# Patient Record
Sex: MALE | Race: WHITE | NOT HISPANIC OR LATINO | Employment: OTHER | URBAN - METROPOLITAN AREA
[De-identification: names, ages, dates, MRNs, and addresses within clinical notes are randomized per-mention and may not be internally consistent; named-entity substitution may affect disease eponyms.]

---

## 2017-02-21 RX ORDER — LOSARTAN POTASSIUM 100 MG/1
100 TABLET ORAL DAILY
Qty: 90 TABLET | Refills: 3 | Status: SHIPPED | OUTPATIENT
Start: 2017-02-21 | End: 2017-06-19 | Stop reason: SDUPTHER

## 2017-03-22 ENCOUNTER — TELEPHONE (OUTPATIENT)
Dept: FAMILY MEDICINE CLINIC | Facility: CLINIC | Age: 72
End: 2017-03-22

## 2017-03-22 DIAGNOSIS — H26.9 CATARACTS, BILATERAL: Primary | ICD-10-CM

## 2017-04-12 RX ORDER — EZETIMIBE 10 MG/1
10 TABLET ORAL DAILY
Qty: 90 TABLET | Refills: 3 | Status: SHIPPED | OUTPATIENT
Start: 2017-04-12 | End: 2018-09-26 | Stop reason: SDUPTHER

## 2017-06-19 ENCOUNTER — HOSPITAL ENCOUNTER (EMERGENCY)
Facility: HOSPITAL | Age: 72
Discharge: HOME | End: 2017-06-19
Attending: EMERGENCY MEDICINE | Admitting: EMERGENCY MEDICINE

## 2017-06-19 ENCOUNTER — APPOINTMENT (OUTPATIENT)
Dept: GENERAL RADIOLOGY | Facility: HOSPITAL | Age: 72
End: 2017-06-19

## 2017-06-19 VITALS
RESPIRATION RATE: 16 BRPM | TEMPERATURE: 96 F | WEIGHT: 176 LBS | BODY MASS INDEX: 28.28 KG/M2 | HEART RATE: 73 BPM | DIASTOLIC BLOOD PRESSURE: 113 MMHG | SYSTOLIC BLOOD PRESSURE: 203 MMHG | OXYGEN SATURATION: 97 % | HEIGHT: 66 IN

## 2017-06-19 DIAGNOSIS — R51.9 FACIAL PAIN: Primary | ICD-10-CM

## 2017-06-19 PROCEDURE — 70220 X-RAY EXAM OF SINUSES: CPT

## 2017-06-19 PROCEDURE — 99283 EMERGENCY DEPT VISIT LOW MDM: CPT

## 2017-06-19 RX ORDER — HYDROCODONE BITARTRATE AND ACETAMINOPHEN 5; 325 MG/1; MG/1
1 TABLET ORAL EVERY 6 HOURS PRN
Qty: 10 TABLET | Refills: 0 | Status: SHIPPED | OUTPATIENT
Start: 2017-06-19 | End: 2018-09-26

## 2017-06-19 RX ORDER — HYDROCODONE BITARTRATE AND ACETAMINOPHEN 7.5; 325 MG/1; MG/1
1 TABLET ORAL ONCE
Status: COMPLETED | OUTPATIENT
Start: 2017-06-19 | End: 2017-06-19

## 2017-06-19 RX ORDER — LOSARTAN POTASSIUM 100 MG/1
100 TABLET ORAL DAILY
Qty: 90 TABLET | Refills: 3 | Status: SHIPPED | OUTPATIENT
Start: 2017-06-19 | End: 2018-09-26 | Stop reason: SDUPTHER

## 2017-06-19 RX ADMIN — HYDROCODONE BITARTRATE AND ACETAMINOPHEN 1 TABLET: 7.5; 325 TABLET ORAL at 01:29

## 2017-06-19 NOTE — ED PROVIDER NOTES
EMERGENCY DEPARTMENT ENCOUNTER    CHIEF COMPLAINT  Chief Complaint: Facial pain  History given by: patient   History limited by: n/a  Room Number: 16/16  PMD: Tyron Hunt MD      HPI:  Pt is a 72 y.o. male who presents complaining of pain across the bridge of his nose that began at 20:30. Pt also complains of neck pain that began at the same time. Pt denies know recent injury, but states that he broke his nasal bone in his teenage years. Pt states that he has had 4-5 similar episodes of pain over the last 10 years. Pt also states that he frequently has sinus problems. Pt also reports drinking a frozen mady ~2 hours prior to the pain beginning.    Duration:  4 hours   Onset: gradual  Timing: constant   Location: bridge of the nose   Radiation: none  Quality: pain  Intensity/Severity: mild   Progression: unchanged   Associated Symptoms: neck pain   Aggravating Factors: none  Alleviating Factors: none  Previous Episodes: hx of nasal fracture, similar episodes of pain (4-5 over the last 10 years)  Treatment before arrival: none    PAST MEDICAL HISTORY  Active Ambulatory Problems     Diagnosis Date Noted   • Hypertension 09/12/2016   • GERD (gastroesophageal reflux disease) 09/12/2016   • Hyperlipidemia 09/12/2016   • Anxiety 09/12/2016     Resolved Ambulatory Problems     Diagnosis Date Noted   • No Resolved Ambulatory Problems     Past Medical History:   Diagnosis Date   • Anxiety    • GERD (gastroesophageal reflux disease)    • Hyperlipidemia    • Hypertension    • Impaired fasting blood sugar    • Kidney stones        PAST SURGICAL HISTORY  Past Surgical History:   Procedure Laterality Date   • CYST REMOVAL     • CYSTOSCOPY W/ LITHOLAPAXY / [facility]         FAMILY HISTORY  History reviewed. No pertinent family history.    SOCIAL HISTORY  Social History     Social History   • Marital status:      Spouse name: N/A   • Number of children: N/A   • Years of education: N/A     Occupational History   • Not on  file.     Social History Main Topics   • Smoking status: Never Smoker   • Smokeless tobacco: Never Used   • Alcohol use Yes   • Drug use: No   • Sexual activity: Yes     Partners: Male     Other Topics Concern   • Not on file     Social History Narrative   • No narrative on file       ALLERGIES  Sulfa antibiotics    REVIEW OF SYSTEMS  Review of Systems   Constitutional: Negative for chills and fever.   HENT: Negative for congestion and sore throat.         Pain to the bridge of the nose     Eyes: Negative.    Respiratory: Negative for cough and shortness of breath.    Cardiovascular: Negative for chest pain and leg swelling.   Gastrointestinal: Negative for abdominal pain, diarrhea and vomiting.   Genitourinary: Negative for difficulty urinating and dysuria.   Musculoskeletal: Positive for neck pain. Negative for back pain.   Skin: Negative for rash and wound.   Allergic/Immunologic: Negative.    Neurological: Negative for dizziness, weakness, numbness and headaches.   Psychiatric/Behavioral: Negative.    All other systems reviewed and are negative.      PHYSICAL EXAM  ED Triage Vitals   Temp Heart Rate Resp BP SpO2   06/19/17 0007 06/19/17 0007 06/19/17 0007 06/19/17 0019 06/19/17 0007   96 °F (35.6 °C) 73 16 203/113 97 %      Temp src Heart Rate Source Patient Position BP Location FiO2 (%)   -- -- 06/19/17 0019 06/19/17 0019 --     Lying Right arm        Physical Exam   Constitutional: He is oriented to person, place, and time and well-developed, well-nourished, and in no distress.   HENT:   Head: Normocephalic and atraumatic.   Nose: Nose normal. Right sinus exhibits no maxillary sinus tenderness and no frontal sinus tenderness. Left sinus exhibits no maxillary sinus tenderness and no frontal sinus tenderness.   Eyes: EOM are normal. Pupils are equal, round, and reactive to light.   Neck: Normal range of motion. Neck supple.   Neck is non tender     Cardiovascular: Normal rate, regular rhythm and normal heart  sounds.    Pulmonary/Chest: Effort normal and breath sounds normal. No respiratory distress.   Abdominal: Soft. There is no tenderness. There is no rebound and no guarding.   Musculoskeletal: Normal range of motion. He exhibits no edema.   Neurological: He is alert and oriented to person, place, and time. He has normal sensation and normal strength.   Skin: Skin is warm and dry.   Psychiatric: Mood and affect normal.   Nursing note and vitals reviewed.    RADIOLOGY  XR Sinuses 3+ View   Preliminary Result   No definite evidence for acute or chronic sinusitis. If there is high   clinical concern, CT scan may be performed which is more sensitive and   specific for sinus pathology.                I ordered the above noted radiological studies. Interpreted by radiologist. Reviewed by me in PACS.       PROCEDURES  Procedures      PROGRESS AND CONSULTS  ED Course     00:28  XR sinuses ordered.     01:17  Norco ordered to treat pain.     01;20  BP- (!) 203/113 HR- 73 Temp- 96 °F (35.6 °C) O2 sat- 97%  Rechecked the patient who is in NAD and is resting comfortably. Advised pt that XR shows NAD. Plan to treat pain. Advised pt to f.u with PMD for blood pressure recheck, and with his ENT for further evaluation. Pt understands and agrees with the plan, all questions answered.      MEDICAL DECISION MAKING  Results were reviewed/discussed with the patient and they were also made aware of online access. Pt also made aware that some labs, such as cultures, will not be resulted during ER visit and follow up with PMD is necessary.     MDM  Number of Diagnoses or Management Options  Facial pain:      Amount and/or Complexity of Data Reviewed  Tests in the radiology section of CPT®: ordered and reviewed (XR sinus shows NAD)    Patient Progress  Patient progress: stable         DIAGNOSIS  Final diagnoses:   Facial pain       DISPOSITION    DISCHARGE    Patient discharged in stable condition.    Reviewed implications of results,  diagnosis, meds, responsibility to follow up, warning signs and symptoms of possible worsening, potential complications and reasons to return to ER.    Patient/Family voiced understanding of above instructions.    Discussed plan for discharge, as there is no emergent indication for admission.  Pt/family is agreeable and understands need for follow up and repeat testing.  Pt is aware that discharge does not mean that nothing is wrong but it indicates no emergency is present that requires admission and they must continue care with follow-up as given below or physician of their choice.     FOLLOW-UP  Tyron Hunt MD  [id number] [id number]  [phone]    Schedule an appointment as soon as possible for a visit           Medication List      Changed          escitalopram 20 MG tablet   Commonly known as:  LEXAPRO   Take 1 tablet by mouth daily.   What changed:  how much to take         Stop          Diclofenac 18 MG capsule   Commonly known as:  ZORVOLEX       ondansetron 4 MG tablet   Commonly known as:  ZOFRAN       tamsulosin 0.4 MG capsule 24 hr capsule   Commonly known as:  FLOMAX           Latest Documented Vital Signs:  As of 1:19 AM  BP- (!) 203/113 HR- 73 Temp- 96 °F (35.6 °C) O2 sat- 97%    --  Documentation assistance provided by regina Kevin for Dr White.  Information recorded by the scribe was done at my direction and has been verified and validated by me.        Mercedes Kevin  06/19/17 0122       Daniel White MD  06/19/17 0665

## 2017-06-19 NOTE — ED NOTES
Pt. Broke nose playing football years ago. Pt. Has had this pain before, but usually a couple of aleve take care of it, but tonight aleve did not work.     Emigdio Spann RN  06/19/17 0025  Pt. Also c/o of neck pain/stiffness, but does not have trouble turning side to side (no increased pain with head movement.     Emigdio Spann RN  06/19/17 0027

## 2017-06-19 NOTE — ED NOTES
Pt. Reports drinking about 1/2 a mady at 1700. Pt. Denies any direct pain from cold mady.      Emigdio Spann RN  06/19/17 0019  Pt. Reporting that his son is a medical Dr. And took him off of BP medicine approx. 3-4 mos. Pt. Routinely works out and lifts weights.     Emigdio Spann RN  06/19/17 0022

## 2017-06-20 ENCOUNTER — TELEPHONE (OUTPATIENT)
Dept: SOCIAL WORK | Facility: HOSPITAL | Age: 72
End: 2017-06-20

## 2017-06-20 NOTE — TELEPHONE ENCOUNTER
ED f/u phone call. States he is doing well and has already followed up w/ PCP. No questions/concerns

## 2017-07-27 ENCOUNTER — TELEPHONE (OUTPATIENT)
Dept: FAMILY MEDICINE CLINIC | Facility: CLINIC | Age: 72
End: 2017-07-27

## 2017-07-27 DIAGNOSIS — R21 RASH AND NONSPECIFIC SKIN ERUPTION: Primary | ICD-10-CM

## 2017-07-31 ENCOUNTER — TELEPHONE (OUTPATIENT)
Dept: FAMILY MEDICINE CLINIC | Facility: CLINIC | Age: 72
End: 2017-07-31

## 2017-07-31 DIAGNOSIS — N40.1 BENIGN PROSTATIC HYPERPLASIA WITH LOWER URINARY TRACT SYMPTOMS, UNSPECIFIED MORPHOLOGY: Primary | ICD-10-CM

## 2017-07-31 NOTE — TELEPHONE ENCOUNTER
Pt needs yearly visit with Urology, this is continued referral, has to be scheduled after 8/22/17 for yearly exam. Pt aware

## 2017-08-14 ENCOUNTER — TELEPHONE (OUTPATIENT)
Dept: FAMILY MEDICINE CLINIC | Facility: CLINIC | Age: 72
End: 2017-08-14

## 2017-08-14 DIAGNOSIS — E78.5 HYPERLIPIDEMIA, UNSPECIFIED HYPERLIPIDEMIA TYPE: ICD-10-CM

## 2017-08-14 RX ORDER — ATORVASTATIN CALCIUM 10 MG/1
10 TABLET, FILM COATED ORAL DAILY
Qty: 30 TABLET | Refills: 11 | Status: SHIPPED | OUTPATIENT
Start: 2017-08-14 | End: 2017-08-14 | Stop reason: SDUPTHER

## 2017-08-14 RX ORDER — ATORVASTATIN CALCIUM 10 MG/1
10 TABLET, FILM COATED ORAL DAILY
Qty: 90 TABLET | Refills: 3 | Status: SHIPPED | OUTPATIENT
Start: 2017-08-14 | End: 2018-09-26 | Stop reason: SDUPTHER

## 2017-09-14 DIAGNOSIS — R73.01 IFG (IMPAIRED FASTING GLUCOSE): ICD-10-CM

## 2017-09-14 DIAGNOSIS — I10 ESSENTIAL HYPERTENSION: Primary | ICD-10-CM

## 2017-09-15 ENCOUNTER — RESULTS ENCOUNTER (OUTPATIENT)
Dept: FAMILY MEDICINE CLINIC | Facility: CLINIC | Age: 72
End: 2017-09-15

## 2017-09-15 DIAGNOSIS — R73.01 IFG (IMPAIRED FASTING GLUCOSE): ICD-10-CM

## 2017-09-15 DIAGNOSIS — I10 ESSENTIAL HYPERTENSION: ICD-10-CM

## 2017-09-19 ENCOUNTER — OFFICE VISIT (OUTPATIENT)
Dept: FAMILY MEDICINE CLINIC | Facility: CLINIC | Age: 72
End: 2017-09-19

## 2017-09-19 VITALS
BODY MASS INDEX: 28.12 KG/M2 | SYSTOLIC BLOOD PRESSURE: 125 MMHG | WEIGHT: 175 LBS | RESPIRATION RATE: 16 BRPM | HEART RATE: 70 BPM | DIASTOLIC BLOOD PRESSURE: 85 MMHG | TEMPERATURE: 98.1 F | HEIGHT: 66 IN

## 2017-09-19 DIAGNOSIS — Z00.00 MEDICARE ANNUAL WELLNESS VISIT, SUBSEQUENT: Primary | ICD-10-CM

## 2017-09-19 DIAGNOSIS — Z23 IMMUNIZATION DUE: ICD-10-CM

## 2017-09-19 DIAGNOSIS — E78.2 MIXED HYPERLIPIDEMIA: Primary | ICD-10-CM

## 2017-09-19 LAB
ALBUMIN SERPL-MCNC: 4.5 G/DL (ref 3.5–5.2)
ALBUMIN/GLOB SERPL: 1.9 G/DL
ALP SERPL-CCNC: 52 U/L (ref 39–117)
ALT SERPL-CCNC: 17 U/L (ref 1–41)
AST SERPL-CCNC: 17 U/L (ref 1–40)
BASOPHILS # BLD AUTO: 0.04 10*3/MM3 (ref 0–0.2)
BASOPHILS NFR BLD AUTO: 0.8 % (ref 0–1.5)
BILIRUB SERPL-MCNC: 1 MG/DL (ref 0.1–1.2)
BUN SERPL-MCNC: 13 MG/DL (ref 8–23)
BUN/CREAT SERPL: 10.2 (ref 7–25)
CALCIUM SERPL-MCNC: 9.9 MG/DL (ref 8.6–10.5)
CHLORIDE SERPL-SCNC: 102 MMOL/L (ref 98–107)
CHOLEST SERPL-MCNC: 127 MG/DL (ref 0–200)
CO2 SERPL-SCNC: 24.5 MMOL/L (ref 22–29)
CREAT SERPL-MCNC: 1.27 MG/DL (ref 0.76–1.27)
EOSINOPHIL # BLD AUTO: 0.15 10*3/MM3 (ref 0–0.7)
EOSINOPHIL NFR BLD AUTO: 3.1 % (ref 0.3–6.2)
ERYTHROCYTE [DISTWIDTH] IN BLOOD BY AUTOMATED COUNT: 13.9 % (ref 11.5–14.5)
GLOBULIN SER CALC-MCNC: 2.4 GM/DL
GLUCOSE SERPL-MCNC: 113 MG/DL (ref 65–99)
HBA1C MFR BLD: 5.9 % (ref 4.8–5.6)
HCT VFR BLD AUTO: 45.8 % (ref 40.4–52.2)
HDLC SERPL-MCNC: 44 MG/DL (ref 40–60)
HGB BLD-MCNC: 16 G/DL (ref 13.7–17.6)
IMM GRANULOCYTES # BLD: 0 10*3/MM3 (ref 0–0.03)
IMM GRANULOCYTES NFR BLD: 0 % (ref 0–0.5)
LDLC SERPL CALC-MCNC: 60 MG/DL (ref 0–100)
LYMPHOCYTES # BLD AUTO: 1.61 10*3/MM3 (ref 0.9–4.8)
LYMPHOCYTES NFR BLD AUTO: 33.5 % (ref 19.6–45.3)
MCH RBC QN AUTO: 32.4 PG (ref 27–32.7)
MCHC RBC AUTO-ENTMCNC: 34.9 G/DL (ref 32.6–36.4)
MCV RBC AUTO: 92.7 FL (ref 79.8–96.2)
MONOCYTES # BLD AUTO: 0.44 10*3/MM3 (ref 0.2–1.2)
MONOCYTES NFR BLD AUTO: 9.2 % (ref 5–12)
NEUTROPHILS # BLD AUTO: 2.56 10*3/MM3 (ref 1.9–8.1)
NEUTROPHILS NFR BLD AUTO: 53.4 % (ref 42.7–76)
PLATELET # BLD AUTO: 151 10*3/MM3 (ref 140–500)
POTASSIUM SERPL-SCNC: 4.1 MMOL/L (ref 3.5–5.2)
PROT SERPL-MCNC: 6.9 G/DL (ref 6–8.5)
RBC # BLD AUTO: 4.94 10*6/MM3 (ref 4.6–6)
SODIUM SERPL-SCNC: 142 MMOL/L (ref 136–145)
TRIGL SERPL-MCNC: 114 MG/DL (ref 0–150)
TSH SERPL DL<=0.005 MIU/L-ACNC: 1.54 MIU/ML (ref 0.27–4.2)
VLDLC SERPL CALC-MCNC: 22.8 MG/DL (ref 5–40)
WBC # BLD AUTO: 4.8 10*3/MM3 (ref 4.5–10.7)

## 2017-09-19 PROCEDURE — G0008 ADMIN INFLUENZA VIRUS VAC: HCPCS | Performed by: FAMILY MEDICINE

## 2017-09-19 PROCEDURE — 96160 PT-FOCUSED HLTH RISK ASSMT: CPT | Performed by: FAMILY MEDICINE

## 2017-09-19 PROCEDURE — G0439 PPPS, SUBSEQ VISIT: HCPCS | Performed by: FAMILY MEDICINE

## 2017-09-19 PROCEDURE — 90686 IIV4 VACC NO PRSV 0.5 ML IM: CPT | Performed by: FAMILY MEDICINE

## 2017-09-19 NOTE — PROGRESS NOTES
QUICK REFERENCE INFORMATION:  The ABCs of the Annual Wellness Visit    Subsequent Medicare Wellness Visit    HEALTH RISK ASSESSMENT    1945    Recent Hospitalizations:  No hospitalization(s) within the last year..        Current Medical Providers:  Patient Care Team:  Tyron Hunt MD as PCP - General  Tyron Hunt MD as PCP - Family Medicine        Smoking Status:  History   Smoking Status   • Never Smoker   Smokeless Tobacco   • Never Used       Alcohol Consumption:  History   Alcohol Use   • Yes       Depression Screen:   PHQ-2/PHQ-9 Depression Screening 9/19/2017   Little interest or pleasure in doing things 0   Feeling down, depressed, or hopeless 0   Trouble falling or staying asleep, or sleeping too much 0   Feeling tired or having little energy 0   Poor appetite or overeating 0   Feeling bad about yourself - or that you are a failure or have let yourself or your family down 0   Trouble concentrating on things, such as reading the newspaper or watching television 0   Moving or speaking so slowly that other people could have noticed. Or the opposite - being so fidgety or restless that you have been moving around a lot more than usual 0   Thoughts that you would be better off dead, or of hurting yourself in some way 0   Total Score 0   If you checked off any problems, how difficult have these problems made it for you to do your work, take care of things at home, or get along with other people? Not difficult at all       Health Habits and Functional and Cognitive Screening:  Functional & Cognitive Status 9/19/2017   Do you have difficulty preparing food and eating? No   Do you have difficulty bathing yourself? No   Do you have difficulty getting dressed? No   Do you have difficulty using the toilet? No   Do you have difficulty moving around from place to place? No   In the past year have you fallen or experienced a near fall? No   Do you need help using the phone?  No   Are you deaf or do you have serious  difficulty hearing?  No   Do you need help with transportation? No   Do you need help shopping? No   Do you need help preparing meals?  No   Do you need help with housework?  No   Do you need help with laundry? No   Do you need help taking your medications? No   Do you need help managing money? No   Do you have difficulty concentrating, remembering or making decisions? No       Health Habits  Current Diet: Frequent Junk Food  Dental Exam: Up to date  Eye Exam: Up to date  Exercise (times per week): 4 times per week  Current Exercise Activities Include: Weightlifting      Does the patient have evidence of cognitive impairment? No    Aspirin use counseling: Taking ASA appropriately as indicated      Recent Lab Results:  CMP:  Lab Results   Component Value Date    GLU 97 09/01/2016    BUN 14 12/11/2016    CREATININE 1.27 12/11/2016    EGFRIFNONA 56 (L) 12/11/2016    EGFRIFAFRI 73 09/01/2016    BCR 11.0 12/11/2016     12/11/2016    K 3.8 12/11/2016    CO2 24.6 12/11/2016    CALCIUM 9.8 12/11/2016    PROTENTOTREF 7.3 09/01/2016    ALBUMIN 4.50 12/11/2016    LABGLOBREF 2.4 09/01/2016    LABIL2 1.6 12/11/2016    BILITOT 0.5 12/11/2016    ALKPHOS 60 12/11/2016    AST 18 12/11/2016    ALT 20 12/11/2016     Lipid Panel:  Lab Results   Component Value Date    TRIG 136 09/01/2016    HDL 42 09/01/2016    VLDL 27.2 09/01/2016    LDLHDL 1.59 09/01/2016     HbA1c:       Visual Acuity:  No exam data present    Age-appropriate Screening Schedule:  Refer to the list below for future screening recommendations based on patient's age, sex and/or medical conditions. Orders for these recommended tests are listed in the plan section. The patient has been provided with a written plan.    Health Maintenance   Topic Date Due   • LIPID PANEL  09/01/2017   • PNEUMOCOCCAL VACCINES (65+ LOW/MEDIUM RISK) (2 of 2 - PPSV23) 09/12/2017   • COLONOSCOPY  04/30/2022   • INFLUENZA VACCINE  Completed   • ZOSTER VACCINE  Completed   • TDAP/TD VACCINES   Excluded        Subjective   History of Present Illness    Tyron Hunt is a 72 y.o. male who presents for an Subsequent Wellness Visit.    The following portions of the patient's history were reviewed and updated as appropriate: allergies, current medications, past family history, past medical history, past social history, past surgical history and problem list.    Outpatient Medications Prior to Visit   Medication Sig Dispense Refill   • atorvastatin (LIPITOR) 10 MG tablet Take 1 tablet by mouth Daily. 90 tablet 3   • escitalopram (LEXAPRO) 20 MG tablet Take 1 tablet by mouth daily. (Patient taking differently: Take 10 mg by mouth Daily.) 90 tablet 3   • ezetimibe (ZETIA) 10 MG tablet Take 1 tablet by mouth Daily. 90 tablet 3   • losartan (COZAAR) 100 MG tablet Take 1 tablet by mouth Daily. 90 tablet 3   • acetaminophen (TYLENOL) 325 MG tablet Take by mouth.     • aspirin 81 MG tablet Take  by mouth Daily. Pt. Says he takes one every other day.     • Diclofenac (ZORVOLEX) 18 MG capsule Take 18 mg by mouth 2 (two) times a day. 10 capsule 0   • esomeprazole (NexIUM) 40 MG capsule Take by mouth.     • HYDROcodone-acetaminophen (NORCO) 5-325 MG per tablet Take 1 tablet by mouth Every 6 (Six) Hours As Needed for Severe Pain (7-10). 10 tablet 0   • Magnesium 100 MG capsule Take by mouth.     • Multiple Vitamin tablet Take by mouth daily.     • ondansetron (ZOFRAN) 4 MG tablet Take 1 tablet by mouth Every 6 (Six) Hours. 20 tablet 0   • tamsulosin (FLOMAX) 0.4 MG capsule 24 hr capsule Take 1 capsule by mouth Daily. 5 capsule 0     No facility-administered medications prior to visit.        Patient Active Problem List   Diagnosis   • Hypertension   • GERD (gastroesophageal reflux disease)   • Hyperlipidemia   • Anxiety   • Benign prostatic hyperplasia with lower urinary tract symptoms       Advance Care Planning:  has an advance directive - a copy HAS NOT been provided    Identification of Risk Factors:  Risk factors  "include: weight  and cardiovascular risk.    Review of Systems    Compared to one year ago, the patient feels his physical health is the same.  Compared to one year ago, the patient feels his mental health is the same.    Objective     Physical Exam    Vitals:    09/19/17 1013   BP: 125/85   Pulse: 70   Resp: 16   Temp: 98.1 °F (36.7 °C)   Weight: 175 lb (79.4 kg)   Height: 66\" (167.6 cm)   PainSc: 0-No pain       Body mass index is 28.25 kg/(m^2).  Discussed the patient's BMI with him. The BMI is in the acceptable range.    Assessment/Plan   Patient Self-Management and Personalized Health Advice  The patient has been provided with information about: diet, exercise and weight management and preventive services including:   · Exercise counseling provided, Fall Risk assessment done, Nutrition counseling provided.    Visit Diagnoses:    ICD-10-CM ICD-9-CM   1. Medicare annual wellness visit, subsequent Z00.00 V70.0   2. Immunization due Z23 V05.9       Orders Placed This Encounter   Procedures   • Flu Vaccine Quad PF 3YR+       Outpatient Encounter Prescriptions as of 9/19/2017   Medication Sig Dispense Refill   • atorvastatin (LIPITOR) 10 MG tablet Take 1 tablet by mouth Daily. 90 tablet 3   • escitalopram (LEXAPRO) 20 MG tablet Take 1 tablet by mouth daily. (Patient taking differently: Take 10 mg by mouth Daily.) 90 tablet 3   • ezetimibe (ZETIA) 10 MG tablet Take 1 tablet by mouth Daily. 90 tablet 3   • losartan (COZAAR) 100 MG tablet Take 1 tablet by mouth Daily. 90 tablet 3   • acetaminophen (TYLENOL) 325 MG tablet Take by mouth.     • aspirin 81 MG tablet Take  by mouth Daily. Pt. Says he takes one every other day.     • Diclofenac (ZORVOLEX) 18 MG capsule Take 18 mg by mouth 2 (two) times a day. 10 capsule 0   • esomeprazole (NexIUM) 40 MG capsule Take by mouth.     • HYDROcodone-acetaminophen (NORCO) 5-325 MG per tablet Take 1 tablet by mouth Every 6 (Six) Hours As Needed for Severe Pain (7-10). 10 tablet 0   • " Magnesium 100 MG capsule Take by mouth.     • Multiple Vitamin tablet Take by mouth daily.     • ondansetron (ZOFRAN) 4 MG tablet Take 1 tablet by mouth Every 6 (Six) Hours. 20 tablet 0   • tamsulosin (FLOMAX) 0.4 MG capsule 24 hr capsule Take 1 capsule by mouth Daily. 5 capsule 0     No facility-administered encounter medications on file as of 9/19/2017.        Reviewed use of high risk medication in the elderly: not applicable  Reviewed for potential of harmful drug interactions in the elderly: not applicable    Follow Up:  Return in about 1 year (around 9/19/2018) for Annual physical.     An After Visit Summary and PPPS with all of these plans were given to the patient.

## 2017-09-19 NOTE — PATIENT INSTRUCTIONS
Medicare Wellness  Personal Prevention Plan of Service     Date of Office Visit:  2017  Encounter Provider:  Tyron Hunt MD  Place of Service:  [facility]  Patient Name: Tyron Hunt  :  1945    As part of the Medicare Wellness portion of your visit today, we are providing you with this personalized preventive plan of services (PPPS). This plan is based upon recommendations of the [place] Preventive Services Task Force (USPSTF) and the Advisory Committee on Immunization Practices (ACIP).    This lists the preventive care services that should be considered, and provides dates of when you are due. Items listed as completed are up-to-date and do not require any further intervention.    Health Maintenance   Topic Date Due   • INFLUENZA VACCINE  2017   • LIPID PANEL  2017   • PNEUMOCOCCAL VACCINES (65+ LOW/MEDIUM RISK) (2 of 2 - PPSV23) 2017   • MEDICARE ANNUAL WELLNESS  2018   • COLONOSCOPY  2022   • ZOSTER VACCINE  Completed   • TDAP/TD VACCINES  Excluded       No orders of the defined types were placed in this encounter.      Return in about 1 year (around 2018) for Annual physical.

## 2017-09-25 RX ORDER — ESCITALOPRAM OXALATE 20 MG/1
TABLET ORAL
Qty: 90 TABLET | Refills: 2 | Status: SHIPPED | OUTPATIENT
Start: 2017-09-25 | End: 2018-09-26 | Stop reason: SDUPTHER

## 2017-09-26 RX ORDER — ESCITALOPRAM OXALATE 20 MG/1
TABLET ORAL
Qty: 90 TABLET | Refills: 0 | Status: SHIPPED | OUTPATIENT
Start: 2017-09-26 | End: 2018-05-14

## 2018-03-14 ENCOUNTER — TRANSCRIBE ORDERS (OUTPATIENT)
Dept: ADMINISTRATIVE | Facility: HOSPITAL | Age: 73
End: 2018-03-14

## 2018-03-14 DIAGNOSIS — Z13.9 VISIT FOR SCREENING: Primary | ICD-10-CM

## 2018-03-22 DIAGNOSIS — I10 BENIGN HYPERTENSION: ICD-10-CM

## 2018-03-23 RX ORDER — AMLODIPINE BESYLATE 10 MG/1
TABLET ORAL
Qty: 90 TABLET | Refills: 0 | OUTPATIENT
Start: 2018-03-23

## 2018-03-26 ENCOUNTER — HOSPITAL ENCOUNTER (OUTPATIENT)
Dept: CARDIOLOGY | Facility: HOSPITAL | Age: 73
Discharge: HOME | End: 2018-03-26
Admitting: FAMILY MEDICINE

## 2018-03-26 VITALS
BODY MASS INDEX: 27.97 KG/M2 | WEIGHT: 174 LBS | HEART RATE: 60 BPM | DIASTOLIC BLOOD PRESSURE: 81 MMHG | SYSTOLIC BLOOD PRESSURE: 135 MMHG | HEIGHT: 66 IN

## 2018-03-26 DIAGNOSIS — Z13.9 VISIT FOR SCREENING: ICD-10-CM

## 2018-03-26 LAB
BH CV ECHO MEAS - DIST AO DIAM: 1.4 CM
BH CV VAS BP LEFT ARM: NORMAL MMHG
BH CV VAS BP RIGHT ARM: NORMAL MMHG
BH CV XLRA MEAS - MID AO DIAM: 1.63 CM
BH CV XLRA MEAS - PAD LEFT ABI DP: 1.2
BH CV XLRA MEAS - PAD LEFT ABI PT: 1.21
BH CV XLRA MEAS - PAD LEFT ARM: 133 MMHG
BH CV XLRA MEAS - PAD LEFT LEG DP: 162 MMHG
BH CV XLRA MEAS - PAD LEFT LEG PT: 164 MMHG
BH CV XLRA MEAS - PAD RIGHT ABI DP: 1.2
BH CV XLRA MEAS - PAD RIGHT ABI PT: 1.18
BH CV XLRA MEAS - PAD RIGHT ARM: 135 MMHG
BH CV XLRA MEAS - PAD RIGHT LEG DP: 162 MMHG
BH CV XLRA MEAS - PAD RIGHT LEG PT: 160 MMHG
BH CV XLRA MEAS - PROX AO DIAM: 1.68 CM
BH CV XLRA MEAS LEFT ICA/CCA RATIO: 1.16
BH CV XLRA MEAS LEFT MID CCA PSV: NORMAL CM/SEC
BH CV XLRA MEAS LEFT MID ICA PSV: NORMAL CM/SEC
BH CV XLRA MEAS LEFT PROX ECA PSV: NORMAL CM/SEC
BH CV XLRA MEAS RIGHT ICA/CCA RATIO: 0.86
BH CV XLRA MEAS RIGHT MID CCA PSV: NORMAL CM/SEC
BH CV XLRA MEAS RIGHT MID ICA PSV: NORMAL CM/SEC
BH CV XLRA MEAS RIGHT PROX ECA PSV: NORMAL CM/SEC

## 2018-03-26 PROCEDURE — 93799 UNLISTED CV SVC/PROCEDURE: CPT

## 2018-08-08 RX ORDER — LOSARTAN POTASSIUM 100 MG/1
TABLET ORAL
Qty: 90 TABLET | Refills: 2 | OUTPATIENT
Start: 2018-08-08

## 2018-09-17 ENCOUNTER — TELEPHONE (OUTPATIENT)
Dept: FAMILY MEDICINE CLINIC | Facility: CLINIC | Age: 73
End: 2018-09-17

## 2018-09-17 DIAGNOSIS — R73.01 IMPAIRED FASTING GLUCOSE: ICD-10-CM

## 2018-09-17 DIAGNOSIS — I10 ESSENTIAL HYPERTENSION: Primary | ICD-10-CM

## 2018-09-17 DIAGNOSIS — E78.2 MIXED HYPERLIPIDEMIA: ICD-10-CM

## 2018-09-22 LAB
ALBUMIN SERPL-MCNC: 4.5 G/DL (ref 3.5–5.2)
ALBUMIN/GLOB SERPL: 2 G/DL
ALP SERPL-CCNC: 56 U/L (ref 39–117)
ALT SERPL-CCNC: 16 U/L (ref 1–41)
AST SERPL-CCNC: 16 U/L (ref 1–40)
BASOPHILS # BLD AUTO: 0.03 10*3/MM3 (ref 0–0.2)
BASOPHILS NFR BLD AUTO: 0.6 % (ref 0–1.5)
BILIRUB SERPL-MCNC: 0.7 MG/DL (ref 0.1–1.2)
BUN SERPL-MCNC: 15 MG/DL (ref 8–23)
BUN/CREAT SERPL: 12.4 (ref 7–25)
CALCIUM SERPL-MCNC: 9.5 MG/DL (ref 8.6–10.5)
CHLORIDE SERPL-SCNC: 104 MMOL/L (ref 98–107)
CHOLEST SERPL-MCNC: 121 MG/DL (ref 100–199)
CO2 SERPL-SCNC: 26.2 MMOL/L (ref 22–29)
CREAT SERPL-MCNC: 1.21 MG/DL (ref 0.76–1.27)
EOSINOPHIL # BLD AUTO: 0.14 10*3/MM3 (ref 0–0.7)
EOSINOPHIL NFR BLD AUTO: 2.8 % (ref 0.3–6.2)
ERYTHROCYTE [DISTWIDTH] IN BLOOD BY AUTOMATED COUNT: 13.1 % (ref 11.5–14.5)
GLOBULIN SER CALC-MCNC: 2.3 GM/DL
GLUCOSE SERPL-MCNC: 100 MG/DL (ref 65–99)
HBA1C MFR BLD: 6 % (ref 4.8–5.6)
HCT VFR BLD AUTO: 44.9 % (ref 40.4–52.2)
HDL SERPL-SCNC: 33.8 UMOL/L
HDLC SERPL-MCNC: 42 MG/DL
HGB BLD-MCNC: 15.3 G/DL (ref 13.7–17.6)
IMM GRANULOCYTES # BLD: 0.01 10*3/MM3 (ref 0–0.03)
IMM GRANULOCYTES NFR BLD: 0.2 % (ref 0–0.5)
LDL SERPL QN: 19.9 NM
LDL SERPL-SCNC: 877 NMOL/L
LDL SMALL SERPL-SCNC: 657 NMOL/L
LDLC SERPL CALC-MCNC: 56 MG/DL (ref 0–99)
LYMPHOCYTES # BLD AUTO: 1.79 10*3/MM3 (ref 0.9–4.8)
LYMPHOCYTES NFR BLD AUTO: 36.3 % (ref 19.6–45.3)
MCH RBC QN AUTO: 31.5 PG (ref 27–32.7)
MCHC RBC AUTO-ENTMCNC: 34.1 G/DL (ref 32.6–36.4)
MCV RBC AUTO: 92.6 FL (ref 79.8–96.2)
MONOCYTES # BLD AUTO: 0.34 10*3/MM3 (ref 0.2–1.2)
MONOCYTES NFR BLD AUTO: 6.9 % (ref 5–12)
NEUTROPHILS # BLD AUTO: 2.63 10*3/MM3 (ref 1.9–8.1)
NEUTROPHILS NFR BLD AUTO: 53.4 % (ref 42.7–76)
PLATELET # BLD AUTO: 151 10*3/MM3 (ref 140–500)
POTASSIUM SERPL-SCNC: 4 MMOL/L (ref 3.5–5.2)
PROT SERPL-MCNC: 6.8 G/DL (ref 6–8.5)
RBC # BLD AUTO: 4.85 10*6/MM3 (ref 4.6–6)
SODIUM SERPL-SCNC: 145 MMOL/L (ref 136–145)
TRIGL SERPL-MCNC: 113 MG/DL (ref 0–149)
TSH SERPL DL<=0.005 MIU/L-ACNC: 2.03 MIU/ML (ref 0.27–4.2)
WBC # BLD AUTO: 4.93 10*3/MM3 (ref 4.5–10.7)

## 2018-09-26 ENCOUNTER — OFFICE VISIT (OUTPATIENT)
Dept: FAMILY MEDICINE CLINIC | Facility: CLINIC | Age: 73
End: 2018-09-26

## 2018-09-26 VITALS
DIASTOLIC BLOOD PRESSURE: 83 MMHG | SYSTOLIC BLOOD PRESSURE: 119 MMHG | RESPIRATION RATE: 16 BRPM | HEART RATE: 66 BPM | TEMPERATURE: 97.4 F | BODY MASS INDEX: 28.12 KG/M2 | HEIGHT: 66 IN | WEIGHT: 175 LBS

## 2018-09-26 DIAGNOSIS — Z23 IMMUNIZATION DUE: ICD-10-CM

## 2018-09-26 DIAGNOSIS — I10 ESSENTIAL HYPERTENSION: ICD-10-CM

## 2018-09-26 DIAGNOSIS — F33.42 RECURRENT MAJOR DEPRESSIVE DISORDER, IN FULL REMISSION (HCC): ICD-10-CM

## 2018-09-26 DIAGNOSIS — E78.5 HYPERLIPIDEMIA, UNSPECIFIED HYPERLIPIDEMIA TYPE: ICD-10-CM

## 2018-09-26 DIAGNOSIS — R73.01 IMPAIRED FASTING GLUCOSE: ICD-10-CM

## 2018-09-26 DIAGNOSIS — Z00.00 MEDICARE ANNUAL WELLNESS VISIT, SUBSEQUENT: Primary | ICD-10-CM

## 2018-09-26 PROCEDURE — 90662 IIV NO PRSV INCREASED AG IM: CPT | Performed by: FAMILY MEDICINE

## 2018-09-26 PROCEDURE — 90732 PPSV23 VACC 2 YRS+ SUBQ/IM: CPT | Performed by: FAMILY MEDICINE

## 2018-09-26 PROCEDURE — 99214 OFFICE O/P EST MOD 30 MIN: CPT | Performed by: FAMILY MEDICINE

## 2018-09-26 PROCEDURE — G0008 ADMIN INFLUENZA VIRUS VAC: HCPCS | Performed by: FAMILY MEDICINE

## 2018-09-26 PROCEDURE — G0009 ADMIN PNEUMOCOCCAL VACCINE: HCPCS | Performed by: FAMILY MEDICINE

## 2018-09-26 PROCEDURE — G0439 PPPS, SUBSEQ VISIT: HCPCS | Performed by: FAMILY MEDICINE

## 2018-09-26 RX ORDER — ATORVASTATIN CALCIUM 10 MG/1
10 TABLET, FILM COATED ORAL DAILY
Qty: 90 TABLET | Refills: 3 | Status: SHIPPED | OUTPATIENT
Start: 2018-09-26 | End: 2020-07-09 | Stop reason: SDUPTHER

## 2018-09-26 RX ORDER — ESCITALOPRAM OXALATE 20 MG/1
20 TABLET ORAL DAILY
Qty: 90 TABLET | Refills: 3 | Status: SHIPPED | OUTPATIENT
Start: 2018-09-26 | End: 2019-10-07 | Stop reason: SDUPTHER

## 2018-09-26 RX ORDER — EZETIMIBE 10 MG/1
10 TABLET ORAL DAILY
Qty: 90 TABLET | Refills: 3 | Status: SHIPPED | OUTPATIENT
Start: 2018-09-26 | End: 2019-02-04 | Stop reason: SDUPTHER

## 2018-09-26 RX ORDER — LOSARTAN POTASSIUM 50 MG/1
50 TABLET ORAL DAILY
Qty: 90 TABLET | Refills: 3 | Status: SHIPPED | OUTPATIENT
Start: 2018-09-26 | End: 2018-10-25 | Stop reason: SDUPTHER

## 2018-09-26 RX ORDER — LOSARTAN POTASSIUM 100 MG/1
100 TABLET ORAL DAILY
Qty: 90 TABLET | Refills: 3 | Status: CANCELLED | OUTPATIENT
Start: 2018-09-26

## 2018-09-26 NOTE — PROGRESS NOTES
"Subjective   Tyron Hunt is a 73 y.o. male.     History of Present Illness     Chief Complaint:   Chief Complaint   Patient presents with   • medicare wellness exam   • Hypertension     med refill - lab results   • Hyperlipidemia   • Anxiety   • Immunizations     high dose flu injection given left deloid  - jms       Tyron Hunt 73 y.o. male who presents today for Medical Management of the below listed issues and medication refills.  he has a problem list of   Patient Active Problem List   Diagnosis   • Hypertension   • GERD (gastroesophageal reflux disease)   • Hyperlipidemia   • Anxiety   • Benign prostatic hyperplasia with lower urinary tract symptoms   • Impaired fasting glucose   • Recurrent major depressive disorder, in full remission (CMS/[facility])   .  Since the last visit, he has overall felt well.  he has been compliant with   Current Outpatient Prescriptions:   •  losartan (COZAAR) 50 MG tablet, Take 1 tablet by mouth Daily., Disp: 90 tablet, Rfl: 3  •  acetaminophen (TYLENOL) 325 MG tablet, Take by mouth., Disp: , Rfl:   •  aspirin 81 MG tablet, Take  by mouth Daily. Pt. Says he takes one every other day., Disp: , Rfl:   •  atorvastatin (LIPITOR) 10 MG tablet, Take 1 tablet by mouth Daily., Disp: 90 tablet, Rfl: 3  •  escitalopram (LEXAPRO) 20 MG tablet, Take 1 tablet by mouth Daily., Disp: 90 tablet, Rfl: 3  •  esomeprazole (NexIUM) 40 MG capsule, Take by mouth., Disp: , Rfl:   •  ezetimibe (ZETIA) 10 MG tablet, Take 1 tablet by mouth Daily., Disp: 90 tablet, Rfl: 3  •  Magnesium 100 MG capsule, Take by mouth., Disp: , Rfl:   •  Multiple Vitamin tablet, Take by mouth daily., Disp: , Rfl: .  he denies medication side effects.    All of the chronic condition(s) listed above are stable w/o issues.    /83   Pulse 66   Temp 97.4 °F (36.3 °C) (Oral)   Resp 16   Ht 167.6 cm (66\")   Wt 79.4 kg (175 lb)   BMI 28.25 kg/m²     Results for orders placed or performed in visit on 09/17/18 "   Comprehensive metabolic panel   Result Value Ref Range    Glucose 100 (H) 65 - 99 mg/dL    BUN 15 8 - 23 mg/dL    Creatinine 1.21 0.76 - 1.27 mg/dL    eGFR Non African Am 59 (L) >60 mL/min/1.73    eGFR African Am 71 >60 mL/min/1.73    BUN/Creatinine Ratio 12.4 7.0 - 25.0    Sodium 145 136 - 145 mmol/L    Potassium 4.0 3.5 - 5.2 mmol/L    Chloride 104 98 - 107 mmol/L    Total CO2 26.2 22.0 - 29.0 mmol/L    Calcium 9.5 8.6 - 10.5 mg/dL    Total Protein 6.8 6.0 - 8.5 g/dL    Albumin 4.50 3.50 - 5.20 g/dL    Globulin 2.3 gm/dL    A/G Ratio 2.0 g/dL    Total Bilirubin 0.7 0.1 - 1.2 mg/dL    Alkaline Phosphatase 56 39 - 117 U/L    AST (SGOT) 16 1 - 40 U/L    ALT (SGPT) 16 1 - 41 U/L   TSH   Result Value Ref Range    TSH 2.030 0.270 - 4.200 mIU/mL   Hemoglobin A1c   Result Value Ref Range    Hemoglobin A1C 6.00 (H) 4.80 - 5.60 %   NMR LipoProfile   Result Value Ref Range    LDL-P 877 <1,000 nmol/L    LDL-C 56 0 - 99 mg/dL    HDL-C 42 >39 mg/dL    Triglycerides 113 0 - 149 mg/dL    Total Cholesterol 121 100 - 199 mg/dL    HDL-P (Total) 33.8 >=30.5 umol/L    Small LDL-P 657 (H) <=527 nmol/L    LDL Size 19.9 >20.5 nm   CBC and Differential   Result Value Ref Range    WBC 4.93 4.50 - 10.70 10*3/mm3    RBC 4.85 4.60 - 6.00 10*6/mm3    Hemoglobin 15.3 13.7 - 17.6 g/dL    Hematocrit 44.9 40.4 - 52.2 %    MCV 92.6 79.8 - 96.2 fL    MCH 31.5 27.0 - 32.7 pg    MCHC 34.1 32.6 - 36.4 g/dL    RDW 13.1 11.5 - 14.5 %    Platelets 151 140 - 500 10*3/mm3    Neutrophil Rel % 53.4 42.7 - 76.0 %    Lymphocyte Rel % 36.3 19.6 - 45.3 %    Monocyte Rel % 6.9 5.0 - 12.0 %    Eosinophil Rel % 2.8 0.3 - 6.2 %    Basophil Rel % 0.6 0.0 - 1.5 %    Neutrophils Absolute 2.63 1.90 - 8.10 10*3/mm3    Lymphocytes Absolute 1.79 0.90 - 4.80 10*3/mm3    Monocytes Absolute 0.34 0.20 - 1.20 10*3/mm3    Eosinophils Absolute 0.14 0.00 - 0.70 10*3/mm3    Basophils Absolute 0.03 0.00 - 0.20 10*3/mm3    Immature Granulocyte Rel % 0.2 0.0 - 0.5 %    Immature Grans  Absolute 0.01 0.00 - 0.03 10*3/mm3           The following portions of the patient's history were reviewed and updated as appropriate: allergies, current medications, past family history, past medical history, past social history, past surgical history and problem list.    Review of Systems   Constitutional: Negative for activity change, chills, fatigue and fever.   Respiratory: Negative for cough and shortness of breath.    Cardiovascular: Negative for chest pain and palpitations.   Gastrointestinal: Negative for abdominal pain.   Endocrine: Negative for cold intolerance.   Psychiatric/Behavioral: Negative for behavioral problems and dysphoric mood. The patient is not nervous/anxious.        Objective   Physical Exam   Constitutional: He appears well-developed and well-nourished.   Neck: Neck supple. No thyromegaly present.   Cardiovascular: Normal rate and regular rhythm.    No murmur heard.  Pulmonary/Chest: Effort normal and breath sounds normal.   Abdominal: Bowel sounds are normal. There is no tenderness.   Psychiatric: He has a normal mood and affect. His behavior is normal.   Nursing note and vitals reviewed.  Labs reviewed with pt today during visit. All questions answered.      Assessment/Plan   Tyron was seen today for medicare wellness exam, hypertension, hyperlipidemia, anxiety and immunizations.    Diagnoses and all orders for this visit:    Medicare annual wellness visit, subsequent    Hyperlipidemia, unspecified hyperlipidemia type  -     ezetimibe (ZETIA) 10 MG tablet; Take 1 tablet by mouth Daily.  -     atorvastatin (LIPITOR) 10 MG tablet; Take 1 tablet by mouth Daily.    Impaired fasting glucose    Recurrent major depressive disorder, in full remission (CMS/[facility])  -     escitalopram (LEXAPRO) 20 MG tablet; Take 1 tablet by mouth Daily.    Essential hypertension  -     losartan (COZAAR) 50 MG tablet; Take 1 tablet by mouth Daily.    Immunization due  -     Fluzone High Dose =>65Years  -      Pneumococcal Polysaccharide Vaccine 23-Valent Greater Than or Equal To 1yo Subcutaneous / IM    Other orders  -     Cancel: losartan (COZAAR) 100 MG tablet; Take 1 tablet by mouth Daily.

## 2018-09-26 NOTE — PROGRESS NOTES
QUICK REFERENCE INFORMATION:  The ABCs of the Annual Wellness Visit    Subsequent Medicare Wellness Visit    HEALTH RISK ASSESSMENT    1945    Recent Hospitalizations:  No hospitalization(s) within the last year..        Current Medical Providers:  Patient Care Team:  Tyron Hunt MD as PCP - General  Tyron Hunt MD as PCP - Family Medicine  Paulino Taylor MD as Consulting Physician (Urology)        Smoking Status:  History   Smoking Status   • Never Smoker   Smokeless Tobacco   • Never Used       Alcohol Consumption:  History   Alcohol Use   • Yes       Depression Screen:   PHQ-2/PHQ-9 Depression Screening 9/26/2018   Little interest or pleasure in doing things 0   Feeling down, depressed, or hopeless 0   Trouble falling or staying asleep, or sleeping too much -   Feeling tired or having little energy -   Poor appetite or overeating -   Feeling bad about yourself - or that you are a failure or have let yourself or your family down -   Trouble concentrating on things, such as reading the newspaper or watching television -   Moving or speaking so slowly that other people could have noticed. Or the opposite - being so fidgety or restless that you have been moving around a lot more than usual -   Thoughts that you would be better off dead, or of hurting yourself in some way -   Total Score 0   If you checked off any problems, how difficult have these problems made it for you to do your work, take care of things at home, or get along with other people? -       Health Habits and Functional and Cognitive Screening:  Functional & Cognitive Status 9/26/2018   Do you have difficulty preparing food and eating? No   Do you have difficulty bathing yourself, getting dressed or grooming yourself? No   Do you have difficulty using the toilet? No   Do you have difficulty moving around from place to place? No   Do you have trouble with steps or getting out of a bed or a chair? No   In the past year have you  fallen or experienced a near fall? Yes   Current Diet Well Balanced Diet   Dental Exam Up to date   Eye Exam Up to date   Exercise (times per week) 4 times per week   Current Exercise Activities Include Weightlifting   Do you need help using the phone?  No   Are you deaf or do you have serious difficulty hearing?  No   Do you need help with transportation? No   Do you need help shopping? No   Do you need help preparing meals?  No   Do you need help with housework?  No   Do you need help with laundry? No   Do you need help taking your medications? No   Do you need help managing money? No   Do you ever drive or ride in a car without wearing a seat belt? No   Have you felt unusual stress, anger or loneliness in the last month? No   Who do you live with? Spouse   If you need help, do you have trouble finding someone available to you? Yes   Have you been bothered in the last four weeks by sexual problems? No   Do you have difficulty concentrating, remembering or making decisions? No           Does the patient have evidence of cognitive impairment? No    Aspirin use counseling: Taking ASA appropriately as indicated      Recent Lab Results:  CMP:  Lab Results   Component Value Date     (H) 09/20/2018    BUN 15 09/20/2018    CREATININE 1.21 09/20/2018    EGFRIFNONA 59 (L) 09/20/2018    EGFRIFAFRI 71 09/20/2018    BCR 12.4 09/20/2018     09/20/2018    K 4.0 09/20/2018    CO2 26.2 09/20/2018    CALCIUM 9.5 09/20/2018    PROTENTOTREF 6.8 09/20/2018    ALBUMIN 4.50 09/20/2018    LABGLOBREF 2.3 09/20/2018    LABIL2 2.0 09/20/2018    BILITOT 0.7 09/20/2018    ALKPHOS 56 09/20/2018    AST 16 09/20/2018    ALT 16 09/20/2018     Lipid Panel:  Lab Results   Component Value Date    TRIG 113 09/20/2018    HDL 44 09/19/2017    VLDL 22.8 09/19/2017    LDLHDL 1.59 09/01/2016     HbA1c:  Lab Results   Component Value Date    HGBA1C 6.00 (H) 09/20/2018       Visual Acuity:  No exam data present    Age-appropriate Screening  Schedule:  Refer to the list below for future screening recommendations based on patient's age, sex and/or medical conditions. Orders for these recommended tests are listed in the plan section. The patient has been provided with a written plan.    Health Maintenance   Topic Date Due   • ZOSTER VACCINE (2 of 2) 02/26/2006   • PNEUMOCOCCAL VACCINES (65+ LOW/MEDIUM RISK) (2 of 2 - PPSV23) 09/12/2017   • LIPID PANEL  09/20/2019   • COLONOSCOPY  04/30/2022   • INFLUENZA VACCINE  Completed   • TDAP/TD VACCINES  Excluded        Subjective   History of Present Illness    Tyron Hunt is a 73 y.o. male who presents for an Subsequent Wellness Visit.    The following portions of the patient's history were reviewed and updated as appropriate: allergies, current medications, past family history, past medical history, past social history, past surgical history and problem list.    Outpatient Medications Prior to Visit   Medication Sig Dispense Refill   • atorvastatin (LIPITOR) 10 MG tablet Take 1 tablet by mouth Daily. 90 tablet 3   • escitalopram (LEXAPRO) 20 MG tablet TAKE ONE TABLET BY MOUTH DAILY 90 tablet 2   • ezetimibe (ZETIA) 10 MG tablet Take 1 tablet by mouth Daily. 90 tablet 3   • losartan (COZAAR) 100 MG tablet Take 1 tablet by mouth Daily. 90 tablet 3   • acetaminophen (TYLENOL) 325 MG tablet Take by mouth.     • aspirin 81 MG tablet Take  by mouth Daily. Pt. Says he takes one every other day.     • esomeprazole (NexIUM) 40 MG capsule Take by mouth.     • Magnesium 100 MG capsule Take by mouth.     • Multiple Vitamin tablet Take by mouth daily.     • HYDROcodone-acetaminophen (NORCO) 5-325 MG per tablet Take 1 tablet by mouth Every 6 (Six) Hours As Needed for Severe Pain (7-10). 10 tablet 0     No facility-administered medications prior to visit.        Patient Active Problem List   Diagnosis   • Hypertension   • GERD (gastroesophageal reflux disease)   • Hyperlipidemia   • Anxiety   • Benign prostatic hyperplasia  "with lower urinary tract symptoms   • Impaired fasting glucose   • Recurrent major depressive disorder, in full remission (CMS/[facility])       Advance Care Planning:  has an advance directive - a copy has been provided and is in file    Identification of Risk Factors:  Risk factors include: cardiovascular risk.    Review of Systems    Compared to one year ago, the patient feels his physical health is the same.  Compared to one year ago, the patient feels his mental health is the same.    Objective     Physical Exam    Vitals:    09/26/18 1546   BP: 119/83   Pulse: 66   Resp: 16   Temp: 97.4 °F (36.3 °C)   TempSrc: Oral   Weight: 79.4 kg (175 lb)   Height: 167.6 cm (66\")   PainSc: 0-No pain       Patient's Body mass index is 28.25 kg/m². BMI is within normal parameters. No follow-up required.      Assessment/Plan   Patient Self-Management and Personalized Health Advice  The patient has been provided with information about: diet, exercise and weight management and preventive services including:   · Exercise counseling provided, Fall Risk assessment done, Nutrition counseling provided.    Visit Diagnoses:    ICD-10-CM ICD-9-CM   1. Medicare annual wellness visit, subsequent Z00.00 V70.0   2. Hyperlipidemia, unspecified hyperlipidemia type E78.5 272.4   3. Impaired fasting glucose R73.01 790.21   4. Recurrent major depressive disorder, in full remission (CMS/[facility]) F33.42 296.36   5. Essential hypertension I10 401.9   6. Immunization due Z23 V05.9       Orders Placed This Encounter   Procedures   • Fluzone High Dose =>65Years   • Pneumococcal Polysaccharide Vaccine 23-Valent Greater Than or Equal To 3yo Subcutaneous / IM       Outpatient Encounter Prescriptions as of 9/26/2018   Medication Sig Dispense Refill   • losartan (COZAAR) 50 MG tablet Take 1 tablet by mouth Daily. 90 tablet 3   • [DISCONTINUED] atorvastatin (LIPITOR) 10 MG tablet Take 1 tablet by mouth Daily. 90 tablet 3   • [DISCONTINUED] escitalopram (LEXAPRO) 20 MG " tablet TAKE ONE TABLET BY MOUTH DAILY 90 tablet 2   • [DISCONTINUED] ezetimibe (ZETIA) 10 MG tablet Take 1 tablet by mouth Daily. 90 tablet 3   • [DISCONTINUED] losartan (COZAAR) 100 MG tablet Take 1 tablet by mouth Daily. 90 tablet 3   • acetaminophen (TYLENOL) 325 MG tablet Take by mouth.     • aspirin 81 MG tablet Take  by mouth Daily. Pt. Says he takes one every other day.     • atorvastatin (LIPITOR) 10 MG tablet Take 1 tablet by mouth Daily. 90 tablet 3   • escitalopram (LEXAPRO) 20 MG tablet Take 1 tablet by mouth Daily. 90 tablet 3   • esomeprazole (NexIUM) 40 MG capsule Take by mouth.     • ezetimibe (ZETIA) 10 MG tablet Take 1 tablet by mouth Daily. 90 tablet 3   • Magnesium 100 MG capsule Take by mouth.     • Multiple Vitamin tablet Take by mouth daily.     • [DISCONTINUED] HYDROcodone-acetaminophen (NORCO) 5-325 MG per tablet Take 1 tablet by mouth Every 6 (Six) Hours As Needed for Severe Pain (7-10). 10 tablet 0     No facility-administered encounter medications on file as of 9/26/2018.        Reviewed use of high risk medication in the elderly: not applicable  Reviewed for potential of harmful drug interactions in the elderly: not applicable    Follow Up:  Return in about 1 year (around 9/26/2019) for Recheck.     An After Visit Summary and PPPS with all of these plans were given to the patient.

## 2018-09-26 NOTE — PATIENT INSTRUCTIONS
Medicare Wellness  Personal Prevention Plan of Service     Date of Office Visit:  2018  Encounter Provider:  Tyron Hunt MD  Place of Service:  [facility]  Patient Name: Tyron Albertoer  :  1945    As part of the Medicare Wellness portion of your visit today, we are providing you with this personalized preventive plan of services (PPPS). This plan is based upon recommendations of the [place] Preventive Services Task Force (USPSTF) and the Advisory Committee on Immunization Practices (ACIP).    This lists the preventive care services that should be considered, and provides dates of when you are due. Items listed as completed are up-to-date and do not require any further intervention.    Health Maintenance   Topic Date Due   • ZOSTER VACCINE (2 of 2) 2006   • PNEUMOCOCCAL VACCINES (65+ LOW/MEDIUM RISK) (2 of 2 - PPSV23) 2017   • LIPID PANEL  2019   • MEDICARE ANNUAL WELLNESS  2019   • COLONOSCOPY  2022   • INFLUENZA VACCINE  Completed   • TDAP/TD VACCINES  Excluded       Orders Placed This Encounter   Procedures   • Fluzone High Dose =>65Years   • Pneumococcal Polysaccharide Vaccine 23-Valent Greater Than or Equal To 1yo Subcutaneous / IM       Return in about 1 year (around 2019) for Recheck.

## 2018-10-25 RX ORDER — LOSARTAN POTASSIUM 100 MG/1
TABLET ORAL
Qty: 90 TABLET | Refills: 2 | Status: SHIPPED | OUTPATIENT
Start: 2018-10-25 | End: 2019-03-21

## 2019-01-24 DIAGNOSIS — R53.82 CHRONIC FATIGUE: ICD-10-CM

## 2019-01-24 DIAGNOSIS — I10 ESSENTIAL HYPERTENSION: ICD-10-CM

## 2019-01-24 DIAGNOSIS — R73.01 IMPAIRED FASTING GLUCOSE: Primary | ICD-10-CM

## 2019-01-25 ENCOUNTER — RESULTS ENCOUNTER (OUTPATIENT)
Dept: FAMILY MEDICINE CLINIC | Facility: CLINIC | Age: 74
End: 2019-01-25

## 2019-01-25 DIAGNOSIS — R53.82 CHRONIC FATIGUE: ICD-10-CM

## 2019-01-25 DIAGNOSIS — I10 ESSENTIAL HYPERTENSION: ICD-10-CM

## 2019-01-25 DIAGNOSIS — R73.01 IMPAIRED FASTING GLUCOSE: ICD-10-CM

## 2019-02-04 ENCOUNTER — TELEPHONE (OUTPATIENT)
Dept: FAMILY MEDICINE CLINIC | Facility: CLINIC | Age: 74
End: 2019-02-04

## 2019-02-04 DIAGNOSIS — E78.5 HYPERLIPIDEMIA, UNSPECIFIED HYPERLIPIDEMIA TYPE: ICD-10-CM

## 2019-02-04 RX ORDER — EZETIMIBE 10 MG/1
10 TABLET ORAL DAILY
Qty: 90 TABLET | Refills: 3 | Status: SHIPPED | OUTPATIENT
Start: 2019-02-04 | End: 2021-06-07 | Stop reason: HOSPADM

## 2019-03-21 RX ORDER — LOSARTAN POTASSIUM 25 MG/1
25 TABLET ORAL DAILY
Qty: 90 TABLET | Refills: 3 | Status: SHIPPED | OUTPATIENT
Start: 2019-03-21 | End: 2019-04-25

## 2019-04-11 DIAGNOSIS — I10 ESSENTIAL HYPERTENSION: Primary | ICD-10-CM

## 2019-04-11 LAB
BASOPHILS # BLD AUTO: 0.04 10*3/MM3 (ref 0–0.2)
BASOPHILS NFR BLD AUTO: 0.9 % (ref 0–1.5)
BUN SERPL-MCNC: 11 MG/DL (ref 8–23)
BUN/CREAT SERPL: 8.2 (ref 7–25)
CALCIUM SERPL-MCNC: 9.6 MG/DL (ref 8.6–10.5)
CHLORIDE SERPL-SCNC: 106 MMOL/L (ref 98–107)
CO2 SERPL-SCNC: 26 MMOL/L (ref 22–29)
CREAT SERPL-MCNC: 1.34 MG/DL (ref 0.76–1.27)
EOSINOPHIL # BLD AUTO: 0.17 10*3/MM3 (ref 0–0.4)
EOSINOPHIL NFR BLD AUTO: 3.7 % (ref 0.3–6.2)
ERYTHROCYTE [DISTWIDTH] IN BLOOD BY AUTOMATED COUNT: 13 % (ref 12.3–15.4)
GLUCOSE SERPL-MCNC: 106 MG/DL (ref 65–99)
HBA1C MFR BLD: 5.92 % (ref 4.8–5.6)
HCT VFR BLD AUTO: 47.5 % (ref 37.5–51)
HGB BLD-MCNC: 16 G/DL (ref 13–17.7)
IMM GRANULOCYTES # BLD AUTO: 0.02 10*3/MM3 (ref 0–0.05)
IMM GRANULOCYTES NFR BLD AUTO: 0.4 % (ref 0–0.5)
LYMPHOCYTES # BLD AUTO: 1.59 10*3/MM3 (ref 0.7–3.1)
LYMPHOCYTES NFR BLD AUTO: 34.9 % (ref 19.6–45.3)
MCH RBC QN AUTO: 31.3 PG (ref 26.6–33)
MCHC RBC AUTO-ENTMCNC: 33.7 G/DL (ref 31.5–35.7)
MCV RBC AUTO: 93 FL (ref 79–97)
MONOCYTES # BLD AUTO: 0.42 10*3/MM3 (ref 0.1–0.9)
MONOCYTES NFR BLD AUTO: 9.2 % (ref 5–12)
NEUTROPHILS # BLD AUTO: 2.32 10*3/MM3 (ref 1.4–7)
NEUTROPHILS NFR BLD AUTO: 50.9 % (ref 42.7–76)
NRBC BLD AUTO-RTO: 0 /100 WBC (ref 0–0)
PLATELET # BLD AUTO: 167 10*3/MM3 (ref 140–450)
POTASSIUM SERPL-SCNC: 4.1 MMOL/L (ref 3.5–5.2)
RBC # BLD AUTO: 5.11 10*6/MM3 (ref 4.14–5.8)
SODIUM SERPL-SCNC: 144 MMOL/L (ref 136–145)
T4 FREE SERPL-MCNC: 1.28 NG/DL (ref 0.93–1.7)
TSH SERPL DL<=0.005 MIU/L-ACNC: 1.48 MIU/ML (ref 0.27–4.2)
WBC # BLD AUTO: 4.56 10*3/MM3 (ref 3.4–10.8)

## 2019-04-18 RX ORDER — AMLODIPINE BESYLATE 2.5 MG/1
2.5 TABLET ORAL DAILY
Qty: 30 TABLET | Refills: 11 | Status: SHIPPED | OUTPATIENT
Start: 2019-04-18 | End: 2019-12-04 | Stop reason: SDUPTHER

## 2019-04-24 DIAGNOSIS — R41.3 MEMORY CHANGES: Primary | ICD-10-CM

## 2019-04-24 DIAGNOSIS — R47.89 WORD FINDING DIFFICULTY: ICD-10-CM

## 2019-04-29 DIAGNOSIS — R47.89 WORD FINDING DIFFICULTY: ICD-10-CM

## 2019-04-29 DIAGNOSIS — R41.3 MEMORY CHANGES: Primary | ICD-10-CM

## 2019-09-19 DIAGNOSIS — H91.93 DECREASED HEARING OF BOTH EARS: Primary | ICD-10-CM

## 2019-09-30 ENCOUNTER — TRANSCRIBE ORDERS (OUTPATIENT)
Dept: ADMINISTRATIVE | Facility: HOSPITAL | Age: 74
End: 2019-09-30

## 2019-09-30 DIAGNOSIS — Z13.6 ENCOUNTER FOR SCREENING FOR VASCULAR DISEASE: Primary | ICD-10-CM

## 2019-10-07 DIAGNOSIS — F33.42 RECURRENT MAJOR DEPRESSIVE DISORDER, IN FULL REMISSION (HCC): ICD-10-CM

## 2019-10-07 RX ORDER — ESCITALOPRAM OXALATE 20 MG/1
TABLET ORAL
Qty: 90 TABLET | Refills: 0 | Status: SHIPPED | OUTPATIENT
Start: 2019-10-07 | End: 2020-02-10

## 2019-10-17 DIAGNOSIS — I10 ESSENTIAL HYPERTENSION: ICD-10-CM

## 2019-10-17 RX ORDER — LOSARTAN POTASSIUM 50 MG/1
TABLET ORAL
Qty: 90 TABLET | Refills: 2 | OUTPATIENT
Start: 2019-10-17

## 2019-10-28 ENCOUNTER — HOSPITAL ENCOUNTER (OUTPATIENT)
Dept: CARDIOLOGY | Facility: HOSPITAL | Age: 74
Discharge: HOME | End: 2019-10-28
Admitting: FAMILY MEDICINE

## 2019-10-28 ENCOUNTER — FLU SHOT (OUTPATIENT)
Dept: FAMILY MEDICINE CLINIC | Facility: CLINIC | Age: 74
End: 2019-10-28

## 2019-10-28 VITALS
HEIGHT: 66 IN | DIASTOLIC BLOOD PRESSURE: 95 MMHG | HEART RATE: 65 BPM | WEIGHT: 174 LBS | SYSTOLIC BLOOD PRESSURE: 165 MMHG | BODY MASS INDEX: 27.97 KG/M2

## 2019-10-28 DIAGNOSIS — Z13.6 ENCOUNTER FOR SCREENING FOR VASCULAR DISEASE: ICD-10-CM

## 2019-10-28 DIAGNOSIS — Z23 IMMUNIZATION DUE: Primary | ICD-10-CM

## 2019-10-28 LAB
BH CV ECHO MEAS - DIST AO DIAM: 1.47 CM
BH CV VAS BP LEFT ARM: NORMAL MMHG
BH CV VAS BP RIGHT ARM: NORMAL MMHG
BH CV XLRA MEAS - MID AO DIAM: 1.71 CM
BH CV XLRA MEAS - PAD LEFT ABI DP: 1.21
BH CV XLRA MEAS - PAD LEFT ABI PT: 1.21
BH CV XLRA MEAS - PAD LEFT ARM: 165 MMHG
BH CV XLRA MEAS - PAD LEFT LEG DP: 200 MMHG
BH CV XLRA MEAS - PAD LEFT LEG PT: 200 MMHG
BH CV XLRA MEAS - PAD RIGHT ABI DP: 1.21
BH CV XLRA MEAS - PAD RIGHT ABI PT: 1.21
BH CV XLRA MEAS - PAD RIGHT ARM: 160 MMHG
BH CV XLRA MEAS - PAD RIGHT LEG DP: 200 MMHG
BH CV XLRA MEAS - PAD RIGHT LEG PT: 200 MMHG
BH CV XLRA MEAS - PROX AO DIAM: 1.92 CM
BH CV XLRA MEAS LEFT ICA/CCA RATIO: 1
BH CV XLRA MEAS LEFT MID CCA PSV: NORMAL CM/SEC
BH CV XLRA MEAS LEFT MID ICA PSV: NORMAL CM/SEC
BH CV XLRA MEAS LEFT PROX ECA PSV: NORMAL CM/SEC
BH CV XLRA MEAS RIGHT ICA/CCA RATIO: 1.25
BH CV XLRA MEAS RIGHT MID CCA PSV: NORMAL CM/SEC
BH CV XLRA MEAS RIGHT MID ICA PSV: NORMAL CM/SEC
BH CV XLRA MEAS RIGHT PROX ECA PSV: NORMAL CM/SEC

## 2019-10-28 PROCEDURE — 90653 IIV ADJUVANT VACCINE IM: CPT | Performed by: FAMILY MEDICINE

## 2019-10-28 PROCEDURE — 93799 UNLISTED CV SVC/PROCEDURE: CPT

## 2019-10-28 PROCEDURE — G0008 ADMIN INFLUENZA VIRUS VAC: HCPCS | Performed by: FAMILY MEDICINE

## 2019-10-28 NOTE — PROGRESS NOTES
Patient was seen today for a vascular screening. The  BP was elevated 160/92 right arm and 165/95 left arm. He will follow-up with his doctor today.

## 2019-12-04 RX ORDER — AMLODIPINE BESYLATE 2.5 MG/1
1.25 TABLET ORAL DAILY
Qty: 15 TABLET | Refills: 11 | Status: SHIPPED | OUTPATIENT
Start: 2019-12-04 | End: 2019-12-19 | Stop reason: SDUPTHER

## 2019-12-19 DIAGNOSIS — R73.01 IMPAIRED FASTING GLUCOSE: ICD-10-CM

## 2019-12-19 DIAGNOSIS — E78.5 HYPERLIPIDEMIA, UNSPECIFIED HYPERLIPIDEMIA TYPE: ICD-10-CM

## 2019-12-19 DIAGNOSIS — I10 ESSENTIAL HYPERTENSION: Primary | ICD-10-CM

## 2019-12-19 RX ORDER — AMLODIPINE BESYLATE 5 MG/1
5 TABLET ORAL DAILY
Qty: 90 TABLET | Refills: 3 | Status: SHIPPED | OUTPATIENT
Start: 2019-12-19 | End: 2020-03-05

## 2019-12-20 ENCOUNTER — RESULTS ENCOUNTER (OUTPATIENT)
Dept: FAMILY MEDICINE CLINIC | Facility: CLINIC | Age: 74
End: 2019-12-20

## 2019-12-20 DIAGNOSIS — I10 ESSENTIAL HYPERTENSION: ICD-10-CM

## 2019-12-20 DIAGNOSIS — R73.01 IMPAIRED FASTING GLUCOSE: ICD-10-CM

## 2019-12-20 DIAGNOSIS — E78.5 HYPERLIPIDEMIA, UNSPECIFIED HYPERLIPIDEMIA TYPE: ICD-10-CM

## 2019-12-21 LAB
ALBUMIN SERPL-MCNC: 4.5 G/DL (ref 3.5–5.2)
ALBUMIN/GLOB SERPL: 1.7 G/DL
ALP SERPL-CCNC: 63 U/L (ref 39–117)
ALT SERPL-CCNC: 23 U/L (ref 1–41)
AST SERPL-CCNC: 17 U/L (ref 1–40)
BASOPHILS # BLD AUTO: 0.08 10*3/MM3 (ref 0–0.2)
BASOPHILS NFR BLD AUTO: 1.5 % (ref 0–1.5)
BILIRUB SERPL-MCNC: 0.8 MG/DL (ref 0.2–1.2)
BUN SERPL-MCNC: 16 MG/DL (ref 8–23)
BUN/CREAT SERPL: 12.7 (ref 7–25)
CALCIUM SERPL-MCNC: 9.3 MG/DL (ref 8.6–10.5)
CHLORIDE SERPL-SCNC: 102 MMOL/L (ref 98–107)
CHOLEST SERPL-MCNC: 204 MG/DL (ref 100–199)
CO2 SERPL-SCNC: 27.3 MMOL/L (ref 22–29)
CREAT SERPL-MCNC: 1.26 MG/DL (ref 0.76–1.27)
EOSINOPHIL # BLD AUTO: 0.28 10*3/MM3 (ref 0–0.4)
EOSINOPHIL NFR BLD AUTO: 5.2 % (ref 0.3–6.2)
ERYTHROCYTE [DISTWIDTH] IN BLOOD BY AUTOMATED COUNT: 13.1 % (ref 12.3–15.4)
GLOBULIN SER CALC-MCNC: 2.6 GM/DL
GLUCOSE SERPL-MCNC: 120 MG/DL (ref 65–99)
HBA1C MFR BLD: 6.3 % (ref 4.8–5.6)
HCT VFR BLD AUTO: 48.4 % (ref 37.5–51)
HDL SERPL-SCNC: 33.6 UMOL/L
HDLC SERPL-MCNC: 38 MG/DL
HGB BLD-MCNC: 16.4 G/DL (ref 13–17.7)
IMM GRANULOCYTES # BLD AUTO: 0.03 10*3/MM3 (ref 0–0.05)
IMM GRANULOCYTES NFR BLD AUTO: 0.6 % (ref 0–0.5)
LDL SERPL QN: 19.9 NM
LDL SERPL-SCNC: 1851 NMOL/L
LDL SMALL SERPL-SCNC: 1297 NMOL/L
LDLC SERPL CALC-MCNC: 128 MG/DL (ref 0–99)
LP-IR SCORE SERPL: 77
LYMPHOCYTES # BLD AUTO: 1.96 10*3/MM3 (ref 0.7–3.1)
LYMPHOCYTES NFR BLD AUTO: 36.6 % (ref 19.6–45.3)
MCH RBC QN AUTO: 30.2 PG (ref 26.6–33)
MCHC RBC AUTO-ENTMCNC: 33.9 G/DL (ref 31.5–35.7)
MCV RBC AUTO: 89.1 FL (ref 79–97)
MONOCYTES # BLD AUTO: 0.49 10*3/MM3 (ref 0.1–0.9)
MONOCYTES NFR BLD AUTO: 9.1 % (ref 5–12)
NEUTROPHILS # BLD AUTO: 2.52 10*3/MM3 (ref 1.7–7)
NEUTROPHILS NFR BLD AUTO: 47 % (ref 42.7–76)
NRBC BLD AUTO-RTO: 0 /100 WBC (ref 0–0.2)
PLATELET # BLD AUTO: 179 10*3/MM3 (ref 140–450)
POTASSIUM SERPL-SCNC: 4 MMOL/L (ref 3.5–5.2)
PROT SERPL-MCNC: 7.1 G/DL (ref 6–8.5)
RBC # BLD AUTO: 5.43 10*6/MM3 (ref 4.14–5.8)
SODIUM SERPL-SCNC: 142 MMOL/L (ref 136–145)
TRIGL SERPL-MCNC: 189 MG/DL (ref 0–149)
WBC # BLD AUTO: 5.36 10*3/MM3 (ref 3.4–10.8)

## 2020-02-08 DIAGNOSIS — F33.42 RECURRENT MAJOR DEPRESSIVE DISORDER, IN FULL REMISSION (HCC): ICD-10-CM

## 2020-02-10 DIAGNOSIS — F33.42 RECURRENT MAJOR DEPRESSIVE DISORDER, IN FULL REMISSION (HCC): ICD-10-CM

## 2020-02-10 RX ORDER — ESCITALOPRAM OXALATE 20 MG/1
20 TABLET ORAL DAILY
Qty: 90 TABLET | Refills: 3 | Status: SHIPPED | OUTPATIENT
Start: 2020-02-10 | End: 2020-11-21

## 2020-02-10 RX ORDER — ESCITALOPRAM OXALATE 20 MG/1
TABLET ORAL
Qty: 90 TABLET | Refills: 0 | Status: SHIPPED | OUTPATIENT
Start: 2020-02-10 | End: 2020-02-10 | Stop reason: SDUPTHER

## 2020-02-20 DIAGNOSIS — E78.5 HYPERLIPIDEMIA, UNSPECIFIED HYPERLIPIDEMIA TYPE: ICD-10-CM

## 2020-02-20 DIAGNOSIS — R73.01 IMPAIRED FASTING GLUCOSE: Primary | ICD-10-CM

## 2020-02-20 DIAGNOSIS — I10 ESSENTIAL HYPERTENSION: ICD-10-CM

## 2020-03-05 ENCOUNTER — RESULTS ENCOUNTER (OUTPATIENT)
Dept: FAMILY MEDICINE CLINIC | Facility: CLINIC | Age: 75
End: 2020-03-05

## 2020-03-05 DIAGNOSIS — R73.01 IMPAIRED FASTING GLUCOSE: ICD-10-CM

## 2020-03-05 DIAGNOSIS — E78.5 HYPERLIPIDEMIA, UNSPECIFIED HYPERLIPIDEMIA TYPE: ICD-10-CM

## 2020-03-05 DIAGNOSIS — I10 ESSENTIAL HYPERTENSION: ICD-10-CM

## 2020-03-05 RX ORDER — AMLODIPINE BESYLATE 2.5 MG/1
1.25 TABLET ORAL DAILY
Qty: 45 TABLET | Refills: 3 | Status: SHIPPED | OUTPATIENT
Start: 2020-03-05 | End: 2020-12-16 | Stop reason: SDUPTHER

## 2020-06-11 LAB
BUN SERPL-MCNC: 17 MG/DL (ref 8–23)
BUN/CREAT SERPL: 12.1 (ref 7–25)
CALCIUM SERPL-MCNC: 9.7 MG/DL (ref 8.6–10.5)
CHLORIDE SERPL-SCNC: 104 MMOL/L (ref 98–107)
CHOLEST SERPL-MCNC: 141 MG/DL (ref 100–199)
CO2 SERPL-SCNC: 27.6 MMOL/L (ref 22–29)
CREAT SERPL-MCNC: 1.4 MG/DL (ref 0.76–1.27)
GLUCOSE SERPL-MCNC: 117 MG/DL (ref 65–99)
HBA1C MFR BLD: 6 % (ref 4.8–5.6)
HDL SERPL-SCNC: 37.2 UMOL/L
HDLC SERPL-MCNC: 47 MG/DL
LDL SERPL QN: 20 NM
LDL SERPL-SCNC: 1179 NMOL/L
LDL SMALL SERPL-SCNC: 829 NMOL/L
LDLC SERPL CALC-MCNC: 76 MG/DL (ref 0–99)
LP-IR SCORE SERPL: 62
POTASSIUM SERPL-SCNC: 4.2 MMOL/L (ref 3.5–5.2)
SODIUM SERPL-SCNC: 143 MMOL/L (ref 136–145)
TRIGL SERPL-MCNC: 91 MG/DL (ref 0–149)

## 2020-07-09 DIAGNOSIS — E78.5 HYPERLIPIDEMIA, UNSPECIFIED HYPERLIPIDEMIA TYPE: ICD-10-CM

## 2020-07-09 RX ORDER — ATORVASTATIN CALCIUM 10 MG/1
TABLET, FILM COATED ORAL
Qty: 90 TABLET | Refills: 2 | OUTPATIENT
Start: 2020-07-09

## 2020-07-09 RX ORDER — ATORVASTATIN CALCIUM 10 MG/1
10 TABLET, FILM COATED ORAL DAILY
Qty: 90 TABLET | Refills: 3 | Status: SHIPPED | OUTPATIENT
Start: 2020-07-09 | End: 2021-08-18 | Stop reason: SDUPTHER

## 2020-10-13 ENCOUNTER — FLU SHOT (OUTPATIENT)
Dept: FAMILY MEDICINE CLINIC | Facility: CLINIC | Age: 75
End: 2020-10-13

## 2020-10-13 DIAGNOSIS — Z23 NEED FOR INFLUENZA VACCINATION: ICD-10-CM

## 2020-10-13 PROCEDURE — G0008 ADMIN INFLUENZA VIRUS VAC: HCPCS | Performed by: FAMILY MEDICINE

## 2020-10-13 PROCEDURE — 90694 VACC AIIV4 NO PRSRV 0.5ML IM: CPT | Performed by: FAMILY MEDICINE

## 2020-10-18 DIAGNOSIS — R47.89 WORD FINDING DIFFICULTY: Primary | ICD-10-CM

## 2020-10-19 ENCOUNTER — DOCUMENTATION (OUTPATIENT)
Dept: FAMILY MEDICINE CLINIC | Facility: CLINIC | Age: 75
End: 2020-10-19

## 2020-10-19 RX ORDER — MEMANTINE HYDROCHLORIDE 5 MG/1
5 TABLET ORAL 2 TIMES DAILY
Qty: 60 TABLET | Refills: 11 | Status: SHIPPED | OUTPATIENT
Start: 2020-10-19 | End: 2021-06-07 | Stop reason: HOSPADM

## 2020-10-28 ENCOUNTER — TRANSCRIBE ORDERS (OUTPATIENT)
Dept: ADMINISTRATIVE | Facility: HOSPITAL | Age: 75
End: 2020-10-28

## 2020-10-28 DIAGNOSIS — Z13.6 ENCOUNTER FOR SCREENING FOR VASCULAR DISEASE: Primary | ICD-10-CM

## 2020-11-04 ENCOUNTER — HOSPITAL ENCOUNTER (OUTPATIENT)
Dept: CARDIOLOGY | Facility: HOSPITAL | Age: 75
Discharge: HOME | End: 2020-11-04
Admitting: SURGERY

## 2020-11-04 VITALS
DIASTOLIC BLOOD PRESSURE: 80 MMHG | HEART RATE: 62 BPM | BODY MASS INDEX: 28.49 KG/M2 | WEIGHT: 171 LBS | SYSTOLIC BLOOD PRESSURE: 150 MMHG | HEIGHT: 65 IN

## 2020-11-04 DIAGNOSIS — Z13.6 ENCOUNTER FOR SCREENING FOR VASCULAR DISEASE: ICD-10-CM

## 2020-11-04 LAB
BH CV ECHO MEAS - DIST AO DIAM: 1.43 CM
BH CV VAS BP LEFT ARM: NORMAL MMHG
BH CV VAS BP RIGHT ARM: NORMAL MMHG
BH CV XLRA MEAS - MID AO DIAM: 1.7 CM
BH CV XLRA MEAS - PAD LEFT ABI DP: 1.13
BH CV XLRA MEAS - PAD LEFT ABI PT: 1.26
BH CV XLRA MEAS - PAD LEFT ARM: 150 MMHG
BH CV XLRA MEAS - PAD LEFT LEG DP: 170 MMHG
BH CV XLRA MEAS - PAD LEFT LEG PT: 190 MMHG
BH CV XLRA MEAS - PAD RIGHT ABI DP: 1.2
BH CV XLRA MEAS - PAD RIGHT ABI PT: 1.26
BH CV XLRA MEAS - PAD RIGHT ARM: 146 MMHG
BH CV XLRA MEAS - PAD RIGHT LEG DP: 180 MMHG
BH CV XLRA MEAS - PAD RIGHT LEG PT: 190 MMHG
BH CV XLRA MEAS - PROX AO DIAM: 1.79 CM
BH CV XLRA MEAS LEFT ICA/CCA RATIO: 1.24
BH CV XLRA MEAS LEFT MID CCA PSV: NORMAL CM/SEC
BH CV XLRA MEAS LEFT MID ICA PSV: NORMAL CM/SEC
BH CV XLRA MEAS LEFT PROX ECA PSV: NORMAL CM/SEC
BH CV XLRA MEAS RIGHT ICA/CCA RATIO: 0.97
BH CV XLRA MEAS RIGHT MID CCA PSV: NORMAL CM/SEC
BH CV XLRA MEAS RIGHT MID ICA PSV: NORMAL CM/SEC
BH CV XLRA MEAS RIGHT PROX ECA PSV: NORMAL CM/SEC

## 2020-11-04 PROCEDURE — 93799 UNLISTED CV SVC/PROCEDURE: CPT

## 2020-11-21 RX ORDER — DESVENLAFAXINE SUCCINATE 50 MG/1
50 TABLET, EXTENDED RELEASE ORAL DAILY
Qty: 30 TABLET | Refills: 5 | Status: SHIPPED | OUTPATIENT
Start: 2020-11-21 | End: 2021-01-07

## 2020-12-16 ENCOUNTER — OFFICE VISIT (OUTPATIENT)
Dept: NEUROLOGY | Facility: CLINIC | Age: 75
End: 2020-12-16

## 2020-12-16 VITALS
BODY MASS INDEX: 27.99 KG/M2 | OXYGEN SATURATION: 98 % | HEIGHT: 65 IN | SYSTOLIC BLOOD PRESSURE: 138 MMHG | DIASTOLIC BLOOD PRESSURE: 82 MMHG | HEART RATE: 75 BPM | WEIGHT: 168 LBS

## 2020-12-16 DIAGNOSIS — R41.89 COGNITIVE IMPAIRMENT: Primary | ICD-10-CM

## 2020-12-16 DIAGNOSIS — G47.33 OBSTRUCTIVE SLEEP APNEA: ICD-10-CM

## 2020-12-16 PROCEDURE — 99205 OFFICE O/P NEW HI 60 MIN: CPT | Performed by: PSYCHIATRY & NEUROLOGY

## 2020-12-16 RX ORDER — LANOLIN ALCOHOL/MO/W.PET/CERES
1000 CREAM (GRAM) TOPICAL DAILY
COMMUNITY
End: 2023-01-01

## 2020-12-16 RX ORDER — AMLODIPINE BESYLATE 5 MG/1
TABLET ORAL
COMMUNITY
Start: 2020-10-19 | End: 2021-02-11 | Stop reason: SDUPTHER

## 2020-12-16 NOTE — PATIENT INSTRUCTIONS
**Eat a high fiber diet    **Exercise regularly (physically and mentally)    **Floss daily    **Consider eating yogurt regularly    **Consider drinking green tea    **Limit soda and alcohol    **Ensure safety in the home    **Check out th Alzheimer's Association (www.alz.org).    **If you are interested in participating in a clinical trial, check out the following centers:   [facility]:  [email or web address]      [facility]:  [email or web address]     [facility]:  [email or web address]    **If you live in [facility], consider [facility]. It is a free agency that helps find placement for seniors. They do an assessment and find out the need and know which facilities have openings and would be the best fit. They help figure out the financial aspects as well.  Ashely Cloud is the nurse navigator and her number is [phone].

## 2020-12-16 NOTE — PROGRESS NOTES
Subjective:     Patient ID: Tyron Hunt is a 75 y.o. male.    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  I reviewed the patient's records.  I reviewed Dr. Boateng neurology note from May 2019.  Reports that the patient has been having memory lapses or losses.  He was losing things sometimes.  He may lose his train of thought.  It reports that he did navigate and plan a trip to [place] the year prior.  EEG and B12 level was ordered.  It was suspected that the patient's issues related to small vessel disease.  The patient had a brain MRI done in 2019 that showed prominent generalized atrophy and white matter microvascular disease.  The patient reports that he does have a deviated septum because he broke his nose playing football.  Last year his TSH was checked and was normal.    Forgetting minor things.  Symptoms started a year ago.  Not affecting driving.  Has not gotten lost.  No accidents, tickets, near misses.  Son has voiced a little concern about driving.  Can't tell that symptoms have worsened over time.  Wife has to remind him of things.  Doesn't forget to take medications.  No problems with ADLs.  No changes in personality.  Doesn't do everything that wife wants him to do.  No impulsivity issues.  Builds model airplanes.  Reports minimal tremor when doing this.  Describes mood as joyful and happy for the most part.  Gets ticked off with politics.  Sleeps well.  Wife says that he snores.  Likes to nap in the afternoon for an hour.  Has never had a sleep study.  No falls.  No accidents due to memory problems.  Wife takes care of the bills.  She has always.  Lives at home with wife.  No hallucinations.  Was on namenda.  He didn't like it.  Didn't think that it was helpful, so stopped it.  Has not  been on aricept.      Occupation: retired dentist  Hobbies: Likes to read a lot, WWII (father was killed on [place] Day)  Alcohol? Rare mady when he is eating out  Smoking? no  Loss of consciousness/head trauma? no  Seizures/strokes/CNS infections? no  Appetite/Weight change? A year ago, purposefully lost 10 lbs  Dry eyes/mouth? no  Swallowing difficulties? no  Family history of neurological disease? GF and GM has memory problems, not sure of age of onset  POA: wife  Living will: yes      The following portions of the patient's history were reviewed and updated as appropriate: allergies, current medications, past family history, past medical history, past social history, past surgical history and problem list.    Review of Systems   Constitutional: Negative for activity change, appetite change and fatigue.   HENT: Negative for ear pain, tinnitus and trouble swallowing.    Eyes: Negative for photophobia, pain and visual disturbance.   Respiratory: Negative for cough, chest tightness and shortness of breath.    Cardiovascular: Negative for chest pain, palpitations and leg swelling.   Gastrointestinal: Negative for abdominal pain, nausea and vomiting.   Endocrine: Negative for cold intolerance, heat intolerance and polydipsia.   Musculoskeletal: Negative for back pain, gait problem and neck pain.   Skin: Negative for color change, pallor and rash.   Allergic/Immunologic: Negative for environmental allergies, food allergies and immunocompromised state.   Neurological: Negative for dizziness, tremors, seizures, syncope, facial asymmetry, speech difficulty, weakness, light-headedness, numbness and headaches.   Hematological: Negative for adenopathy. Does not bruise/bleed easily.   Psychiatric/Behavioral: Positive for decreased concentration. Negative for agitation, behavioral problems, confusion, dysphoric mood, hallucinations, self-injury, sleep disturbance and suicidal ideas. The patient is not nervous/anxious and is not  hyperactive.     I reviewed the ROS documented by the MA.  All other systems negative.      Objective:    Neurologic Exam    Physical Exam     **The patient is wearing a mask**  Constitutional:  Vital signs reviewed.  No apparent distress.  Well groomed.  Eyes:  No injection, no icterus.    Respiratory:  Normal effort.  Clear to auscultation bilaterally.  Cardiovascular:  Regular rate and rhythm.  No murmurs.  No carotid bruits. Symmetric radial pulses.  Musculoskeletal: Normal station.  Gait steady.  Normal arm swing.  Patient able to walk on heels and toes.  Tandem gait intact.  Romberg negative.  Muscle tone and bulk normal in the bilateral upper and lower extremities.  Strength is 5/5 in the bilateral upper and lower extremities proximally and distally unless otherwise specified in the neurological exam.  Skin:  No rashes.  Warm, dry, and intact.  Psychiatric:  Good mood.  Normal affect.    Neurologic:  Mental status-    The patient is alert and oriented to person, place and time.  Attention/concentration is within normal limits.  Speech is fluent without dysarthria.  The patient is able to name, repeat and follow complex commands without difficulty.     MoCA score is:  19/30 (scanned into chart)  F-words in a minute: 9  Animals in a minute: 11    Reading: Intact    Left parietal function:   Writing- Intact (sentence scanned in)   Calculations- Intact to how many nickels are in $1; 5-2; 2x4; 78/6=12 (not intact)   Recognizes own fingers- yes   Can distinguish between right/left- yes    Right parietal function (neglect?):   Clock- not intact   Dividing lines- not intact (scanned into chart)   Copy drawing- not intact    Frontal lobe:   Silhouette pattern of alternating angles and squares- intact   Luria manual sequencing task- not intact      Apraxia:  Able to demonstrate how to brush teeth with right hand and comb hair with left hand.  Can also demonstrate how to strike and match and blow it out and open a can  and take a sip.    Logic & abstraction: Knows how an apple and banana are alike.  Knows how a car and a boat are alike.    Insight: Appropriate response if they smelled smoke in a crowded theater    Cranial nerves- Pupils equally round and reactive to light with intact accomodation.  Visual fields intact.  Extraocular movements intact.  Facial sensation intact.   Hearing intact to finger-rub bilaterally.  SCM and trapezius are 5/5 bilaterally.    Motor-  See musculoskeletal above.  No tremor.  Reflexes- 2+ in the bilateral biceps, brachioradialis, patellar and achilles.  Toes down-going bilaterally.  Sensation- Intact to pinprick and vibration in bilateral upper and lower extremities symmetrically.  Coordination- Intact to finger tapping and heel knee shin bilaterally.   Gait- See musculoskeletal exam above.       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    1.  Moderate cognitive impairment-The patient's memory problems do not seem to be affecting his day-to-day and therefore I have a low clinical suspicion for dementia at this time.  He does have significant cognitive issues with a MoCA score of a 19 out of 30.  He scored poorly on visual spatial skills.  We discussed my concerns about driving.  The patient was agreeable to going to [employer] and having a driving assessment.  I also asked them to get his brain MRI on a disc so that I can personally review the images. Based on the report, he likely does have vascular cognitive impairment.  He is at risk for sleep apnea we discussed how untreated sleep apnea could cause memory issues.  The patient would like to think about a home sleep study.  We also discussed the role for neuropsychological testing.  If he was interested, it would be reasonable but possibly may not ultimately affect his  plan of care.  The patient did not seem interested in that today.  I had an extensive conversation regarding his test results and plan of care with the patient and his wife.  We will see the patient back in 3 months time or sooner if needed.    A total of 60 minutes of face-to-face time was spent with the patient and greater than 50% time was spent on counseling regarding his symptoms and plan of care.       Problems Addressed this Visit     None      Visit Diagnoses     Cognitive impairment    -  Primary    Relevant Orders    Ambulatory Referral to Occupational Therapy (Completed)      Diagnoses       Codes Comments    Cognitive impairment    -  Primary ICD-10-CM: R41.89  ICD-9-CM: 294.9

## 2021-01-07 ENCOUNTER — DOCUMENTATION (OUTPATIENT)
Dept: FAMILY MEDICINE CLINIC | Facility: CLINIC | Age: 76
End: 2021-01-07

## 2021-01-07 RX ORDER — FLUOXETINE HYDROCHLORIDE 20 MG/1
20 CAPSULE ORAL DAILY
Qty: 7 CAPSULE | Refills: 0 | Status: SHIPPED | OUTPATIENT
Start: 2021-01-07 | End: 2021-01-12

## 2021-01-12 ENCOUNTER — DOCUMENTATION (OUTPATIENT)
Dept: NEUROLOGY | Facility: CLINIC | Age: 76
End: 2021-01-12

## 2021-01-12 RX ORDER — FLUOXETINE HYDROCHLORIDE 20 MG/1
CAPSULE ORAL
Qty: 7 CAPSULE | Refills: 0 | Status: SHIPPED | OUTPATIENT
Start: 2021-01-12 | End: 2021-05-16

## 2021-01-22 ENCOUNTER — APPOINTMENT (OUTPATIENT)
Dept: SLEEP MEDICINE | Facility: HOSPITAL | Age: 76
End: 2021-01-22

## 2021-01-25 DIAGNOSIS — E78.5 HYPERLIPIDEMIA, UNSPECIFIED HYPERLIPIDEMIA TYPE: Primary | ICD-10-CM

## 2021-01-25 DIAGNOSIS — I10 ESSENTIAL HYPERTENSION: ICD-10-CM

## 2021-01-25 DIAGNOSIS — R73.01 IMPAIRED FASTING GLUCOSE: ICD-10-CM

## 2021-01-25 DIAGNOSIS — R53.82 CHRONIC FATIGUE: Primary | ICD-10-CM

## 2021-01-30 LAB
ALBUMIN SERPL-MCNC: 4.4 G/DL (ref 3.5–5.2)
ALBUMIN/GLOB SERPL: 1.9 G/DL
ALP SERPL-CCNC: 61 U/L (ref 39–117)
ALT SERPL-CCNC: 19 U/L (ref 1–41)
AST SERPL-CCNC: 22 U/L (ref 1–40)
BASOPHILS # BLD AUTO: NORMAL 10*3/UL
BILIRUB SERPL-MCNC: 0.7 MG/DL (ref 0–1.2)
BUN SERPL-MCNC: 14 MG/DL (ref 8–23)
BUN/CREAT SERPL: 11.8 (ref 7–25)
CALCIUM SERPL-MCNC: 9.2 MG/DL (ref 8.6–10.5)
CHLORIDE SERPL-SCNC: 107 MMOL/L (ref 98–107)
CHOLEST SERPL-MCNC: 161 MG/DL (ref 100–199)
CO2 SERPL-SCNC: 25.3 MMOL/L (ref 22–29)
CREAT SERPL-MCNC: 1.19 MG/DL (ref 0.76–1.27)
EOSINOPHIL # BLD AUTO: NORMAL 10*3/UL
EOSINOPHIL NFR BLD AUTO: NORMAL %
GLOBULIN SER CALC-MCNC: 2.3 GM/DL
GLUCOSE SERPL-MCNC: 111 MG/DL (ref 65–99)
HBA1C MFR BLD: NORMAL %
HCT VFR BLD AUTO: NORMAL %
HDL SERPL-SCNC: 31.6 UMOL/L
HDLC SERPL-MCNC: 44 MG/DL
HGB BLD-MCNC: NORMAL G/DL
LDL SERPL QN: 20.3 NM
LDL SERPL-SCNC: 1284 NMOL/L
LDL SMALL SERPL-SCNC: 699 NMOL/L
LDLC SERPL CALC-MCNC: 95 MG/DL (ref 0–99)
LP-IR SCORE SERPL: 59
LYMPHOCYTES # BLD AUTO: NORMAL 10*3/UL
LYMPHOCYTES NFR BLD AUTO: NORMAL %
MONOCYTES NFR BLD AUTO: NORMAL %
NEUTROPHILS NFR BLD AUTO: NORMAL %
PLATELET # BLD AUTO: NORMAL 10*3/UL
POTASSIUM SERPL-SCNC: 4 MMOL/L (ref 3.5–5.2)
PROT SERPL-MCNC: 6.7 G/DL (ref 6–8.5)
RBC # BLD AUTO: NORMAL 10*6/UL
REQUEST PROBLEM: NORMAL
SODIUM SERPL-SCNC: 144 MMOL/L (ref 136–145)
TESTOST FREE SERPL-MCNC: 4 PG/ML (ref 6.6–18.1)
TESTOST SERPL-MCNC: 525 NG/DL (ref 264–916)
TRIGL SERPL-MCNC: 124 MG/DL (ref 0–149)
TSH SERPL DL<=0.005 MIU/L-ACNC: 1.97 UIU/ML (ref 0.27–4.2)
WBC # BLD AUTO: NORMAL 10*3/MM3

## 2021-01-31 DIAGNOSIS — I10 ESSENTIAL HYPERTENSION: ICD-10-CM

## 2021-01-31 DIAGNOSIS — R73.01 IMPAIRED FASTING GLUCOSE: Primary | ICD-10-CM

## 2021-02-01 ENCOUNTER — TELEPHONE (OUTPATIENT)
Dept: NEUROLOGY | Facility: CLINIC | Age: 76
End: 2021-02-01

## 2021-02-01 NOTE — TELEPHONE ENCOUNTER
We received a fax from [facility] stating pt refused  evaluation stating he doesn't feel that he needs it.

## 2021-02-05 LAB
BASOPHILS # BLD AUTO: 0.05 10*3/MM3 (ref 0–0.2)
BASOPHILS NFR BLD AUTO: 0.9 % (ref 0–1.5)
EOSINOPHIL # BLD AUTO: 0.2 10*3/MM3 (ref 0–0.4)
EOSINOPHIL NFR BLD AUTO: 3.4 % (ref 0.3–6.2)
ERYTHROCYTE [DISTWIDTH] IN BLOOD BY AUTOMATED COUNT: 13 % (ref 12.3–15.4)
HBA1C MFR BLD: 6 % (ref 4.8–5.6)
HCT VFR BLD AUTO: 46.1 % (ref 37.5–51)
HGB BLD-MCNC: 15.7 G/DL (ref 13–17.7)
IMM GRANULOCYTES # BLD AUTO: 0.02 10*3/MM3 (ref 0–0.05)
IMM GRANULOCYTES NFR BLD AUTO: 0.3 % (ref 0–0.5)
LYMPHOCYTES # BLD AUTO: 2.06 10*3/MM3 (ref 0.7–3.1)
LYMPHOCYTES NFR BLD AUTO: 35 % (ref 19.6–45.3)
MCH RBC QN AUTO: 31.5 PG (ref 26.6–33)
MCHC RBC AUTO-ENTMCNC: 34.1 G/DL (ref 31.5–35.7)
MCV RBC AUTO: 92.4 FL (ref 79–97)
MONOCYTES # BLD AUTO: 0.58 10*3/MM3 (ref 0.1–0.9)
MONOCYTES NFR BLD AUTO: 9.9 % (ref 5–12)
NEUTROPHILS # BLD AUTO: 2.97 10*3/MM3 (ref 1.7–7)
NEUTROPHILS NFR BLD AUTO: 50.5 % (ref 42.7–76)
NRBC BLD AUTO-RTO: 0 /100 WBC (ref 0–0.2)
PLATELET # BLD AUTO: 147 10*3/MM3 (ref 140–450)
RBC # BLD AUTO: 4.99 10*6/MM3 (ref 4.14–5.8)
WBC # BLD AUTO: 5.88 10*3/MM3 (ref 3.4–10.8)

## 2021-02-09 RX ORDER — AMLODIPINE BESYLATE 5 MG/1
TABLET ORAL
Qty: 90 TABLET | Refills: 2 | OUTPATIENT
Start: 2021-02-09

## 2021-02-11 ENCOUNTER — DOCUMENTATION (OUTPATIENT)
Dept: FAMILY MEDICINE CLINIC | Facility: CLINIC | Age: 76
End: 2021-02-11

## 2021-02-11 RX ORDER — AMLODIPINE BESYLATE 5 MG/1
5 TABLET ORAL DAILY
Qty: 90 TABLET | Refills: 3 | Status: SHIPPED | OUTPATIENT
Start: 2021-02-11 | End: 2021-09-21 | Stop reason: SDUPTHER

## 2021-02-12 ENCOUNTER — TELEPHONE (OUTPATIENT)
Dept: NEUROLOGY | Facility: CLINIC | Age: 76
End: 2021-02-12

## 2021-02-12 NOTE — TELEPHONE ENCOUNTER
Haydee from [facility] authorization dept. [phone] needs to speak with you about pt's insurance says it's out of network.    ThanksKandy

## 2021-02-16 ENCOUNTER — APPOINTMENT (OUTPATIENT)
Dept: SLEEP MEDICINE | Facility: HOSPITAL | Age: 76
End: 2021-02-16

## 2021-03-09 DIAGNOSIS — Z23 IMMUNIZATION DUE: ICD-10-CM

## 2021-03-15 ENCOUNTER — HOSPITAL ENCOUNTER (OUTPATIENT)
Dept: SLEEP MEDICINE | Facility: HOSPITAL | Age: 76
Discharge: HOME | End: 2021-03-15

## 2021-03-15 DIAGNOSIS — G47.33 OBSTRUCTIVE SLEEP APNEA: ICD-10-CM

## 2021-04-26 ENCOUNTER — TELEPHONE (OUTPATIENT)
Dept: NEUROLOGY | Facility: CLINIC | Age: 76
End: 2021-04-26

## 2021-04-26 NOTE — TELEPHONE ENCOUNTER
----- Message from Miranda Lai MD sent at 4/26/2021  9:32 AM EDT -----  Can we asked the sleep lab to contact this patient again to get set up with a home sleep study?  He is apparently agreeable to try.  Thanks!

## 2021-04-26 NOTE — TELEPHONE ENCOUNTER
Spoke to patient. Informed him that he would just need to call the sleep lab to reschedule the appointment. Sleep lab phone number given. He stated understanding and thanks.

## 2021-04-30 ENCOUNTER — HOSPITAL ENCOUNTER (OUTPATIENT)
Dept: SLEEP MEDICINE | Facility: HOSPITAL | Age: 76
Discharge: HOME | End: 2021-04-30
Admitting: PSYCHIATRY & NEUROLOGY

## 2021-04-30 PROCEDURE — 95806 SLEEP STUDY UNATT&RESP EFFT: CPT | Performed by: PSYCHIATRY & NEUROLOGY

## 2021-04-30 PROCEDURE — 95806 SLEEP STUDY UNATT&RESP EFFT: CPT

## 2021-05-03 ENCOUNTER — OFFICE VISIT (OUTPATIENT)
Dept: NEUROLOGY | Facility: CLINIC | Age: 76
End: 2021-05-03

## 2021-05-03 VITALS
DIASTOLIC BLOOD PRESSURE: 78 MMHG | BODY MASS INDEX: 27.82 KG/M2 | HEIGHT: 65 IN | SYSTOLIC BLOOD PRESSURE: 138 MMHG | RESPIRATION RATE: 18 BRPM | WEIGHT: 167 LBS

## 2021-05-03 DIAGNOSIS — R41.89 COGNITIVE IMPAIRMENT: Primary | ICD-10-CM

## 2021-05-03 PROCEDURE — 99215 OFFICE O/P EST HI 40 MIN: CPT | Performed by: PSYCHIATRY & NEUROLOGY

## 2021-05-03 RX ORDER — DONEPEZIL HYDROCHLORIDE 5 MG/1
5 TABLET, FILM COATED ORAL NIGHTLY
Qty: 30 TABLET | Refills: 0 | Status: SHIPPED | OUTPATIENT
Start: 2021-05-03 | End: 2021-06-01 | Stop reason: SDUPTHER

## 2021-05-03 NOTE — PROGRESS NOTES
Subjective:     Patient ID: Tyron Hunt is a 76 y.o. male.    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  The patient was last seen as a new patient on December 16, 2020.  For details regarding his history, please refer to that note.  Since I last saw him we received records from his previous neurologist.  He had an EEG in 2019 that was read as normal.  Overall the patient and his wife both think that his memory is better.  He has been taking Privigen.  Since his last visit he continues to drive.  He refused a driving assessment because he did not feel like he needed it.  His wife is not concerned about his driving but they did voice that his son was a little concerned.  He has not gotten lost, there has been no accidents, tickets or near misses.  He denies any development of tremor or hallucinations.  He had one fall in the dark when he tripped over a coffee table in a hotel.  Otherwise he denies any balance issues.  He does have some urinary incontinence once or twice a day.  He saw urologist and reportedly had normal aging.  They brought in his CD of his brain MRI from 2019.  I personally reviewed the images.  His Brizuela ratio is 0.317.  At his previous visit he scored a 19 out of 30 on his Derwent.  He just dropped off his sleep study today.    The following portions of the patient's history were reviewed and updated as appropriate: allergies, current medications, past family history, past medical history, past social history, past surgical history and problem list.    Review of Systems     Objective:    Neurologic Exam    Physical Exam     The patient scored a 16 out of 30 on his Derwent today.    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    1.  Memory loss-the patient's MoCA score declined today to a 16 out of 30.  We had extensive conversation regarding his symptoms.  Due to the decline, I would recommend starting Aricept 5 mg daily.  After a month we can increase to 10 mg.  We discussed side effects including upset stomach.  We had an extensive conversation regarding his driving today.  I am concerned about his insight.  He has poor visual-spatial function and a low MoCA score.  I feel very strongly about him doing the driving assessment at [facility].  The patient finally was agreeable but did not seem convinced.  We will see him back in 4 to 6 weeks to go over his sleep study results and hopefully he has had his driving test by then.  Although the patient does have some urinary incontinence he does not have ataxia and his ventricles are not too large.  I have a low clinical sufficient for NPH at this time.  In the future, we may want to consider further evaluation over at [place]'s multidisciplinary clinic.    Total of 40 minutes of time was spent on this encounter today.  This included reviewing the patient's records, face-to-face time, and documentation.       Problems Addressed this Visit     None      Visit Diagnoses     Cognitive impairment    -  Primary    Relevant Orders    Ambulatory Referral to Occupational Therapy (Completed)      Diagnoses       Codes Comments    Cognitive impairment    -  Primary ICD-10-CM: R41.89  ICD-9-CM: 294.9

## 2021-05-03 NOTE — PATIENT INSTRUCTIONS
**Eat a high fiber diet    **Exercise regularly (physically and mentally)    **Floss daily    **Consider eating yogurt regularly    **Consider drinking green tea    **Limit soda and alcohol    **Ensure safety in the home    **Check out th Alzheimer's Association (www.alz.org).    **If you are interested in participating in a clinical trial, check out the following centers:   [place] [facility]:  [email or web address]      [facility]'s Disease Center:  [email or web address]/coa/[email or web address]     [facility] Alzheimer's Disease Research Center:  [email or web address]/[email or web address]    **If you live in [facility], consider [facility]. It is a free agency that helps find placement for seniors. They do an assessment and find out the need and know which facilities have openings and would be the best fit. They help figure out the financial aspects as well.  Asehly Cloud is the nurse navigator and her number is [phone].

## 2021-05-06 DIAGNOSIS — R52 PAIN: Primary | ICD-10-CM

## 2021-05-06 PROCEDURE — 73140 X-RAY EXAM OF FINGER(S): CPT | Performed by: FAMILY MEDICINE

## 2021-05-12 ENCOUNTER — TELEPHONE (OUTPATIENT)
Dept: NEUROLOGY | Facility: CLINIC | Age: 76
End: 2021-05-12

## 2021-05-12 NOTE — TELEPHONE ENCOUNTER
Returned call to patient. Spoke to his wife. Explained that the sleep studies can take up to 4 to 6 weeks for the provider to read, that is why an appointment was made to go over the results on 6/7/21. I also reassured her once Dr. Lai does read the study she will also have me call or send a message through patient's my chart account. Patient's wife stated understanding and thanks.

## 2021-05-12 NOTE — TELEPHONE ENCOUNTER
Provider:     Caller: PT    Relationship to Patient: SELF    Pharmacy: NA    Phone Number: [phone]    Reason for Call: PATIENT IS WANTING TO SEE IF SOMEONE CAN CALL HIM TO GO OVER SLEEP STUDY RESULTS. PLEASE ADVISE.     When was the patient last seen: 5-3-21

## 2021-05-16 RX ORDER — ESCITALOPRAM OXALATE 10 MG/1
10 TABLET ORAL DAILY
Qty: 90 TABLET | Refills: 3 | Status: SHIPPED | OUTPATIENT
Start: 2021-05-16 | End: 2021-07-09 | Stop reason: SDUPTHER

## 2021-06-01 RX ORDER — DONEPEZIL HYDROCHLORIDE 5 MG/1
5 TABLET, FILM COATED ORAL NIGHTLY
Qty: 30 TABLET | Refills: 11 | Status: SHIPPED | OUTPATIENT
Start: 2021-06-01 | End: 2021-06-07

## 2021-06-01 RX ORDER — DONEPEZIL HYDROCHLORIDE 5 MG/1
TABLET, FILM COATED ORAL
Qty: 30 TABLET | Refills: 0 | OUTPATIENT
Start: 2021-06-01

## 2021-06-01 NOTE — TELEPHONE ENCOUNTER
BART IS CALLING IN TO SAY THAT HE DOES NOW WANT THE MEDICATION AND HE WOULD LIKE SOMEONE TO CALL HIM TO EXPLAIN EXACTLY WHAT THE MEDICATION IS FOR AND WHAT IS DOES.   PLEASE CALL PATIENT TO ADVISE.

## 2021-06-01 NOTE — TELEPHONE ENCOUNTER
I returned the call to the patient. He has thought more about it and would like to continue the 5 mg tablets for now.     Please review.   Thank you

## 2021-06-01 NOTE — TELEPHONE ENCOUNTER
AUSTYN. Patient does not want to increase medication, donepezil, to the recommended 10 mg nightly. He actually does not want to continue it at this time and would like to further discuss it with you at his appt next week.    Please review.  Thank you

## 2021-06-07 ENCOUNTER — OFFICE VISIT (OUTPATIENT)
Dept: NEUROLOGY | Facility: CLINIC | Age: 76
End: 2021-06-07

## 2021-06-07 VITALS
BODY MASS INDEX: 27.66 KG/M2 | OXYGEN SATURATION: 99 % | DIASTOLIC BLOOD PRESSURE: 98 MMHG | WEIGHT: 166 LBS | SYSTOLIC BLOOD PRESSURE: 160 MMHG | HEART RATE: 92 BPM | HEIGHT: 65 IN

## 2021-06-07 DIAGNOSIS — R41.89 COGNITIVE IMPAIRMENT: ICD-10-CM

## 2021-06-07 DIAGNOSIS — G47.33 OBSTRUCTIVE SLEEP APNEA: Primary | ICD-10-CM

## 2021-06-07 PROCEDURE — 99214 OFFICE O/P EST MOD 30 MIN: CPT | Performed by: PSYCHIATRY & NEUROLOGY

## 2021-06-07 RX ORDER — DONEPEZIL HYDROCHLORIDE 10 MG/1
10 TABLET, FILM COATED ORAL NIGHTLY
Qty: 30 TABLET | Refills: 11 | Status: SHIPPED | OUTPATIENT
Start: 2021-06-07 | End: 2021-07-27

## 2021-06-07 RX ORDER — UBIDECARENONE 100 MG
200 CAPSULE ORAL DAILY
COMMUNITY
Start: 2022-02-22

## 2021-06-07 RX ORDER — NAPROXEN SODIUM 220 MG
220 TABLET ORAL AS NEEDED
COMMUNITY
End: 2021-08-17

## 2021-06-07 RX ORDER — DESVENLAFAXINE SUCCINATE 50 MG/1
50 TABLET, EXTENDED RELEASE ORAL DAILY
COMMUNITY
End: 2021-07-09

## 2021-06-07 NOTE — PROGRESS NOTES
Subjective:     Patient ID: Tyron Hunt is a 76 y.o. male.    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  The patient was last seen on May 3, 2021.  He was a new patient back in December.  For details regarding his history, please refer to that note.  Today he is following up his sleep study.  He had overall mild apnea with an AHI of 9.1.  Is 9.7 the supine position 5.2 in the lateral position.  He also started Aricept 5 mg daily.  He denies any side effects of the medication.  They report that they have not been contacted by [facility] for the driving assessment just yet.  He is seeing an ophthalmologist currently to make sure that his vision is optimized for the assessment.    The following portions of the patient's history were reviewed and updated as appropriate: allergies, current medications, past family history, past medical history, past social history, past surgical history and problem list.    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    1.  Memory loss-the patient is tolerating 5 mg of Aricept.  I recommend increasing to 10 mg.    2.  Mild obstructive sleep apnea-we discussed the diagnosis of sleep apnea as well as the consequences of untreated sleep apnea.  We discussed treatment options with CPAP and strict lateral positional therapy.  The patient would like to purchase the Rematee side sleeper device.    We will see him back in 6 months time or sooner if needed.    A total of 30 minutes of time was spent on this encounter today.  This  included reviewing patient's records, face-to-face time, and documentation.       Problems Addressed this Visit     None      Visit Diagnoses     Obstructive sleep apnea    -  Primary    Cognitive impairment          Diagnoses       Codes Comments    Obstructive sleep apnea    -  Primary ICD-10-CM: G47.33  ICD-9-CM: 327.23     Cognitive impairment     ICD-10-CM: R41.89  ICD-9-CM: 294.9

## 2021-06-07 NOTE — PATIENT INSTRUCTIONS
**Eat a high fiber diet    **Exercise regularly (physically and mentally)    **Floss daily    **Consider eating yogurt regularly    **Consider drinking green tea    **Limit soda and alcohol    **Ensure safety in the home    **Check out th Alzheimer's Association (www.alz.org).    **If you are interested in participating in a clinical trial, check out the following centers:   [place] [facility]:  http://[email or web address].medicine.[facility]/      [facility] Disease [facility]:  [email or web address]://[email or web address]/coa/adc     [facility] Alzheimer's Disease Research [facility]:  [email or web address]/[email or web address]    **If you live in [facility], consider [facility]. It is a free agency that helps find placement for seniors. They do an assessment and find out the need and know which facilities have openings and would be the best fit. They help figure out the financial aspects as well.  Ashely Cloud is the nurse navigator and her number is [phone].

## 2021-06-09 ENCOUNTER — TELEPHONE (OUTPATIENT)
Dept: NEUROLOGY | Facility: CLINIC | Age: 76
End: 2021-06-09

## 2021-06-09 NOTE — TELEPHONE ENCOUNTER
Caller: Tyron Hunt    Relationship: Self    Best call back number: [phone]    What was the call regarding: PT CALLED REQUESTING TO KNOW THE POTENTIAL SIDE EFFECTS OF THE 10MG DONEPEZIL (ARICEPT) MEDICATION. INFORMED PT THAT I AM NOT CLINICALLY TRAINED TO PROVIDE THE REQUESTED INFORMATION BUT I WOULD SEND A MESSAGE TO DR. LIU REQUESTING ADDITIONAL INFORMATION, INFORMED PT THAT WE WOULD CALL BACK AS SOON AS WE HAD SOME ANSWERS FOR HIM. PT VERBALIZED UNDERSTANDING AND THANKED THE OFFICE FOR OUR OUTSTANDING SERVICE PROVIDED AT HIS VISIT ON 6/7/21.    Do you require a callback: YES, PLEASE.      PLEASE REVIEW AND ADVISE.

## 2021-06-17 DIAGNOSIS — R73.01 IMPAIRED FASTING GLUCOSE: Primary | ICD-10-CM

## 2021-06-17 DIAGNOSIS — I10 ESSENTIAL HYPERTENSION: ICD-10-CM

## 2021-06-19 LAB
BUN SERPL-MCNC: 16 MG/DL (ref 8–23)
BUN/CREAT SERPL: 13.9 (ref 7–25)
CALCIUM SERPL-MCNC: 10 MG/DL (ref 8.6–10.5)
CHLORIDE SERPL-SCNC: 105 MMOL/L (ref 98–107)
CHOLEST SERPL-MCNC: 158 MG/DL (ref 100–199)
CO2 SERPL-SCNC: 31.5 MMOL/L (ref 22–29)
CREAT SERPL-MCNC: 1.15 MG/DL (ref 0.76–1.27)
GLUCOSE SERPL-MCNC: 108 MG/DL (ref 65–99)
HBA1C MFR BLD: 5.9 % (ref 4.8–5.6)
HDL SERPL-SCNC: 36 UMOL/L
HDLC SERPL-MCNC: 54 MG/DL
LDL SERPL QN: 20.9 NM
LDL SERPL-SCNC: 1086 NMOL/L
LDL SMALL SERPL-SCNC: 311 NMOL/L
LDLC SERPL CALC-MCNC: 90 MG/DL (ref 0–99)
LP-IR SCORE SERPL: 41
POTASSIUM SERPL-SCNC: 4.1 MMOL/L (ref 3.5–5.2)
SODIUM SERPL-SCNC: 144 MMOL/L (ref 136–145)
TRIGL SERPL-MCNC: 73 MG/DL (ref 0–149)

## 2021-06-24 ENCOUNTER — TELEPHONE (OUTPATIENT)
Dept: NEUROLOGY | Facility: CLINIC | Age: 76
End: 2021-06-24

## 2021-06-24 NOTE — TELEPHONE ENCOUNTER
Please review. Patient called in stating that he is starting to have side effects he believes to the donepezil since increasing to the 10 mg. He complains of waking up around 3 or 4 am every night trembling and freezing to death with his arms feeling like they are on fire. He states that he feels like he is shaking to pieces. He would like to know your recommendations.    Thank you

## 2021-06-24 NOTE — TELEPHONE ENCOUNTER
Caller: BART LUIS    Relationship:   PT  Best call back number: [phone]    What medications are you currently taking:   Current Outpatient Medications on File Prior to Visit   Medication Sig Dispense Refill   • amLODIPine (NORVASC) 5 MG tablet Take 1 tablet by mouth Daily. (Patient taking differently: Take 2.5 mg by mouth Daily.) 90 tablet 3   • atorvastatin (LIPITOR) 10 MG tablet Take 1 tablet by mouth Daily. 90 tablet 3   • Cholecalciferol (VITAMIN D3 PO) Take  by mouth.     • coenzyme Q10 100 MG capsule Take 100 mg by mouth Daily.     • desvenlafaxine (PRISTIQ) 50 MG 24 hr tablet Take 50 mg by mouth Daily.     • donepezil (Aricept) 10 MG tablet Take 1 tablet by mouth Every Night. 30 tablet 11   • escitalopram (Lexapro) 10 MG tablet Take 1 tablet by mouth Daily. 90 tablet 3   • Magnesium 100 MG capsule Take 200 mg by mouth.     • Multiple Vitamins-Minerals (COMPLETE MULTIVITAMIN/MINERAL PO) Take  by mouth.     • Multiple Vitamins-Minerals (ZINC PO) Take  by mouth.     • naproxen sodium (ALEVE) 220 MG tablet Take 220 mg by mouth As Needed.     • vitamin B-12 (CYANOCOBALAMIN) 1000 MCG tablet Take 1,000 mcg by mouth Daily.       No current facility-administered medications on file prior to visit.        When did you start taking these medications: ABOUT 2 WEEKS SINCE DOSAGE INCREASE     Which medication are you concerned about: donepezil (Aricept) 10 MG tablet    Who prescribed you this medication:  DR LIU     What are your concerns:  PT STATES SINCE  TAKING INCREASED MG OF 10 PT STATES HE IS WAKING UP  AROUND 3 OR 4 IN THE MORNING  STATES HE FEELS LIKE HE IS FREEZING TO DEATH  ARMS FEEL LIKE THEY ARE ON FIRE  HAS ONLY EXPERIENCED SINCE THE DOSAGE INCREASE AND IS CONCERNED PLS ADVISE  STATES HE FEELS LIKE HE IS SHAKING TO PIECES         How long have you had these concerns:  SINCE INCREASE OF MED

## 2021-06-24 NOTE — TELEPHONE ENCOUNTER
I would first try taking the medication in the morning.  If he continues to have issues then I would lower the dose back down to 5 mg.

## 2021-07-05 DIAGNOSIS — M54.2 CERVICALGIA: Primary | ICD-10-CM

## 2021-07-06 ENCOUNTER — DOCUMENTATION (OUTPATIENT)
Dept: NEUROLOGY | Facility: CLINIC | Age: 76
End: 2021-07-06

## 2021-07-06 ENCOUNTER — HOSPITAL ENCOUNTER (OUTPATIENT)
Dept: GENERAL RADIOLOGY | Facility: HOSPITAL | Age: 76
Discharge: HOME | End: 2021-07-06
Admitting: FAMILY MEDICINE

## 2021-07-06 PROCEDURE — 72050 X-RAY EXAM NECK SPINE 4/5VWS: CPT

## 2021-07-06 NOTE — PROGRESS NOTES
The patient was assessed by the driving program on June 21, 2021.  The patient was cleared to drive without restrictions.

## 2021-07-09 RX ORDER — ESCITALOPRAM OXALATE 20 MG/1
20 TABLET ORAL DAILY
Qty: 90 TABLET | Refills: 3 | Status: SHIPPED | OUTPATIENT
Start: 2021-07-09 | End: 2021-09-21

## 2021-07-11 RX ORDER — PANTOPRAZOLE SODIUM 40 MG/1
40 TABLET, DELAYED RELEASE ORAL DAILY
Qty: 90 TABLET | Refills: 3 | Status: SHIPPED | OUTPATIENT
Start: 2021-07-11 | End: 2022-02-17 | Stop reason: SDUPTHER

## 2021-07-12 RX ORDER — MEMANTINE HYDROCHLORIDE 5 MG/1
TABLET ORAL
Qty: 120 TABLET | Refills: 5 | Status: SHIPPED | OUTPATIENT
Start: 2021-07-12 | End: 2021-07-27

## 2021-07-12 RX ORDER — SUCRALFATE 1 G/1
1 TABLET ORAL NIGHTLY
Qty: 30 TABLET | Refills: 1 | Status: SHIPPED | OUTPATIENT
Start: 2021-07-12 | End: 2021-09-13

## 2021-07-19 DIAGNOSIS — M50.30 DDD (DEGENERATIVE DISC DISEASE), CERVICAL: Primary | ICD-10-CM

## 2021-07-22 DIAGNOSIS — R39.198 DECREASED URINE STREAM: Primary | ICD-10-CM

## 2021-07-22 DIAGNOSIS — R10.13 DYSPEPSIA: ICD-10-CM

## 2021-07-27 RX ORDER — TAMSULOSIN HYDROCHLORIDE 0.4 MG/1
1 CAPSULE ORAL NIGHTLY
Qty: 30 CAPSULE | Refills: 1 | Status: SHIPPED | OUTPATIENT
Start: 2021-07-27 | End: 2022-02-17 | Stop reason: SDUPTHER

## 2021-08-04 DIAGNOSIS — R10.13 EPIGASTRIC PAIN: Primary | ICD-10-CM

## 2021-08-04 NOTE — TELEPHONE ENCOUNTER
Pt calls and reports continued epigastric pressure and discomfort, even with the PPI and Carafate. Pt has appt end of October with GI. Will get CT Abd to look into further.

## 2021-08-06 DIAGNOSIS — M50.30 DDD (DEGENERATIVE DISC DISEASE), CERVICAL: Primary | ICD-10-CM

## 2021-08-13 NOTE — PROGRESS NOTES
"The patient has a pain history of the following:  Cervical disc disease     Previous interventions that the patient has received include:   Cervical Epidural steroid injections in the past - helped     Pain medications include:  Tylenol     Previously: Naproxen     Other conservative modalities which the patient reports using include:  Physical Therapy: no  Chiropractor: no  Massage Therapy: no  TENS: no  Neck or back surgery: no  Past pain management: no    Past Significant Surgical History:  None (posterior cervical cyst removal)    HPI:       CHIEF COMPLAINT: Neck Pain    Tyron Hunt is a 76 y.o. male referred here by Tyron Hunt MD. Tyron Hunt presents to the office for evaluation and treatment of Neck Pain      Onset:  1997  Inciting Event:  Multiple falls years ago   Location:  Neck   Pain: Pain described as cold and burning. Located in the neck and does radiate into the left upper extremity.  Severity:  Pain rated as a 0 /10.  Apportions pain as 0%  neck pain and 100% extremity pain.  Symptoms have been episodic - only around 5am.  Exacerbation:  Sleeping, certain neck positions at night seem to irritate it.    Alleviation:  Getting out of bed.  Associated Symptoms:  Denies weakness in the left upper extremity - still able to bench press 100lbs.   Ambulates: without assistive device.     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  Lately it's been bothering him early in the morning hours.  He's tried multiple different pillows without much change.  He states he has left upper extremity \"cold, fire\" around 5:00 in the morning.  He feels some sensation in his left chest as well, but denies pressure or shortness of air associated with it.  His pain resolves when he gets up and has his coffee in the morning.         PEG Assessment   What number best describes your pain on average in the past week?4  What number best describes how, " during the past week, pain has interfered with your enjoyment of life?0  What number best describes how, during the past week, pain has interfered with your general activity?  0        Current Outpatient Medications:   •  amLODIPine (NORVASC) 5 MG tablet, Take 1 tablet by mouth Daily. (Patient taking differently: Take 2.5 mg by mouth Daily.), Disp: 90 tablet, Rfl: 3  •  Apoaequorin (PREVAGEN PO), Take 1 tablet by mouth Daily., Disp: , Rfl:   •  atorvastatin (LIPITOR) 10 MG tablet, Take 1 tablet by mouth Daily., Disp: 90 tablet, Rfl: 3  •  Cholecalciferol (VITAMIN D3 PO), Take  by mouth., Disp: , Rfl:   •  coenzyme Q10 100 MG capsule, Take 100 mg by mouth Daily., Disp: , Rfl:   •  desvenlafaxine (PRISTIQ) 50 MG 24 hr tablet, Take 50 mg by mouth Daily., Disp: , Rfl:   •  escitalopram (Lexapro) 20 MG tablet, Take 1 tablet by mouth Daily., Disp: 90 tablet, Rfl: 3  •  Magnesium 200 MG tablet, Take 200 mg by mouth., Disp: , Rfl:   •  Multiple Vitamins-Minerals (COMPLETE MULTIVITAMIN/MINERAL PO), Take  by mouth., Disp: , Rfl:   •  Multiple Vitamins-Minerals (ZINC PO), Take  by mouth., Disp: , Rfl:   •  pantoprazole (PROTONIX) 40 MG EC tablet, Take 1 tablet by mouth Daily., Disp: 90 tablet, Rfl: 3  •  rivastigmine (EXELON) 1.5 MG capsule, Take 1 capsule by mouth 2 (Two) Times a Day., Disp: 60 capsule, Rfl: 2  •  sucralfate (Carafate) 1 g tablet, Take 1 tablet by mouth Every Night., Disp: 30 tablet, Rfl: 1  •  tamsulosin (FLOMAX) 0.4 MG capsule 24 hr capsule, Take 1 capsule by mouth Every Night., Disp: 30 capsule, Rfl: 1  •  vitamin B-12 (CYANOCOBALAMIN) 1000 MCG tablet, Take 1,000 mcg by mouth Daily., Disp: , Rfl:     The following portions of the patient's history were reviewed and updated as appropriate: allergies, current medications, past family history, past medical history, past social history, past surgical history and problem list.      REVIEW OF PERTINENT MEDICAL DATA            6/18/21 Creatinine 1.15    2/4/21  "Platelets 147 (10*3)    Review of Systems   Constitutional: Negative for fatigue.   HENT: Positive for congestion.    Eyes: Negative for visual disturbance.   Respiratory: Negative for shortness of breath.    Cardiovascular: Negative for chest pain.   Gastrointestinal: Negative for constipation.   Genitourinary: Negative for difficulty urinating.   Musculoskeletal: Negative for neck pain.   Neurological: Negative for numbness.   Psychiatric/Behavioral: Negative for sleep disturbance and suicidal ideas. The patient is not nervous/anxious.      I have reviewed and confirmed the accuracy of the ROS as documented by the MA/LPN/RN Nataliia Avilez MD      Vitals:    08/17/21 0855   BP: 130/94   Pulse: 81   Resp: 18   Temp: 96.9 °F (36.1 °C)   SpO2: 97%   Weight: 74.1 kg (163 lb 6.4 oz)   Height: 165.1 cm (65\")   PainSc: 0-No pain         Objective   Physical Exam  Vitals reviewed.   Constitutional:       General: He is not in acute distress.  Pulmonary:      Effort: Pulmonary effort is normal. No respiratory distress.   Musculoskeletal:      Comments: Cervical Examination:  Appearance: Posterior scarring present (cyst removal).  Range of Motion: full range of motion  Cervical paravertebral regions and transverse processes are not tender.  The Cervical Paraspinous, Trapezius, Levator and Rhomboid muscles are not tender to palpation, bilaterally.   Skin:     General: Skin is warm and dry.   Neurological:      General: No focal deficit present.      Mental Status: He is alert.      Sensory: No sensory deficit.      Deep Tendon Reflexes:      Reflex Scores:       Bicep reflexes are 2+ on the right side and 2+ on the left side.       Brachioradialis reflexes are 2+ on the right side and 2+ on the left side.     Comments: Negative Webber's sign bilaterally   Psychiatric:         Mood and Affect: Mood normal.         Thought Content: Thought content normal.         Assessment/Plan   Diagnoses and all orders for this " visit:    1. Cervical radiculitis (Primary)  -     Case Request    2. Cervical disc disease  -     Case Request    3. Cervical spondylosis without myelopathy    4. Cervical spinal stenosis  -     Case Request    5. Foraminal stenosis of cervical region  -     Case Request    6. Arthropathy of cervical facet joint        - Pertinent labs reviewed.   - Pertinent imaging reviewed.   - Discussed physical therapy, however he would like to hold at this time.   - Will schedule for cervical Epidural steroid injection.  Risks discussed including but not limited to bleeding, bruising, infection, damage to surrounding structures, headache, and rare things such as being paralyzed, seizure, stroke, heart attack and death.  The risk of steroid medications include but are not limited to immunosuppression, which can increase the risk of surya an infectious disease as well as decrease the immune response to a vaccine.    - Will hold on medications at this time - he does not like any medications that cause drowsiness.     --- Follow-up for C7-T1 Epidural steroid injection and office visit 4 weeks following procedure.          While examining this patient, I wore protective equipment including a mask, eye shield and gloves.  I washed my hands before and after this patient encounter.  The patient wore a mask throughout the visit as well.     Nataliia Avilez MD  Pain Management

## 2021-08-13 NOTE — H&P (VIEW-ONLY)
"The patient has a pain history of the following:  Cervical disc disease     Previous interventions that the patient has received include:   Cervical Epidural steroid injections in the past - helped     Pain medications include:  Tylenol     Previously: Naproxen     Other conservative modalities which the patient reports using include:  Physical Therapy: no  Chiropractor: no  Massage Therapy: no  TENS: no  Neck or back surgery: no  Past pain management: no    Past Significant Surgical History:  None (posterior cervical cyst removal)    HPI:       CHIEF COMPLAINT: Neck Pain    Tyron Hunt is a 76 y.o. male referred here by Tryon Hunt MD. Tyron Hunt presents to the office for evaluation and treatment of Neck Pain      Onset:  1997  Inciting Event:  Multiple falls years ago   Location:  Neck   Pain: Pain described as cold and burning. Located in the neck and does radiate into the left upper extremity.  Severity:  Pain rated as a 0 /10.  Apportions pain as 0%  neck pain and 100% extremity pain.  Symptoms have been episodic - only around 5am.  Exacerbation:  Sleeping, certain neck positions at night seem to irritate it.    Alleviation:  Getting out of bed.  Associated Symptoms:  Denies weakness in the left upper extremity - still able to bench press 100lbs.   Ambulates: without assistive device.     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  Lately it's been bothering him early in the morning hours.  He's tried multiple different pillows without much change.  He states he has left upper extremity \"cold, fire\" around 5:00 in the morning.  He feels some sensation in his left chest as well, but denies pressure or shortness of air associated with it.  His pain resolves when he gets up and has his coffee in the morning.         PEG Assessment   What number best describes your pain on average in the past week?4  What number best describes how, " during the past week, pain has interfered with your enjoyment of life?0  What number best describes how, during the past week, pain has interfered with your general activity?  0        Current Outpatient Medications:   •  amLODIPine (NORVASC) 5 MG tablet, Take 1 tablet by mouth Daily. (Patient taking differently: Take 2.5 mg by mouth Daily.), Disp: 90 tablet, Rfl: 3  •  Apoaequorin (PREVAGEN PO), Take 1 tablet by mouth Daily., Disp: , Rfl:   •  atorvastatin (LIPITOR) 10 MG tablet, Take 1 tablet by mouth Daily., Disp: 90 tablet, Rfl: 3  •  Cholecalciferol (VITAMIN D3 PO), Take  by mouth., Disp: , Rfl:   •  coenzyme Q10 100 MG capsule, Take 100 mg by mouth Daily., Disp: , Rfl:   •  desvenlafaxine (PRISTIQ) 50 MG 24 hr tablet, Take 50 mg by mouth Daily., Disp: , Rfl:   •  escitalopram (Lexapro) 20 MG tablet, Take 1 tablet by mouth Daily., Disp: 90 tablet, Rfl: 3  •  Magnesium 200 MG tablet, Take 200 mg by mouth., Disp: , Rfl:   •  Multiple Vitamins-Minerals (COMPLETE MULTIVITAMIN/MINERAL PO), Take  by mouth., Disp: , Rfl:   •  Multiple Vitamins-Minerals (ZINC PO), Take  by mouth., Disp: , Rfl:   •  pantoprazole (PROTONIX) 40 MG EC tablet, Take 1 tablet by mouth Daily., Disp: 90 tablet, Rfl: 3  •  rivastigmine (EXELON) 1.5 MG capsule, Take 1 capsule by mouth 2 (Two) Times a Day., Disp: 60 capsule, Rfl: 2  •  sucralfate (Carafate) 1 g tablet, Take 1 tablet by mouth Every Night., Disp: 30 tablet, Rfl: 1  •  tamsulosin (FLOMAX) 0.4 MG capsule 24 hr capsule, Take 1 capsule by mouth Every Night., Disp: 30 capsule, Rfl: 1  •  vitamin B-12 (CYANOCOBALAMIN) 1000 MCG tablet, Take 1,000 mcg by mouth Daily., Disp: , Rfl:     The following portions of the patient's history were reviewed and updated as appropriate: allergies, current medications, past family history, past medical history, past social history, past surgical history and problem list.      REVIEW OF PERTINENT MEDICAL DATA            6/18/21 Creatinine 1.15    2/4/21  "Platelets 147 (10*3)    Review of Systems   Constitutional: Negative for fatigue.   HENT: Positive for congestion.    Eyes: Negative for visual disturbance.   Respiratory: Negative for shortness of breath.    Cardiovascular: Negative for chest pain.   Gastrointestinal: Negative for constipation.   Genitourinary: Negative for difficulty urinating.   Musculoskeletal: Negative for neck pain.   Neurological: Negative for numbness.   Psychiatric/Behavioral: Negative for sleep disturbance and suicidal ideas. The patient is not nervous/anxious.      I have reviewed and confirmed the accuracy of the ROS as documented by the MA/LPN/RN Nataliia Avilez MD      Vitals:    08/17/21 0855   BP: 130/94   Pulse: 81   Resp: 18   Temp: 96.9 °F (36.1 °C)   SpO2: 97%   Weight: 74.1 kg (163 lb 6.4 oz)   Height: 165.1 cm (65\")   PainSc: 0-No pain         Objective   Physical Exam  Vitals reviewed.   Constitutional:       General: He is not in acute distress.  Pulmonary:      Effort: Pulmonary effort is normal. No respiratory distress.   Musculoskeletal:      Comments: Cervical Examination:  Appearance: Posterior scarring present (cyst removal).  Range of Motion: full range of motion  Cervical paravertebral regions and transverse processes are not tender.  The Cervical Paraspinous, Trapezius, Levator and Rhomboid muscles are not tender to palpation, bilaterally.   Skin:     General: Skin is warm and dry.   Neurological:      General: No focal deficit present.      Mental Status: He is alert.      Sensory: No sensory deficit.      Deep Tendon Reflexes:      Reflex Scores:       Bicep reflexes are 2+ on the right side and 2+ on the left side.       Brachioradialis reflexes are 2+ on the right side and 2+ on the left side.     Comments: Negative Webber's sign bilaterally   Psychiatric:         Mood and Affect: Mood normal.         Thought Content: Thought content normal.         Assessment/Plan   Diagnoses and all orders for this " visit:    1. Cervical radiculitis (Primary)  -     Case Request    2. Cervical disc disease  -     Case Request    3. Cervical spondylosis without myelopathy    4. Cervical spinal stenosis  -     Case Request    5. Foraminal stenosis of cervical region  -     Case Request    6. Arthropathy of cervical facet joint        - Pertinent labs reviewed.   - Pertinent imaging reviewed.   - Discussed physical therapy, however he would like to hold at this time.   - Will schedule for cervical Epidural steroid injection.  Risks discussed including but not limited to bleeding, bruising, infection, damage to surrounding structures, headache, and rare things such as being paralyzed, seizure, stroke, heart attack and death.  The risk of steroid medications include but are not limited to immunosuppression, which can increase the risk of surya an infectious disease as well as decrease the immune response to a vaccine.    - Will hold on medications at this time - he does not like any medications that cause drowsiness.     --- Follow-up for C7-T1 Epidural steroid injection and office visit 4 weeks following procedure.          While examining this patient, I wore protective equipment including a mask, eye shield and gloves.  I washed my hands before and after this patient encounter.  The patient wore a mask throughout the visit as well.     Nataliia Avilez MD  Pain Management

## 2021-08-14 RX ORDER — RIVASTIGMINE TARTRATE 1.5 MG/1
1.5 CAPSULE ORAL 2 TIMES DAILY
Qty: 60 CAPSULE | Refills: 2 | Status: SHIPPED | OUTPATIENT
Start: 2021-08-14 | End: 2021-09-21

## 2021-08-17 ENCOUNTER — PREP FOR SURGERY (OUTPATIENT)
Dept: SURGERY | Facility: SURGERY CENTER | Age: 76
End: 2021-08-17

## 2021-08-17 ENCOUNTER — OFFICE VISIT (OUTPATIENT)
Dept: PAIN MEDICINE | Facility: CLINIC | Age: 76
End: 2021-08-17

## 2021-08-17 VITALS
TEMPERATURE: 96.9 F | RESPIRATION RATE: 18 BRPM | DIASTOLIC BLOOD PRESSURE: 94 MMHG | HEART RATE: 81 BPM | SYSTOLIC BLOOD PRESSURE: 130 MMHG | BODY MASS INDEX: 27.22 KG/M2 | OXYGEN SATURATION: 97 % | WEIGHT: 163.4 LBS | HEIGHT: 65 IN

## 2021-08-17 DIAGNOSIS — M48.02 CERVICAL SPINAL STENOSIS: ICD-10-CM

## 2021-08-17 DIAGNOSIS — M50.90 CERVICAL DISC DISEASE: ICD-10-CM

## 2021-08-17 DIAGNOSIS — M48.02 FORAMINAL STENOSIS OF CERVICAL REGION: ICD-10-CM

## 2021-08-17 DIAGNOSIS — M54.12 CERVICAL RADICULITIS: Primary | ICD-10-CM

## 2021-08-17 DIAGNOSIS — M47.812 ARTHROPATHY OF CERVICAL FACET JOINT: ICD-10-CM

## 2021-08-17 DIAGNOSIS — M47.812 CERVICAL SPONDYLOSIS WITHOUT MYELOPATHY: ICD-10-CM

## 2021-08-17 PROCEDURE — 99204 OFFICE O/P NEW MOD 45 MIN: CPT | Performed by: ANESTHESIOLOGY

## 2021-08-17 RX ORDER — SODIUM CHLORIDE 0.9 % (FLUSH) 0.9 %
10 SYRINGE (ML) INJECTION EVERY 12 HOURS SCHEDULED
Status: CANCELLED | OUTPATIENT
Start: 2021-08-17

## 2021-08-17 RX ORDER — SODIUM CHLORIDE 0.9 % (FLUSH) 0.9 %
10 SYRINGE (ML) INJECTION AS NEEDED
Status: CANCELLED | OUTPATIENT
Start: 2021-08-17

## 2021-08-17 RX ORDER — DESVENLAFAXINE SUCCINATE 50 MG/1
50 TABLET, EXTENDED RELEASE ORAL DAILY
Status: ON HOLD | COMMUNITY
End: 2021-08-27

## 2021-08-17 NOTE — PATIENT INSTRUCTIONS
What to expect if we're setting up an injection/procedure    - I have placed the order today, we'll start speaking to your insurance for authorization (this can sometimes take a few weeks).   - You should be scheduled for your procedure before you leave the office.  If you were not, please call our office to schedule.   - A COVID test is no longer required for procedures (except spinal cord stimulator procedures).   - LIGHT Intravenous (IV) sedation is offered for some procedures: you will NOT be put to sleep.  If you plan to have sedation, do not eat or drink anything on the day of your injection.   - If we're using steroid in your injection, it should be scheduled at least 2 weeks before or after your COVID vaccine(s).  - Most procedures require having someone drive you.  Please make sure you arrange a  unless told otherwise.           Epidural Steroid Injection    An epidural steroid injection is a shot of steroid medicine and numbing medicine that is given into the space between the spinal cord and the bones of the back (epidural space). The shot helps relieve pain caused by an irritated or swollen nerve root.  The amount of pain relief you get from the injection depends on what is causing the nerve to be swollen and irritated, and how long your pain lasts. You are more likely to benefit from this injection if your pain is strong and comes on suddenly rather than if you have had long-term (chronic) pain.  Tell a health care provider about:  · Any allergies you have.  · All medicines you are taking, including vitamins, herbs, eye drops, creams, and over-the-counter medicines.  · Any problems you or family members have had with anesthetic medicines.  · Any blood disorders you have.  · Any surgeries you have had.  · Any medical conditions you have.  · Whether you are pregnant or may be pregnant.  What are the risks?  Generally, this is a safe procedure. However, problems may occur,  including:  · Headache.  · Bleeding.  · Infection.  · Allergic reaction to medicines.  · Nerve damage.  What happens before the procedure?  Staying hydrated  Follow instructions from your health care provider about hydration, which may include:  · Up to 2 hours before the procedure - you may continue to drink clear liquids, such as water, clear fruit juice, black coffee, and plain tea.  Eating and drinking restrictions  Follow instructions from your health care provider about eating and drinking, which may include:  · 8 hours before the procedure - stop eating heavy meals or foods, such as meat, fried foods, or fatty foods.  · 6 hours before the procedure - stop eating light meals or foods, such as toast or cereal.  · 6 hours before the procedure - stop drinking milk or drinks that contain milk.  · 2 hours before the procedure - stop drinking clear liquids.  Medicines  · You may be given medicines to lower anxiety.  · Ask your health care provider about:  ? Changing or stopping your regular medicines. This is especially important if you are taking diabetes medicines or blood thinners.  ? Taking medicines such as aspirin and ibuprofen. These medicines can thin your blood. Do not take these medicines unless your health care provider tells you to take them.  ? Taking over-the-counter medicines, vitamins, herbs, and supplements.  · Ask your health care provider what steps will be taken to prevent infection.  General instructions  · Plan to have someone take you home from the hospital or clinic.  · If you will be going home right after the procedure, plan to have someone with you for 24 hours.  What happens during the procedure?  · An IV will be inserted into one of your veins.  · You will be given one or more of the following:  ? A medicine to help you relax (sedative).  ? A medicine to numb the area (local anesthetic).  · You will be asked to lie on your abdomen or sit.  · The injection site will be cleaned.  · A  needle will be inserted through your skin into the epidural space. This may cause you some discomfort. An X-ray machine will be used to guide the needle as close as possible to the affected nerve.  · A steroid medicine and a local anesthetic will be injected into the epidural space.  · The needle and IV will be removed.  · A bandage (dressing) will be put over the injection site.  The procedure may vary among health care providers and hospitals.  What can I expect after the procedure?  Follow these instructions at home:  Injection site care  · You may remove the bandage (dressing) after 24 hours.  · Check your injection site every day for signs of infection. Check for:  ? Redness, swelling, or pain.  ? Fluid or blood.  ? Warmth.  ? Pus or a bad smell.  Managing pain, stiffness, and swelling  · For 24 hours after the procedure:  ? Avoid using heat on the injection site.  ? Do not take baths, swim, or use a hot tub until your health care provider approves. Ask your health care provider if you may take a shower. You may only be allowed to take sponge baths.  · If directed, put ice on the injection site. To do this:  ? Put ice in a plastic bag.  ? Place a towel between your skin and the bag.  ? Leave the ice on for 20 minutes, 2-3 times a day.    Activity  · Do not drive for 24 hours if you were given a sedative during your procedure.  · Return to your normal activities as told by your health care provider. Ask your health care provider what activities are safe for you.  General instructions  · Your blood pressure, heart rate, breathing rate, and blood oxygen level will be monitored until you leave the hospital or clinic.  · Your arm or leg may feel weak or numb for a few hours.  · The injection site may feel sore.  · Take over-the-counter and prescription medicines only as told by your health care provider.  · Drink enough fluid to keep your urine pale yellow.  · Keep all follow-up visits as told by your health care  provider. This is important.  Contact a health care provider if:  · You have any of these signs of infection:  ? Redness, swelling, or pain around your injection site.  ? Fluid or blood coming from your injection site.  ? Warmth coming from your injection site.  ? Pus or a bad smell coming from your injection site.  ? A fever.  · You continue to have pain and soreness around the injection site, even after taking over-the-counter pain medicine.  · You have severe, sudden, or lasting nausea or vomiting.  Get help right away if:  · You have severe pain at the injection site that is not relieved by medicines.  · You develop a severe headache or a stiff neck.  · You become sensitive to light.  · You have any new numbness or weakness in your legs or arms.  · You lose control of your bladder or bowel movements.  · You have trouble breathing.  Summary  · An epidural steroid injection is a shot of steroid medicine and numbing medicine that is given into the epidural space.  · The shot helps relieve pain caused by an irritated or swollen nerve root.  · You are more likely to benefit from this injection if your pain is strong and comes on suddenly rather than if you have had chronic pain.  This information is not intended to replace advice given to you by your health care provider. Make sure you discuss any questions you have with your health care provider.  Document Revised: 06/29/2020 Document Reviewed: 06/29/2020  [employer]vier Patient Education © 2021 [employer]

## 2021-08-18 DIAGNOSIS — E78.5 HYPERLIPIDEMIA, UNSPECIFIED HYPERLIPIDEMIA TYPE: ICD-10-CM

## 2021-08-18 RX ORDER — ATORVASTATIN CALCIUM 10 MG/1
10 TABLET, FILM COATED ORAL DAILY
Qty: 90 TABLET | Refills: 3 | Status: SHIPPED | OUTPATIENT
Start: 2021-08-18 | End: 2022-02-17 | Stop reason: SDUPTHER

## 2021-08-19 ENCOUNTER — OFFICE (AMBULATORY)
Dept: URBAN - METROPOLITAN AREA CLINIC 75 | Facility: CLINIC | Age: 76
End: 2021-08-19

## 2021-08-19 VITALS
OXYGEN SATURATION: 98 % | WEIGHT: 163 LBS | SYSTOLIC BLOOD PRESSURE: 150 MMHG | DIASTOLIC BLOOD PRESSURE: 88 MMHG | HEIGHT: 66 IN | HEART RATE: 78 BPM

## 2021-08-19 DIAGNOSIS — R14.2 ERUCTATION: ICD-10-CM

## 2021-08-19 DIAGNOSIS — R10.13 EPIGASTRIC PAIN: ICD-10-CM

## 2021-08-19 DIAGNOSIS — R11.0 NAUSEA: ICD-10-CM

## 2021-08-19 PROCEDURE — 99204 OFFICE O/P NEW MOD 45 MIN: CPT | Performed by: INTERNAL MEDICINE

## 2021-08-19 NOTE — SERVICEHPINOTES
thank you very much for referring your dad for evaluation.  I have met him before, my aunt and uncle are his neighbors.  He is here today with your mom.  They tell me that he's been having intermittent episodes of nausea.  He can often be nauseated in the morning even before he eats but he can sometimes get nauseous after eating and sometimes he does not want to eat because of the nausea.  He also has indigestion at times and increased belching.  Symptoms seem to be somewhat intermittent but again the nausea is worse in the morning.  He's had symptoms now for about 2 months.  There is no melena, there is no hematemesis.  He denies reflux.  There is no dysphagia or odynophagia.  He does not smoke or drink.  He's had no abdominal surgeries.  He does tell me that he was told that he is "borderline diabetic".  As a result he says he did change his diet and his weight has dropped from 180-160 in the past year and again he says that has been intentional.  He's also had lab work done, I don't have a copy but he says everything was "fine".He has occasional constipation but overall does well and uses MiraLAX when necessary.  Is no blood in the stool, there is no lower abdominal pain.  He denies family history of polyps, colitis or colon cancer.  He is up-to-date in terms of colonoscopy.  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  He is in no distress however.  He does not look acutely ill.  He says he is active and exercises without difficulty.

## 2021-08-19 NOTE — SERVICENOTES
per patient is lab work is unremarkable.  He also had an MRI which apparently showed degenerative disc disease in the neck.

## 2021-08-26 NOTE — DISCHARGE INSTRUCTIONS
[facility] - Post-procedure Instructions          --  While there are no absolute restrictions, it is recommended that you do not perform strenuous activity today. In the morning, you may resume your level of activity as before your block.    --  If you have a band-aid at your injection site, please remove it later today. Observe the area for any redness, swelling, pus-like drainage, or a temperature over 101°. If any of these symptoms occur, please call your doctor at [phone]. If after office hours, leave a message and the on-call provider will return your call.    --  Ice may be applied to your injection site. It is recommended you avoid direct heat (heating pad; hot tub) for 1-2 days.    --  Call [facility] at [phone] if you experience persistent headache, persistent bleeding from the injection site, or severe pain not relieved by heat or oral medication.    --  Do not make important decisions today.    --  Due to the effects of the block and/or the I.V. Sedation, DO NOT drive or operate hazardous machinery for 12 hours.  Local anesthetics may cause numbness after procedure and precautions must be taken with regards to operating equipment as well as with walking, even if ambulating with assistance of another person or with an assistive device.    --  Do not drink alcohol for 12 hours.    -- You may return to work tomorrow, or as directed by your referring doctor.    --  Occasionally you may notice a slight increase in your pain after the procedure. This should start to improve within the next 24-48 hours. Radiofrequency ablation procedure pain may last 3-4 weeks.    --  It may take as long as 3-4 days before you notice a gradual improvement in your pain and/or other symptoms.    -- You may continue to take your prescribed pain medication as needed.    --  Some normal possible side effects of steroid use could include fluid retention, increased blood sugar, dull headache,  increased sweating, increased appetite, mood swings and flushing.    --  Diabetics are recommended to watch their blood glucose level closely for 24-48 hours after the injection.    --  Must stay in PACU for 20 min upon arrival and prove no leg weakness before being discharged.    --  IN THE EVENT OF A LIFE THREATENING EMERGENCY, (CHEST PAIN, BREATHING DIFFICULTIES, PARALYSIS…) YOU SHOULD GO TO YOUR NEAREST EMERGENCY ROOM.    --  You should be contacted by our office within 2-3 days to schedule follow up or next appointment date.  If not contacted within 7 days, please call the office at [phone]

## 2021-08-27 ENCOUNTER — TRANSCRIBE ORDERS (OUTPATIENT)
Dept: SURGERY | Facility: SURGERY CENTER | Age: 76
End: 2021-08-27

## 2021-08-27 ENCOUNTER — HOSPITAL ENCOUNTER (OUTPATIENT)
Facility: SURGERY CENTER | Age: 76
Setting detail: HOSPITAL OUTPATIENT SURGERY
Discharge: HOME | End: 2021-08-27
Attending: ANESTHESIOLOGY | Admitting: ANESTHESIOLOGY

## 2021-08-27 ENCOUNTER — HOSPITAL ENCOUNTER (OUTPATIENT)
Dept: GENERAL RADIOLOGY | Facility: SURGERY CENTER | Age: 76
Setting detail: HOSPITAL OUTPATIENT SURGERY
End: 2021-08-27

## 2021-08-27 VITALS
WEIGHT: 160 LBS | BODY MASS INDEX: 25.71 KG/M2 | HEIGHT: 66 IN | TEMPERATURE: 98.4 F | DIASTOLIC BLOOD PRESSURE: 98 MMHG | HEART RATE: 66 BPM | OXYGEN SATURATION: 96 % | RESPIRATION RATE: 16 BRPM | SYSTOLIC BLOOD PRESSURE: 173 MMHG

## 2021-08-27 DIAGNOSIS — M48.02 CERVICAL SPINAL STENOSIS: ICD-10-CM

## 2021-08-27 DIAGNOSIS — Z41.9 SURGERY, ELECTIVE: Primary | ICD-10-CM

## 2021-08-27 DIAGNOSIS — M54.12 CERVICAL RADICULITIS: ICD-10-CM

## 2021-08-27 DIAGNOSIS — Z41.9 SURGERY, ELECTIVE: ICD-10-CM

## 2021-08-27 DIAGNOSIS — M48.02 FORAMINAL STENOSIS OF CERVICAL REGION: ICD-10-CM

## 2021-08-27 PROCEDURE — 25010000002 DEXAMETHASONE SODIUM PHOSPHATE 100 MG/10ML SOLUTION 10 ML VIAL: Performed by: ANESTHESIOLOGY

## 2021-08-27 PROCEDURE — 62321 NJX INTERLAMINAR CRV/THRC: CPT | Performed by: ANESTHESIOLOGY

## 2021-08-27 PROCEDURE — 3E0S33Z INTRODUCTION OF ANTI-INFLAMMATORY INTO EPIDURAL SPACE, PERCUTANEOUS APPROACH: ICD-10-PCS | Performed by: ANESTHESIOLOGY

## 2021-08-27 PROCEDURE — 0 IOHEXOL 300 MG/ML SOLUTION 10 ML VIAL: Performed by: ANESTHESIOLOGY

## 2021-08-27 PROCEDURE — 76000 FLUOROSCOPY <1 HR PHYS/QHP: CPT

## 2021-08-27 RX ORDER — SODIUM CHLORIDE 0.9 % (FLUSH) 0.9 %
10 SYRINGE (ML) INJECTION AS NEEDED
Status: DISCONTINUED | OUTPATIENT
Start: 2021-08-27 | End: 2021-08-27 | Stop reason: HOSPADM

## 2021-08-27 RX ORDER — SODIUM CHLORIDE 0.9 % (FLUSH) 0.9 %
10 SYRINGE (ML) INJECTION EVERY 12 HOURS SCHEDULED
Status: DISCONTINUED | OUTPATIENT
Start: 2021-08-27 | End: 2021-08-27 | Stop reason: HOSPADM

## 2021-08-27 RX ORDER — SODIUM CHLORIDE, SODIUM LACTATE, POTASSIUM CHLORIDE, CALCIUM CHLORIDE 600; 310; 30; 20 MG/100ML; MG/100ML; MG/100ML; MG/100ML
20 INJECTION, SOLUTION INTRAVENOUS CONTINUOUS
Status: DISCONTINUED | OUTPATIENT
Start: 2021-08-27 | End: 2021-08-27 | Stop reason: HOSPADM

## 2021-08-27 RX ADMIN — SODIUM CHLORIDE, POTASSIUM CHLORIDE, SODIUM LACTATE AND CALCIUM CHLORIDE 20 ML/HR: 600; 310; 30; 20 INJECTION, SOLUTION INTRAVENOUS at 11:06

## 2021-08-27 NOTE — OP NOTE
Cervical Epidural Steroid Injection   [facility]    PREOPERATIVE DIAGNOSIS:  Cervical Radiculopathy, Cervical Degenerative Disc Disease and Cervical Spinal Stenosis  POSTOPERATIVE DIAGNOSIS:  Same as preop diagnosis    PROCEDURE:   Cervical Epidural Steroid Injection, Therapeutic Translaminar Injection, with epidurogram, at  C7-T1    PRE-PROCEDURE DISCUSSION WITH PATIENT:    Risks and complications were discussed with the patient prior to starting the procedure and informed consent was obtained.  We discussed various topics including but not limited to bleeding, infection, injury, paralysis, nerve injury, dural puncture, coma, death, worsening of clinical picture, lack of pain relief, and postprocedural soreness.    SURGEON:  Nataliia Avilez MD    REASON FOR PROCEDURE:   Diagnostic injection at this level is needed and Degenerative changes are noted in the area.    SEDATION:  Patient declined administration of moderate sedation    ANESTHETIC:  None  STEROID:   15mg Dexamethasone    DESCRIPTON OF PROCEDURE:  After obtaining informed consent, I.V. was started in the preop area.   The patient was taken to the operating room and placed in the prone position.  EKG, blood pressure, and pulse oximeter were monitored throughout, and sedation was provided as needed by the RN under my guidance. All pressure points were well padded.  The cervicothoracic spine area was prepped with Chloraprep and draped in a sterile fashion.      AP fluoroscopic image was used to visualize the C7-T1 interspace.  The skin and subcutaneous tissue over the area was anesthetized with 1% Lidocaine.  An 18-Gauge Tuohy needle was then advanced through the anesthetized skin tract under fluoroscopic guidance in a coaxial view using a loss of resistance technique.  Lateral fluoroscopy was used to verify appropriate needle depth.  Once the needle tip was felt to be in the posterior epidural space, aspiration was noted to be negative  for blood or CSF.  A volume of 1mL of Omnipaque 180 was then injected under live fluoroscopy in an AP view which produced good epidural spread with no evidence of loculation, vascular run-off or intrathecal spread.  Subsequently, a total volume of 3mL consisting of 15mg of dexamethasone mixed with normal saline was injected without resistance.      ESTIMATED BLOOD LOSS:  <5 mL  SPECIMENS:  None    COMPLICATIONS:     No complications were noted., There was no indication of vascular uptake on live injection of contrast dye. and There was no indication of intrathecal uptake on live injection of contrast dye.    TOLERANCE & DISCHARGE CONDITION:    The patient tolerated the procedure well.  The patient was transported to the recovery area without difficulties.  The patient was discharged to home under the care of family in stable and satisfactory condition.    PLAN OF CARE:  1. The patient was given our standard instruction sheet.        2.   The patient will Return to clinic 2-3 wks.  3.    The patient will resume all medications as per the medication reconciliation sheet.

## 2021-08-29 RX ORDER — AMITRIPTYLINE HYDROCHLORIDE 10 MG/1
10 TABLET, FILM COATED ORAL NIGHTLY
Qty: 90 TABLET | Refills: 1 | Status: SHIPPED | OUTPATIENT
Start: 2021-08-29 | End: 2021-09-21

## 2021-09-08 NOTE — PROGRESS NOTES
The patient has a pain history of the following:  Cervical disc disease   Cervical radiculitis  Cervical spinal stenosis  Cervical spondylosis   Cervical foraminal stenosis      Previous interventions that the patient has received include:   Cervical Epidural steroid injection 8/27/21  Cervical Epidural steroid injections in the past - helped      Pain medications include:  Tylenol      Previously: Naproxen      Other conservative modalities which the patient reports using include:  Physical Therapy: no  Chiropractor: no  Massage Therapy: no  TENS: no  Neck or back surgery: no  Past pain management: no     Past Significant Surgical History:  None (posterior cervical cyst removal)    HPI:     CHIEF COMPLAINT Neck Pain  F/u neck pain. Pt had CERVICAL EPIDURAL and sts receiving 90% relief x a week and a half and is still getting relief today.     Subjective   Tyron Hunt is a 76 y.o. male  who presents to the office for follow-up of procedure.  He completed a Cervical Epidural steroid injection 8/27/21 for management of cervical radiculitis. Patient reports 90% relief from the procedure for 1.5 weeks and ongoing.     He presents today stating that his symptoms have significantly reduced.  He feels that some of his symptoms may actually be GI related, so he's going to see a specialist to further assess abdominal complaints.  Since his injection the pain is much less, but he still feels a sensation of coldness in his arms bilaterally.  He says this doesn't bother him much.        PEG Assessment   What number best describes your pain on average in the past week?2  What number best describes how, during the past week, pain has interfered with your enjoyment of life?2  What number best describes how, during the past week, pain has interfered with your general activity?  2    REVIEW OF PERTINENT MEDICAL DATA          The following portions of the patient's history were reviewed and updated as appropriate: allergies,  "current medications, past family history, past medical history, past social history, past surgical history and problem list.    Review of Systems   Constitutional: Negative for activity change, fatigue and fever.   HENT: Negative for congestion.    Eyes: Negative for visual disturbance.   Respiratory: Negative for cough and shortness of breath.    Cardiovascular: Negative for chest pain.   Gastrointestinal: Negative for constipation and diarrhea.   Endocrine: Negative for polyuria.   Genitourinary: Negative for difficulty urinating.   Musculoskeletal: Positive for neck pain.   Allergic/Immunologic: Negative for immunocompromised state.   Neurological: Negative for dizziness, weakness, light-headedness, numbness and headaches.   Psychiatric/Behavioral: Negative for agitation, sleep disturbance and suicidal ideas. The patient is not nervous/anxious.        Vitals:    09/09/21 1028   BP: 166/91  Comment: pt sts had bp med today   Pulse: 70   Resp: 20   Temp: 97.1 °F (36.2 °C)   SpO2: 96%   Weight: 73.9 kg (163 lb)   Height: 167.6 cm (66\")   PainSc:   2   PainLoc: Neck         Objective   Physical Exam  Vitals reviewed.   Constitutional:       General: He is not in acute distress.  Pulmonary:      Effort: Pulmonary effort is normal. No respiratory distress.   Neurological:      General: No focal deficit present.      Mental Status: He is alert.      Deep Tendon Reflexes:      Reflex Scores:       Bicep reflexes are 2+ on the right side.       Brachioradialis reflexes are 2+ on the right side and 2+ on the left side.     Comments: Negative Webber's sign bilaterally.   Psychiatric:         Mood and Affect: Mood normal.         Thought Content: Thought content normal.             Assessment/Plan   Diagnoses and all orders for this visit:    1. Cervical disc disease (Primary)    2. Cervical radiculitis    3. Foraminal stenosis of cervical region    4. Cervical spinal stenosis    5. Cervical spondylosis without " myelopathy    6. Arthropathy of cervical facet joint        - May repeat cervical Epidural steroid injection as needed up to 3 times in a 6 month period/4 times in a 1 year period.    - Will hold at this time due to significantly reduced symptoms.     --- Follow-up prn            While examining this patient, I wore protective equipment including a mask, eye sheild and gloves.  I washed my hands before and after this patient encounter.  The patient wore a mask throughout the visit as well.     Nataliia Avilez MD  Pain Management

## 2021-09-09 ENCOUNTER — OFFICE VISIT (OUTPATIENT)
Dept: PAIN MEDICINE | Facility: CLINIC | Age: 76
End: 2021-09-09

## 2021-09-09 VITALS
SYSTOLIC BLOOD PRESSURE: 166 MMHG | TEMPERATURE: 97.1 F | DIASTOLIC BLOOD PRESSURE: 91 MMHG | OXYGEN SATURATION: 96 % | HEIGHT: 66 IN | BODY MASS INDEX: 26.2 KG/M2 | RESPIRATION RATE: 20 BRPM | HEART RATE: 70 BPM | WEIGHT: 163 LBS

## 2021-09-09 DIAGNOSIS — M47.812 CERVICAL SPONDYLOSIS WITHOUT MYELOPATHY: ICD-10-CM

## 2021-09-09 DIAGNOSIS — M54.12 CERVICAL RADICULITIS: ICD-10-CM

## 2021-09-09 DIAGNOSIS — M48.02 CERVICAL SPINAL STENOSIS: ICD-10-CM

## 2021-09-09 DIAGNOSIS — M48.02 FORAMINAL STENOSIS OF CERVICAL REGION: ICD-10-CM

## 2021-09-09 DIAGNOSIS — M50.90 CERVICAL DISC DISEASE: Primary | ICD-10-CM

## 2021-09-09 DIAGNOSIS — M47.812 ARTHROPATHY OF CERVICAL FACET JOINT: ICD-10-CM

## 2021-09-09 PROCEDURE — 99212 OFFICE O/P EST SF 10 MIN: CPT | Performed by: ANESTHESIOLOGY

## 2021-09-13 RX ORDER — SUCRALFATE 1 G/1
TABLET ORAL
Qty: 30 TABLET | Refills: 5 | Status: SHIPPED | OUTPATIENT
Start: 2021-09-13 | End: 2022-02-17

## 2021-09-21 DIAGNOSIS — F41.9 ANXIETY: ICD-10-CM

## 2021-09-21 DIAGNOSIS — R73.01 IMPAIRED FASTING GLUCOSE: Primary | ICD-10-CM

## 2021-09-21 DIAGNOSIS — I10 ESSENTIAL HYPERTENSION: ICD-10-CM

## 2021-09-21 RX ORDER — LOSARTAN POTASSIUM 25 MG/1
25 TABLET ORAL DAILY
Qty: 90 TABLET | Refills: 1 | Status: SHIPPED | OUTPATIENT
Start: 2021-09-21 | End: 2022-02-17 | Stop reason: SDUPTHER

## 2021-09-21 RX ORDER — FLUVOXAMINE MALEATE 50 MG/1
50 TABLET, COATED ORAL NIGHTLY
Qty: 30 TABLET | Refills: 5 | Status: SHIPPED | OUTPATIENT
Start: 2021-09-21 | End: 2021-12-23 | Stop reason: SDUPTHER

## 2021-09-21 RX ORDER — AMLODIPINE BESYLATE 5 MG/1
5 TABLET ORAL DAILY
Qty: 90 TABLET | Refills: 3 | Status: SHIPPED | OUTPATIENT
Start: 2021-09-21 | End: 2022-02-17

## 2021-09-22 LAB
ALBUMIN SERPL-MCNC: 4.8 G/DL (ref 3.5–5.2)
ALBUMIN/GLOB SERPL: 2.7 G/DL
ALP SERPL-CCNC: 77 U/L (ref 39–117)
ALT SERPL-CCNC: 12 U/L (ref 1–41)
AST SERPL-CCNC: 14 U/L (ref 1–40)
BASOPHILS # BLD AUTO: 0.05 10*3/MM3 (ref 0–0.2)
BASOPHILS NFR BLD AUTO: 0.9 % (ref 0–1.5)
BILIRUB SERPL-MCNC: 0.5 MG/DL (ref 0–1.2)
BUN SERPL-MCNC: 11 MG/DL (ref 8–23)
BUN/CREAT SERPL: 9.6 (ref 7–25)
CALCIUM SERPL-MCNC: 9.5 MG/DL (ref 8.6–10.5)
CHLORIDE SERPL-SCNC: 101 MMOL/L (ref 98–107)
CO2 SERPL-SCNC: 28.2 MMOL/L (ref 22–29)
CREAT SERPL-MCNC: 1.15 MG/DL (ref 0.76–1.27)
EOSINOPHIL # BLD AUTO: 0.12 10*3/MM3 (ref 0–0.4)
EOSINOPHIL NFR BLD AUTO: 2.1 % (ref 0.3–6.2)
ERYTHROCYTE [DISTWIDTH] IN BLOOD BY AUTOMATED COUNT: 12.1 % (ref 12.3–15.4)
GLOBULIN SER CALC-MCNC: 1.8 GM/DL
GLUCOSE SERPL-MCNC: 104 MG/DL (ref 65–99)
HBA1C MFR BLD: 5.8 % (ref 4.8–5.6)
HCT VFR BLD AUTO: 45.5 % (ref 37.5–51)
HGB BLD-MCNC: 15.3 G/DL (ref 13–17.7)
IMM GRANULOCYTES # BLD AUTO: 0.02 10*3/MM3 (ref 0–0.05)
IMM GRANULOCYTES NFR BLD AUTO: 0.4 % (ref 0–0.5)
LYMPHOCYTES # BLD AUTO: 1.77 10*3/MM3 (ref 0.7–3.1)
LYMPHOCYTES NFR BLD AUTO: 31.6 % (ref 19.6–45.3)
MCH RBC QN AUTO: 30.9 PG (ref 26.6–33)
MCHC RBC AUTO-ENTMCNC: 33.6 G/DL (ref 31.5–35.7)
MCV RBC AUTO: 91.9 FL (ref 79–97)
MONOCYTES # BLD AUTO: 0.6 10*3/MM3 (ref 0.1–0.9)
MONOCYTES NFR BLD AUTO: 10.7 % (ref 5–12)
NEUTROPHILS # BLD AUTO: 3.04 10*3/MM3 (ref 1.7–7)
NEUTROPHILS NFR BLD AUTO: 54.3 % (ref 42.7–76)
NRBC BLD AUTO-RTO: 0 /100 WBC (ref 0–0.2)
PLATELET # BLD AUTO: 159 10*3/MM3 (ref 140–450)
POTASSIUM SERPL-SCNC: 3.7 MMOL/L (ref 3.5–5.2)
PROT SERPL-MCNC: 6.6 G/DL (ref 6–8.5)
RBC # BLD AUTO: 4.95 10*6/MM3 (ref 4.14–5.8)
SODIUM SERPL-SCNC: 140 MMOL/L (ref 136–145)
TSH SERPL DL<=0.005 MIU/L-ACNC: 1.07 UIU/ML (ref 0.27–4.2)
WBC # BLD AUTO: 5.6 10*3/MM3 (ref 3.4–10.8)

## 2021-09-30 VITALS
RESPIRATION RATE: 11 BRPM | OXYGEN SATURATION: 93 % | DIASTOLIC BLOOD PRESSURE: 70 MMHG | OXYGEN SATURATION: 92 % | RESPIRATION RATE: 18 BRPM | DIASTOLIC BLOOD PRESSURE: 62 MMHG | WEIGHT: 160 LBS | HEART RATE: 61 BPM | SYSTOLIC BLOOD PRESSURE: 127 MMHG | OXYGEN SATURATION: 95 % | HEART RATE: 64 BPM | OXYGEN SATURATION: 94 % | DIASTOLIC BLOOD PRESSURE: 65 MMHG | RESPIRATION RATE: 10 BRPM | DIASTOLIC BLOOD PRESSURE: 64 MMHG | SYSTOLIC BLOOD PRESSURE: 91 MMHG | DIASTOLIC BLOOD PRESSURE: 87 MMHG | SYSTOLIC BLOOD PRESSURE: 108 MMHG | SYSTOLIC BLOOD PRESSURE: 116 MMHG | HEART RATE: 58 BPM | OXYGEN SATURATION: 98 % | TEMPERATURE: 96.8 F | TEMPERATURE: 97.2 F | HEART RATE: 63 BPM | RESPIRATION RATE: 16 BRPM | HEIGHT: 66 IN | SYSTOLIC BLOOD PRESSURE: 92 MMHG | DIASTOLIC BLOOD PRESSURE: 59 MMHG | HEART RATE: 67 BPM | SYSTOLIC BLOOD PRESSURE: 138 MMHG | HEART RATE: 59 BPM | RESPIRATION RATE: 15 BRPM | OXYGEN SATURATION: 100 %

## 2021-10-05 ENCOUNTER — OFFICE (AMBULATORY)
Dept: URBAN - METROPOLITAN AREA PATHOLOGY 4 | Facility: PATHOLOGY | Age: 76
End: 2021-10-05

## 2021-10-05 ENCOUNTER — AMBULATORY SURGICAL CENTER (AMBULATORY)
Dept: URBAN - METROPOLITAN AREA SURGERY 17 | Facility: SURGERY | Age: 76
End: 2021-10-05

## 2021-10-05 DIAGNOSIS — K22.89 OTHER SPECIFIED DISEASE OF ESOPHAGUS: ICD-10-CM

## 2021-10-05 DIAGNOSIS — R11.0 NAUSEA: ICD-10-CM

## 2021-10-05 DIAGNOSIS — K29.50 UNSPECIFIED CHRONIC GASTRITIS WITHOUT BLEEDING: ICD-10-CM

## 2021-10-05 DIAGNOSIS — R14.2 ERUCTATION: ICD-10-CM

## 2021-10-05 DIAGNOSIS — K31.89 OTHER DISEASES OF STOMACH AND DUODENUM: ICD-10-CM

## 2021-10-05 DIAGNOSIS — R10.13 EPIGASTRIC PAIN: ICD-10-CM

## 2021-10-05 LAB
GI HISTOLOGY: A. SELECT: (no result)
GI HISTOLOGY: PDF REPORT: (no result)

## 2021-10-05 PROCEDURE — 88342 IMHCHEM/IMCYTCHM 1ST ANTB: CPT | Mod: Q6 | Performed by: INTERNAL MEDICINE

## 2021-10-05 PROCEDURE — 43239 EGD BIOPSY SINGLE/MULTIPLE: CPT | Performed by: INTERNAL MEDICINE

## 2021-10-05 PROCEDURE — 88305 TISSUE EXAM BY PATHOLOGIST: CPT | Mod: Q6 | Performed by: INTERNAL MEDICINE

## 2021-10-13 ENCOUNTER — TRANSCRIBE ORDERS (OUTPATIENT)
Dept: SURGERY | Facility: SURGERY CENTER | Age: 76
End: 2021-10-13

## 2021-10-13 ENCOUNTER — PREP FOR SURGERY (OUTPATIENT)
Dept: SURGERY | Facility: SURGERY CENTER | Age: 76
End: 2021-10-13

## 2021-10-13 DIAGNOSIS — Z41.9 SURGERY, ELECTIVE: Primary | ICD-10-CM

## 2021-10-13 DIAGNOSIS — M54.12 CERVICAL RADICULITIS: Primary | ICD-10-CM

## 2021-10-13 DIAGNOSIS — M50.90 CERVICAL DISC DISEASE: Primary | ICD-10-CM

## 2021-10-13 DIAGNOSIS — M54.12 CERVICAL RADICULITIS: ICD-10-CM

## 2021-10-13 DIAGNOSIS — M48.02 FORAMINAL STENOSIS OF CERVICAL REGION: ICD-10-CM

## 2021-10-13 DIAGNOSIS — M48.02 CERVICAL SPINAL STENOSIS: ICD-10-CM

## 2021-10-13 DIAGNOSIS — M50.90 CERVICAL DISC DISEASE: ICD-10-CM

## 2021-10-13 RX ORDER — SODIUM CHLORIDE 0.9 % (FLUSH) 0.9 %
10 SYRINGE (ML) INJECTION AS NEEDED
Status: CANCELLED | OUTPATIENT
Start: 2021-10-13

## 2021-10-13 RX ORDER — SODIUM CHLORIDE 0.9 % (FLUSH) 0.9 %
10 SYRINGE (ML) INJECTION EVERY 12 HOURS SCHEDULED
Status: CANCELLED | OUTPATIENT
Start: 2021-10-13

## 2021-10-15 NOTE — DISCHARGE INSTRUCTIONS
[facility] - Post-procedure Instructions          --  While there are no absolute restrictions, it is recommended that you do not perform strenuous activity today. In the morning, you may resume your level of activity as before your block.    --  If you have a band-aid at your injection site, please remove it later today. Observe the area for any redness, swelling, pus-like drainage, or a temperature over 101°. If any of these symptoms occur, please call your doctor at [phone]. If after office hours, leave a message and the on-call provider will return your call.    --  Ice may be applied to your injection site. It is recommended you avoid direct heat (heating pad; hot tub) for 1-2 days.    --  Call [facility] at [phone] if you experience persistent headache, persistent bleeding from the injection site, or severe pain not relieved by heat or oral medication.    --  Do not make important decisions today.    --  Due to the effects of the block and/or the I.V. Sedation, DO NOT drive or operate hazardous machinery for 12 hours.  Local anesthetics may cause numbness after procedure and precautions must be taken with regards to operating equipment as well as with walking, even if ambulating with assistance of another person or with an assistive device.    --  Do not drink alcohol for 12 hours.    -- You may return to work tomorrow, or as directed by your referring doctor.    --  Occasionally you may notice a slight increase in your pain after the procedure. This should start to improve within the next 24-48 hours. Radiofrequency ablation procedure pain may last 3-4 weeks.    --  It may take as long as 3-4 days before you notice a gradual improvement in your pain and/or other symptoms.    -- You may continue to take your prescribed pain medication as needed.    --  Some normal possible side effects of steroid use could include fluid retention, increased blood sugar, dull headache,  increased sweating, increased appetite, mood swings and flushing.    --  Diabetics are recommended to watch their blood glucose level closely for 24-48 hours after the injection.    --  Must stay in PACU for 20 min upon arrival and prove no leg weakness before being discharged.    --  IN THE EVENT OF A LIFE THREATENING EMERGENCY, (CHEST PAIN, BREATHING DIFFICULTIES, PARALYSIS…) YOU SHOULD GO TO YOUR NEAREST EMERGENCY ROOM.    --  You should be contacted by our office within 2-3 days to schedule follow up or next appointment date.  If not contacted within 7 days, please call the office at [phone]

## 2021-10-18 ENCOUNTER — HOSPITAL ENCOUNTER (OUTPATIENT)
Dept: GENERAL RADIOLOGY | Facility: SURGERY CENTER | Age: 76
Setting detail: HOSPITAL OUTPATIENT SURGERY
End: 2021-10-18

## 2021-10-18 ENCOUNTER — HOSPITAL ENCOUNTER (OUTPATIENT)
Facility: SURGERY CENTER | Age: 76
Setting detail: HOSPITAL OUTPATIENT SURGERY
Discharge: HOME | End: 2021-10-18
Attending: ANESTHESIOLOGY | Admitting: ANESTHESIOLOGY

## 2021-10-18 VITALS
RESPIRATION RATE: 16 BRPM | TEMPERATURE: 98 F | DIASTOLIC BLOOD PRESSURE: 77 MMHG | WEIGHT: 160 LBS | HEIGHT: 65 IN | BODY MASS INDEX: 26.66 KG/M2 | SYSTOLIC BLOOD PRESSURE: 137 MMHG | OXYGEN SATURATION: 97 % | HEART RATE: 68 BPM

## 2021-10-18 DIAGNOSIS — M48.02 CERVICAL SPINAL STENOSIS: ICD-10-CM

## 2021-10-18 DIAGNOSIS — M50.90 CERVICAL DISC DISEASE: ICD-10-CM

## 2021-10-18 DIAGNOSIS — M54.12 CERVICAL RADICULITIS: ICD-10-CM

## 2021-10-18 DIAGNOSIS — Z41.9 SURGERY, ELECTIVE: ICD-10-CM

## 2021-10-18 DIAGNOSIS — M48.02 FORAMINAL STENOSIS OF CERVICAL REGION: ICD-10-CM

## 2021-10-18 PROCEDURE — 3E0S3BZ INTRODUCTION OF ANESTHETIC AGENT INTO EPIDURAL SPACE, PERCUTANEOUS APPROACH: ICD-10-PCS | Performed by: ANESTHESIOLOGY

## 2021-10-18 PROCEDURE — 25010000002 DEXAMETHASONE SODIUM PHOSPHATE 100 MG/10ML SOLUTION 10 ML VIAL: Performed by: ANESTHESIOLOGY

## 2021-10-18 PROCEDURE — 62321 NJX INTERLAMINAR CRV/THRC: CPT | Performed by: ANESTHESIOLOGY

## 2021-10-18 PROCEDURE — 76000 FLUOROSCOPY <1 HR PHYS/QHP: CPT

## 2021-10-18 PROCEDURE — 77002 NEEDLE LOCALIZATION BY XRAY: CPT

## 2021-10-18 PROCEDURE — 0 IOHEXOL 300 MG/ML SOLUTION 10 ML VIAL: Performed by: ANESTHESIOLOGY

## 2021-10-18 RX ORDER — SODIUM CHLORIDE 0.9 % (FLUSH) 0.9 %
10 SYRINGE (ML) INJECTION AS NEEDED
Status: DISCONTINUED | OUTPATIENT
Start: 2021-10-18 | End: 2021-10-18 | Stop reason: HOSPADM

## 2021-10-18 RX ORDER — SODIUM CHLORIDE 0.9 % (FLUSH) 0.9 %
10 SYRINGE (ML) INJECTION EVERY 12 HOURS SCHEDULED
Status: DISCONTINUED | OUTPATIENT
Start: 2021-10-18 | End: 2021-10-18 | Stop reason: HOSPADM

## 2021-10-18 RX ORDER — SODIUM CHLORIDE, SODIUM LACTATE, POTASSIUM CHLORIDE, CALCIUM CHLORIDE 600; 310; 30; 20 MG/100ML; MG/100ML; MG/100ML; MG/100ML
20 INJECTION, SOLUTION INTRAVENOUS CONTINUOUS
Status: DISCONTINUED | OUTPATIENT
Start: 2021-10-18 | End: 2021-10-18 | Stop reason: HOSPADM

## 2021-10-18 RX ADMIN — SODIUM CHLORIDE, POTASSIUM CHLORIDE, SODIUM LACTATE AND CALCIUM CHLORIDE 20 ML/HR: 600; 310; 30; 20 INJECTION, SOLUTION INTRAVENOUS at 11:34

## 2021-10-18 NOTE — H&P
[facility]   HISTORY AND PHYSICAL    Patient Name: Tyron Hunt  : 1945  MRN: [id number]  Primary Care Physician:  Tyron Hunt MD  Date of admission: 10/18/2021    Subjective   Subjective     Chief Complaint: Chronic neck pain    Returning neck pain with radiation into the left upper extremity      Review of Systems   Constitutional: Negative for chills and fever.   Respiratory: Negative for cough and shortness of breath.    Musculoskeletal: Positive for neck pain.        Personal History     Past Medical History:   Diagnosis Date   • Anxiety    • Diverticulitis    • GERD (gastroesophageal reflux disease)    • Gout    • H/O complete eye exam 2017   • Hyperlipidemia    • Hypertension    • Impaired fasting blood sugar    • Kidney stones    • Memory loss    • Sleep apnea        Past Surgical History:   Procedure Laterality Date   • CERVICAL EPIDURAL N/A 2021    Procedure: CERVICAL EPIDURAL;  Surgeon: Nataliia Avilez MD;  Location: [facility] OR;  Service: Pain Management;  Laterality: N/A;   • CYST REMOVAL     • CYSTOSCOPY W/ LITHOLAPAXY / EHL     • TONSILLECTOMY         Family History: family history includes Arthritis in his mother; Heart disease in his mother; Hypertension in his brother; Obesity in his brother; Thyroid disease in his mother. Otherwise pertinent FHx was reviewed and not pertinent to current issue.    Social History:  reports that he has never smoked. He has never used smokeless tobacco. He reports that he does not drink alcohol and does not use drugs.    Home Medications:  Apoaequorin, Cholecalciferol, Magnesium, Multiple Vitamins-Minerals, amLODIPine, atorvastatin, coenzyme Q10, fluvoxaMINE, losartan, pantoprazole, sucralfate, tamsulosin, and vitamin B-12    Allergies:  Allergies   Allergen Reactions   • Sulfa Antibiotics        Objective    Objective     Vitals:   Temp:  [97.1 °F (36.2 °C)] 97.1 °F (36.2 °C)  Heart Rate:  [73] 73  Resp:  [16] 16  BP: (143)/(103)  143/103    Physical Exam  Constitutional:       General: He is not in acute distress.  Pulmonary:      Effort: Pulmonary effort is normal. No respiratory distress.   Neurological:      Mental Status: He is alert.   Psychiatric:         Mood and Affect: Mood normal.         Thought Content: Thought content normal.         Result Review    Result Review:  I have personally reviewed the results from the time of this admission to 10/18/2021 11:25 EDT and agree with these findings:  []  Laboratory  []  Microbiology  []  Radiology  []  EKG/Telemetry   []  Cardiology/Vascular   []  Pathology  []  Old records  []  Other:  Most notable findings include: No new     Assessment/Plan   Assessment / Plan     Brief Patient Summary:  Tyron Hunt is a 76 y.o. male who continues to have neck pain with radiation into the left upper extremity    Active Hospital Problems:  Active Hospital Problems    Diagnosis    • Cervical disc disease      Added automatically from request for surgery [id number]     • Cervical radiculitis      Added automatically from request for surgery [id number]     • Foraminal stenosis of cervical region      Added automatically from request for surgery [id number]     • Cervical spinal stenosis      Added automatically from request for surgery [id number]       Plan: C7-T1 Epidural steroid injection       DVT prophylaxis:  No DVT prophylaxis order currently exists.    CODE STATUS:       Electronically signed by Nataliia Avilez MD, 10/18/21, 11:25 AM EDT.

## 2021-10-18 NOTE — OP NOTE
Cervical Epidural Steroid Injection   [facility]    PREOPERATIVE DIAGNOSIS:  Cervical Spinal Stenosis, Cervical Disc Displacement and Cervical radiculitis  POSTOPERATIVE DIAGNOSIS:  Same as preop diagnosis    PROCEDURE:   Cervical Epidural Steroid Injection, Therapeutic Translaminar Injection, with epidurogram, at  C7-T1    PRE-PROCEDURE DISCUSSION WITH PATIENT:    Risks and complications were discussed with the patient prior to starting the procedure and informed consent was obtained.  We discussed various topics including but not limited to bleeding, infection, injury, paralysis, nerve injury, dural puncture, coma, death, worsening of clinical picture, lack of pain relief, and postprocedural soreness.    SURGEON:  Nataliia Avilez MD    REASON FOR PROCEDURE:   Previous clinically significant therapeutic effect is noted. and Degenerative changes are noted in the area.    SEDATION:  Patient declined administration of moderate sedation    ANESTHETIC:  None  STEROID:   15mg Dexamethasone    DESCRIPTON OF PROCEDURE:  After obtaining informed consent, I.V. was started in the preop area.   The patient was taken to the operating room and placed in the prone position.  EKG, blood pressure, and pulse oximeter were monitored throughout, and sedation was provided as needed by the RN under my guidance. All pressure points were well padded.  The cervicothoracic spine area was prepped with Chloraprep and draped in a sterile fashion.      AP fluoroscopic image was used to visualize the C7-T1 interspace.  The skin and subcutaneous tissue over the area was anesthetized with 1% Lidocaine.  An 18-Gauge Tuohy needle was then advanced through the anesthetized skin tract under fluoroscopic guidance in a coaxial view using a loss of resistance technique.  Lateral fluoroscopy was used to verify appropriate needle depth.  Once the needle tip was felt to be in the posterior epidural space, aspiration was noted to be  negative for blood or CSF.  A volume of 1mL of Omnipaque 180 was then injected under live fluoroscopy in an AP view which produced good epidural spread with no evidence of loculation, vascular run-off or intrathecal spread.  Subsequently, a total volume of 3mL consisting of 15mg of dexamethasone mixed with normal saline was injected without resistance.      ESTIMATED BLOOD LOSS:  <5 mL  SPECIMENS:  None    COMPLICATIONS:     No complications were noted., There was no indication of vascular uptake on live injection of contrast dye. and There was no indication of intrathecal uptake on live injection of contrast dye.    TOLERANCE & DISCHARGE CONDITION:    The patient tolerated the procedure well.  The patient was transported to the recovery area without difficulties.  The patient was discharged to home under the care of family in stable and satisfactory condition.    PLAN OF CARE:  1. The patient was given our standard instruction sheet.        2.   The patient will Return to clinic 4-6 wks.  3.    The patient will resume all medications as per the medication reconciliation sheet.

## 2021-10-21 RX ORDER — QUETIAPINE FUMARATE 25 MG/1
25 TABLET, FILM COATED ORAL NIGHTLY
Qty: 90 TABLET | Refills: 1 | Status: SHIPPED | OUTPATIENT
Start: 2021-10-21 | End: 2022-02-17

## 2021-11-23 NOTE — PROGRESS NOTES
The patient has a pain history of the following:  Cervical disc disease   Cervical radiculitis  Cervical spinal stenosis  Cervical spondylosis   Cervical foraminal stenosis      Previous interventions that the patient has received include:   Cervical Epidural steroid injection 10/18/21, 8/27/21  Cervical Epidural steroid injections in the past - helped      Pain medications include:  Tylenol      Previously: Naproxen      Other conservative modalities which the patient reports using include:  Physical Therapy: no  Chiropractor: no  Massage Therapy: no  TENS: no  Neck or back surgery: no  Past pain management: no     Past Significant Surgical History:  None (posterior cervical cyst removal)    HPI:     CHIEF COMPLAINT Neck Pain      Subjective   Tyron Hunt is a 76 y.o. male  who presents to the office for follow-up of procedure.  He completed a Cervical Epidural steroid injection 10/18/21 for management of chronic neck and left upper extremity symptoms. Patient reports 90-95% relief from the procedure for 6 weeks and ongoing.     He states that last week he had the left-sided chest pain, but he hasn't been having the pain in his left upper extremity at all since his injection.  He denies any side effects or issues after his injection and is happy with his improvement at this time.        PEG Assessment   What number best describes your pain on average in the past week?0  What number best describes how, during the past week, pain has interfered with your enjoyment of life?0  What number best describes how, during the past week, pain has interfered with your general activity?  0    REVIEW OF PERTINENT MEDICAL DATA  No new    The following portions of the patient's history were reviewed and updated as appropriate: allergies, current medications, past family history, past medical history, past social history, past surgical history and problem list.    Review of Systems   Constitutional: Negative for fatigue.   HENT:  "Negative for congestion.    Eyes: Negative for visual disturbance.   Respiratory: Negative for shortness of breath.    Cardiovascular: Negative for chest pain.   Gastrointestinal: Negative for constipation and diarrhea.   Genitourinary: Negative for difficulty urinating.   Musculoskeletal: Negative for neck pain.   Neurological: Negative for weakness and numbness.   Psychiatric/Behavioral: Negative for sleep disturbance and suicidal ideas. The patient is not nervous/anxious.        Vitals:    11/30/21 0910   BP: 142/77   Pulse: 78   Resp: 18   Temp: 97.7 °F (36.5 °C)   SpO2: 97%   Weight: 74 kg (163 lb 3.2 oz)   Height: 165.1 cm (65\")   PainSc: 0-No pain         Objective   Physical Exam  Vitals reviewed.   Constitutional:       General: He is not in acute distress.  Pulmonary:      Effort: Pulmonary effort is normal. No respiratory distress.   Skin:     General: Skin is warm and dry.      Findings: No bruising, erythema or lesion.   Neurological:      Mental Status: He is alert.   Psychiatric:         Mood and Affect: Mood normal.         Thought Content: Thought content normal.             Assessment/Plan   Diagnoses and all orders for this visit:    1. Cervical disc disease (Primary)    2. Cervical radiculitis    3. Foraminal stenosis of cervical region    4. Cervical spinal stenosis        - Explained may have up to 4 cervical Epidural steroid injections in a 12 month time period.     --- Follow-up 3 months          While examining this patient, I wore protective equipment including a mask, eye sheild and gloves.  I washed my hands before and after this patient encounter.  The patient wore a mask throughout the visit as well.     Nataliia Avilez MD  Pain Management  "

## 2021-11-30 ENCOUNTER — OFFICE VISIT (OUTPATIENT)
Dept: PAIN MEDICINE | Facility: CLINIC | Age: 76
End: 2021-11-30

## 2021-11-30 VITALS
OXYGEN SATURATION: 97 % | DIASTOLIC BLOOD PRESSURE: 77 MMHG | BODY MASS INDEX: 27.19 KG/M2 | TEMPERATURE: 97.7 F | HEIGHT: 65 IN | RESPIRATION RATE: 18 BRPM | SYSTOLIC BLOOD PRESSURE: 142 MMHG | HEART RATE: 78 BPM | WEIGHT: 163.2 LBS

## 2021-11-30 DIAGNOSIS — M54.12 CERVICAL RADICULITIS: ICD-10-CM

## 2021-11-30 DIAGNOSIS — M50.90 CERVICAL DISC DISEASE: Primary | ICD-10-CM

## 2021-11-30 DIAGNOSIS — M48.02 FORAMINAL STENOSIS OF CERVICAL REGION: ICD-10-CM

## 2021-11-30 DIAGNOSIS — M48.02 CERVICAL SPINAL STENOSIS: ICD-10-CM

## 2021-11-30 PROCEDURE — 99212 OFFICE O/P EST SF 10 MIN: CPT | Performed by: ANESTHESIOLOGY

## 2021-12-23 DIAGNOSIS — F41.9 ANXIETY: ICD-10-CM

## 2021-12-23 RX ORDER — FLUVOXAMINE MALEATE 100 MG
100 TABLET ORAL NIGHTLY
Qty: 90 TABLET | Refills: 1 | Status: SHIPPED | OUTPATIENT
Start: 2021-12-23 | End: 2022-02-17

## 2022-01-07 ENCOUNTER — OFFICE (AMBULATORY)
Dept: URBAN - METROPOLITAN AREA CLINIC 75 | Facility: CLINIC | Age: 77
End: 2022-01-07

## 2022-01-07 VITALS
SYSTOLIC BLOOD PRESSURE: 124 MMHG | HEART RATE: 69 BPM | HEIGHT: 66 IN | WEIGHT: 166 LBS | OXYGEN SATURATION: 95 % | DIASTOLIC BLOOD PRESSURE: 88 MMHG

## 2022-01-07 DIAGNOSIS — R11.0 NAUSEA: ICD-10-CM

## 2022-01-07 PROBLEM — K31.89 OTHER DISEASES OF STOMACH AND DUODENUM: Status: ACTIVE | Noted: 2021-10-05

## 2022-01-07 PROBLEM — K22.89 OTHER SPECIFIED DISEASE OF ESOPHAGUS: Status: ACTIVE | Noted: 2021-10-05

## 2022-01-07 PROCEDURE — 99213 OFFICE O/P EST LOW 20 MIN: CPT | Performed by: INTERNAL MEDICINE

## 2022-01-25 ENCOUNTER — TELEPHONE (OUTPATIENT)
Dept: FAMILY MEDICINE CLINIC | Facility: CLINIC | Age: 77
End: 2022-01-25

## 2022-01-25 DIAGNOSIS — Z20.9 EXPOSURE TO COMMUNICABLE DISEASE: Primary | ICD-10-CM

## 2022-01-25 DIAGNOSIS — J06.9 ACUTE URI: ICD-10-CM

## 2022-01-26 LAB
SARS-COV-2 RNA RESP QL NAA+PROBE: NORMAL
SPECIMEN STATUS: NORMAL

## 2022-01-27 RX ORDER — FAMOTIDINE 20 MG/1
20 TABLET, FILM COATED ORAL 2 TIMES DAILY
Qty: 30 TABLET | Refills: 0 | Status: SHIPPED | OUTPATIENT
Start: 2022-01-27 | End: 2022-02-11 | Stop reason: HOSPADM

## 2022-01-29 ENCOUNTER — APPOINTMENT (OUTPATIENT)
Dept: CT IMAGING | Facility: HOSPITAL | Age: 77
End: 2022-01-29

## 2022-01-29 ENCOUNTER — HOSPITAL ENCOUNTER (INPATIENT)
Facility: HOSPITAL | Age: 77
LOS: 12 days | Discharge: HOME HEALTH | End: 2022-02-11
Attending: EMERGENCY MEDICINE | Admitting: INTERNAL MEDICINE

## 2022-01-29 ENCOUNTER — APPOINTMENT (OUTPATIENT)
Dept: GENERAL RADIOLOGY | Facility: HOSPITAL | Age: 77
End: 2022-01-29

## 2022-01-29 DIAGNOSIS — U07.1 ENCEPHALOPATHY DUE TO COVID-19 VIRUS: ICD-10-CM

## 2022-01-29 DIAGNOSIS — R41.82 ALTERED MENTAL STATUS, UNSPECIFIED ALTERED MENTAL STATUS TYPE: ICD-10-CM

## 2022-01-29 DIAGNOSIS — M48.02 CERVICAL SPINAL STENOSIS: ICD-10-CM

## 2022-01-29 DIAGNOSIS — R54 AGE-RELATED PHYSICAL DEBILITY: Primary | ICD-10-CM

## 2022-01-29 DIAGNOSIS — M54.12 CERVICAL RADICULITIS: ICD-10-CM

## 2022-01-29 DIAGNOSIS — G93.49 ENCEPHALOPATHY DUE TO COVID-19 VIRUS: ICD-10-CM

## 2022-01-29 DIAGNOSIS — U07.1 COVID-19 VIRUS INFECTION: ICD-10-CM

## 2022-01-29 LAB
ALBUMIN SERPL-MCNC: 4.4 G/DL (ref 3.5–5.2)
ALBUMIN/GLOB SERPL: 1.8 G/DL
ALP SERPL-CCNC: 96 U/L (ref 39–117)
ALT SERPL W P-5'-P-CCNC: 19 U/L (ref 1–41)
ANION GAP SERPL CALCULATED.3IONS-SCNC: 13 MMOL/L (ref 5–15)
AST SERPL-CCNC: 25 U/L (ref 1–40)
BILIRUB SERPL-MCNC: 0.8 MG/DL (ref 0–1.2)
BUN SERPL-MCNC: 17 MG/DL (ref 8–23)
BUN/CREAT SERPL: 11.5 (ref 7–25)
CALCIUM SPEC-SCNC: 8.8 MG/DL (ref 8.6–10.5)
CHLORIDE SERPL-SCNC: 101 MMOL/L (ref 98–107)
CO2 SERPL-SCNC: 24 MMOL/L (ref 22–29)
CREAT SERPL-MCNC: 1.48 MG/DL (ref 0.76–1.27)
D-LACTATE SERPL-SCNC: 1.8 MMOL/L (ref 0.5–2)
GFR SERPL CREATININE-BSD FRML MDRD: 46 ML/MIN/1.73
GLOBULIN UR ELPH-MCNC: 2.5 GM/DL
GLUCOSE SERPL-MCNC: 113 MG/DL (ref 65–99)
POTASSIUM SERPL-SCNC: 3.8 MMOL/L (ref 3.5–5.2)
PROT SERPL-MCNC: 6.9 G/DL (ref 6–8.5)
SODIUM SERPL-SCNC: 138 MMOL/L (ref 136–145)

## 2022-01-29 PROCEDURE — 80053 COMPREHEN METABOLIC PANEL: CPT | Performed by: EMERGENCY MEDICINE

## 2022-01-29 PROCEDURE — 83605 ASSAY OF LACTIC ACID: CPT | Performed by: EMERGENCY MEDICINE

## 2022-01-29 PROCEDURE — 99285 EMERGENCY DEPT VISIT HI MDM: CPT

## 2022-01-29 PROCEDURE — 93010 ELECTROCARDIOGRAM REPORT: CPT | Performed by: INTERNAL MEDICINE

## 2022-01-29 PROCEDURE — P9612 CATHETERIZE FOR URINE SPEC: HCPCS

## 2022-01-29 PROCEDURE — 85730 THROMBOPLASTIN TIME PARTIAL: CPT | Performed by: EMERGENCY MEDICINE

## 2022-01-29 PROCEDURE — 85610 PROTHROMBIN TIME: CPT | Performed by: EMERGENCY MEDICINE

## 2022-01-29 PROCEDURE — 0202U NFCT DS 22 TRGT SARS-COV-2: CPT | Performed by: EMERGENCY MEDICINE

## 2022-01-29 PROCEDURE — 84484 ASSAY OF TROPONIN QUANT: CPT | Performed by: EMERGENCY MEDICINE

## 2022-01-29 PROCEDURE — 71045 X-RAY EXAM CHEST 1 VIEW: CPT

## 2022-01-29 PROCEDURE — 81001 URINALYSIS AUTO W/SCOPE: CPT | Performed by: EMERGENCY MEDICINE

## 2022-01-29 PROCEDURE — 70450 CT HEAD/BRAIN W/O DYE: CPT

## 2022-01-29 PROCEDURE — 93005 ELECTROCARDIOGRAM TRACING: CPT | Performed by: EMERGENCY MEDICINE

## 2022-01-29 RX ORDER — SODIUM CHLORIDE 0.9 % (FLUSH) 0.9 %
10 SYRINGE (ML) INJECTION AS NEEDED
Status: DISCONTINUED | OUTPATIENT
Start: 2022-01-29 | End: 2022-02-11 | Stop reason: HOSPADM

## 2022-01-30 PROBLEM — R41.82 ALTERED MENTAL STATUS: Status: ACTIVE | Noted: 2022-01-30

## 2022-01-30 PROBLEM — G93.49 ENCEPHALOPATHY DUE TO COVID-19 VIRUS: Status: ACTIVE | Noted: 2022-01-30

## 2022-01-30 PROBLEM — N18.31 STAGE 3A CHRONIC KIDNEY DISEASE: Status: ACTIVE | Noted: 2022-01-30

## 2022-01-30 PROBLEM — U07.1 ENCEPHALOPATHY DUE TO COVID-19 VIRUS: Status: ACTIVE | Noted: 2022-01-30

## 2022-01-30 LAB
APTT PPP: 20 SECONDS (ref 22.7–35.4)
B PARAPERT DNA SPEC QL NAA+PROBE: NOT DETECTED
B PERT DNA SPEC QL NAA+PROBE: NOT DETECTED
BACTERIA UR QL AUTO: NORMAL /HPF
BASOPHILS # BLD AUTO: 0.02 10*3/MM3 (ref 0–0.2)
BASOPHILS NFR BLD AUTO: 0.5 % (ref 0–1.5)
BILIRUB UR QL STRIP: NEGATIVE
C PNEUM DNA NPH QL NAA+NON-PROBE: NOT DETECTED
CLARITY UR: CLEAR
COLOR UR: ABNORMAL
CRP SERPL-MCNC: 1.58 MG/DL (ref 0–0.5)
D-LACTATE SERPL-SCNC: 1 MMOL/L (ref 0.5–2)
DEPRECATED RDW RBC AUTO: 44.9 FL (ref 37–54)
EOSINOPHIL # BLD AUTO: 0 10*3/MM3 (ref 0–0.4)
EOSINOPHIL NFR BLD AUTO: 0 % (ref 0.3–6.2)
ERYTHROCYTE [DISTWIDTH] IN BLOOD BY AUTOMATED COUNT: 12.9 % (ref 12.3–15.4)
FLUAV SUBTYP SPEC NAA+PROBE: NOT DETECTED
FLUBV RNA ISLT QL NAA+PROBE: NOT DETECTED
GLUCOSE UR STRIP-MCNC: NEGATIVE MG/DL
GRAN CASTS URNS QL MICRO: NORMAL /LPF
HADV DNA SPEC NAA+PROBE: NOT DETECTED
HCOV 229E RNA SPEC QL NAA+PROBE: NOT DETECTED
HCOV HKU1 RNA SPEC QL NAA+PROBE: NOT DETECTED
HCOV NL63 RNA SPEC QL NAA+PROBE: NOT DETECTED
HCOV OC43 RNA SPEC QL NAA+PROBE: NOT DETECTED
HCT VFR BLD AUTO: 46.2 % (ref 37.5–51)
HGB BLD-MCNC: 15.5 G/DL (ref 13–17.7)
HGB UR QL STRIP.AUTO: NEGATIVE
HMPV RNA NPH QL NAA+NON-PROBE: NOT DETECTED
HOLD SPECIMEN: NORMAL
HOLD SPECIMEN: NORMAL
HPIV1 RNA ISLT QL NAA+PROBE: NOT DETECTED
HPIV2 RNA SPEC QL NAA+PROBE: NOT DETECTED
HPIV3 RNA NPH QL NAA+PROBE: NOT DETECTED
HPIV4 P GENE NPH QL NAA+PROBE: NOT DETECTED
HYALINE CASTS UR QL AUTO: NORMAL /LPF
INR PPP: 0.99 (ref 0.9–1.1)
KETONES UR QL STRIP: ABNORMAL
LEUKOCYTE ESTERASE UR QL STRIP.AUTO: NEGATIVE
LYMPHOCYTES # BLD AUTO: 0.92 10*3/MM3 (ref 0.7–3.1)
LYMPHOCYTES NFR BLD AUTO: 21.9 % (ref 19.6–45.3)
M PNEUMO IGG SER IA-ACNC: NOT DETECTED
MCH RBC QN AUTO: 31.4 PG (ref 26.6–33)
MCHC RBC AUTO-ENTMCNC: 33.5 G/DL (ref 31.5–35.7)
MCV RBC AUTO: 93.5 FL (ref 79–97)
MONOCYTES # BLD AUTO: 0.48 10*3/MM3 (ref 0.1–0.9)
MONOCYTES NFR BLD AUTO: 11.4 % (ref 5–12)
NEUTROPHILS NFR BLD AUTO: 2.77 10*3/MM3 (ref 1.7–7)
NEUTROPHILS NFR BLD AUTO: 66 % (ref 42.7–76)
NITRITE UR QL STRIP: NEGATIVE
PH UR STRIP.AUTO: <=5 [PH] (ref 5–8)
PLATELET # BLD AUTO: 98 10*3/MM3 (ref 140–450)
PMV BLD AUTO: 10.6 FL (ref 6–12)
PROT UR QL STRIP: ABNORMAL
PROTHROMBIN TIME: 12.9 SECONDS (ref 11.7–14.2)
QT INTERVAL: 404 MS
RBC # BLD AUTO: 4.94 10*6/MM3 (ref 4.14–5.8)
RBC # UR STRIP: NORMAL /HPF
REF LAB TEST METHOD: NORMAL
RHINOVIRUS RNA SPEC NAA+PROBE: NOT DETECTED
RSV RNA NPH QL NAA+NON-PROBE: NOT DETECTED
SARS-COV-2 RNA NPH QL NAA+NON-PROBE: DETECTED
SP GR UR STRIP: 1.02 (ref 1–1.03)
SQUAMOUS #/AREA URNS HPF: NORMAL /HPF
TROPONIN T SERPL-MCNC: <0.01 NG/ML (ref 0–0.03)
TROPONIN T SERPL-MCNC: <0.01 NG/ML (ref 0–0.03)
UROBILINOGEN UR QL STRIP: ABNORMAL
WBC # UR STRIP: NORMAL /HPF
WBC NRBC COR # BLD: 4.2 10*3/MM3 (ref 3.4–10.8)
WHOLE BLOOD HOLD SPECIMEN: NORMAL
WHOLE BLOOD HOLD SPECIMEN: NORMAL

## 2022-01-30 PROCEDURE — 83605 ASSAY OF LACTIC ACID: CPT

## 2022-01-30 PROCEDURE — 85025 COMPLETE CBC W/AUTO DIFF WBC: CPT | Performed by: EMERGENCY MEDICINE

## 2022-01-30 PROCEDURE — 84484 ASSAY OF TROPONIN QUANT: CPT

## 2022-01-30 PROCEDURE — 36415 COLL VENOUS BLD VENIPUNCTURE: CPT

## 2022-01-30 PROCEDURE — 25010000002 ENOXAPARIN PER 10 MG

## 2022-01-30 PROCEDURE — 86140 C-REACTIVE PROTEIN: CPT | Performed by: INTERNAL MEDICINE

## 2022-01-30 RX ORDER — MULTIPLE VITAMINS W/ MINERALS TAB 9MG-400MCG
1 TAB ORAL DAILY
Status: DISCONTINUED | OUTPATIENT
Start: 2022-01-30 | End: 2022-02-11 | Stop reason: HOSPADM

## 2022-01-30 RX ORDER — AMLODIPINE BESYLATE 5 MG/1
5 TABLET ORAL DAILY
Status: DISCONTINUED | OUTPATIENT
Start: 2022-01-30 | End: 2022-02-11 | Stop reason: HOSPADM

## 2022-01-30 RX ORDER — SUCRALFATE 1 G/1
1 TABLET ORAL NIGHTLY
Status: DISCONTINUED | OUTPATIENT
Start: 2022-01-30 | End: 2022-02-11 | Stop reason: HOSPADM

## 2022-01-30 RX ORDER — UREA 10 %
3 LOTION (ML) TOPICAL NIGHTLY PRN
Status: DISCONTINUED | OUTPATIENT
Start: 2022-01-30 | End: 2022-01-30

## 2022-01-30 RX ORDER — CHOLECALCIFEROL (VITAMIN D3) 125 MCG
1000 CAPSULE ORAL DAILY
Status: DISCONTINUED | OUTPATIENT
Start: 2022-01-30 | End: 2022-02-11 | Stop reason: HOSPADM

## 2022-01-30 RX ORDER — NITROGLYCERIN 0.4 MG/1
0.4 TABLET SUBLINGUAL
Status: DISCONTINUED | OUTPATIENT
Start: 2022-01-30 | End: 2022-01-30

## 2022-01-30 RX ORDER — PANTOPRAZOLE SODIUM 40 MG/1
40 TABLET, DELAYED RELEASE ORAL DAILY
Status: DISCONTINUED | OUTPATIENT
Start: 2022-01-30 | End: 2022-02-11 | Stop reason: HOSPADM

## 2022-01-30 RX ORDER — ONDANSETRON 2 MG/ML
4 INJECTION INTRAMUSCULAR; INTRAVENOUS EVERY 4 HOURS PRN
Status: DISCONTINUED | OUTPATIENT
Start: 2022-01-30 | End: 2022-02-11 | Stop reason: HOSPADM

## 2022-01-30 RX ORDER — FAMOTIDINE 20 MG/1
20 TABLET, FILM COATED ORAL DAILY
Status: DISCONTINUED | OUTPATIENT
Start: 2022-01-30 | End: 2022-01-30

## 2022-01-30 RX ORDER — ATORVASTATIN CALCIUM 20 MG/1
10 TABLET, FILM COATED ORAL NIGHTLY
Status: DISCONTINUED | OUTPATIENT
Start: 2022-01-30 | End: 2022-02-11 | Stop reason: HOSPADM

## 2022-01-30 RX ORDER — QUETIAPINE FUMARATE 25 MG/1
25 TABLET, FILM COATED ORAL NIGHTLY
Status: DISCONTINUED | OUTPATIENT
Start: 2022-01-30 | End: 2022-01-30

## 2022-01-30 RX ORDER — FAMOTIDINE 20 MG/1
20 TABLET, FILM COATED ORAL 2 TIMES DAILY
Status: DISCONTINUED | OUTPATIENT
Start: 2022-01-30 | End: 2022-01-30

## 2022-01-30 RX ORDER — TAMSULOSIN HYDROCHLORIDE 0.4 MG/1
0.4 CAPSULE ORAL NIGHTLY
Status: DISCONTINUED | OUTPATIENT
Start: 2022-01-30 | End: 2022-02-11 | Stop reason: HOSPADM

## 2022-01-30 RX ORDER — SODIUM CHLORIDE 9 MG/ML
125 INJECTION, SOLUTION INTRAVENOUS CONTINUOUS
Status: DISCONTINUED | OUTPATIENT
Start: 2022-01-30 | End: 2022-01-31

## 2022-01-30 RX ORDER — ACETAMINOPHEN 325 MG/1
650 TABLET ORAL EVERY 4 HOURS PRN
Status: DISCONTINUED | OUTPATIENT
Start: 2022-01-30 | End: 2022-02-11 | Stop reason: HOSPADM

## 2022-01-30 RX ORDER — QUETIAPINE FUMARATE 50 MG/1
50 TABLET, FILM COATED ORAL NIGHTLY
Status: DISCONTINUED | OUTPATIENT
Start: 2022-01-30 | End: 2022-02-01

## 2022-01-30 RX ORDER — MELATONIN
1000 DAILY
Status: DISCONTINUED | OUTPATIENT
Start: 2022-01-30 | End: 2022-02-11 | Stop reason: HOSPADM

## 2022-01-30 RX ORDER — UREA 10 %
3 LOTION (ML) TOPICAL NIGHTLY
Status: DISCONTINUED | OUTPATIENT
Start: 2022-01-30 | End: 2022-02-11 | Stop reason: HOSPADM

## 2022-01-30 RX ORDER — FLUVOXAMINE MALEATE 50 MG/1
100 TABLET, COATED ORAL NIGHTLY
Status: DISCONTINUED | OUTPATIENT
Start: 2022-01-30 | End: 2022-02-11 | Stop reason: HOSPADM

## 2022-01-30 RX ORDER — ONDANSETRON 4 MG/1
4 TABLET, FILM COATED ORAL EVERY 4 HOURS PRN
Status: DISCONTINUED | OUTPATIENT
Start: 2022-01-30 | End: 2022-02-11 | Stop reason: HOSPADM

## 2022-01-30 RX ADMIN — ATORVASTATIN CALCIUM 10 MG: 20 TABLET, FILM COATED ORAL at 20:39

## 2022-01-30 RX ADMIN — SODIUM CHLORIDE 125 ML/HR: 9 INJECTION, SOLUTION INTRAVENOUS at 21:08

## 2022-01-30 RX ADMIN — MULTIPLE VITAMINS W/ MINERALS TAB 1 TABLET: TAB at 10:38

## 2022-01-30 RX ADMIN — Medication 3 MG: at 20:38

## 2022-01-30 RX ADMIN — TAMSULOSIN HYDROCHLORIDE 0.4 MG: 0.4 CAPSULE ORAL at 20:39

## 2022-01-30 RX ADMIN — SUCRALFATE 1 G: 1 TABLET ORAL at 20:38

## 2022-01-30 RX ADMIN — FLUVOXAMINE MALEATE 100 MG: 50 TABLET, FILM COATED ORAL at 20:39

## 2022-01-30 RX ADMIN — PANTOPRAZOLE SODIUM 40 MG: 40 TABLET, DELAYED RELEASE ORAL at 10:38

## 2022-01-30 RX ADMIN — MAGNESIUM OXIDE 400 MG (241.3 MG MAGNESIUM) TABLET 200 MG: TABLET at 10:38

## 2022-01-30 RX ADMIN — Medication 3 MG: at 04:04

## 2022-01-30 RX ADMIN — SODIUM CHLORIDE 500 ML: 9 INJECTION, SOLUTION INTRAVENOUS at 00:10

## 2022-01-30 RX ADMIN — Medication 1000 UNITS: at 10:37

## 2022-01-30 RX ADMIN — QUETIAPINE FUMARATE 50 MG: 50 TABLET, FILM COATED ORAL at 21:47

## 2022-01-30 RX ADMIN — Medication 1000 MCG: at 10:38

## 2022-01-30 RX ADMIN — SODIUM CHLORIDE 125 ML/HR: 9 INJECTION, SOLUTION INTRAVENOUS at 12:38

## 2022-01-30 RX ADMIN — ENOXAPARIN SODIUM 40 MG: 100 INJECTION SUBCUTANEOUS at 08:31

## 2022-01-30 RX ADMIN — AMLODIPINE BESYLATE 5 MG: 5 TABLET ORAL at 10:38

## 2022-01-30 RX ADMIN — FAMOTIDINE 20 MG: 20 TABLET, FILM COATED ORAL at 08:31

## 2022-01-31 LAB
ALBUMIN SERPL-MCNC: 3.2 G/DL (ref 3.5–5.2)
ALBUMIN/GLOB SERPL: 1.3 G/DL
ALP SERPL-CCNC: 75 U/L (ref 39–117)
ALT SERPL W P-5'-P-CCNC: 20 U/L (ref 1–41)
ANION GAP SERPL CALCULATED.3IONS-SCNC: 13.6 MMOL/L (ref 5–15)
AST SERPL-CCNC: 45 U/L (ref 1–40)
BASOPHILS # BLD AUTO: 0.02 10*3/MM3 (ref 0–0.2)
BASOPHILS NFR BLD AUTO: 0.5 % (ref 0–1.5)
BILIRUB SERPL-MCNC: 0.7 MG/DL (ref 0–1.2)
BUN SERPL-MCNC: 15 MG/DL (ref 8–23)
BUN/CREAT SERPL: 15.8 (ref 7–25)
CALCIUM SPEC-SCNC: 7.7 MG/DL (ref 8.6–10.5)
CHLORIDE SERPL-SCNC: 109 MMOL/L (ref 98–107)
CK SERPL-CCNC: 864 U/L (ref 20–200)
CO2 SERPL-SCNC: 19.4 MMOL/L (ref 22–29)
CREAT SERPL-MCNC: 0.95 MG/DL (ref 0.76–1.27)
CRP SERPL-MCNC: 3.04 MG/DL (ref 0–0.5)
D DIMER PPP FEU-MCNC: 0.63 MCGFEU/ML (ref 0–0.49)
DEPRECATED RDW RBC AUTO: 42.1 FL (ref 37–54)
EOSINOPHIL # BLD AUTO: 0.01 10*3/MM3 (ref 0–0.4)
EOSINOPHIL NFR BLD AUTO: 0.3 % (ref 0.3–6.2)
ERYTHROCYTE [DISTWIDTH] IN BLOOD BY AUTOMATED COUNT: 13 % (ref 12.3–15.4)
FERRITIN SERPL-MCNC: 544 NG/ML (ref 30–400)
FIBRINOGEN PPP-MCNC: 440 MG/DL (ref 219–464)
GFR SERPL CREATININE-BSD FRML MDRD: 77 ML/MIN/1.73
GLOBULIN UR ELPH-MCNC: 2.4 GM/DL
GLUCOSE SERPL-MCNC: 85 MG/DL (ref 65–99)
HCT VFR BLD AUTO: 41.5 % (ref 37.5–51)
HGB BLD-MCNC: 14.1 G/DL (ref 13–17.7)
IMM GRANULOCYTES # BLD AUTO: 0.03 10*3/MM3 (ref 0–0.05)
IMM GRANULOCYTES NFR BLD AUTO: 0.8 % (ref 0–0.5)
LDH SERPL-CCNC: 244 U/L (ref 135–225)
LYMPHOCYTES # BLD AUTO: 0.91 10*3/MM3 (ref 0.7–3.1)
LYMPHOCYTES NFR BLD AUTO: 24.1 % (ref 19.6–45.3)
MCH RBC QN AUTO: 30.6 PG (ref 26.6–33)
MCHC RBC AUTO-ENTMCNC: 34 G/DL (ref 31.5–35.7)
MCV RBC AUTO: 90 FL (ref 79–97)
MONOCYTES # BLD AUTO: 0.43 10*3/MM3 (ref 0.1–0.9)
MONOCYTES NFR BLD AUTO: 11.4 % (ref 5–12)
NEUTROPHILS NFR BLD AUTO: 2.37 10*3/MM3 (ref 1.7–7)
NEUTROPHILS NFR BLD AUTO: 62.9 % (ref 42.7–76)
NRBC BLD AUTO-RTO: 0.8 /100 WBC (ref 0–0.2)
PLATELET # BLD AUTO: 79 10*3/MM3 (ref 140–450)
PMV BLD AUTO: 11.1 FL (ref 6–12)
POTASSIUM SERPL-SCNC: 3.3 MMOL/L (ref 3.5–5.2)
PROT SERPL-MCNC: 5.6 G/DL (ref 6–8.5)
RBC # BLD AUTO: 4.61 10*6/MM3 (ref 4.14–5.8)
SODIUM SERPL-SCNC: 142 MMOL/L (ref 136–145)
WBC NRBC COR # BLD: 3.77 10*3/MM3 (ref 3.4–10.8)

## 2022-01-31 PROCEDURE — 80053 COMPREHEN METABOLIC PANEL: CPT

## 2022-01-31 PROCEDURE — 25010000002 ENOXAPARIN PER 10 MG

## 2022-01-31 PROCEDURE — 82728 ASSAY OF FERRITIN: CPT

## 2022-01-31 PROCEDURE — 83615 LACTATE (LD) (LDH) ENZYME: CPT

## 2022-01-31 PROCEDURE — 86140 C-REACTIVE PROTEIN: CPT

## 2022-01-31 PROCEDURE — 85025 COMPLETE CBC W/AUTO DIFF WBC: CPT

## 2022-01-31 PROCEDURE — 82550 ASSAY OF CK (CPK): CPT

## 2022-01-31 PROCEDURE — 85384 FIBRINOGEN ACTIVITY: CPT

## 2022-01-31 PROCEDURE — 85379 FIBRIN DEGRADATION QUANT: CPT

## 2022-01-31 RX ORDER — MAGNESIUM SULFATE HEPTAHYDRATE 40 MG/ML
2 INJECTION, SOLUTION INTRAVENOUS AS NEEDED
Status: DISCONTINUED | OUTPATIENT
Start: 2022-01-31 | End: 2022-02-11 | Stop reason: HOSPADM

## 2022-01-31 RX ORDER — MAGNESIUM SULFATE HEPTAHYDRATE 40 MG/ML
4 INJECTION, SOLUTION INTRAVENOUS AS NEEDED
Status: DISCONTINUED | OUTPATIENT
Start: 2022-01-31 | End: 2022-02-11 | Stop reason: HOSPADM

## 2022-01-31 RX ORDER — POTASSIUM CHLORIDE 750 MG/1
40 TABLET, FILM COATED, EXTENDED RELEASE ORAL AS NEEDED
Status: DISCONTINUED | OUTPATIENT
Start: 2022-01-31 | End: 2022-02-11 | Stop reason: HOSPADM

## 2022-01-31 RX ORDER — POTASSIUM CHLORIDE 1.5 G/1.77G
40 POWDER, FOR SOLUTION ORAL AS NEEDED
Status: DISCONTINUED | OUTPATIENT
Start: 2022-01-31 | End: 2022-02-11 | Stop reason: HOSPADM

## 2022-01-31 RX ADMIN — SODIUM CHLORIDE 125 ML/HR: 9 INJECTION, SOLUTION INTRAVENOUS at 12:30

## 2022-01-31 RX ADMIN — SODIUM CHLORIDE 125 ML/HR: 9 INJECTION, SOLUTION INTRAVENOUS at 05:17

## 2022-01-31 RX ADMIN — AMLODIPINE BESYLATE 5 MG: 5 TABLET ORAL at 09:01

## 2022-01-31 RX ADMIN — MAGNESIUM OXIDE 400 MG (241.3 MG MAGNESIUM) TABLET 200 MG: TABLET at 09:01

## 2022-01-31 RX ADMIN — Medication 3 MG: at 20:06

## 2022-01-31 RX ADMIN — POTASSIUM CHLORIDE 40 MEQ: 750 TABLET, EXTENDED RELEASE ORAL at 20:07

## 2022-01-31 RX ADMIN — PANTOPRAZOLE SODIUM 40 MG: 40 TABLET, DELAYED RELEASE ORAL at 09:01

## 2022-01-31 RX ADMIN — FLUVOXAMINE MALEATE 100 MG: 50 TABLET, FILM COATED ORAL at 20:07

## 2022-01-31 RX ADMIN — TAMSULOSIN HYDROCHLORIDE 0.4 MG: 0.4 CAPSULE ORAL at 20:06

## 2022-01-31 RX ADMIN — SUCRALFATE 1 G: 1 TABLET ORAL at 20:07

## 2022-01-31 RX ADMIN — QUETIAPINE FUMARATE 50 MG: 50 TABLET, FILM COATED ORAL at 18:19

## 2022-01-31 RX ADMIN — Medication 1000 MCG: at 09:01

## 2022-01-31 RX ADMIN — Medication 1000 UNITS: at 17:11

## 2022-01-31 RX ADMIN — MULTIPLE VITAMINS W/ MINERALS TAB 1 TABLET: TAB at 09:01

## 2022-01-31 RX ADMIN — ENOXAPARIN SODIUM 40 MG: 100 INJECTION SUBCUTANEOUS at 09:00

## 2022-01-31 RX ADMIN — POTASSIUM CHLORIDE 40 MEQ: 750 TABLET, EXTENDED RELEASE ORAL at 17:04

## 2022-01-31 RX ADMIN — ATORVASTATIN CALCIUM 10 MG: 20 TABLET, FILM COATED ORAL at 20:06

## 2022-02-01 PROBLEM — R54 AGE-RELATED PHYSICAL DEBILITY: Status: ACTIVE | Noted: 2022-02-01

## 2022-02-01 LAB
ALBUMIN SERPL-MCNC: 3.7 G/DL (ref 3.5–5.2)
ALBUMIN/GLOB SERPL: 1.5 G/DL
ALP SERPL-CCNC: 81 U/L (ref 39–117)
ALT SERPL W P-5'-P-CCNC: 25 U/L (ref 1–41)
ANION GAP SERPL CALCULATED.3IONS-SCNC: 10 MMOL/L (ref 5–15)
AST SERPL-CCNC: 55 U/L (ref 1–40)
BASOPHILS # BLD AUTO: 0.01 10*3/MM3 (ref 0–0.2)
BASOPHILS NFR BLD AUTO: 0.3 % (ref 0–1.5)
BILIRUB SERPL-MCNC: 0.8 MG/DL (ref 0–1.2)
BUN SERPL-MCNC: 12 MG/DL (ref 8–23)
BUN/CREAT SERPL: 14.6 (ref 7–25)
CALCIUM SPEC-SCNC: 8.5 MG/DL (ref 8.6–10.5)
CHLORIDE SERPL-SCNC: 110 MMOL/L (ref 98–107)
CK SERPL-CCNC: 661 U/L (ref 20–200)
CO2 SERPL-SCNC: 23 MMOL/L (ref 22–29)
CREAT SERPL-MCNC: 0.82 MG/DL (ref 0.76–1.27)
CRP SERPL-MCNC: 3.15 MG/DL (ref 0–0.5)
DEPRECATED RDW RBC AUTO: 45.6 FL (ref 37–54)
EOSINOPHIL # BLD AUTO: 0.05 10*3/MM3 (ref 0–0.4)
EOSINOPHIL NFR BLD AUTO: 1.4 % (ref 0.3–6.2)
ERYTHROCYTE [DISTWIDTH] IN BLOOD BY AUTOMATED COUNT: 12.9 % (ref 12.3–15.4)
FERRITIN SERPL-MCNC: 586 NG/ML (ref 30–400)
GFR SERPL CREATININE-BSD FRML MDRD: 91 ML/MIN/1.73
GLOBULIN UR ELPH-MCNC: 2.5 GM/DL
GLUCOSE SERPL-MCNC: 93 MG/DL (ref 65–99)
HCT VFR BLD AUTO: 45.5 % (ref 37.5–51)
HGB BLD-MCNC: 14.8 G/DL (ref 13–17.7)
IMM GRANULOCYTES # BLD AUTO: 0.01 10*3/MM3 (ref 0–0.05)
IMM GRANULOCYTES NFR BLD AUTO: 0.3 % (ref 0–0.5)
LYMPHOCYTES # BLD AUTO: 0.95 10*3/MM3 (ref 0.7–3.1)
LYMPHOCYTES NFR BLD AUTO: 25.7 % (ref 19.6–45.3)
MAGNESIUM SERPL-MCNC: 2 MG/DL (ref 1.6–2.4)
MCH RBC QN AUTO: 30.8 PG (ref 26.6–33)
MCHC RBC AUTO-ENTMCNC: 32.5 G/DL (ref 31.5–35.7)
MCV RBC AUTO: 94.6 FL (ref 79–97)
MONOCYTES # BLD AUTO: 0.41 10*3/MM3 (ref 0.1–0.9)
MONOCYTES NFR BLD AUTO: 11.1 % (ref 5–12)
NEUTROPHILS NFR BLD AUTO: 2.27 10*3/MM3 (ref 1.7–7)
NEUTROPHILS NFR BLD AUTO: 61.2 % (ref 42.7–76)
NRBC BLD AUTO-RTO: 0 /100 WBC (ref 0–0.2)
PLATELET # BLD AUTO: 93 10*3/MM3 (ref 140–450)
PMV BLD AUTO: 10.8 FL (ref 6–12)
POTASSIUM SERPL-SCNC: 3.8 MMOL/L (ref 3.5–5.2)
PROT SERPL-MCNC: 6.2 G/DL (ref 6–8.5)
RBC # BLD AUTO: 4.81 10*6/MM3 (ref 4.14–5.8)
SODIUM SERPL-SCNC: 143 MMOL/L (ref 136–145)
WBC NRBC COR # BLD: 3.7 10*3/MM3 (ref 3.4–10.8)

## 2022-02-01 PROCEDURE — 25010000002 ENOXAPARIN PER 10 MG

## 2022-02-01 PROCEDURE — 80053 COMPREHEN METABOLIC PANEL: CPT

## 2022-02-01 PROCEDURE — 36415 COLL VENOUS BLD VENIPUNCTURE: CPT

## 2022-02-01 PROCEDURE — 97166 OT EVAL MOD COMPLEX 45 MIN: CPT

## 2022-02-01 PROCEDURE — 85025 COMPLETE CBC W/AUTO DIFF WBC: CPT

## 2022-02-01 PROCEDURE — 97162 PT EVAL MOD COMPLEX 30 MIN: CPT

## 2022-02-01 PROCEDURE — 83735 ASSAY OF MAGNESIUM: CPT | Performed by: INTERNAL MEDICINE

## 2022-02-01 PROCEDURE — 86140 C-REACTIVE PROTEIN: CPT

## 2022-02-01 PROCEDURE — 97530 THERAPEUTIC ACTIVITIES: CPT

## 2022-02-01 PROCEDURE — 82728 ASSAY OF FERRITIN: CPT

## 2022-02-01 PROCEDURE — 82550 ASSAY OF CK (CPK): CPT

## 2022-02-01 RX ORDER — QUETIAPINE FUMARATE 25 MG/1
25 TABLET, FILM COATED ORAL NIGHTLY
Status: DISCONTINUED | OUTPATIENT
Start: 2022-02-01 | End: 2022-02-01

## 2022-02-01 RX ORDER — QUETIAPINE FUMARATE 25 MG/1
25 TABLET, FILM COATED ORAL
Status: DISCONTINUED | OUTPATIENT
Start: 2022-02-01 | End: 2022-02-02

## 2022-02-01 RX ADMIN — Medication 3 MG: at 20:01

## 2022-02-01 RX ADMIN — Medication 1000 MCG: at 09:07

## 2022-02-01 RX ADMIN — QUETIAPINE FUMARATE 25 MG: 25 TABLET ORAL at 20:01

## 2022-02-01 RX ADMIN — ATORVASTATIN CALCIUM 10 MG: 20 TABLET, FILM COATED ORAL at 20:01

## 2022-02-01 RX ADMIN — TAMSULOSIN HYDROCHLORIDE 0.4 MG: 0.4 CAPSULE ORAL at 20:01

## 2022-02-01 RX ADMIN — AMLODIPINE BESYLATE 5 MG: 5 TABLET ORAL at 09:08

## 2022-02-01 RX ADMIN — SODIUM CHLORIDE 500 ML: 9 INJECTION, SOLUTION INTRAVENOUS at 16:07

## 2022-02-01 RX ADMIN — MAGNESIUM OXIDE 400 MG (241.3 MG MAGNESIUM) TABLET 200 MG: TABLET at 09:07

## 2022-02-01 RX ADMIN — ENOXAPARIN SODIUM 40 MG: 100 INJECTION SUBCUTANEOUS at 09:08

## 2022-02-01 RX ADMIN — MULTIPLE VITAMINS W/ MINERALS TAB 1 TABLET: TAB at 09:07

## 2022-02-01 RX ADMIN — FLUVOXAMINE MALEATE 100 MG: 50 TABLET, FILM COATED ORAL at 20:01

## 2022-02-01 RX ADMIN — PANTOPRAZOLE SODIUM 40 MG: 40 TABLET, DELAYED RELEASE ORAL at 09:08

## 2022-02-01 RX ADMIN — Medication 1000 UNITS: at 09:08

## 2022-02-01 RX ADMIN — SUCRALFATE 1 G: 1 TABLET ORAL at 20:01

## 2022-02-01 NOTE — PROGRESS NOTES
Discharge Planning Assessment  [facility]     Patient Name: Tyron Hunt  MRN: [id number]  Today's Date: 2/1/2022    Admit Date: 1/29/2022     Discharge Needs Assessment     Row Name 02/01/22 1153       Living Environment    Lives With spouse    Name(s) of Who Lives With Patient Julia Hunt    Current Living Arrangements home/apartment/condo    Primary Care Provided by spouse/significant other    Provides Primary Care For no one    Family Caregiver if Needed child(salvador), adult; spouse    Family Caregiver Names Spouse, Julia    Quality of Family Relationships helpful; involved; supportive    Able to Return to Prior Arrangements yes       Resource/Environmental Concerns    Resource/Environmental Concerns none       Transition Planning    Patient/Family Anticipates Transition to home with family; home with help/services    Patient/Family Anticipated Services at Transition home health care    Transportation Anticipated family or friend will provide       Discharge Needs Assessment    Readmission Within the Last 30 Days no previous admission in last 30 days    Equipment Currently Used at Home walker, rolling    Concerns to be Addressed adjustment to diagnosis/illness    Equipment Needed After Discharge none    Outpatient/Agency/Support Group Needs homecare agency    Discharge Facility/Level of Care Needs home with home health    Current Discharge Risk dependent with mobility/activities of daily living               Discharge Plan     Row Name 02/01/22 1152       Plan    Plan Home w/ spouse, [facility] and in-home caregivers if needed    Patient/Family in Agreement with Plan yes    Plan Comments Spoke with pt's spouse, Julia [phone], face sheet verified. Patient lives w/ spouse, he is normally IADL inside the home. He occasionally uses a walker, all ADL are on main level. Spouse would like for pt to return home, she is agreeable to use [facility] for HH if needed. We discussed possible need for in home caregivers to assist  spouse, caregiver agency info given to spouse. Spouse will discuss dc plan w/ pt's son, Dr. Hunt. Spouse to follow up w/ [facility] to confirm dc plan. Epic referral sent to [facility]. Spoke w/ House Rhonda Munguia to see if pt is allowed private sitter at bedside, no sitters are allowed on the COVID unit at this time.              Continued Care and Services - Admitted Since 1/29/2022     Home Medical Care     Service Provider Request Status Selected Services Address Phone Fax Patient Preferred     [facility]  Pending - Request Sent N/A [address] [address] [phone] --              Expected Discharge Date and Time     Expected Discharge Date Expected Discharge Time    Feb 2, 2022          Demographic Summary    No documentation.                Functional Status     Row Name 02/01/22 1155       Functional Status    Usual Activity Tolerance good       Functional Status, IADL    Medications assistive person    Meal Preparation assistive person    Housekeeping assistive person    Laundry assistive person    Shopping assistive equipment and person       Mental Status Summary    Recent Changes in Mental Status/Cognitive Functioning unable to assess               Psychosocial    No documentation.                Abuse/Neglect    No documentation.                Legal    No documentation.                Substance Abuse    No documentation.                Patient Forms    No documentation.                   Mariann Patterson RN

## 2022-02-01 NOTE — CONSULTS
"Adult Nutrition  Assessment/PES    Patient Name:  Tyron Hunt  YOB: 1945  MRN: [id number]  Admit Date:  1/29/2022    Assessment Date:  2/1/2022    Comments:  Nutrition screen completed due to pt with Chronic poor po intake x 1 month. Pt with COVID 19 and AMS at this time. BMI 25. DIet Regular with Boost plus TID. Will continue to monitor po intake of meals and supplements, honor foods pt prefers.      Reason for Assessment     Row Name 02/01/22 0746          Reason for Assessment    Reason For Assessment identified at risk by screening criteria     Diagnosis --  COVID 19 infection, AMS, hx of dementia, HTN, Reflux, CKD     Identified At Risk by Screening Criteria reduced oral intake over the last month                Nutrition/Diet History     Row Name 02/01/22 0755          Nutrition/Diet History    Typical Food/Fluid Intake chronic poor po intake x 1 mo                Anthropometrics     Row Name 02/01/22 0755          Anthropometrics    Height 162.6 cm (64.02\")     Weight 67.6 kg (149 lb 1.6 oz)  not weighed by RD            Ideal Body Weight (IBW)    Ideal Body Weight (IBW) (kg) 59.76     % Ideal Body Weight 113.17            Body Mass Index (BMI)    BMI (kg/m2) 25.63     BMI Assessment BMI 25-29.9: overweight                Labs/Tests/Procedures/Meds     Row Name 02/01/22 0757          Labs/Procedures/Meds    Lab Results Reviewed reviewed     Lab Results Comments k, alb, crp            Diagnostic Tests/Procedures    Diagnostic Test/Procedure Reviewed reviewed            Medications    Pertinent Medications Reviewed reviewed     Pertinent Medications Comments lipitor, Vit D, lovenox, melatonin, mvi, protonix, B12                Physical Findings     Row Name 02/01/22 0758          Physical Findings    Oral/Mouth Cavity poor dentition     Skin poor skin integrity/turgor  bruised, ecchymotic                Estimated/Assessed Needs     Row Name 02/01/22 0755          Calculation Measurements    " "Height 162.6 cm (64.02\")                Nutrition Prescription Ordered     Row Name 02/01/22 0759          Nutrition Prescription PO    Current PO Diet Regular     Supplement Boost Plus (Ensure Enlive, Ensure Plus)     Supplement Frequency 3 times a day                       Problem/Interventions:   Problem 1     Row Name 02/01/22 0800          Nutrition Diagnoses Problem 1    Problem 1 Predicted Suboptimal Intake     Etiology (related to) Medical Diagnosis     Infectious Disease Other (comment)  COVID 19 infection                      Intervention Goal     Row Name 02/01/22 0800          Intervention Goal    General Maintain nutrition; Disease management/therapy; Reduce/improve symptoms     PO Increase intake; Tolerate PO; PO intake (%)     PO Intake % 75 %     Weight Maintain weight                Nutrition Intervention     Row Name 02/01/22 0801          Nutrition Intervention    RD/Tech Action Follow Tx progress; Care plan reviewd; Encourage intake; Supplement provided                  Education/Evaluation     Row Name 02/01/22 0801          Education    Education Education not appropriate at this time     Please explain Patient confusion            Monitor/Evaluation    Monitor Per protocol                 Electronically signed by:  Marina Bowden RD  02/01/22 08:01 EST  "

## 2022-02-01 NOTE — THERAPY EVALUATION
Patient Name: Tyron Hunt  : 1945    MRN: [id number]                              Today's Date: 2022       Admit Date: 2022    Visit Dx:     ICD-10-CM ICD-9-CM   1. Altered mental status, unspecified altered mental status type  R41.82 780.97   2. COVID-19 virus infection  U07.1 079.89     Patient Active Problem List   Diagnosis   • Hypertension   • GERD (gastroesophageal reflux disease)   • Hyperlipidemia   • Anxiety   • Benign prostatic hyperplasia with lower urinary tract symptoms   • Impaired fasting glucose   • Recurrent major depressive disorder, in full remission (HCC)   • Cervical disc disease   • Cervical spinal stenosis   • Foraminal stenosis of cervical region   • Cervical radiculitis   • Altered mental status   • Stage 3a chronic kidney disease (HCC)   • Encephalopathy due to COVID-19 virus   • Age-related physical debility     Past Medical History:   Diagnosis Date   • Anxiety    • Diverticulitis    • GERD (gastroesophageal reflux disease)    • Gout    • H/O complete eye exam 2017   • Hyperlipidemia    • Hypertension    • Impaired fasting blood sugar    • Kidney stones    • Memory loss    • Sleep apnea      Past Surgical History:   Procedure Laterality Date   • CERVICAL EPIDURAL N/A 2021    Procedure: CERVICAL EPIDURAL;  Surgeon: Nataliia Avilez MD;  Location: [facility];  Service: Pain Management;  Laterality: N/A;   • CERVICAL EPIDURAL N/A 10/18/2021    Procedure: CERVICAL EPIDURAL 7-1;  Surgeon: Nataliia Avilez MD;  Location: [facility];  Service: Pain Management;  Laterality: N/A;   • CYST REMOVAL     • CYSTOSCOPY W/ LITHOLAPAXY / EHL     • TONSILLECTOMY        General Information     Row Name 22 1416          OT Time and Intention    Document Type evaluation  -SM     Mode of Treatment occupational therapy; physical therapy; co-treatment  -SM     Row Name 22 1416          General Information    Patient Profile Reviewed yes  -SM     Prior Level of  Function independent:; ADL's; all household mobility  per [facility] notes pt is independent, pt may not be an accurate historian this encounter, he reports he uses a w/c but [facility] notes spouse does not report pt owns a w/c but does use a rwx.  -     Existing Precautions/Restrictions fall  COVID 19+  -     Barriers to Rehab cognitive status  -     Row Name 02/01/22 1416          Living Environment    Lives With spouse  -     Row Name 02/01/22 1416          Stairs Within Home, Primary    Stairs, Within Home, Primary per [facility] notes, all ADLs on 1 level.  -     Row Name 02/01/22 1416          Cognition    Orientation Status (Cognition) oriented to; person  -     Row Name 02/01/22 1416          Safety Issues, Functional Mobility    Safety Issues Affecting Function (Mobility) ability to follow commands; insight into deficits/self-awareness; awareness of need for assistance; safety precaution awareness; judgment; problem-solving; sequencing abilities; impulsivity  in bilateral wrist restriants d/t pt attempting to get OOB with nsg. Was in posey vest but that was discharged.  -     Impairments Affecting Function (Mobility) balance; cognition; endurance/activity tolerance; strength; coordination  -           User Key  (r) = Recorded By, (t) = Taken By, (c) = Cosigned By    Initials Name Provider Type     Brianna Armstrong OT Occupational Therapist                 Mobility/ADL's     Row Name 02/01/22 1419          Bed Mobility    Supine-Sit Tama (Bed Mobility) verbal cues; nonverbal cues (demo/gesture); maximum assist (25% patient effort); 2 person assist  -     Sit-Supine [place]ama (Bed Mobility) verbal cues; nonverbal cues (demo/gesture); standby assist  -     Comment (Bed Mobility) initally very lathargic, does not assist much with sitting EOB, more alert once EOB sitting.  -     Row Name 02/01/22 1419          Transfers    Sit-Stand Tama (Transfers) verbal cues; nonverbal cues  (demo/gesture); minimum assist (75% patient effort); 2 person assist  -[facility] Name 02/01/22 1419          Sit-Stand Transfer    Assistive Device (Sit-Stand Transfers) --  HHAX2.  -[facility] Name 02/01/22 1419          Functional Mobility    Functional Mobility- Ind. Level minimum assist (75% patient effort); 2 person assist required  -     Functional Mobility- Comment HHAX2, pt able to take a few side steps up EOB, very unsteady and posterior leaning. Pt declines further mobility today as he reports he is fatiqued and requests to return to bed.  -[facility] Name 02/01/22 1419          Activities of Daily Living    BADL Assessment/Intervention lower body dressing; grooming  -[facility] Name 02/01/22 1419          Lower Body Dressing Assessment/Training    [place] Level (Lower Body Dressing) lower body dressing skills; don; socks; dependent (less than 25% patient effort)  -     Position (Lower Body Dressing) edge of bed sitting  -[facility] Name 02/01/22 1419          Grooming Assessment/Training    [place] Level (Grooming) grooming skills; hair care, combing/brushing; moderate assist (50% patient effort); verbal cues; nonverbal cues (demo/gesture)  -     Position (Grooming) edge of bed sitting  -     Comment (Grooming) pt inappropriately uses comb, combing forehead/eye brows. Attempting to use for hair with several cues from therapists but BUE coordination is impaired.  -           User Key  (r) = Recorded By, (t) = Taken By, (c) = Cosigned By    Initials Name Provider Type     Brianna Armstrong OT Occupational Therapist               Obj/Interventions     [facility] Name 02/01/22 1423          Range of Motion Comprehensive    Comment, General Range of Motion BUE AROM WFL tested EOB.  -[facility] Name 02/01/22 1423          Strength Comprehensive (MMT)    Comment, General Manual Muscle Testing (MMT) Assessment BUE MMT grossly 3/5.  -[facility] Name 02/01/22 1423          Balance    Balance  Assessment sitting static balance  -SM     Static Sitting Balance WFL; sitting, edge of bed  SBA/SV  -SM     Static Standing Balance moderate impairment  -SM     Dynamic Standing Balance severe impairment  -SM     Comment, Balance unsteady and difffculty with weight shifting or picking up feet when standing to advance further functional mobility for ADL.  -SM           User Key  (r) = Recorded By, (t) = Taken By, (c) = Cosigned By    Initials Name Provider Type    SM Brianna Armstrong OT Occupational Therapist               Goals/Plan     Row Name 02/01/22 1429          Transfer Goal 1 (OT)    Activity/Assistive Device (Transfer Goal 1, OT) transfers, all; toilet; sit-to-stand/stand-to-sit  -SM     [place] Level/Cues Needed (Transfer Goal 1, OT) contact guard assist  -SM     Time Frame (Transfer Goal 1, OT) short term goal (STG); 2 weeks  -SM     Progress/Outcome (Transfer Goal 1, OT) goal ongoing  -     Row Name 02/01/22 1429          Dressing Goal 1 (OT)    Activity/Device (Dressing Goal 1, OT) dressing skills, all  -SM     [place]/Cues Needed (Dressing Goal 1, OT) contact guard assist  -SM     Time Frame (Dressing Goal 1, OT) short term goal (STG); 2 weeks  -SM     Progress/Outcome (Dressing Goal 1, OT) goal ongoing  -     Row Name 02/01/22 1429          Toileting Goal 1 (OT)    Activity/Device (Toileting Goal 1, OT) toileting skills, all  -SM     [place] Level/Cues Needed (Toileting Goal 1, OT) contact guard assist  -SM     Time Frame (Toileting Goal 1, OT) short term goal (STG); 2 weeks  -SM     Progress/Outcome (Toileting Goal 1, OT) goal ongoing  -     Row Name 02/01/22 1429          Grooming Goal 1 (OT)    Activity/Device (Grooming Goal 1, OT) grooming skills, all  -SM     [place] (Grooming Goal 1, OT) set-up required  -SM     Time Frame (Grooming Goal 1, OT) short term goal (STG); 2 weeks  seated  -SM     Progress/Outcome (Grooming Goal 1, OT) goal ongoing  [facility]     Row Name  02/01/22 1429          Therapy Assessment/Plan (OT)    Planned Therapy Interventions (OT) activity tolerance training; adaptive equipment training; BADL retraining; cognitive/visual perception retraining; functional balance retraining; IADL retraining; occupation/activity based interventions; patient/caregiver education/training; ROM/therapeutic exercise; strengthening exercise; transfer/mobility retraining  -           User Key  (r) = Recorded By, (t) = Taken By, (c) = Cosigned By    Initials Name Provider Type    Brianna Kincaid, RK Occupational Therapist               Clinical Impression     Row Name 02/01/22 1425          Pain Scale: FACES Pre/Post-Treatment    Pain: FACES Scale, Pretreatment 0-->no hurt  -SM     Posttreatment Pain Rating 0-->no hurt  -SM     Row Name 02/01/22 1425          Plan of Care Review    Plan of Care Reviewed With patient  -     Outcome Summary Pt is a 77 y.o male admitted to [facility] with weakness for 2 days and AMS, treated for encephalopathy 2/2 COVID 19. He has hx of moderate dementia but per ED notes, more more confused than his baseline. Today oriented to self only, able to sit EOB today initally with max AX2 and lathargic, increased aletness while sitting EOB. Pt completed EOB LBD with total A for socks and mod A to comb hair with cogntive cues throuhgout. He requires min AX2 to stand and take a few side steps but unsteady and fatiqued quickly with mobility requesting to return self to bed at end of encounter. Family hopeful for dc home per chart review, will recommend continued OT to increase independence with ADLs and transfers. Currently high falls risk and decreased strength, cogntion, activity tolerance, mobility, self care independence. OT recommending SNF vs. home with HH and 24/7 assist pending his progress while admitted. If dc home would recommend shower chair and BSC if pt does not already have.  -     Row Name 02/01/22 1425          Therapy Assessment/Plan (OT)     Rehab Potential (OT) good, to achieve stated therapy goals  -     Criteria for Skilled Therapeutic Interventions Met (OT) yes; skilled treatment is necessary  -     Therapy Frequency (OT) 5 times/wk  -     Row Name 02/01/22 1425          Therapy Plan Review/Discharge Plan (OT)    Anticipated Discharge Disposition (OT) skilled nursing facility  -     Row Name 02/01/22 1425          Vital Signs    Pre SpO2 (%) 96  -SM     O2 Delivery Pre Treatment room air  -SM     Post SpO2 (%) 96  -SM     O2 Delivery Post Treatment room air  -     Row Name 02/01/22 1425          Positioning and Restraints    Pre-Treatment Position in bed  -SM     Post Treatment Position bed  -SM     In Bed fowlers; call light within reach; encouraged to call for assist; exit alarm on; notified nsg; with family/caregiver; side rails up x3  -SM     Restraints reapplied:; soft limb  -SM           User Key  (r) = Recorded By, (t) = Taken By, (c) = Cosigned By    Initials Name Provider Type     Brianna Armstrong, OT Occupational Therapist               Outcome Measures     Row Name 02/01/22 1431          How much help from another is currently needed...    Putting on and taking off regular lower body clothing? 1  -SM     Bathing (including washing, rinsing, and drying) 2  -SM     Toileting (which includes using toilet bed pan or urinal) 1  -SM     Putting on and taking off regular upper body clothing 2  -SM     Taking care of personal grooming (such as brushing teeth) 2  -SM     Eating meals 2  -SM     AM-PAC 6 Clicks Score (OT) 10  -SM     Row Name 02/01/22 1354          How much help from another person do you currently need...    Turning from your back to your side while in flat bed without using bedrails? 2  -CH     Moving from lying on back to sitting on the side of a flat bed without bedrails? 2  -CH     Moving to and from a bed to a chair (including a wheelchair)? 1  -CH     Standing up from a chair using your arms (e.g.,  wheelchair, bedside chair)? 2  -CH     Climbing 3-5 steps with a railing? 1  -CH     To walk in hospital room? 1  -CH     AM-PAC 6 Clicks Score (PT) 9  -CH     Row Name 02/01/22 1431 02/01/22 1354       Functional Assessment    Outcome Measure Options AM-PAC 6 Clicks Daily Activity (OT)  - AM-PAC 6 Clicks Basic Mobility (PT)  -          User Key  (r) = Recorded By, (t) = Taken By, (c) = Cosigned By    Initials Name Provider Type     Awa Vernon, PT Physical Therapist     Brianna Armstrong, OT Occupational Therapist                Occupational Therapy Education                 Title: PT OT SLP Therapies (In Progress)     Topic: Occupational Therapy (In Progress)     Point: ADL training (Not Started)     Description:   Instruct learner(s) on proper safety adaptation and remediation techniques during self care or transfers.   Instruct in proper use of assistive devices.              Learner Progress:  Not documented in this visit.          Point: Home exercise program (Not Started)     Description:   Instruct learner(s) on appropriate technique for monitoring, assisting and/or progressing therapeutic exercises/activities.              Learner Progress:  Not documented in this visit.          Point: Precautions (Done)     Description:   Instruct learner(s) on prescribed precautions during self-care and functional transfers.              Learning Progress Summary           Patient Acceptance, E, VU,NR by  at 2/1/2022 1431    Comment: educated pt on falls precautions, use of call light for nsg assist, safety with not getting OOB by himself due to high risk of falls.                   Point: Body mechanics (Not Started)     Description:   Instruct learner(s) on proper positioning and spine alignment during self-care, functional mobility activities and/or exercises.              Learner Progress:  Not documented in this visit.                      User Key     Initials Effective Dates Name Provider Type  Discipline     04/02/20 -  Brianna Armstrong OT Occupational Therapist OT              OT Recommendation and Plan  Planned Therapy Interventions (OT): activity tolerance training, adaptive equipment training, BADL retraining, cognitive/visual perception retraining, functional balance retraining, IADL retraining, occupation/activity based interventions, patient/caregiver education/training, ROM/therapeutic exercise, strengthening exercise, transfer/mobility retraining  Therapy Frequency (OT): 5 times/wk  Plan of Care Review  Plan of Care Reviewed With: patient  Outcome Summary: Pt is a 77 y.o male admitted to [facility] with weakness for 2 days and AMS, treated for encephalopathy 2/2 COVID 19. He has hx of moderate dementia but per ED notes, more more confused than his baseline. Today oriented to self only, able to sit EOB today initally with max AX2 and lathargic, increased aletness while sitting EOB. Pt completed EOB LBD with total A for socks and mod A to comb hair with cogntive cues throuhgout. He requires min AX2 to stand and take a few side steps but unsteady and fatiqued quickly with mobility requesting to return self to bed at end of encounter. Family hopeful for dc home per chart review, will recommend continued OT to increase independence with ADLs and transfers. Currently high falls risk and decreased strength, cogntion, activity tolerance, mobility, self care independence. OT recommending SNF vs. home with HH and 24/7 assist pending his progress while admitted. If dc home would recommend shower chair and BSC if pt does not already have.     Time Calculation:    Time Calculation- OT     Row Name 02/01/22 1433             Time Calculation- OT    OT Start Time 1055  -      OT Stop Time 1118  -      OT Time Calculation (min) 23 min  -      Total Timed Code Minutes- OT 15 minute(s)  -      OT Received On 02/01/22  -      OT - Next Appointment 02/02/22  -      OT Goal Re-Cert Due Date 02/15/22  -               Timed Charges    04572 - OT Therapeutic Activity Minutes 15  -SM              Untimed Charges    OT Eval/Re-eval Minutes 8  -SM              Total Minutes    Timed Charges Total Minutes 15  -SM      Untimed Charges Total Minutes 8  -SM       Total Minutes 23  -SM            User Key  (r) = Recorded By, (t) = Taken By, (c) = Cosigned By    Initials Name Provider Type     Brianna Armstrong, OT Occupational Therapist              Therapy Charges for Today     Code Description Service Date Service Provider Modifiers Qty    [id number]  OT EVAL MOD COMPLEXITY 2 2/1/2022 Brianna Armstrong OT GO 1    [id number]  OT THERAPEUTIC ACT EA 15 MIN 2/1/2022 Brianna Armstrong OT GO 1               Brianna Armstrong OT  2/1/2022

## 2022-02-01 NOTE — PLAN OF CARE
Problem: Adult Inpatient Plan of Care  Goal: Optimal Comfort and Wellbeing  Outcome: Ongoing, Not Progressing  Intervention: Provide Person-Centered Care  Recent Flowsheet Documentation  Taken 2/1/2022 1400 by Jocy Dillard RN  Trust Relationship/Rapport:   care explained   emotional support provided   questions encouraged   reassurance provided  Taken 2/1/2022 0913 by Jocy Dillard RN  Trust Relationship/Rapport:   care explained   choices provided   empathic listening provided   questions answered   questions encouraged   reassurance provided   thoughts/feelings acknowledged  Goal: Readiness for Transition of Care  Outcome: Ongoing, Not Progressing     Problem: Confusion Acute  Goal: Optimal Cognitive Function  Outcome: Ongoing, Not Progressing  Intervention: Minimize Factors Contributing to Confusion  Recent Flowsheet Documentation  Taken 2/1/2022 1400 by Jocy Dillard RN  Sensory Stimulation Regulation:   care clustered   quiet environment promoted   television on  Reorientation Measures:   reorientation provided   clock in view  Environment Familiarity/Consistency: daily routine followed  Taken 2/1/2022 0913 by Jocy Dillard RN  Sensory Stimulation Regulation:   care clustered   lighting decreased   quiet environment promoted   television on  Reorientation Measures: reorientation provided     Problem: Infection  Goal: Infection Symptom Resolution  Outcome: Ongoing, Not Progressing  Intervention: Prevent or Manage Infection  Recent Flowsheet Documentation  Taken 2/1/2022 1716 by Jocy Dillard RN  Infection Management: aseptic technique maintained  Taken 2/1/2022 1627 by Jocy Dillard RN  Isolation Precautions:   contact precautions maintained   droplet precautions maintained  Taken 2/1/2022 1600 by Jocy Dillard RN  Infection Management: aseptic technique maintained  Isolation Precautions:   contact precautions maintained   droplet precautions maintained  Taken 2/1/2022 1449 by Jocy Dillard RN  Infection Management:  aseptic technique maintained  Isolation Precautions:   contact precautions maintained   droplet precautions maintained  Taken 2/1/2022 1400 by Jocy Dillard RN  Infection Management: aseptic technique maintained  Isolation Precautions:   contact precautions maintained   droplet precautions maintained  Taken 2/1/2022 1210 by Jocy Dillard RN  Infection Management: aseptic technique maintained  Isolation Precautions:   contact precautions maintained   droplet precautions maintained  Taken 2/1/2022 1015 by Jocy Dillard RN  Infection Management: aseptic technique maintained  Isolation Precautions:   contact precautions maintained   droplet precautions maintained  Taken 2/1/2022 0913 by Jocy Dillard RN  Infection Management: aseptic technique maintained  Taken 2/1/2022 0815 by Jocy Dillard RN  Infection Management: aseptic technique maintained  Isolation Precautions:   contact precautions maintained   droplet precautions maintained     Problem: Restraint, Nonbehavioral (Nonviolent)  Goal: Discontinuation Criteria Achieved  Outcome: Ongoing, Not Progressing  Intervention: Implement Least-restrictive Safety Strategies  Recent Flowsheet Documentation  Taken 2/1/2022 1716 by Jocy Dillard RN  Medical Device Protection: IV pole/bag removed from visual field  Taken 2/1/2022 1627 by Jocy Dillard RN  Medical Device Protection: IV pole/bag removed from visual field  Taken 2/1/2022 1600 by Jocy Dillard RN  Medical Device Protection: IV pole/bag removed from visual field  Taken 2/1/2022 1449 by Jocy Dillard RN  Medical Device Protection: IV pole/bag removed from visual field  Taken 2/1/2022 1400 by Jocy Dillard RN  Medical Device Protection: IV pole/bag removed from visual field  Taken 2/1/2022 1210 by Jocy Dillard RN  Medical Device Protection: IV pole/bag removed from visual field  Taken 2/1/2022 1015 by Jocy Dillard RN  Medical Device Protection: IV pole/bag removed from visual field  Taken 2/1/2022 0913 by Jocy Dillard RN  Medical  Device Protection: IV pole/bag removed from visual field  Taken 2/1/2022 0815 by Jocy Dillard RN  Medical Device Protection: IV pole/bag removed from visual field   Goal Outcome Evaluation:              Outcome Summary: Patient's remains confused which interferes with his safety as he is not able to remember discussion or demonstrations for care. He becomes restless as he wants to go home and wants to get oob to leave resulting in pulling off equipment and monitoring as well as pillows that are place to change position and protect the skin. He responds to one to one direct care with a high degree of cooperation and can engage in discussions about topics and his life. However, he was offering his ice cream to people not present in the room but this ceased when he focused on a topic such as football and him playing football. Saftey is a frank issue as he highly confused.

## 2022-02-01 NOTE — PLAN OF CARE
Goal Outcome Evaluation:  Plan of Care Reviewed With: patient           Outcome Summary: Pt is a 77 y.o male admitted to [facility] with weakness for 2 days and AMS, treated for encephalopathy 2/2 COVID 19. He has hx of moderate dementia but per ED notes, more more confused than his baseline. Today oriented to self only, able to sit EOB today initally with max AX2 and lathargic, increased aletness while sitting EOB. Pt completed EOB LBD with total A for socks and mod A to comb hair with cogntive cues throuhgout. He requires min AX2 to stand and take a few side steps but unsteady and fatiqued quickly with mobility requesting to return self to bed at end of encounter. Family hopeful for dc home per chart review, will recommend continued OT to increase independence with ADLs and transfers. Currently high falls risk and decreased strength, cogntion, activity tolerance, mobility, self care independence. OT recommending SNF vs. home with HH and 24/7 assist pending his progress while admitted. If dc home would recommend shower chair and BSC if pt does not already have.    OT wore a mask, eye protection, gloves, and completed hand hygiene. Pt wore a mask.

## 2022-02-01 NOTE — PLAN OF CARE
Goal Outcome Evaluation:  Plan of Care Reviewed With: patient           Outcome Summary: Pt is a 76 yo M who was admitted with encephalopathy secondary to COVID-19. Pt presents to PT with impaired functional mobility and gait secondary to generalized weakness, impaired balance, and decreased activity tolerance. Pt was very sleepy and difficult to arrouse initially but had improved alertness once sitting EOB. Pt may benefit from skilled PT to address strength, mobility, and gait/transfers.

## 2022-02-01 NOTE — DISCHARGE PLACEMENT REQUEST
"Bart Hunt (77 y.o. Male)             Date of Birth Social Security Number Address Home Phone MRN    1945  [id number]  [place] [address] [phone] [id number]    Denominational Marital Status             [facility]        Admission Date Admission Type Admitting Provider Attending Provider Department, Room/Bed    1/29/22 Emergency Ramila Gutierrez MD Jackson, Alan David, MD [address], [address]    Discharge Date Discharge Disposition Discharge Destination                         Attending Provider: Reuben Barnes MD    Allergies: Sulfa Antibiotics    Isolation: Enh Drop/Con   Infection: COVID (confirmed) (01/30/22)   Code Status: CPR   Advance Care Planning Activity    Ht: 162.6 cm (64.02\")   Wt: 67.6 kg (149 lb 1.6 oz)    Admission Cmt: None   Principal Problem: Encephalopathy due to COVID-19 virus [U07.1,G93.49]                 Active Insurance as of 1/29/2022     Primary Coverage     Payor Plan Insurance Group Employer/Plan Group    ANTHEM MEDICARE REPLACEMENT ANTHEM MEDICARE ADVANTAGE [id number]     Payor Plan Address Payor Plan Phone Number Payor Plan Fax Number Effective Dates    [address] [phone]  1/1/2018 - None Entered    [facility] [id number]       Subscriber Name Subscriber Birth Date Member ID       BART HUNT 1945 [id number]                 Emergency Contacts      (Rel.) Home Phone Work Phone Mobile Phone    ZANDER HUNT (Spouse) [phone] -- --    Dr Bart Hunt (Son) -- [phone]              "

## 2022-02-01 NOTE — THERAPY EVALUATION
Patient Name: Tyron Hunt  : 1945    MRN: [id number]                              Today's Date: 2022       Admit Date: 2022    Visit Dx:     ICD-10-CM ICD-9-CM   1. Altered mental status, unspecified altered mental status type  R41.82 780.97   2. COVID-19 virus infection  U07.1 079.89     Patient Active Problem List   Diagnosis   • Hypertension   • GERD (gastroesophageal reflux disease)   • Hyperlipidemia   • Anxiety   • Benign prostatic hyperplasia with lower urinary tract symptoms   • Impaired fasting glucose   • Recurrent major depressive disorder, in full remission (HCC)   • Cervical disc disease   • Cervical spinal stenosis   • Foraminal stenosis of cervical region   • Cervical radiculitis   • Altered mental status   • Stage 3a chronic kidney disease (HCC)   • Encephalopathy due to COVID-19 virus     Past Medical History:   Diagnosis Date   • Anxiety    • Diverticulitis    • GERD (gastroesophageal reflux disease)    • Gout    • H/O complete eye exam 2017   • Hyperlipidemia    • Hypertension    • Impaired fasting blood sugar    • Kidney stones    • Memory loss    • Sleep apnea      Past Surgical History:   Procedure Laterality Date   • CERVICAL EPIDURAL N/A 2021    Procedure: CERVICAL EPIDURAL;  Surgeon: Nataliia Avilez MD;  [place] [facility] [place];  Service: Pain Management;  Laterality: N/A;   • CERVICAL EPIDURAL N/A 10/18/2021    Procedure: CERVICAL EPIDURAL 7-1;  Surgeon: Nataliia Avilez MD;  [place] [facility] [place];  Service: Pain Management;  Laterality: N/A;   • CYST REMOVAL     • CYSTOSCOPY W/ LITHOLAPAXY / EHL     • TONSILLECTOMY        General Information     Row Name 22 1346          Physical Therapy Time and Intention    Document Type evaluation  -CH     Mode of Treatment individual therapy; physical therapy  -CH     Row Name 22 1346          General Information    Prior Level of Function independent:; transfer; bed mobility; w/c or scooter  pt is not a  very reliable historian, pt reports he gets to a wheelchair at home  -     Existing Precautions/Restrictions fall  -     Barriers to Rehab medically complex; cognitive status  -     Row Name 02/01/22 1346          Living Environment    Lives With spouse  -     Row Name 02/01/22 1346          Cognition    Orientation Status (Cognition) oriented to; person  -     Row Name 02/01/22 1346          Safety Issues, Functional Mobility    Safety Issues Affecting Function (Mobility) ability to follow commands; impulsivity; insight into deficits/self-awareness; judgment; safety precaution awareness  -     Impairments Affecting Function (Mobility) balance; cognition; endurance/activity tolerance; strength  -           User Key  (r) = Recorded By, (t) = Taken By, (c) = Cosigned By    Initials Name Provider Type     Awa Vernon, PT Physical Therapist               Mobility     Row Name 02/01/22 1348          Bed Mobility    Bed Mobility supine-sit; sit-supine  -     Supine-Sit [place] (Bed Mobility) verbal cues; nonverbal cues (demo/gesture); maximum assist (25% patient effort); 2 person assist  -     Sit-Supine [place] (Bed Mobility) verbal cues; nonverbal cues (demo/gesture); standby assist  -     Assistive Device (Bed Mobility) bed rails; draw sheet; head of bed elevated  -     Comment (Bed Mobility) pt very sleepy when PT/OT were helping him to side of bed, but became more alert once sitting upright  -     Row Name 02/01/22 1348          Sit-Stand Transfer    Sit-Stand [place] (Transfers) verbal cues; nonverbal cues (demo/gesture); minimum assist (75% patient effort); 2 person assist  -     Assistive Device (Sit-Stand Transfers) --  [facility]     Row Name 02/01/22 1348          Gait/Stairs (Locomotion)    [place] Level (Gait) verbal cues; nonverbal cues (demo/gesture); moderate assist (50% patient effort); 2 person assist  [facility]     Assistive Device (Gait) --  [facility]      Distance in Feet (Gait) 3 shuffling steps to HOB, and very small (shuffling) standing marches at bedside for 10 seconds  -     Deviations/Abnormal Patterns (Gait) sheila decreased; festinating/shuffling; gait speed decreased; stride length decreased  -     Bilateral Gait Deviations forward flexed posture  -           User Key  (r) = Recorded By, (t) = Taken By, (c) = Cosigned By    Initials Name Provider Type     Awa Vernon PT Physical Therapist               Obj/Interventions     Row Name 02/01/22 1350          Range of Motion Comprehensive    General Range of Motion bilateral lower extremity ROM WFL  -     Row Name 02/01/22 1350          Strength Comprehensive (MMT)    Comment, General Manual Muscle Testing (MMT) Assessment generalized weakness noted with functional mobility, B LE grossly >/=3/5  -     Row Name 02/01/22 1350          Balance    Balance Assessment standing static balance; standing dynamic balance  -     Static Standing Balance moderate impairment  -     Dynamic Standing Balance severe impairment  -           User Key  (r) = Recorded By, (t) = Taken By, (c) = Cosigned By    Initials Name Provider Type     Awa Vernon, PT Physical Therapist               Goals/Plan     Row Name 02/01/22 4604          Bed Mobility Goal 1 (PT)    Activity/Assistive Device (Bed Mobility Goal 1, PT) bed mobility activities, all  -     [place] Level/Cues Needed (Bed Mobility Goal 1, PT) contact guard assist  -     Time Frame (Bed Mobility Goal 1, PT) 1 week  -     Row Name 02/01/22 5503          Transfer Goal 1 (PT)    Activity/Assistive Device (Transfer Goal 1, PT) transfers, all; walker, rolling  -     [place] Level/Cues Needed (Transfer Goal 1, PT) contact guard assist  -     Time Frame (Transfer Goal 1, PT) 1 week  -     Row Name 02/01/22 135          Gait Training Goal 1 (PT)    Activity/Assistive Device (Gait Training Goal 1, PT) gait (walking locomotion);  walker, rolling  -CH     [place] Level (Gait Training Goal 1, PT) contact guard assist  -CH     Distance (Gait Training Goal 1, PT) 50  -CH     Time Frame (Gait Training Goal 1, PT) 1 week  -CH           User Key  (r) = Recorded By, (t) = Taken By, (c) = Cosigned By    Initials Name Provider Type    Awa Caruso, PT Physical Therapist               Clinical Impression     Row Name 02/01/22 1351          Pain    Additional Documentation Pain Scale: FACES Pre/Post-Treatment (Group)  -     Row Name 02/01/22 1351          Pain Scale: FACES Pre/Post-Treatment    Pain: FACES Scale, Pretreatment 0-->no hurt  -CH     Posttreatment Pain Rating 0-->no hurt  -CH     Row Name 02/01/22 1351          Plan of Care Review    Plan of Care Reviewed With patient  -     Outcome Summary Pt is a 78 yo M who was admitted with encephalopathy secondary to COVID-19. Pt presents to PT with impaired functional mobility and gait secondary to generalized weakness, impaired balance, and decreased activity tolerance. Pt was very sleepy and difficult to arrouse initially but had improved alertness once sitting EOB. Pt may benefit from skilled PT to address strength, mobility, and gait/transfers.  -     Row Name 02/01/22 1351          Therapy Assessment/Plan (PT)    Patient/Family Therapy Goals Statement (PT) to go home  -     Rehab Potential (PT) good, to achieve stated therapy goals  -     Criteria for Skilled Interventions Met (PT) skilled treatment is necessary  -     Row Name 02/01/22 1351          Positioning and Restraints    Pre-Treatment Position in bed  -     Post Treatment Position bed  -     In Bed supine; call light within reach; encouraged to call for assist; exit alarm on  -     Restraints reapplied:; soft limb  -           User Key  (r) = Recorded By, (t) = Taken By, (c) = Cosigned By    Initials Name Provider Type    Awa Caruso, PT Physical Therapist               Outcome Measures     Row  Name 02/01/22 1354          How much help from another person do you currently need...    Turning from your back to your side while in flat bed without using bedrails? 2  -CH     Moving from lying on back to sitting on the side of a flat bed without bedrails? 2  -CH     Moving to and from a bed to a chair (including a wheelchair)? 1  -CH     Standing up from a chair using your arms (e.g., wheelchair, bedside chair)? 2  -CH     Climbing 3-5 steps with a railing? 1  -CH     To walk in hospital room? 1  -CH     AM-PAC 6 Clicks Score (PT) 9  -CH     Row Name 02/01/22 1354          Functional Assessment    Outcome Measure Options AM-PAC 6 Clicks Basic Mobility (PT)  -           User Key  (r) = Recorded By, (t) = Taken By, (c) = Cosigned By    Initials Name Provider Type     Awa Vernon PT Physical Therapist                             Physical Therapy Education                 Title: PT OT SLP Therapies (In Progress)     Topic: Physical Therapy (In Progress)     Point: Mobility training (Done)     Learning Progress Summary           Patient Acceptance, E,TB,D, VU,NR by  at 2/1/2022 1355                   Point: Home exercise program (Not Started)     Learner Progress:  Not documented in this visit.          Point: Body mechanics (Done)     Learning Progress Summary           Patient Acceptance, E,TB,D, VU,NR by  at 2/1/2022 1355                   Point: Precautions (Done)     Learning Progress Summary           Patient Acceptance, E,TB,D, VU,NR by  at 2/1/2022 1355                               User Key     Initials Effective Dates Name Provider Type [facility] 06/16/21 -  Awa Vernon PT Physical Therapist PT              PT Recommendation and Plan  Planned Therapy Interventions (PT): balance training, bed mobility training, gait training, home exercise program, patient/family education, strengthening, transfer training  Plan of Care Reviewed With: patient  Outcome Summary: Pt is a 76 yo M  who was admitted with encephalopathy secondary to COVID-19. Pt presents to PT with impaired functional mobility and gait secondary to generalized weakness, impaired balance, and decreased activity tolerance. Pt was very sleepy and difficult to arrouse initially but had improved alertness once sitting EOB. Pt may benefit from skilled PT to address strength, mobility, and gait/transfers.     Time Calculation:    PT Charges     Row Name 02/01/22 1355             Time Calculation    Start Time 1056  -CH      Stop Time 1119  -CH      Time Calculation (min) 23 min  -CH      PT Received On 02/01/22  -      PT - Next Appointment 02/03/22  -      PT Goal Re-Cert Due Date 02/08/22  -              Time Calculation- PT    Total Timed Code Minutes- PT 15 minute(s)  -CH              Timed Charges    54303 - PT Therapeutic Activity Minutes 15  -CH              Untimed Charges    PT Eval/Re-eval Minutes 10  -CH              Total Minutes    Timed Charges Total Minutes 15  -CH      Untimed Charges Total Minutes 10  -CH       Total Minutes 25  -CH            User Key  (r) = Recorded By, (t) = Taken By, (c) = Cosigned By    Initials Name Provider Type     Awa Vernon, PT Physical Therapist              Therapy Charges for Today     Code Description Service Date Service Provider Modifiers Qty    98030799807 HC PT THERAPEUTIC ACT EA 15 MIN 2/1/2022 Awa Vernon, PT GP 1    83633464335 HC PT EVAL MOD COMPLEXITY 3 2/1/2022 Awa Vernon, PT GP 1          PT G-Codes  Outcome Measure Options: AM-PAC 6 Clicks Basic Mobility (PT)  AM-PAC 6 Clicks Score (PT): 9    Awa Vernon PT  2/1/2022

## 2022-02-01 NOTE — PROGRESS NOTES
Name: Tyron Hunt ADMIT: 2022   : 1945  PCP: Tyron Hunt MD    MRN: [id number] LOS: 2 days   AGE/SEX: 77 y.o. male  ROOM: [facility]   Subjective   Chief Complaint   Patient presents with   • Weakness - Generalized   • Altered Mental Status         He has had some issues with likely dementia and memory issues over the past year.  The patient is well as his wife have likely been sick with coronavirus for less than a week.  The patient has not had much of any pulmonary symptoms, only some mild congestion.  Unfortunately he has had increased confusion over the past 2 to 3 days.  Also decreased p.o. intake.    Slept better the past 2 nights  Still then has been wanting to sleep into the morning    Did work with PT after I had seen him  Has still needed restraints at times due to trying to get out of bed by himself (in isolation and a falls risk)    ROS  Unreliable due to mental status changes    Objective   Vital Signs  Temp:  [95.5 °F (35.3 °C)-97.5 °F (36.4 °C)] 95.5 °F (35.3 °C)  Heart Rate:  [56-76] 73  Resp:  [16-20] 16  BP: (132-165)/(78-90) 132/78  SpO2:  [91 %-98 %] 97 %  on   ;   Device (Oxygen Therapy): room air  Body mass index is 25.58 kg/m².    Physical Exam  HENT:      Head: Normocephalic and atraumatic.   Eyes:      General: No scleral icterus.  Cardiovascular:      Rate and Rhythm: Normal rate and regular rhythm.   Pulmonary:      Effort: Pulmonary effort is normal. No respiratory distress.      Breath sounds: Normal breath sounds.   Abdominal:      General: There is no distension.      Palpations: Abdomen is soft.      Tenderness: There is no abdominal tenderness.   Musculoskeletal:      Cervical back: Neck supple.     Elderly  Confused, oriented to self only  Resting this morning and asked if he can continue sleeping (he had said the same thing yesterday)    Results Review:       I reviewed the patient's new clinical results.  Results from last 7 days   Lab Units 22  5140  01/31/22  0559 01/30/22  0010   WBC 10*3/mm3 3.70 3.77 4.20   HEMOGLOBIN g/dL 14.8 14.1 15.5   PLATELETS 10*3/mm3 93* 79* 98*     Results from last 7 days   Lab Units 02/01/22  0623 01/31/22  0559 01/29/22  2320   SODIUM mmol/L 143 142 138   POTASSIUM mmol/L 3.8 3.3* 3.8   CHLORIDE mmol/L 110* 109* 101   CO2 mmol/L 23.0 19.4* 24.0   BUN mg/dL 12 15 17   CREATININE mg/dL 0.82 0.95 1.48*   GLUCOSE mg/dL 93 85 113*   Estimated Creatinine Clearance: 71 mL/min (by C-G formula based on SCr of 0.82 mg/dL).  Results from last 7 days   Lab Units 02/01/22  0623 01/31/22  0559 01/29/22  2320   ALBUMIN g/dL 3.70 3.20* 4.40   BILIRUBIN mg/dL 0.8 0.7 0.8   ALK PHOS U/L 81 75 96   AST (SGOT) U/L 55* 45* 25   ALT (SGPT) U/L 25 20 19     Results from last 7 days   Lab Units 02/01/22  0623 01/31/22  0559 01/29/22  2320   CALCIUM mg/dL 8.5* 7.7* 8.8   ALBUMIN g/dL 3.70 3.20* 4.40   MAGNESIUM mg/dL 2.0  --   --      Results from last 7 days   Lab Units 01/30/22  0606 01/29/22  2320   LACTATE mmol/L 1.0 1.8       Coag   Results from last 7 days   Lab Units 01/29/22  2320   INR  0.99   APTT seconds 20.0*     HbA1C   Lab Results   Component Value Date    HGBA1C 5.80 (H) 09/21/2021    HGBA1C 5.90 (H) 06/18/2021    HGBA1C 6.00 (H) 02/04/2021     Infection     Radiology(recent) No radiology results for the last day  Troponin T   Date Value Ref Range Status   01/30/2022 <0.010 0.000 - 0.030 ng/mL Final     No components found for: TSH;2    amLODIPine, 5 mg, Oral, Daily  atorvastatin, 10 mg, Oral, Nightly  cholecalciferol, 1,000 Units, Oral, Daily  enoxaparin, 40 mg, Subcutaneous, Q24H  fluvoxaMINE, 100 mg, Oral, Nightly  magnesium oxide, 200 mg, Oral, Daily  melatonin, 3 mg, Oral, Nightly  multivitamin with minerals, 1 tablet, Oral, Daily  pantoprazole, 40 mg, Oral, Daily  QUEtiapine, 25 mg, Oral, Nightly  sodium chloride, 500 mL, Intravenous, Once  sucralfate, 1 g, Oral, Nightly  tamsulosin, 0.4 mg, Oral, Nightly  vitamin B-12, 1,000 mcg,  Oral, Daily       Diet Regular      Assessment/Plan      Active Hospital Problems    Diagnosis  POA   • **Encephalopathy due to COVID-19 virus [U07.1, G93.49]  Yes   • Age-related physical debility [R54]  Unknown   • Altered mental status [R41.82]  Yes   • Stage 3a chronic kidney disease (HCC) [N18.31]  Yes   • Hypertension [I10]  Yes   • GERD (gastroesophageal reflux disease) [K21.9]  Yes   • Anxiety [F41.9]  Yes      Resolved Hospital Problems   No resolved problems to display.       · Continue supportive care, monitoring oxygen  · Delirium, trying to have good sleep overnight and is on Seroquel at home and slightly higher dose here in the hospital (decrease dose back to 25mg). want lights on and increase activity during the day.  This is obviously more difficult with his isolation and as discussed in the previous notes trying to get him moved to [place] so there can be a window to monitor him so hopefully we can get him out of the restraints.  · Lovenox for DVT prophylaxis  · Currently no indication for inpatient treatment for covid including steroids (which may give more side effects than benefit)  · Continue majority of home medications  · Monitor labs closely, he did have some thrombocytopenia on admission which has been ok  · Replace electrolytes as needed  · Known abnormality of CXR in the past  · TSH normal 4 months ago  · Will give a short run of fluids again today- but then stop so it is not running continuously     Has still needed restraints at times due to trying to get out of bed by himself (in isolation and a falls risk)    SOLOMON RUBIO  DW Son    Reuben Barnes MD  [facility]  02/01/22  15:26 EST

## 2022-02-01 NOTE — PLAN OF CARE
Problem: Adult Inpatient Plan of Care  Goal: Readiness for Transition of Care  Outcome: Ongoing, Not Progressing     Problem: Confusion Acute  Goal: Optimal Cognitive Function  Outcome: Ongoing, Not Progressing  Intervention: Minimize Factors Contributing to Confusion  Recent Flowsheet Documentation  Taken 1/31/2022 1413 by Jocy Dillard RN  Reorientation Measures:   clock in view   reorientation provided  Taken 1/31/2022 0846 by Jocy Dillard RN  Sensory Stimulation Regulation:   lighting decreased   care clustered   quiet environment promoted  Reorientation Measures:   reorientation provided   calendar in view  Environment Familiarity/Consistency: daily routine followed     Problem: Restraint, Nonbehavioral (Nonviolent)  Goal: Discontinuation Criteria Achieved  Outcome: Ongoing, Not Progressing  Intervention: Implement Least-restrictive Safety Strategies  Recent Flowsheet Documentation  Taken 1/31/2022 1700 by Jocy Dillard RN  Medical Device Protection: IV pole/bag removed from visual field  Taken 1/31/2022 1600 by Jocy Dillard RN  Medical Device Protection: IV pole/bag removed from visual field  Taken 1/31/2022 1413 by Jocy Dillard RN  Medical Device Protection: IV pole/bag removed from visual field  Taken 1/31/2022 1220 by Jocy Dillard RN  Medical Device Protection: IV pole/bag removed from visual field  Taken 1/31/2022 1100 by Jocy Dillard RN  Medical Device Protection: IV pole/bag removed from visual field  Taken 1/31/2022 1000 by Jocy Dillard RN  Medical Device Protection: IV pole/bag removed from visual field  Taken 1/31/2022 0846 by Jocy Dillard RN  Medical Device Protection: IV pole/bag removed from visual field   Goal Outcome Evaluation:              Outcome Summary: Patient is highly confused resulting in pulling off equipment and lines. Additionally, he if focused on returning home and is preoccupied with caring for his family which creates distress in the fact that he is not able to be discharged and  caused him to pull off equipment in the hopes of leaving. He can be redirected but is forgetful and returns to pulling off equipment. He was having frequent urine output and nurse attempted several methods to keep him dry but he forgot the reasons for the tubes and pulled them off resulting in frustration for him. Skin care was provided and to allay anxiety nurse applied a diaper which was changed frequently.

## 2022-02-01 NOTE — PLAN OF CARE
Goal Outcome Evaluation:  Plan of Care Reviewed With: patient        Progress: no change  Outcome Summary: VSS on RA. Only oriented to self. Remains in bilateral wrist restraints d/t attempting to get OOB and pulling at lines. Able to take pills whole with water. Very large amount of urine output, soaking brief and incontinence pads Q2H. Awaiting transfer to [facility] when bed available. Continuous pulse ox monitoring remains in place. No new complaints at this time. WCTM.

## 2022-02-02 ENCOUNTER — TELEPHONE (OUTPATIENT)
Dept: FAMILY MEDICINE CLINIC | Facility: CLINIC | Age: 77
End: 2022-02-02

## 2022-02-02 LAB
ALBUMIN SERPL-MCNC: 3.4 G/DL (ref 3.5–5.2)
ALBUMIN/GLOB SERPL: 1.3 G/DL
ALP SERPL-CCNC: 87 U/L (ref 39–117)
ALT SERPL W P-5'-P-CCNC: 28 U/L (ref 1–41)
ANION GAP SERPL CALCULATED.3IONS-SCNC: 13.6 MMOL/L (ref 5–15)
AST SERPL-CCNC: 47 U/L (ref 1–40)
BASOPHILS # BLD AUTO: 0.02 10*3/MM3 (ref 0–0.2)
BASOPHILS NFR BLD AUTO: 0.4 % (ref 0–1.5)
BILIRUB SERPL-MCNC: 0.8 MG/DL (ref 0–1.2)
BUN SERPL-MCNC: 14 MG/DL (ref 8–23)
BUN/CREAT SERPL: 20.3 (ref 7–25)
CALCIUM SPEC-SCNC: 8.5 MG/DL (ref 8.6–10.5)
CHLORIDE SERPL-SCNC: 109 MMOL/L (ref 98–107)
CK SERPL-CCNC: 397 U/L (ref 20–200)
CO2 SERPL-SCNC: 22.4 MMOL/L (ref 22–29)
CREAT SERPL-MCNC: 0.69 MG/DL (ref 0.76–1.27)
CRP SERPL-MCNC: 3.77 MG/DL (ref 0–0.5)
DEPRECATED RDW RBC AUTO: 42.4 FL (ref 37–54)
EOSINOPHIL # BLD AUTO: 0.09 10*3/MM3 (ref 0–0.4)
EOSINOPHIL NFR BLD AUTO: 1.9 % (ref 0.3–6.2)
ERYTHROCYTE [DISTWIDTH] IN BLOOD BY AUTOMATED COUNT: 12.8 % (ref 12.3–15.4)
FERRITIN SERPL-MCNC: 545 NG/ML (ref 30–400)
GFR SERPL CREATININE-BSD FRML MDRD: 111 ML/MIN/1.73
GLOBULIN UR ELPH-MCNC: 2.6 GM/DL
GLUCOSE SERPL-MCNC: 106 MG/DL (ref 65–99)
HCT VFR BLD AUTO: 42.7 % (ref 37.5–51)
HGB BLD-MCNC: 14.5 G/DL (ref 13–17.7)
LYMPHOCYTES # BLD AUTO: 1.31 10*3/MM3 (ref 0.7–3.1)
LYMPHOCYTES NFR BLD AUTO: 27.6 % (ref 19.6–45.3)
MAGNESIUM SERPL-MCNC: 2.3 MG/DL (ref 1.6–2.4)
MCH RBC QN AUTO: 30.9 PG (ref 26.6–33)
MCHC RBC AUTO-ENTMCNC: 34 G/DL (ref 31.5–35.7)
MCV RBC AUTO: 91 FL (ref 79–97)
MONOCYTES # BLD AUTO: 0.57 10*3/MM3 (ref 0.1–0.9)
MONOCYTES NFR BLD AUTO: 12 % (ref 5–12)
NEUTROPHILS NFR BLD AUTO: 2.73 10*3/MM3 (ref 1.7–7)
NEUTROPHILS NFR BLD AUTO: 57.7 % (ref 42.7–76)
PHOSPHATE SERPL-MCNC: 3.4 MG/DL (ref 2.5–4.5)
PLATELET # BLD AUTO: 90 10*3/MM3 (ref 140–450)
PMV BLD AUTO: 10.9 FL (ref 6–12)
POTASSIUM SERPL-SCNC: 3.5 MMOL/L (ref 3.5–5.2)
PROT SERPL-MCNC: 6 G/DL (ref 6–8.5)
RBC # BLD AUTO: 4.69 10*6/MM3 (ref 4.14–5.8)
SODIUM SERPL-SCNC: 145 MMOL/L (ref 136–145)
WBC NRBC COR # BLD: 4.74 10*3/MM3 (ref 3.4–10.8)

## 2022-02-02 PROCEDURE — 86140 C-REACTIVE PROTEIN: CPT

## 2022-02-02 PROCEDURE — 83735 ASSAY OF MAGNESIUM: CPT | Performed by: INTERNAL MEDICINE

## 2022-02-02 PROCEDURE — 82728 ASSAY OF FERRITIN: CPT

## 2022-02-02 PROCEDURE — 85025 COMPLETE CBC W/AUTO DIFF WBC: CPT

## 2022-02-02 PROCEDURE — 84100 ASSAY OF PHOSPHORUS: CPT | Performed by: INTERNAL MEDICINE

## 2022-02-02 PROCEDURE — 25010000002 ENOXAPARIN PER 10 MG

## 2022-02-02 PROCEDURE — 82550 ASSAY OF CK (CPK): CPT

## 2022-02-02 PROCEDURE — 36415 COLL VENOUS BLD VENIPUNCTURE: CPT

## 2022-02-02 PROCEDURE — 80053 COMPREHEN METABOLIC PANEL: CPT

## 2022-02-02 RX ORDER — DEXTROSE AND SODIUM CHLORIDE 5; .9 G/100ML; G/100ML
100 INJECTION, SOLUTION INTRAVENOUS CONTINUOUS
Status: ACTIVE | OUTPATIENT
Start: 2022-02-02 | End: 2022-02-02

## 2022-02-02 RX ORDER — OLANZAPINE 2.5 MG/1
2.5 TABLET ORAL ONCE
Status: DISCONTINUED | OUTPATIENT
Start: 2022-02-02 | End: 2022-02-03

## 2022-02-02 RX ORDER — OLANZAPINE 2.5 MG/1
2.5 TABLET ORAL NIGHTLY
Status: DISCONTINUED | OUTPATIENT
Start: 2022-02-02 | End: 2022-02-04

## 2022-02-02 RX ADMIN — MULTIPLE VITAMINS W/ MINERALS TAB 1 TABLET: TAB at 10:33

## 2022-02-02 RX ADMIN — Medication 1000 UNITS: at 10:33

## 2022-02-02 RX ADMIN — PANTOPRAZOLE SODIUM 40 MG: 40 TABLET, DELAYED RELEASE ORAL at 10:49

## 2022-02-02 RX ADMIN — MAGNESIUM OXIDE 400 MG (241.3 MG MAGNESIUM) TABLET 200 MG: TABLET at 10:32

## 2022-02-02 RX ADMIN — Medication 3 MG: at 20:04

## 2022-02-02 RX ADMIN — SUCRALFATE 1 G: 1 TABLET ORAL at 20:04

## 2022-02-02 RX ADMIN — Medication 1000 MCG: at 10:33

## 2022-02-02 RX ADMIN — TAMSULOSIN HYDROCHLORIDE 0.4 MG: 0.4 CAPSULE ORAL at 20:04

## 2022-02-02 RX ADMIN — ATORVASTATIN CALCIUM 10 MG: 20 TABLET, FILM COATED ORAL at 20:04

## 2022-02-02 RX ADMIN — AMLODIPINE BESYLATE 5 MG: 5 TABLET ORAL at 10:33

## 2022-02-02 RX ADMIN — ENOXAPARIN SODIUM 40 MG: 100 INJECTION SUBCUTANEOUS at 10:32

## 2022-02-02 RX ADMIN — DEXTROSE AND SODIUM CHLORIDE 100 ML/HR: 5; 900 INJECTION, SOLUTION INTRAVENOUS at 15:18

## 2022-02-02 RX ADMIN — OLANZAPINE 2.5 MG: 2.5 TABLET ORAL at 20:04

## 2022-02-02 RX ADMIN — FLUVOXAMINE MALEATE 100 MG: 50 TABLET, FILM COATED ORAL at 20:04

## 2022-02-02 NOTE — PLAN OF CARE
Goal Outcome Evaluation:  Plan of Care Reviewed With: patient        Progress: declining  Outcome Summary: VSS on RA. Pt only oriented to self. Has began having auditory hallucinations and is frequently talking to people that aren't in the room. Remains very restless and wanting to get OOB. Has had good intake and urine output overnight. WCTM.

## 2022-02-02 NOTE — PROGRESS NOTES
Name: Tyron Hunt ADMIT: 2022   : 1945  PCP: Tyron Hunt MD    MRN: [id number] LOS: 3 days   AGE/SEX: 77 y.o. male  ROOM: [facility]   Subjective   Chief Complaint   Patient presents with   • Weakness - Generalized   • Altered Mental Status         He has had some issues with likely dementia and memory issues over the past year.  The patient is well as his wife have likely been sick with coronavirus for less than a week.  The patient has not had much of any pulmonary symptoms, only some mild congestion.  Unfortunately he has had increased confusion over the past 2 to 3 days.  Also decreased p.o. intake.    Slept better the past 2 nights  Still then has been wanting to sleep into the morning (past few days    Has still needed restraints at times due to trying to get out of bed by himself (in isolation and a falls risk)    2/2 having more hallucinations this afternoon per the nurses    ROS  Unreliable due to mental status changes    Objective   Vital Signs  Temp:  [95.4 °F (35.2 °C)-97.6 °F (36.4 °C)] 97.6 °F (36.4 °C)  Heart Rate:  [66-84] 66  Resp:  [16-20] 18  BP: (136-158)/(80-87) 152/85  SpO2:  [82 %-99 %] 96 %  on   ;   Device (Oxygen Therapy): room air  Body mass index is 25.58 kg/m².    Physical Exam  HENT:      Head: Normocephalic and atraumatic.   Eyes:      General: No scleral icterus.  Cardiovascular:      Rate and Rhythm: Normal rate and regular rhythm.   Pulmonary:      Effort: Pulmonary effort is normal. No respiratory distress.      Breath sounds: Normal breath sounds.   Abdominal:      General: There is no distension.      Palpations: Abdomen is soft.      Tenderness: There is no abdominal tenderness.   Musculoskeletal:      Cervical back: Neck supple.     Elderly  Confused, oriented to self only  Resting this morning and asked if he can continue sleeping (he had said the same thing the past 2 days)  In restraints    Results Review:       I reviewed the patient's new clinical  results.  Results from last 7 days   Lab Units 02/02/22  0634 02/01/22  0623 01/31/22  0559 01/30/22  0010   WBC 10*3/mm3 4.74 3.70 3.77 4.20   HEMOGLOBIN g/dL 14.5 14.8 14.1 15.5   PLATELETS 10*3/mm3 90* 93* 79* 98*     Results from last 7 days   Lab Units 02/02/22  0634 02/01/22  0623 01/31/22  0559 01/29/22  2320   SODIUM mmol/L 145 143 142 138   POTASSIUM mmol/L 3.5 3.8 3.3* 3.8   CHLORIDE mmol/L 109* 110* 109* 101   CO2 mmol/L 22.4 23.0 19.4* 24.0   BUN mg/dL 14 12 15 17   CREATININE mg/dL 0.69* 0.82 0.95 1.48*   GLUCOSE mg/dL 106* 93 85 113*   Estimated Creatinine Clearance: 72.8 mL/min (A) (by C-G formula based on SCr of 0.69 mg/dL (L)).  Results from last 7 days   Lab Units 02/02/22  0634 02/01/22  0623 01/31/22  0559 01/29/22  2320   ALBUMIN g/dL 3.40* 3.70 3.20* 4.40   BILIRUBIN mg/dL 0.8 0.8 0.7 0.8   ALK PHOS U/L 87 81 75 96   AST (SGOT) U/L 47* 55* 45* 25   ALT (SGPT) U/L 28 25 20 19     Results from last 7 days   Lab Units 02/02/22  0634 02/01/22  0623 01/31/22  0559 01/29/22  2320   CALCIUM mg/dL 8.5* 8.5* 7.7* 8.8   ALBUMIN g/dL 3.40* 3.70 3.20* 4.40   MAGNESIUM mg/dL 2.3 2.0  --   --    PHOSPHORUS mg/dL 3.4  --   --   --      Results from last 7 days   Lab Units 01/30/22  0606 01/29/22  2320   LACTATE mmol/L 1.0 1.8       Coag   Results from last 7 days   Lab Units 01/29/22  2320   INR  0.99   APTT seconds 20.0*     HbA1C   Lab Results   Component Value Date    HGBA1C 5.80 (H) 09/21/2021    HGBA1C 5.90 (H) 06/18/2021    HGBA1C 6.00 (H) 02/04/2021     Infection     Radiology(recent) No radiology results for the last day  No results found for: TROPONINT, TROPONINI, BNP  No components found for: TSH;2    amLODIPine, 5 mg, Oral, Daily  atorvastatin, 10 mg, Oral, Nightly  cholecalciferol, 1,000 Units, Oral, Daily  enoxaparin, 40 mg, Subcutaneous, Q24H  fluvoxaMINE, 100 mg, Oral, Nightly  magnesium oxide, 200 mg, Oral, Daily  melatonin, 3 mg, Oral, Nightly  multivitamin with minerals, 1 tablet, Oral,  Daily  OLANZapine, 2.5 mg, Oral, Once  OLANZapine, 2.5 mg, Oral, Nightly  pantoprazole, 40 mg, Oral, Daily  sucralfate, 1 g, Oral, Nightly  tamsulosin, 0.4 mg, Oral, Nightly  vitamin B-12, 1,000 mcg, Oral, Daily      dextrose 5 % and sodium chloride 0.9 %, 100 mL/hr    Diet Regular      Assessment/Plan      Active Hospital Problems    Diagnosis  POA   • **Encephalopathy due to COVID-19 virus [U07.1, G93.49]  Yes   • Age-related physical debility [R54]  Unknown   • Altered mental status [R41.82]  Yes   • Stage 3a chronic kidney disease (HCC) [N18.31]  Yes   • Hypertension [I10]  Yes   • GERD (gastroesophageal reflux disease) [K21.9]  Yes   • Anxiety [F41.9]  Yes      Resolved Hospital Problems   No resolved problems to display.       · Continue supportive care, monitoring oxygen  · Delirium, trying to have good sleep overnight and on Seroquel at home. More hallucinations on 2/2 will change this to low dose zyprexa and have consulted Psychiatry for assistance.       · want lights on and increase activity during the day.  This is obviously more difficult with his isolation and as discussed in the previous notes trying to get him moved to [place] so there can be a window to monitor him so hopefully we can get him out of the restraints.  · Lovenox for DVT prophylaxis  · Currently no indication for inpatient treatment for covid including steroids (which may give more side effects than benefit)  · Continue majority of home medications  · Monitor labs closely, he did have some thrombocytopenia on admission which has been ok  · Replace electrolytes as needed  · Known abnormality of CXR in the past  · TSH normal 4 months ago  · Will give a short run of fluids again today 2/2- but then stop so it is not running continuously     Has still needed restraints at times due to trying to get out of bed by himself (in isolation and a falls risk)    SOLOOMN RN  Sent message to son  Greater than 36 minutes spent with greater than 50%  counseling and coordinating care      Reuben Barnes MD  [facility]  02/02/22  15:26 EST

## 2022-02-02 NOTE — NURSING NOTE
"RN reached out to [facility] about pt bed need. RN was told that \"There is nothing that can be done right now and that  was aware and will continue to evaluate the situation.\"   "

## 2022-02-03 LAB
ALBUMIN SERPL-MCNC: 3.8 G/DL (ref 3.5–5.2)
ALBUMIN/GLOB SERPL: 1.4 G/DL
ALP SERPL-CCNC: 93 U/L (ref 39–117)
ALT SERPL W P-5'-P-CCNC: 32 U/L (ref 1–41)
ANION GAP SERPL CALCULATED.3IONS-SCNC: 13 MMOL/L (ref 5–15)
AST SERPL-CCNC: 50 U/L (ref 1–40)
BASOPHILS # BLD AUTO: 0.01 10*3/MM3 (ref 0–0.2)
BASOPHILS NFR BLD AUTO: 0.2 % (ref 0–1.5)
BILIRUB SERPL-MCNC: 1.2 MG/DL (ref 0–1.2)
BUN SERPL-MCNC: 15 MG/DL (ref 8–23)
BUN/CREAT SERPL: 20.3 (ref 7–25)
CALCIUM SPEC-SCNC: 8.7 MG/DL (ref 8.6–10.5)
CHLORIDE SERPL-SCNC: 107 MMOL/L (ref 98–107)
CK SERPL-CCNC: 372 U/L (ref 20–200)
CO2 SERPL-SCNC: 22 MMOL/L (ref 22–29)
CREAT SERPL-MCNC: 0.74 MG/DL (ref 0.76–1.27)
CRP SERPL-MCNC: 5.63 MG/DL (ref 0–0.5)
DEPRECATED RDW RBC AUTO: 41.8 FL (ref 37–54)
EOSINOPHIL # BLD AUTO: 0.09 10*3/MM3 (ref 0–0.4)
EOSINOPHIL NFR BLD AUTO: 1.7 % (ref 0.3–6.2)
ERYTHROCYTE [DISTWIDTH] IN BLOOD BY AUTOMATED COUNT: 12.5 % (ref 12.3–15.4)
FERRITIN SERPL-MCNC: 648 NG/ML (ref 30–400)
GFR SERPL CREATININE-BSD FRML MDRD: 102 ML/MIN/1.73
GLOBULIN UR ELPH-MCNC: 2.8 GM/DL
GLUCOSE SERPL-MCNC: 100 MG/DL (ref 65–99)
HCT VFR BLD AUTO: 45 % (ref 37.5–51)
HGB BLD-MCNC: 15.1 G/DL (ref 13–17.7)
LYMPHOCYTES # BLD AUTO: 1.04 10*3/MM3 (ref 0.7–3.1)
LYMPHOCYTES NFR BLD AUTO: 19.6 % (ref 19.6–45.3)
MCH RBC QN AUTO: 30.8 PG (ref 26.6–33)
MCHC RBC AUTO-ENTMCNC: 33.6 G/DL (ref 31.5–35.7)
MCV RBC AUTO: 91.6 FL (ref 79–97)
MONOCYTES # BLD AUTO: 0.59 10*3/MM3 (ref 0.1–0.9)
MONOCYTES NFR BLD AUTO: 11.1 % (ref 5–12)
NEUTROPHILS NFR BLD AUTO: 3.56 10*3/MM3 (ref 1.7–7)
NEUTROPHILS NFR BLD AUTO: 67.2 % (ref 42.7–76)
PLATELET # BLD AUTO: 100 10*3/MM3 (ref 140–450)
PMV BLD AUTO: 10.8 FL (ref 6–12)
POTASSIUM SERPL-SCNC: 3.4 MMOL/L (ref 3.5–5.2)
PROT SERPL-MCNC: 6.6 G/DL (ref 6–8.5)
RBC # BLD AUTO: 4.91 10*6/MM3 (ref 4.14–5.8)
SODIUM SERPL-SCNC: 142 MMOL/L (ref 136–145)
WBC NRBC COR # BLD: 5.3 10*3/MM3 (ref 3.4–10.8)

## 2022-02-03 PROCEDURE — 97530 THERAPEUTIC ACTIVITIES: CPT

## 2022-02-03 PROCEDURE — 80053 COMPREHEN METABOLIC PANEL: CPT

## 2022-02-03 PROCEDURE — 25010000002 ENOXAPARIN PER 10 MG

## 2022-02-03 PROCEDURE — 82728 ASSAY OF FERRITIN: CPT

## 2022-02-03 PROCEDURE — 85025 COMPLETE CBC W/AUTO DIFF WBC: CPT

## 2022-02-03 PROCEDURE — 86140 C-REACTIVE PROTEIN: CPT

## 2022-02-03 PROCEDURE — 82550 ASSAY OF CK (CPK): CPT

## 2022-02-03 RX ORDER — DEXTROSE AND SODIUM CHLORIDE 5; .9 G/100ML; G/100ML
100 INJECTION, SOLUTION INTRAVENOUS CONTINUOUS
Status: ACTIVE | OUTPATIENT
Start: 2022-02-03 | End: 2022-02-03

## 2022-02-03 RX ORDER — OLANZAPINE 10 MG/1
5 INJECTION, POWDER, LYOPHILIZED, FOR SOLUTION INTRAMUSCULAR EVERY 8 HOURS PRN
Status: DISCONTINUED | OUTPATIENT
Start: 2022-02-03 | End: 2022-02-04

## 2022-02-03 RX ADMIN — SODIUM CHLORIDE, PRESERVATIVE FREE 10 ML: 5 INJECTION INTRAVENOUS at 20:56

## 2022-02-03 RX ADMIN — FLUVOXAMINE MALEATE 100 MG: 50 TABLET, FILM COATED ORAL at 20:55

## 2022-02-03 RX ADMIN — MULTIPLE VITAMINS W/ MINERALS TAB 1 TABLET: TAB at 09:45

## 2022-02-03 RX ADMIN — ENOXAPARIN SODIUM 40 MG: 100 INJECTION SUBCUTANEOUS at 09:45

## 2022-02-03 RX ADMIN — POTASSIUM CHLORIDE 40 MEQ: 750 TABLET, EXTENDED RELEASE ORAL at 09:45

## 2022-02-03 RX ADMIN — MAGNESIUM OXIDE 400 MG (241.3 MG MAGNESIUM) TABLET 200 MG: TABLET at 09:46

## 2022-02-03 RX ADMIN — SUCRALFATE 1 G: 1 TABLET ORAL at 20:55

## 2022-02-03 RX ADMIN — PANTOPRAZOLE SODIUM 40 MG: 40 TABLET, DELAYED RELEASE ORAL at 09:46

## 2022-02-03 RX ADMIN — Medication 1000 MCG: at 09:46

## 2022-02-03 RX ADMIN — TAMSULOSIN HYDROCHLORIDE 0.4 MG: 0.4 CAPSULE ORAL at 20:55

## 2022-02-03 RX ADMIN — OLANZAPINE 5 MG: 10 INJECTION, POWDER, FOR SOLUTION INTRAMUSCULAR at 18:35

## 2022-02-03 RX ADMIN — DEXTROSE AND SODIUM CHLORIDE 100 ML/HR: 5; 900 INJECTION, SOLUTION INTRAVENOUS at 14:09

## 2022-02-03 RX ADMIN — Medication 1000 UNITS: at 09:46

## 2022-02-03 RX ADMIN — POTASSIUM CHLORIDE 40 MEQ: 750 TABLET, EXTENDED RELEASE ORAL at 13:53

## 2022-02-03 RX ADMIN — AMLODIPINE BESYLATE 5 MG: 5 TABLET ORAL at 09:45

## 2022-02-03 RX ADMIN — Medication 3 MG: at 20:55

## 2022-02-03 RX ADMIN — ATORVASTATIN CALCIUM 10 MG: 20 TABLET, FILM COATED ORAL at 20:55

## 2022-02-03 NOTE — PLAN OF CARE
Problem: Restraint, Nonbehavioral (Nonviolent)  Goal: Discontinuation Criteria Achieved  2/3/2022 0558 by Corina Rendon RN  Outcome: Ongoing, Progressing  2/3/2022 0556 by Corina Rendon RN  Outcome: Ongoing, Progressing  Intervention: Implement Least-restrictive Safety Strategies  Recent Flowsheet Documentation  Taken 2/3/2022 0003 by Corina Rendon RN  Medical Device Protection: tubing secured  Diversional Activities: television  Taken 2/2/2022 1959 by Corina Rendon RN  Medical Device Protection: tubing secured  Diversional Activities: television  Goal: Personal Dignity and Safety Maintained  Intervention: Protect Dignity, Rights, and Personal Wellbeing  Recent Flowsheet Documentation  Taken 2/3/2022 0003 by Corina Rendon RN  Trust Relationship/Rapport:   care explained   questions answered   questions encouraged  Taken 2/2/2022 1959 by Corina Rendon RN  Trust Relationship/Rapport:   care explained   choices provided   questions answered   questions encouraged  Intervention: Protect Skin and Joint Integrity  Recent Flowsheet Documentation  Taken 2/3/2022 0400 by Corina Rendon RN  Body Position: tilted, right  Taken 2/3/2022 0259 by Corina Rendon RN  Body Position: supine  Taken 2/3/2022 0003 by Corina Rendon RN  Body Position: supine, legs elevated  Taken 2/2/2022 2205 by Corina Rendon RN  Body Position: supine, legs elevated  Taken 2/2/2022 1959 by Corina Rendon RN  Body Position: supine, legs elevated   Goal Outcome Evaluation:  Plan of Care Reviewed With: patient        Progress: improving  Outcome Summary: Patient on Room air throughout the shift and oxygen saturation WNL.  Appeared to sleep most of the night.  VSS.  WCTM

## 2022-02-03 NOTE — THERAPY TREATMENT NOTE
Patient Name: Tyron Hunt  : 1945    MRN: [id number]                              Today's Date: 2/3/2022       Admit Date: 2022    Visit Dx:     ICD-10-CM ICD-9-CM   1. Altered mental status, unspecified altered mental status type  R41.82 780.97   2. COVID-19 virus infection  U07.1 079.89     Patient Active Problem List   Diagnosis   • Hypertension   • GERD (gastroesophageal reflux disease)   • Hyperlipidemia   • Anxiety   • Benign prostatic hyperplasia with lower urinary tract symptoms   • Impaired fasting glucose   • Recurrent major depressive disorder, in full remission (HCC)   • Cervical disc disease   • Cervical spinal stenosis   • Foraminal stenosis of cervical region   • Cervical radiculitis   • Altered mental status   • Stage 3a chronic kidney disease (HCC)   • Encephalopathy due to COVID-19 virus   • Age-related physical debility     Past Medical History:   Diagnosis Date   • Anxiety    • Diverticulitis    • GERD (gastroesophageal reflux disease)    • Gout    • H/O complete eye exam 2017   • Hyperlipidemia    • Hypertension    • Impaired fasting blood sugar    • Kidney stones    • Memory loss    • Sleep apnea      Past Surgical History:   Procedure Laterality Date   • CERVICAL EPIDURAL N/A 2021    Procedure: CERVICAL EPIDURAL;  Surgeon: Nataliia Avilez MD;  Location: [facility];  Service: Pain Management;  Laterality: N/A;   • CERVICAL EPIDURAL N/A 10/18/2021    Procedure: CERVICAL EPIDURAL 7-1;  Surgeon: Nataliia Avilez MD;  Location: [facility];  Service: Pain Management;  Laterality: N/A;   • CYST REMOVAL     • CYSTOSCOPY W/ LITHOLAPAXY / EHL     • TONSILLECTOMY        General Information     Row Name 22 1141          Physical Therapy Time and Intention    Document Type therapy note (daily note)  -CH     Mode of Treatment individual therapy; physical therapy  -CH     Row Name 22 1141          General Information    Existing Precautions/Restrictions fall   -     Row Name 02/03/22 1141          Cognition    Orientation Status (Cognition) oriented to; person  -     Row Name 02/03/22 1141          Safety Issues, Functional Mobility    Impairments Affecting Function (Mobility) balance; cognition; endurance/activity tolerance; strength; coordination  -           User Key  (r) = Recorded By, (t) = Taken By, (c) = Cosigned By    Initials Name Provider Type     Awa Vrenon PT Physical Therapist               Mobility     Row Name 02/03/22 1142          Bed Mobility    Bed Mobility supine-sit; sit-supine  -     Supine-Sit [place] (Bed Mobility) verbal cues; nonverbal cues (demo/gesture); minimum assist (75% patient effort)  -     Sit-Supine [place] (Bed Mobility) verbal cues; nonverbal cues (demo/gesture); minimum assist (75% patient effort)  -     Assistive Device (Bed Mobility) bed rails; head of bed elevated  -     Row Name 02/03/22 1142          Sit-Stand Transfer    Sit-Stand [place] (Transfers) verbal cues; nonverbal cues (demo/gesture); minimum assist (75% patient effort)  -     Assistive Device (Sit-Stand Transfers) --  HHA  -     Row Name 02/03/22 1142          Gait/Stairs (Locomotion)    [place] Level (Gait) verbal cues; nonverbal cues (demo/gesture); minimum assist (75% patient effort)  -     Assistive Device (Gait) --  HHA  -     Distance in Feet (Gait) 8 ft  -     Deviations/Abnormal Patterns (Gait) sheila decreased; festinating/shuffling; gait speed decreased; stride length decreased  -     Bilateral Gait Deviations forward flexed posture  -     Comment (Gait/Stairs) gait distance limited secondary to complaints of dizziness, pt with very shuffling and festinating gait  -           User Key  (r) = Recorded By, (t) = Taken By, (c) = Cosigned By    Initials Name Provider Type    Awa Caruso PT Physical Therapist               Obj/Interventions    No documentation.                Goals/Plan     No documentation.                Clinical Impression     Row Name 02/03/22 1146          Pain    Additional Documentation Pain Scale: FACES Pre/Post-Treatment (Group)  -     Row Name 02/03/22 1146          Pain Scale: FACES Pre/Post-Treatment    Pain: FACES Scale, Pretreatment 0-->no hurt  -     Posttreatment Pain Rating 0-->no hurt  -     Row Name 02/03/22 1146          Plan of Care Review    Plan of Care Reviewed With patient  -     Outcome Summary Pt demonstrates some increased activity tolerance and functional strength as he was able to increase his gait distance and required less assistance. Pt continues to have impaired balance and shuffling gait. PT will continue to follow to address strength, mobility, and gait.  -     Row Name 02/03/22 1146          Positioning and Restraints    Pre-Treatment Position in bed  -     Post Treatment Position bed  -CH     In Bed supine; call light within reach; encouraged to call for assist; exit alarm on  -     Restraints --  RN trialing to discharge restraints  -           User Key  (r) = Recorded By, (t) = Taken By, (c) = Cosigned By    Initials Name Provider Type     Awa Vernon, PT Physical Therapist               Outcome Measures     Row Name 02/03/22 1150          How much help from another person do you currently need...    Turning from your back to your side while in flat bed without using bedrails? 3  -CH     Moving from lying on back to sitting on the side of a flat bed without bedrails? 3  -CH     Moving to and from a bed to a chair (including a wheelchair)? 3  -CH     Standing up from a chair using your arms (e.g., wheelchair, bedside chair)? 3  -CH     Climbing 3-5 steps with a railing? 1  -CH     To walk in hospital room? 2  -CH     AM-PAC 6 Clicks Score (PT) 15  -     Row Name 02/03/22 1150          Functional Assessment    Outcome Measure Options AM-PAC 6 Clicks Basic Mobility (PT)  -           User Key  (r) = Recorded By, (t) =  Taken By, (c) = Cosigned By    Initials Name Provider Type     Awa Vernon PT Physical Therapist                             Physical Therapy Education                 Title: PT OT SLP Therapies (In Progress)     Topic: Physical Therapy (In Progress)     Point: Mobility training (Done)     Learning Progress Summary           Patient Acceptance, E,TB,D, VU,NR by  at 2/3/2022 1151    Acceptance, E,TB,D, VU,NR by  at 2/1/2022 1355                   Point: Home exercise program (Not Started)     Learner Progress:  Not documented in this visit.          Point: Body mechanics (Done)     Learning Progress Summary           Patient Acceptance, E,TB,D, VU,NR by  at 2/3/2022 1151    Acceptance, E,TB,D, VU,NR by  at 2/1/2022 1355                   Point: Precautions (Done)     Learning Progress Summary           Patient Acceptance, E,TB,D, VU,NR by  at 2/3/2022 1151    Acceptance, E,TB,D, VU,NR by  at 2/1/2022 1355                               User Key     Initials Effective Dates Name Provider Type [facility] 06/16/21 -  Awa Vernon PT Physical Therapist PT              PT Recommendation and Plan  Planned Therapy Interventions (PT): balance training, bed mobility training, gait training, home exercise program, patient/family education, strengthening, transfer training  Plan of Care Reviewed With: patient  Outcome Summary: Pt demonstrates some increased activity tolerance and functional strength as he was able to increase his gait distance and required less assistance. Pt continues to have impaired balance and shuffling gait. PT will continue to follow to address strength, mobility, and gait.     Time Calculation:    PT Charges     Row Name 02/03/22 1151             Time Calculation    Start Time 1118  -      Stop Time 1130  -      Time Calculation (min) 12 min  -      PT Received On 02/03/22  -      PT - Next Appointment 02/04/22  -              Time Calculation- PT    Total  Timed Code Minutes- PT 12 minute(s)  -CH              Timed Charges    28378 - PT Therapeutic Activity Minutes 12  -CH              Total Minutes    Timed Charges Total Minutes 12  -CH       Total Minutes 12  -CH            User Key  (r) = Recorded By, (t) = Taken By, (c) = Cosigned By    Initials Name Provider Type    CH Awa Vernon PT Physical Therapist              Therapy Charges for Today     Code Description Service Date Service Provider Modifiers Qty    [id number] HC PT THERAPEUTIC ACT EA 15 MIN 2/3/2022 Awa Vernon PT GP 1          PT G-Codes  Outcome Measure Options: AM-PAC 6 Clicks Basic Mobility (PT)  AM-PAC 6 Clicks Score (PT): 15  AM-PAC 6 Clicks Score (OT): 10    Awa Vernon PT  2/3/2022

## 2022-02-03 NOTE — PLAN OF CARE
Goal Outcome Evaluation:           Progress: improving   VSS on room air.  Pt taken out of restraints in the afternoon.  Has tolerated mostly well. Tried to get out of bed 2 times but was able to be directed back into to bed.  He is now being monitored via baby monitor to ensure safety.  Will CTM.

## 2022-02-03 NOTE — CONSULTS
IDENTIFYING INFORMATION: The patient is a 77-year-old white male admitted with increasing confusion related to COVID-19 encephalopathy    CHIEF COMPLAINT: None given    INFORMANT: Chart and Dr. Barnes    RELIABILITY: Good    HISTORY OF PRESENT ILLNESS: The patient is a 77-year-old white male with no reported history of a dementia diagnosis.  He was noted by his son to have increasing confusion and word finding difficulties over the 24 hours prior to admission.  He had become increasingly weak and was found on admission to the hospital to be COVID positive.  His oxygen sats were around 88 during initial evaluation.  When seen today the patient is sleeping soundly and does not awaken for interview.  Charting indicates that his respiratory status is improving as he was able to sleep through the night on room air.  On admission, the patient was on fluvoxamine 100 mg daily.  This medication is usually associated with treatment of obsessive-compulsive disorder.  Additionally he had been on a very low-dose of Seroquel most likely to assist with sleep.  Dr. Barnes indicates that because the patient was reporting some visual hallucinations, Seroquel had been discontinued and the patient instead begun on low-dose Zyprexa.    PAST PSYCHIATRIC HISTORY: As above    PAST MEDICAL HISTORY: Significant for diverticulitis, GERD, gout, hyperlipidemia, hypertension, history of renal stones, sleep apnea    MEDICATIONS:   Current Facility-Administered Medications   Medication Dose Route Frequency Provider Last Rate Last Admin   • acetaminophen (TYLENOL) tablet 650 mg  650 mg Oral Q4H PRN Kaden Rosario APRN       • amLODIPine (NORVASC) tablet 5 mg  5 mg Oral Daily Reuben Barnes MD   5 mg at 02/02/22 1033   • atorvastatin (LIPITOR) tablet 10 mg  10 mg Oral Nightly Reuben Barnes MD   10 mg at 02/02/22 2004   • cholecalciferol (VITAMIN D3) tablet 1,000 Units  1,000 Units Oral Daily Reuben Barnes MD   1,000 Units  at 02/02/22 1033   • enoxaparin (LOVENOX) syringe 40 mg  40 mg Subcutaneous Q24H Kaden Rosario APRN   40 mg at 02/02/22 1032   • fluvoxaMINE (LUVOX) tablet 100 mg  100 mg Oral Nightly Reuben Barnes MD   100 mg at 02/02/22 2004   • magnesium oxide (MAG-OX) tablet 200 mg  200 mg Oral Daily Reuben Barnes MD   200 mg at 02/02/22 1032   • Magnesium Sulfate 2 gram Bolus, followed by 8 gram infusion (total Mg dose 10 grams)- Mg less than or equal to 1mg/dL  2 g Intravenous PRN Reuben Barnes MD        Or   • Magnesium Sulfate 2 gram / 50mL Infusion (GIVE X 3 BAGS TO EQUAL 6GM TOTAL DOSE) - Mg 1.1 - 1.5 mg/dl  2 g Intravenous PRN Reuben Barnes MD        Or   • Magnesium Sulfate 4 gram infusion- Mg 1.6-1.9 mg/dL  4 g Intravenous PRN Reuben Barnes MD       • melatonin tablet 3 mg  3 mg Oral Nightly Reuben Barnes MD   3 mg at 02/02/22 2004   • multivitamin with minerals 1 tablet  1 tablet Oral Daily Reuben Barnes MD   1 tablet at 02/02/22 1033   • OLANZapine (zyPREXA) injection 5 mg  5 mg Intramuscular Q8H PRN Terry Hernandez III, MD       • OLANZapine (zyPREXA) tablet 2.5 mg  2.5 mg Oral Once Reuben Barnes MD       • OLANZapine (zyPREXA) tablet 2.5 mg  2.5 mg Oral Nightly Reuben Barnes MD   2.5 mg at 02/02/22 2004   • ondansetron (ZOFRAN) tablet 4 mg  4 mg Oral Q4H PRN Kaden Rosario APRN        Or   • ondansetron (ZOFRAN) injection 4 mg  4 mg Intravenous Q4H PRN Kaden Rosario APRN       • pantoprazole (PROTONIX) EC tablet 40 mg  40 mg Oral Daily Reuben Barnes MD   40 mg at 02/02/22 1049   • potassium chloride (K-DUR,KLOR-CON) ER tablet 40 mEq  40 mEq Oral PRN Reuben Barnes MD   40 mEq at 01/31/22 2007   • potassium chloride (KLOR-CON) packet 40 mEq  40 mEq Oral PRN Reuben Barnes MD       • sodium chloride 0.9 % flush 10 mL  10 mL Intravenous PRN Gorge Resendiz MD       • sucralfate (CARAFATE) tablet 1 g  1 g  Oral Nightly Reuben Barnes MD   1 g at 02/02/22 2004   • tamsulosin (FLOMAX) 24 hr capsule 0.4 mg  0.4 mg Oral Nightly Reuben Barnes MD   0.4 mg at 02/02/22 2004   • vitamin B-12 (CYANOCOBALAMIN) tablet 1,000 mcg  1,000 mcg Oral Daily Reuben Barnes MD   1,000 mcg at 02/02/22 1033         ALLERGIES: Sulfa    FAMILY HISTORY: Noncontributory    SOCIAL HISTORY: Patient is retired dentist.  There is no reported use of alcohol tobacco street drugs    MENTAL STATUS EXAM: The patient is an elderly white male who is sleeping soundly.  Attempts to awaken him orally is unsuccessful.    ASSETS/LIABILITIES: Supportive family, high level of functioning/health issues    DIAGNOSTIC IMPRESSION: Metabolic encephalopathy secondary to COVID-19, medical problems described previously    PLAN: For now, I would recommend continuation of scheduled fluvoxamine and Zyprexa, and will add as needed Zyprexa should any agitation occur.  I would expect the patient's mentation to return to baseline with resolution of his infectious process.  I will continue to follow with you.

## 2022-02-03 NOTE — PROGRESS NOTES
Name: Tyron Hunt ADMIT: 2022   : 1945  PCP: Tyron Hunt MD    MRN: [id number] LOS: 4 days   AGE/SEX: 77 y.o. male  ROOM: [facility]   Subjective   Chief Complaint   Patient presents with   • Weakness - Generalized   • Altered Mental Status         He has had some issues with likely dementia and memory issues over the past year.  The patient is well as his wife have likely been sick with coronavirus for less than a week.  The patient has not had much of any pulmonary symptoms, only some mild congestion.  Unfortunately he has had increased confusion over the past 2 to 3 days.  Also decreased p.o. intake.    Slept better the past 3 nights  Still then has been wanting to sleep into the morning (past few days)    Hallucinations seem better  Working with PT    ROS  Unreliable due to mental status changes    Objective   Vital Signs  Temp:  [96.4 °F (35.8 °C)-97.9 °F (36.6 °C)] 97.9 °F (36.6 °C)  Heart Rate:  [71-82] 71  Resp:  [18-20] 18  BP: (140-156)/(84-89) 140/87  SpO2:  [95 %-96 %] 96 %  on   ;   Device (Oxygen Therapy): room air  Body mass index is 25.58 kg/m².    Physical Exam  HENT:      Head: Normocephalic and atraumatic.   Eyes:      General: No scleral icterus.  Cardiovascular:      Rate and Rhythm: Normal rate and regular rhythm.   Pulmonary:      Effort: Pulmonary effort is normal. No respiratory distress.      Breath sounds: Normal breath sounds.   Abdominal:      General: There is no distension.      Palpations: Abdomen is soft.      Tenderness: There is no abdominal tenderness.   Musculoskeletal:      Cervical back: Neck supple.     Elderly  Confused, oriented to self only  Resting this morning and asked if he can continue sleeping (he had said the same thing the past 3 days)- very pleasant though  In restraints    Results Review:       I reviewed the patient's new clinical results.  Results from last 7 days   Lab Units 22  0505 22  0634 22  0623 22  0559   WBC  10*3/mm3 5.30 4.74 3.70 3.77   HEMOGLOBIN g/dL 15.1 14.5 14.8 14.1   PLATELETS 10*3/mm3 100* 90* 93* 79*     Results from last 7 days   Lab Units 02/03/22  0505 02/02/22  0634 02/01/22  0623 01/31/22  0559   SODIUM mmol/L 142 145 143 142   POTASSIUM mmol/L 3.4* 3.5 3.8 3.3*   CHLORIDE mmol/L 107 109* 110* 109*   CO2 mmol/L 22.0 22.4 23.0 19.4*   BUN mg/dL 15 14 12 15   CREATININE mg/dL 0.74* 0.69* 0.82 0.95   GLUCOSE mg/dL 100* 106* 93 85   Estimated Creatinine Clearance: 72.8 mL/min (A) (by C-G formula based on SCr of 0.74 mg/dL (L)).  Results from last 7 days   Lab Units 02/03/22  0505 02/02/22  0634 02/01/22  0623 01/31/22  0559   ALBUMIN g/dL 3.80 3.40* 3.70 3.20*   BILIRUBIN mg/dL 1.2 0.8 0.8 0.7   ALK PHOS U/L 93 87 81 75   AST (SGOT) U/L 50* 47* 55* 45*   ALT (SGPT) U/L 32 28 25 20     Results from last 7 days   Lab Units 02/03/22  0505 02/02/22  0634 02/01/22  0623 01/31/22  0559   CALCIUM mg/dL 8.7 8.5* 8.5* 7.7*   ALBUMIN g/dL 3.80 3.40* 3.70 3.20*   MAGNESIUM mg/dL  --  2.3 2.0  --    PHOSPHORUS mg/dL  --  3.4  --   --      Results from last 7 days   Lab Units 01/30/22  0606 01/29/22  2320   LACTATE mmol/L 1.0 1.8       Coag   Results from last 7 days   Lab Units 01/29/22  2320   INR  0.99   APTT seconds 20.0*     HbA1C   Lab Results   Component Value Date    HGBA1C 5.80 (H) 09/21/2021    HGBA1C 5.90 (H) 06/18/2021    HGBA1C 6.00 (H) 02/04/2021     Infection     Radiology(recent) No radiology results for the last day  No results found for: TROPONINT, TROPONINI, BNP  No components found for: TSH;2    amLODIPine, 5 mg, Oral, Daily  atorvastatin, 10 mg, Oral, Nightly  cholecalciferol, 1,000 Units, Oral, Daily  enoxaparin, 40 mg, Subcutaneous, Q24H  fluvoxaMINE, 100 mg, Oral, Nightly  magnesium oxide, 200 mg, Oral, Daily  melatonin, 3 mg, Oral, Nightly  multivitamin with minerals, 1 tablet, Oral, Daily  OLANZapine, 2.5 mg, Oral, Nightly  pantoprazole, 40 mg, Oral, Daily  sucralfate, 1 g, Oral,  Nightly  tamsulosin, 0.4 mg, Oral, Nightly  vitamin B-12, 1,000 mcg, Oral, Daily       Diet Regular      Assessment/Plan      Active Hospital Problems    Diagnosis  POA   • **Encephalopathy due to COVID-19 virus [U07.1, G93.49]  Yes   • Age-related physical debility [R54]  Unknown   • Altered mental status [R41.82]  Yes   • Stage 3a chronic kidney disease (HCC) [N18.31]  Yes   • Hypertension [I10]  Yes   • GERD (gastroesophageal reflux disease) [K21.9]  Yes   • Anxiety [F41.9]  Yes      Resolved Hospital Problems   No resolved problems to display.       · Continue supportive care, monitoring oxygen  · Delirium,  hallucinations on 2/2 will changed this to low dose zyprexa and Psychiatry now following      · lights on and increase activity during the day.    · Lovenox for DVT prophylaxis  · Currently no indication for inpatient treatment for covid including steroids (which may give more side effects than benefit)  · Continue majority of home medications  · Monitor labs closely, he did have some thrombocytopenia on admission which has been ok  · Replace electrolytes as needed  · Known abnormality of CXR in the past- can follow up with repeat imaging as outpatient  · TSH normal 4 months ago  · Will give a short run of fluids again today 2/3- but then stop so it is not running continuously     DW RN- doing trial period out of restraints    SOLOMON RN  DW Family  DW Dr. Hernandez  Greater than 38 minutes spent with greater than 50% counseling and coordinating care    Hopefully will do well out of restraints today if possible. Discussed with his son, 1 of these days if he is a little bit better, even if still having some confusion, will try to get him to home with his family and possible other caregivers as I think mentally and physically he will do much better at home and out of isolation. RN to reach out to infection control regarding how long he is going to need to be in isolation, started having symptoms on January  23.    Reuben Barnes MD  [facility]  02/03/22  15:26 EST

## 2022-02-03 NOTE — PLAN OF CARE
Goal Outcome Evaluation:  Plan of Care Reviewed With: patient           Outcome Summary: Pt demonstrates some increased activity tolerance and functional strength as he was able to increase his gait distance and required less assistance. Pt continues to have impaired balance and shuffling gait. PT will continue to follow to address strength, mobility, and gait.

## 2022-02-03 NOTE — PLAN OF CARE
Goal Outcome Evaluation:  Plan of Care Reviewed With: patient        Progress: improving  Outcome Summary: Patient on Room air throughout the shift and oxygen saturation WNL.  Appeared to sleep most of the night.  VSS.  WCTM

## 2022-02-04 LAB
ALBUMIN SERPL-MCNC: 3.6 G/DL (ref 3.5–5.2)
ALBUMIN/GLOB SERPL: 1.2 G/DL
ALP SERPL-CCNC: 94 U/L (ref 39–117)
ALT SERPL W P-5'-P-CCNC: 36 U/L (ref 1–41)
ANION GAP SERPL CALCULATED.3IONS-SCNC: 11.9 MMOL/L (ref 5–15)
AST SERPL-CCNC: 38 U/L (ref 1–40)
BASOPHILS # BLD AUTO: 0.02 10*3/MM3 (ref 0–0.2)
BASOPHILS NFR BLD AUTO: 0.4 % (ref 0–1.5)
BILIRUB SERPL-MCNC: 1 MG/DL (ref 0–1.2)
BUN SERPL-MCNC: 16 MG/DL (ref 8–23)
BUN/CREAT SERPL: 21.9 (ref 7–25)
CALCIUM SPEC-SCNC: 8.9 MG/DL (ref 8.6–10.5)
CHLORIDE SERPL-SCNC: 109 MMOL/L (ref 98–107)
CO2 SERPL-SCNC: 25.1 MMOL/L (ref 22–29)
CREAT SERPL-MCNC: 0.73 MG/DL (ref 0.76–1.27)
CRP SERPL-MCNC: 7.48 MG/DL (ref 0–0.5)
DEPRECATED RDW RBC AUTO: 42 FL (ref 37–54)
EOSINOPHIL # BLD AUTO: 0.13 10*3/MM3 (ref 0–0.4)
EOSINOPHIL NFR BLD AUTO: 2.3 % (ref 0.3–6.2)
ERYTHROCYTE [DISTWIDTH] IN BLOOD BY AUTOMATED COUNT: 12.3 % (ref 12.3–15.4)
FERRITIN SERPL-MCNC: 605 NG/ML (ref 30–400)
GFR SERPL CREATININE-BSD FRML MDRD: 104 ML/MIN/1.73
GLOBULIN UR ELPH-MCNC: 3 GM/DL
GLUCOSE SERPL-MCNC: 113 MG/DL (ref 65–99)
HCT VFR BLD AUTO: 44.1 % (ref 37.5–51)
HGB BLD-MCNC: 14.7 G/DL (ref 13–17.7)
LYMPHOCYTES # BLD AUTO: 0.96 10*3/MM3 (ref 0.7–3.1)
LYMPHOCYTES NFR BLD AUTO: 17.1 % (ref 19.6–45.3)
MAGNESIUM SERPL-MCNC: 2 MG/DL (ref 1.6–2.4)
MCH RBC QN AUTO: 30.6 PG (ref 26.6–33)
MCHC RBC AUTO-ENTMCNC: 33.3 G/DL (ref 31.5–35.7)
MCV RBC AUTO: 91.9 FL (ref 79–97)
MONOCYTES # BLD AUTO: 0.65 10*3/MM3 (ref 0.1–0.9)
MONOCYTES NFR BLD AUTO: 11.5 % (ref 5–12)
NEUTROPHILS NFR BLD AUTO: 3.86 10*3/MM3 (ref 1.7–7)
NEUTROPHILS NFR BLD AUTO: 68.5 % (ref 42.7–76)
PLATELET # BLD AUTO: 118 10*3/MM3 (ref 140–450)
PMV BLD AUTO: 11 FL (ref 6–12)
POTASSIUM SERPL-SCNC: 3.8 MMOL/L (ref 3.5–5.2)
PROT SERPL-MCNC: 6.6 G/DL (ref 6–8.5)
RBC # BLD AUTO: 4.8 10*6/MM3 (ref 4.14–5.8)
SODIUM SERPL-SCNC: 146 MMOL/L (ref 136–145)
WBC NRBC COR # BLD: 5.63 10*3/MM3 (ref 3.4–10.8)

## 2022-02-04 PROCEDURE — 36415 COLL VENOUS BLD VENIPUNCTURE: CPT

## 2022-02-04 PROCEDURE — 86140 C-REACTIVE PROTEIN: CPT

## 2022-02-04 PROCEDURE — 97530 THERAPEUTIC ACTIVITIES: CPT

## 2022-02-04 PROCEDURE — 85025 COMPLETE CBC W/AUTO DIFF WBC: CPT

## 2022-02-04 PROCEDURE — 83735 ASSAY OF MAGNESIUM: CPT | Performed by: INTERNAL MEDICINE

## 2022-02-04 PROCEDURE — 82728 ASSAY OF FERRITIN: CPT

## 2022-02-04 PROCEDURE — 80053 COMPREHEN METABOLIC PANEL: CPT

## 2022-02-04 PROCEDURE — 25010000002 ENOXAPARIN PER 10 MG

## 2022-02-04 RX ORDER — OLANZAPINE 10 MG/1
5 INJECTION, POWDER, LYOPHILIZED, FOR SOLUTION INTRAMUSCULAR EVERY 8 HOURS PRN
Status: DISCONTINUED | OUTPATIENT
Start: 2022-02-04 | End: 2022-02-11 | Stop reason: HOSPADM

## 2022-02-04 RX ORDER — OLANZAPINE 5 MG/1
5 TABLET ORAL NIGHTLY
Status: DISCONTINUED | OUTPATIENT
Start: 2022-02-04 | End: 2022-02-05

## 2022-02-04 RX ORDER — OLANZAPINE 10 MG/1
7.5 INJECTION, POWDER, LYOPHILIZED, FOR SOLUTION INTRAMUSCULAR EVERY 8 HOURS PRN
Status: DISCONTINUED | OUTPATIENT
Start: 2022-02-04 | End: 2022-02-04

## 2022-02-04 RX ADMIN — AMLODIPINE BESYLATE 5 MG: 5 TABLET ORAL at 15:03

## 2022-02-04 RX ADMIN — Medication 1000 MCG: at 15:02

## 2022-02-04 RX ADMIN — FLUVOXAMINE MALEATE 100 MG: 50 TABLET, FILM COATED ORAL at 20:19

## 2022-02-04 RX ADMIN — OLANZAPINE 5 MG: 5 TABLET ORAL at 20:18

## 2022-02-04 RX ADMIN — ENOXAPARIN SODIUM 40 MG: 100 INJECTION SUBCUTANEOUS at 15:03

## 2022-02-04 RX ADMIN — ATORVASTATIN CALCIUM 10 MG: 20 TABLET, FILM COATED ORAL at 20:19

## 2022-02-04 RX ADMIN — MULTIPLE VITAMINS W/ MINERALS TAB 1 TABLET: TAB at 15:03

## 2022-02-04 RX ADMIN — Medication 3 MG: at 20:19

## 2022-02-04 RX ADMIN — PANTOPRAZOLE SODIUM 40 MG: 40 TABLET, DELAYED RELEASE ORAL at 15:03

## 2022-02-04 RX ADMIN — MAGNESIUM OXIDE 400 MG (241.3 MG MAGNESIUM) TABLET 200 MG: TABLET at 15:03

## 2022-02-04 RX ADMIN — SUCRALFATE 1 G: 1 TABLET ORAL at 20:19

## 2022-02-04 RX ADMIN — TAMSULOSIN HYDROCHLORIDE 0.4 MG: 0.4 CAPSULE ORAL at 20:19

## 2022-02-04 RX ADMIN — Medication 1000 UNITS: at 15:03

## 2022-02-04 RX ADMIN — SODIUM CHLORIDE, PRESERVATIVE FREE 10 ML: 5 INJECTION INTRAVENOUS at 20:20

## 2022-02-04 NOTE — PLAN OF CARE
Problem: Restraint, Nonbehavioral (Nonviolent)  Goal: Discontinuation Criteria Achieved  Intervention: Implement Least-restrictive Safety Strategies  Recent Flowsheet Documentation  Taken 2/4/2022 0000 by Mabel Goodrich RN  Diversional Activities: television  Taken 2/3/2022 2005 by Mabel Goodrich RN  Diversional Activities: television     Problem: Restraint, Nonbehavioral (Nonviolent)  Goal: Discontinuation Criteria Achieved  Intervention: Implement Least-restrictive Safety Strategies  Recent Flowsheet Documentation  Taken 2/4/2022 0000 by Mabel Goodrich RN  Diversional Activities: television  Taken 2/3/2022 2005 by Mabel Goodrich RN  Diversional Activities: television     Problem: Restraint, Nonbehavioral (Nonviolent)  Goal: Personal Dignity and Safety Maintained  Intervention: Protect Dignity, Rights, and Personal Wellbeing  Recent Flowsheet Documentation  Taken 2/4/2022 0000 by Mabel Goodrich RN  Trust Relationship/Rapport:   care explained   choices provided   reassurance provided  Taken 2/3/2022 2005 by Mabel Goodrich RN  Trust Relationship/Rapport:   care explained   choices provided   reassurance provided     Problem: Restraint, Nonbehavioral (Nonviolent)  Goal: Personal Dignity and Safety Maintained  Intervention: Protect Skin and Joint Integrity  Recent Flowsheet Documentation  Taken 2/4/2022 0744 by Mabel Goodrich RN  Body Position:   side-lying, right   position changed independently  Taken 2/4/2022 0600 by Mabel Goodrich RN  Body Position: position changed independently  Taken 2/4/2022 0000 by Mabel Goodrich RN  Body Position: position changed independently  Range of Motion: active ROM (range of motion) encouraged  Taken 2/3/2022 2005 by Mabel Goodrich RN  Body Position: position changed independently  Range of Motion: active ROM (range of motion) encouraged   Goal Outcome Evaluation:  Plan of Care Reviewed With: patient           Outcome Summary: VSS, pt attempted to get oob multiple times, pulled off  cath,O2 sensor and clothing. Prn medication wasn't effective and had to call for an order for bilateral soft wrist restraints. Restraints were applied, however pt was still able to remove his brief and put his legs oob. Slept off and on throughout the shift. Staff will CTM..

## 2022-02-04 NOTE — PLAN OF CARE
Goal Outcome Evaluation:  Plan of Care Reviewed With: patient           Outcome Summary: Pt seen today by OT, he is drowsy and speech is difficult to understand. Pt with decreased command following to participate in ADLs today. Attempted grooming with pt, he is able to hold grooming items but does not use appropriately and needs hand over hand assist/total A to complete. He does sit EOB today with mod A and cues and stands twice from EOB but posterior leaning and unable to take steps. Min AX2 for both sit to stands. Notifed RN, plans to dc home possible tomorrow but at cuurent time still signficantly decreased ADL and mobility per admission report from CCP/family. Pt unable to provide much hx throughout. Possible need for SNF at dc.    OT wore PPE following enchanced droplet precautions. Pt wore a mask.

## 2022-02-04 NOTE — DISCHARGE PLACEMENT REQUEST
"Bart Hunt (77 y.o. Male)             Date of Birth Social Security Number Address Home Phone MRN    1945  [id number]  [facility] [place] [address] [phone] [id number]    [employer] Marital Status             [facility]        Admission Date Admission Type Admitting Provider Attending Provider Department, Room/Bed    1/29/22 Emergency Ramila Gutierrez MD George, Katherine, DO [address], [address]    Discharge Date Discharge Disposition Discharge Destination                         Attending Provider: Elena Pacheco DO    Allergies: Sulfa Antibiotics    Isolation: Enh Drop/Con   Infection: COVID (confirmed) (01/30/22)   Code Status: CPR   Advance Care Planning Activity    Ht: 162.6 cm (64.02\")   Wt: 67.6 kg (149 lb 1.6 oz)    Admission Cmt: None   Principal Problem: Encephalopathy due to COVID-19 virus [U07.1,G93.49]                 Active Insurance as of 1/29/2022     Primary Coverage     Payor Plan Insurance Group Employer/Plan Group    ANTHEM MEDICARE REPLACEMENT ANTHEM MEDICARE ADVANTAGE [id number]     Payor Plan Address Payor Plan Phone Number Payor Plan Fax Number Effective Dates    [address] [phone]  1/1/2018 - None Entered    [facility] [id number]       Subscriber Name Subscriber Birth Date Member ID       BART HUNT 1945 [id number]                 Emergency Contacts      (Rel.) Home Phone Work Phone Mobile Phone    ZANDER HUNT (Spouse) [phone] -- --    Dr Bart Hunt (Son) -- [phone]            "

## 2022-02-04 NOTE — THERAPY TREATMENT NOTE
Patient Name: Tyron Hunt  : 1945    MRN: [id number]                              Today's Date: 2022       Admit Date: 2022    Visit Dx:     ICD-10-CM ICD-9-CM   1. Age-related physical debility  R54 797   2. Altered mental status, unspecified altered mental status type  R41.82 780.97   3. COVID-19 virus infection  U07.1 079.89   4. Cervical radiculitis  M54.12 723.4   5. Cervical spinal stenosis  M48.02 723.0   6. Encephalopathy due to COVID-19 virus  U07.1 348.39    G93.49 079.89     Patient Active Problem List   Diagnosis   • Hypertension   • GERD (gastroesophageal reflux disease)   • Hyperlipidemia   • Anxiety   • Benign prostatic hyperplasia with lower urinary tract symptoms   • Impaired fasting glucose   • Recurrent major depressive disorder, in full remission (HCC)   • Cervical disc disease   • Cervical spinal stenosis   • Foraminal stenosis of cervical region   • Cervical radiculitis   • Altered mental status   • Stage 3a chronic kidney disease (HCC)   • Encephalopathy due to COVID-19 virus   • Age-related physical debility     Past Medical History:   Diagnosis Date   • Anxiety    • Diverticulitis    • GERD (gastroesophageal reflux disease)    • Gout    • H/O complete eye exam 2017   • Hyperlipidemia    • Hypertension    • Impaired fasting blood sugar    • Kidney stones    • Memory loss    • Sleep apnea      Past Surgical History:   Procedure Laterality Date   • CERVICAL EPIDURAL N/A 2021    Procedure: CERVICAL EPIDURAL;  Surgeon: Nataliia Avilez MD;  Location: [facility] MAIN OR;  Service: Pain Management;  Laterality: N/A;   • CERVICAL EPIDURAL N/A 10/18/2021    Procedure: CERVICAL EPIDURAL 7-1;  Surgeon: Nataliia Avilez MD;  Location: [facility] MAIN OR;  Service: Pain Management;  Laterality: N/A;   • CYST REMOVAL     • CYSTOSCOPY W/ LITHOLAPAXY / EHL     • TONSILLECTOMY        General Information     Row Name 22 1502          OT Time and Intention    Document Type therapy  note (daily note)  -     Mode of Treatment occupational therapy; individual therapy  -     Row Name 02/04/22 1502          General Information    Patient Profile Reviewed yes  -     Existing Precautions/Restrictions fall  COVID 19 isolation  -     Barriers to Rehab cognitive status  -     Row Name 02/04/22 1502          Cognition    Orientation Status (Cognition) oriented to; person  speech is very garbled today, difficult to understand, lathargic, RN notified.  -     Row Name 02/04/22 1502          Safety Issues, Functional Mobility    Safety Issues Affecting Function (Mobility) ability to follow commands; insight into deficits/self-awareness; awareness of need for assistance; impulsivity; safety precaution awareness; safety precautions follow-through/compliance; judgment; problem-solving  -     Impairments Affecting Function (Mobility) balance; cognition; endurance/activity tolerance; strength; coordination  -           User Key  (r) = Recorded By, (t) = Taken By, (c) = Cosigned By    Initials Name Provider Type     Brianna Armstrong OT Occupational Therapist                 Mobility/ADL's     Row Name 02/04/22 1503          Bed Mobility    Supine-Sit [place] (Bed Mobility) moderate assist (50% patient effort); verbal cues  -     Sit-Supine [place] (Bed Mobility) contact guard; verbal cues  -     Assistive Device (Bed Mobility) bed rails; head of bed elevated  -     Comment (Bed Mobility) tactile cues and extra time for initation of movement, needs assist to bring BLE off EOB and assist at trunk.  -     Row Name 02/04/22 1506          Transfers    Comment (Transfers) X2 sit to stands completed, HHAX2. Pt posterior leaning on both attempts, on first attempt pt stood for 1.5 minutes then on second attempt pt requests to sit back down after ~15 seconds.  -     Sit-Stand [place] (Transfers) minimum assist (75% patient effort); 2 person assist  -     Row Name 02/04/22 9441           Sit-Stand Transfer    Assistive Device (Sit-Stand Transfers) --  HHAX2.  -     Row Name 02/04/22 1503          Functional Mobility    Functional Mobility- Comment Pt unable to take any steps today, posterior leaning, once able to stand CGA pt attempted weight shifting, marching in place in prep for mobility and unable to  LEs.  -     Row Name 02/04/22 1503          Grooming Assessment/Training    [place] Level (Grooming) grooming skills; dependent (less than 25% patient effort)  -     Assistive Devices (Grooming) hand over hand  -     Position (Grooming) sitting up in bed  -     Comment (Grooming) OT provided grooming ax for pt, pt holds toothbrush but does not brush teeth with max cues and hand over hand to complete.  -           User Key  (r) = Recorded By, (t) = Taken By, (c) = Cosigned By    Initials Name Provider Type    Brianna Kincaid OT Occupational Therapist               Obj/Interventions     Row Name 02/04/22 1506          Balance    Static Sitting Balance WFL; sitting, edge of bed  -     Static Standing Balance moderate impairment  -     Comment, Balance initally leaning posteriorly but able to progress to CGA with HHAX2.  -           User Key  (r) = Recorded By, (t) = Taken By, (c) = Cosigned By    Initials Name Provider Type    Brianna Kincaid OT Occupational Therapist               Goals/Plan    No documentation.                Clinical Impression     [facility] Name 02/04/22 1506          Pain Scale: FACES Pre/Post-Treatment    Pain: FACES Scale, Pretreatment 0-->no hurt  -[facility] Name 02/04/22 1506          Plan of Care Review    Plan of Care Reviewed With patient  -SM     Outcome Summary Pt seen today by OT, he is drowsy and speech is difficult to understand. Pt with decreased command following to participate in ADLs today. Attempted grooming with pt, he is able to hold grooming items but does not use appropriately and needs hand over hand assist/total  A to complete. He does sit EOB today with mod A and cues and stands twice from EOB but posterior leaning and unable to take steps. Min AX2 for both sit to stands. Notifed RN, plans to dc home possible tomorrow but at cuurent time still signficantly decreased ADL and mobility per admission report from CCP/family. Pt unable to provide much hx throughout. Possible need for SNF at [place].  -     Row Name 02/04/22 1506          Therapy Plan Review/Discharge Plan (OT)    Anticipated Discharge Disposition (OT) skilled nursing facility  -     Row Name 02/04/22 1506          Vital Signs    Pre SpO2 (%) 96  -SM     O2 Delivery Pre Treatment room air  -SM     Post SpO2 (%) 96  -SM     O2 Delivery Post Treatment room air  -     Row Name 02/04/22 1506          Positioning and Restraints    Pre-Treatment Position in bed  -SM     Post Treatment Position bed  -SM     In Bed fowlers; call light within reach; encouraged to call for assist; exit alarm on; notified nsg  monitor/camera set up for staff to view.  -           User Key  (r) = Recorded By, (t) = Taken By, (c) = Cosigned By    Initials Name Provider Type    Brianna Kincaid OT Occupational Therapist               Outcome Measures     Row Name 02/04/22 1510          How much help from another is currently needed...    Putting on and taking off regular lower body clothing? 1  -SM     Bathing (including washing, rinsing, and drying) 1  -SM     Toileting (which includes using toilet bed pan or urinal) 1  -SM     Putting on and taking off regular upper body clothing 1  -SM     Taking care of personal grooming (such as brushing teeth) 1  -SM     Eating meals 2  -SM     AM-PAC 6 Clicks Score (OT) 7  -SM     Row Name 02/04/22 1510          Functional Assessment    Outcome Measure Options AM-PAC 6 Clicks Daily Activity (OT)  -SM           User Key  (r) = Recorded By, (t) = Taken By, (c) = Cosigned By    Initials Name Provider Type    Brianna Kincaid OT Occupational  Therapist                Occupational Therapy Education                 Title: PT OT SLP Therapies (In Progress)     Topic: Occupational Therapy (In Progress)     Point: ADL training (Not Started)     Description:   Instruct learner(s) on proper safety adaptation and remediation techniques during self care or transfers.   Instruct in proper use of assistive devices.              Learner Progress:  Not documented in this visit.          Point: Home exercise program (Not Started)     Description:   Instruct learner(s) on appropriate technique for monitoring, assisting and/or progressing therapeutic exercises/activities.              Learner Progress:  Not documented in this visit.          Point: Precautions (Done)     Description:   Instruct learner(s) on prescribed precautions during self-care and functional transfers.              Learning Progress Summary           Patient Acceptance, E, VU,NR by  at 2/1/2022 3453    Comment: educated pt on falls precautions, use of call light for nsg assist, safety with not getting OOB by himself due to high risk of falls.                   Point: Body mechanics (Not Started)     Description:   Instruct learner(s) on proper positioning and spine alignment during self-care, functional mobility activities and/or exercises.              Learner Progress:  Not documented in this visit.                      User Key     Initials Effective Dates Name Provider Type Discipline     04/02/20 -  Brianna Armstrong OT Occupational Therapist OT              OT Recommendation and Plan  Planned Therapy Interventions (OT): activity tolerance training, adaptive equipment training, BADL retraining, cognitive/visual perception retraining, functional balance retraining, IADL retraining, occupation/activity based interventions, patient/caregiver education/training, ROM/therapeutic exercise, strengthening exercise, transfer/mobility retraining  Therapy Frequency (OT): 5 times/wk  Plan of Care  Review  Plan of Care Reviewed With: patient  Outcome Summary: Pt seen today by OT, he is drowsy and speech is difficult to understand. Pt with decreased command following to participate in ADLs today. Attempted grooming with pt, he is able to hold grooming items but does not use appropriately and needs hand over hand assist/total A to complete. He does sit EOB today with mod A and cues and stands twice from EOB but posterior leaning and unable to take steps. Min AX2 for both sit to stands. Notifed RN, plans to dc home possible tomorrow but at cuurent time still signficantly decreased ADL and mobility per admission report from CCP/family. Pt unable to provide much hx throughout. Possible need for SNF at dc.     Time Calculation:    Time Calculation- OT     Row Name 02/04/22 1510             Time Calculation- OT    OT Start Time 1434  -SM      OT Stop Time 1457  -SM      OT Time Calculation (min) 23 min  -SM      Total Timed Code Minutes- OT 23 minute(s)  -SM      OT Received On 02/04/22  -      OT - Next Appointment 02/07/22  -              Timed Charges    83929 - OT Therapeutic Activity Minutes 23  -SM              Total Minutes    Timed Charges Total Minutes 23  -SM       Total Minutes 23  -SM            User Key  (r) = Recorded By, (t) = Taken By, (c) = Cosigned By    Initials Name Provider Type    Brianna Kincaid OT Occupational Therapist              Therapy Charges for Today     Code Description Service Date Service Provider Modifiers Qty    [id number]  OT THERAPEUTIC ACT EA 15 MIN 2/4/2022 Brianna Armstrong OT GO 2               Brianna Armstrong OT  2/4/2022

## 2022-02-04 NOTE — PROGRESS NOTES
Name: Tyron Hunt ADMIT: 2022   : 1945  PCP: Tyron Hunt MD    MRN: [id number] LOS: 5 days   AGE/SEX: 77 y.o. male  ROOM: [facility]     Subjective   Subjective   Required as needed Zyprexa overnight for agitation.  Back in restraints this morning.    Review of Systems  Not able to obtain due to patient's mental status     Objective   Objective   Vital Signs  Temp:  [96.5 °F (35.8 °C)-98.2 °F (36.8 °C)] 96.5 °F (35.8 °C)  Heart Rate:  [70-87] 77  Resp:  [18] 18  BP: (146-170)/() 158/106  SpO2:  [91 %-100 %] 91 %  on  Flow (L/min):  [8] 8;   Device (Oxygen Therapy): room air  Body mass index is 25.58 kg/m².  Physical Exam  Constitutional:       General: He is not in acute distress.     Appearance: He is not ill-appearing.   HENT:      Mouth/Throat:      Mouth: Mucous membranes are moist.   Eyes:      General: No scleral icterus.  Cardiovascular:      Rate and Rhythm: Normal rate and regular rhythm.   Pulmonary:      Effort: Pulmonary effort is normal. No respiratory distress.      Breath sounds: No wheezing or rhonchi.   Abdominal:      General: There is no distension.      Palpations: Abdomen is soft.      Tenderness: There is no abdominal tenderness. There is no guarding.   Musculoskeletal:      Right lower leg: No edema.      Left lower leg: No edema.   Skin:     General: Skin is warm and dry.   Neurological:      Comments: Lethargic.  Does not follow commands         Results Review     I reviewed the patient's new clinical results.  Results from last 7 days   Lab Units 22  0912 22  0505 22  0634 22  0623   WBC 10*3/mm3 5.63 5.30 4.74 3.70   HEMOGLOBIN g/dL 14.7 15.1 14.5 14.8   PLATELETS 10*3/mm3 118* 100* 90* 93*     Results from last 7 days   Lab Units 22  0912 22  0505 22  0634 22  0623   SODIUM mmol/L 146* 142 145 143   POTASSIUM mmol/L 3.8 3.4* 3.5 3.8   CHLORIDE mmol/L 109* 107 109* 110*   CO2 mmol/L 25.1 22.0 22.4 23.0   BUN mg/dL 16  15 14 12   CREATININE mg/dL 0.73* 0.74* 0.69* 0.82   GLUCOSE mg/dL 113* 100* 106* 93   Estimated Creatinine Clearance: 72.8 mL/min (A) (by C-G formula based on SCr of 0.73 mg/dL (L)).  Results from last 7 days   Lab Units 02/04/22  0912 02/03/22  0505 02/02/22  0634 02/01/22  0623   ALBUMIN g/dL 3.60 3.80 3.40* 3.70   BILIRUBIN mg/dL 1.0 1.2 0.8 0.8   ALK PHOS U/L 94 93 87 81   AST (SGOT) U/L 38 50* 47* 55*   ALT (SGPT) U/L 36 32 28 25     Results from last 7 days   Lab Units 02/04/22  0912 02/03/22  0505 02/02/22  0634 02/01/22  0623   CALCIUM mg/dL 8.9 8.7 8.5* 8.5*   ALBUMIN g/dL 3.60 3.80 3.40* 3.70   MAGNESIUM mg/dL 2.0  --  2.3 2.0   PHOSPHORUS mg/dL  --   --  3.4  --      Results from last 7 days   Lab Units 01/30/22  0606 01/29/22  2320   LACTATE mmol/L 1.0 1.8     COVID19   Date Value Ref Range Status   01/29/2022 Detected (C) Not Detected - Ref. Range Final     SARS-CoV-2, ISIAH   Date Value Ref Range Status   01/25/2022 CANCELED       Comment:     Test not performed. Insufficient specimen to perform or complete  analysis.    Result canceled by the ancillary.       No results found for: HGBA1C, POCGLU    XR Chest 1 View  Narrative: PORTABLE RADIOGRAPHIC VIEW OF THE CHEST     CLINICAL HISTORY: Altered mental status.     Portable radiographic view of the chest demonstrates an indeterminate  2.9 cm vague opacity within the right base which could represent an area  of scarring, or atelectatic change although I cannot exclude the  possibility of a mass. Further evaluation with a CT scan of the chest is  suggested. There are areas of atelectatic change or less likely  pneumonia within the left base. Overall, the lung volumes are relatively  low. There is a tortuous thoracic aorta. The osseous structures are  unremarkable.     Impression:    Within the right lung base, there is an indeterminate opacity which  measures up to approximately 2.9 cm in diameter and could represent an  area of scarring or atelectatic  change although I cannot exclude the  possibility of a mass at this site. I recommend further evaluation with  a CT scan of the chest.     These findings and recommendations were discussed with Gorge Resendiz MD,  on 01/29/2022 at approximately 12:05 AM.     This report was finalized on 1/30/2022 12:33 AM by Dr. Nacho Maria M.D.     CT Head Without Contrast  Narrative: Patient: BART BENSON  Time Out: 00:12  Exam(s): CT HEAD Without Contrast     EXAM:    CT Head Without Intravenous Contrast    CLINICAL HISTORY:     Reason for exam: Delirium.    TECHNIQUE:    Axial computed tomography images of the head brain without intravenous   contrast.  CTDI is 54.8 mGy and DLP is 1007.3 mGy-cm.  This CT exam was   performed according to the principle of ALARA (As Low As Reasonably   Achievable) by using one or more of the following dose reduction   techniques: automated exposure control, adjustment of the mA and or kV   according to patient size, and or use of iterative reconstruction   technique.    COMPARISON:    No previous studies.    FINDINGS:    Limitations:  Markedly limited evaluation due to motion artifact.    Brain:  No abnormal extra-axial collection is noted.  No hemorrhage.    Midline shift:  Midline anatomy is unremarkable.    Ventricles:  There is prominence of the ventricular system, cortical   sulci, basilar cisterns, compatible with age related atrophy.    Bones joints:  Calvarium is within normal limits.  No acute fracture.    Soft tissues:  Unremarkable.    Vasculature:  Atherosclerotic disease.    Sinuses:  Mild to moderate chronic ethmoid sinusitis.    Mastoid air cells:  Mastoid air cells are unremarkable.    IMPRESSION:       1.  Age-related atrophy and small vessel disease of aging.  2.  No acute intracranial pathology is noted.  3.  If there is concern for etiology such as early acute lacunar infarcts,   MRI imaging of the brain with diffusion-weighted sequences should be   performed.  Impression:  "Electronically signed by Rodrigo Rodríguez MD on 01-30-22 at 0012    I reviewed the patient's daily medications.  Scheduled Medications  amLODIPine, 5 mg, Oral, Daily  atorvastatin, 10 mg, Oral, Nightly  cholecalciferol, 1,000 Units, Oral, Daily  enoxaparin, 40 mg, Subcutaneous, Q24H  fluvoxaMINE, 100 mg, Oral, Nightly  magnesium oxide, 200 mg, Oral, Daily  melatonin, 3 mg, Oral, Nightly  multivitamin with minerals, 1 tablet, Oral, Daily  OLANZapine, 5 mg, Oral, Nightly  pantoprazole, 40 mg, Oral, Daily  sucralfate, 1 g, Oral, Nightly  tamsulosin, 0.4 mg, Oral, Nightly  vitamin B-12, 1,000 mcg, Oral, Daily    Infusions   Diet  Diet Regular       Assessment/Plan     Active Hospital Problems    Diagnosis  POA   • **Encephalopathy due to COVID-19 virus [U07.1, G93.49]  Yes   • Age-related physical debility [R54]  Unknown   • Altered mental status [R41.82]  Yes   • Stage 3a chronic kidney disease (HCC) [N18.31]  Yes   • Hypertension [I10]  Yes   • GERD (gastroesophageal reflux disease) [K21.9]  Yes   • Anxiety [F41.9]  Yes      Resolved Hospital Problems   No resolved problems to display.       77 y.o. male admitted with Encephalopathy due to COVID-19 virus.    Today: replace K  Possible home tomorrow with home health.     COVID-19 infection with encephalopathy  -Currently comfortable on room air.  No disease specific treatment indicated at this time. Supportive care, trend inflammatory markers, encourage incentive spirometry    -Evaluated by psychiatry.  Continue nightly and as needed olanzapine.  Continue delirium precautions    Incidental chest x-ray findings needing outpatient follow-up: \"Within the right lung base, there is an indeterminate opacity which measures up to approximately 2.9 cm in diameter and could represent an area of scarring or atelectatic change although I cannot exclude the possibility of a mass at this site. I recommend further evaluation with a CT scan of the chest\".    Lovenox 40 mg SC daily for " DVT prophylaxis.  Full code.  Discussed with patient, nursing staff and CCP.  Anticipate discharge home with home health tomorrow.      Elena Pacheco DO  [facility]  02/04/22  14:07 EST    Patient was placed in face mask on first look.  I wore protective equipment throughout this patient encounter including a face mask, gloves and protective eyewear.  Hand hygiene was performed before donning protective equipment and after removal when leaving the room.

## 2022-02-04 NOTE — PLAN OF CARE
Goal Outcome Evaluation:  Plan of Care Reviewed With: patient           Outcome Summary: VSS, pt attempted to get oob multiple times, pulled off cath,O2 sensor and clothing. Prn medication wasn't effective and had to call for an order for bilateral soft wrist restraints. Restraints were applied, however pt was still able to remove his brief and put his legs oob. Slept off and on throughout the shift. Staff will CTM..

## 2022-02-04 NOTE — CASE MANAGEMENT/SOCIAL WORK
Continued Stay Note  [facility]     Patient Name: Tyron Hunt  MRN: [id number]  Today's Date: 2/4/2022    Admit Date: 1/29/2022     Discharge Plan     Row Name 02/04/22 1242       Plan    Plan Home with family, [facility] and dme thru [employer], family to transport tomorrow    Patient/Family in Agreement with Plan yes    Plan Comments CCP received call back from wife and she states after speaking with pts son they will bring pt home with HH. Pt will need rolling walker and bedside commode. Wife denies DME preference. CCP explained will plan for dc home tomorrow with family, formerly [facility] and [employer] [facility]. CCP offered to email her a list of in-home caregivers for dc. She requests email to be sent to [email or web address]. She also states they have LTC insurance. CCP encouraged her to find policy and contact them to determine when services are available/their process. She verbalized understanding. IMM notice given. Family to transport pt home. [facility] called [facility] to notify of anticipated dc home tomorrow. Notified [facility] for referral. Updated Dr. Leticia RUBIO. Hany RUBIO/PASCUAL               Discharge Codes    No documentation.               Expected Discharge Date and Time     Expected Discharge Date Expected Discharge Time    Feb 5, 2022             Melisa Anaya, JOANNE

## 2022-02-04 NOTE — PROGRESS NOTES
The patient is a bed and quite groggy. He attempts to awaken for interview is unable to speak any intelligible words. Charting indicates that as needed medications were ineffectual last evening after the patient had attempted to get out of bed and was pulling at lines. I have increased the nighttime dose of Zyprexa to 5 mg as well as the as needed dose of Zyprexa to 7.5 mg. If this remains ineffectual, we may consider a switch to haloperidol.

## 2022-02-05 LAB
ALBUMIN SERPL-MCNC: 3.4 G/DL (ref 3.5–5.2)
ALBUMIN/GLOB SERPL: 1.2 G/DL
ALP SERPL-CCNC: 88 U/L (ref 39–117)
ALT SERPL W P-5'-P-CCNC: 40 U/L (ref 1–41)
ANION GAP SERPL CALCULATED.3IONS-SCNC: 12.6 MMOL/L (ref 5–15)
AST SERPL-CCNC: 38 U/L (ref 1–40)
BASOPHILS # BLD AUTO: 0.02 10*3/MM3 (ref 0–0.2)
BASOPHILS NFR BLD AUTO: 0.3 % (ref 0–1.5)
BILIRUB SERPL-MCNC: 1 MG/DL (ref 0–1.2)
BUN SERPL-MCNC: 19 MG/DL (ref 8–23)
BUN/CREAT SERPL: 23.2 (ref 7–25)
CALCIUM SPEC-SCNC: 8.8 MG/DL (ref 8.6–10.5)
CHLORIDE SERPL-SCNC: 107 MMOL/L (ref 98–107)
CO2 SERPL-SCNC: 25.4 MMOL/L (ref 22–29)
CREAT SERPL-MCNC: 0.82 MG/DL (ref 0.76–1.27)
CRP SERPL-MCNC: 7.27 MG/DL (ref 0–0.5)
DEPRECATED RDW RBC AUTO: 41.4 FL (ref 37–54)
EOSINOPHIL # BLD AUTO: 0.13 10*3/MM3 (ref 0–0.4)
EOSINOPHIL NFR BLD AUTO: 1.9 % (ref 0.3–6.2)
ERYTHROCYTE [DISTWIDTH] IN BLOOD BY AUTOMATED COUNT: 12.2 % (ref 12.3–15.4)
FERRITIN SERPL-MCNC: 602 NG/ML (ref 30–400)
GFR SERPL CREATININE-BSD FRML MDRD: 91 ML/MIN/1.73
GLOBULIN UR ELPH-MCNC: 2.9 GM/DL
GLUCOSE SERPL-MCNC: 106 MG/DL (ref 65–99)
HCT VFR BLD AUTO: 43.8 % (ref 37.5–51)
HGB BLD-MCNC: 14.9 G/DL (ref 13–17.7)
IMM GRANULOCYTES # BLD AUTO: 0.03 10*3/MM3 (ref 0–0.05)
IMM GRANULOCYTES NFR BLD AUTO: 0.4 % (ref 0–0.5)
LYMPHOCYTES # BLD AUTO: 1.11 10*3/MM3 (ref 0.7–3.1)
LYMPHOCYTES NFR BLD AUTO: 16.5 % (ref 19.6–45.3)
MCH RBC QN AUTO: 31.1 PG (ref 26.6–33)
MCHC RBC AUTO-ENTMCNC: 34 G/DL (ref 31.5–35.7)
MCV RBC AUTO: 91.4 FL (ref 79–97)
MONOCYTES # BLD AUTO: 0.78 10*3/MM3 (ref 0.1–0.9)
MONOCYTES NFR BLD AUTO: 11.6 % (ref 5–12)
NEUTROPHILS NFR BLD AUTO: 4.64 10*3/MM3 (ref 1.7–7)
NEUTROPHILS NFR BLD AUTO: 69.3 % (ref 42.7–76)
NRBC BLD AUTO-RTO: 0 /100 WBC (ref 0–0.2)
PLATELET # BLD AUTO: 146 10*3/MM3 (ref 140–450)
PMV BLD AUTO: 11.3 FL (ref 6–12)
POTASSIUM SERPL-SCNC: 3.4 MMOL/L (ref 3.5–5.2)
PROT SERPL-MCNC: 6.3 G/DL (ref 6–8.5)
RBC # BLD AUTO: 4.79 10*6/MM3 (ref 4.14–5.8)
SODIUM SERPL-SCNC: 145 MMOL/L (ref 136–145)
WBC NRBC COR # BLD: 6.71 10*3/MM3 (ref 3.4–10.8)

## 2022-02-05 PROCEDURE — 85025 COMPLETE CBC W/AUTO DIFF WBC: CPT

## 2022-02-05 PROCEDURE — 25010000002 ENOXAPARIN PER 10 MG

## 2022-02-05 PROCEDURE — 97110 THERAPEUTIC EXERCISES: CPT

## 2022-02-05 PROCEDURE — 86140 C-REACTIVE PROTEIN: CPT

## 2022-02-05 PROCEDURE — 80053 COMPREHEN METABOLIC PANEL: CPT

## 2022-02-05 PROCEDURE — 82728 ASSAY OF FERRITIN: CPT

## 2022-02-05 RX ORDER — OLANZAPINE 2.5 MG/1
2.5 TABLET ORAL NIGHTLY
Status: DISCONTINUED | OUTPATIENT
Start: 2022-02-05 | End: 2022-02-06

## 2022-02-05 RX ADMIN — FLUVOXAMINE MALEATE 100 MG: 50 TABLET, FILM COATED ORAL at 22:47

## 2022-02-05 RX ADMIN — Medication 1000 MCG: at 10:28

## 2022-02-05 RX ADMIN — PANTOPRAZOLE SODIUM 40 MG: 40 TABLET, DELAYED RELEASE ORAL at 10:29

## 2022-02-05 RX ADMIN — SUCRALFATE 1 G: 1 TABLET ORAL at 21:05

## 2022-02-05 RX ADMIN — TAMSULOSIN HYDROCHLORIDE 0.4 MG: 0.4 CAPSULE ORAL at 21:05

## 2022-02-05 RX ADMIN — AMLODIPINE BESYLATE 5 MG: 5 TABLET ORAL at 10:29

## 2022-02-05 RX ADMIN — OLANZAPINE 5 MG: 10 INJECTION, POWDER, FOR SOLUTION INTRAMUSCULAR at 18:27

## 2022-02-05 RX ADMIN — MAGNESIUM OXIDE 400 MG (241.3 MG MAGNESIUM) TABLET 200 MG: TABLET at 10:28

## 2022-02-05 RX ADMIN — ENOXAPARIN SODIUM 40 MG: 100 INJECTION SUBCUTANEOUS at 10:29

## 2022-02-05 RX ADMIN — POTASSIUM CHLORIDE 40 MEQ: 750 TABLET, EXTENDED RELEASE ORAL at 12:25

## 2022-02-05 RX ADMIN — OLANZAPINE 2.5 MG: 2.5 TABLET ORAL at 21:06

## 2022-02-05 RX ADMIN — Medication 1000 UNITS: at 10:28

## 2022-02-05 RX ADMIN — ATORVASTATIN CALCIUM 10 MG: 20 TABLET, FILM COATED ORAL at 21:04

## 2022-02-05 RX ADMIN — POTASSIUM CHLORIDE 40 MEQ: 750 TABLET, EXTENDED RELEASE ORAL at 17:47

## 2022-02-05 RX ADMIN — MULTIPLE VITAMINS W/ MINERALS TAB 1 TABLET: TAB at 10:28

## 2022-02-05 RX ADMIN — Medication 3 MG: at 21:05

## 2022-02-05 NOTE — PROGRESS NOTES
Name: Tyron Hunt ADMIT: 2022   : 1945  PCP: Tyron Hunt MD    MRN: [id number] LOS: 6 days   AGE/SEX: 77 y.o. male  ROOM: [facility]     Subjective   Subjective   Required restraints overnight due to agitation.  Very sleepy again this morning    Review of Systems  Not able to obtain due to patient's mental status     Objective   Objective   Vital Signs  Temp:  [96.8 °F (36 °C)-98 °F (36.7 °C)] 97.7 °F (36.5 °C)  Heart Rate:  [74-90] 75  Resp:  [16-18] 16  BP: (116-145)/(75-85) 131/78  SpO2:  [100 %] 100 %  on   ;   Device (Oxygen Therapy): room air  Body mass index is 24.65 kg/m².  Physical Exam  Constitutional:       General: He is not in acute distress.     Appearance: He is not diaphoretic.   HENT:      Mouth/Throat:      Mouth: Mucous membranes are moist.   Eyes:      General: No scleral icterus.  Cardiovascular:      Rate and Rhythm: Normal rate and regular rhythm.   Pulmonary:      Effort: Pulmonary effort is normal. No respiratory distress.      Breath sounds: No wheezing or rhonchi.   Abdominal:      General: There is no distension.      Palpations: Abdomen is soft.      Tenderness: There is no abdominal tenderness. There is no guarding.   Musculoskeletal:      Right lower leg: No edema.      Left lower leg: No edema.   Skin:     General: Skin is warm and dry.   Neurological:      Comments: Lethargic.  Does not follow commands.  Withdraws to pain.  Nonverbal         Results Review     I reviewed the patient's new clinical results.  Results from last 7 days   Lab Units 22  04522  0912 22  0505 22  0634   WBC 10*3/mm3 6.71 5.63 5.30 4.74   HEMOGLOBIN g/dL 14.9 14.7 15.1 14.5   PLATELETS 10*3/mm3 146 118* 100* 90*     Results from last 7 days   Lab Units 22  0452 22  0912 22  0505 22  0634   SODIUM mmol/L 145 146* 142 145   POTASSIUM mmol/L 3.4* 3.8 3.4* 3.5   CHLORIDE mmol/L 107 109* 107 109*   CO2 mmol/L 25.4 25.1 22.0 22.4   BUN mg/dL 19  16 15 14   CREATININE mg/dL 0.82 0.73* 0.74* 0.69*   GLUCOSE mg/dL 106* 113* 100* 106*   Estimated Creatinine Clearance: 68.5 mL/min (by C-G formula based on SCr of 0.82 mg/dL).  Results from last 7 days   Lab Units 02/05/22  0452 02/04/22  0912 02/03/22  0505 02/02/22  0634   ALBUMIN g/dL 3.40* 3.60 3.80 3.40*   BILIRUBIN mg/dL 1.0 1.0 1.2 0.8   ALK PHOS U/L 88 94 93 87   AST (SGOT) U/L 38 38 50* 47*   ALT (SGPT) U/L 40 36 32 28     Results from last 7 days   Lab Units 02/05/22 0452 02/04/22  0912 02/03/22  0505 02/02/22  0634 02/01/22  0623 02/01/22  0623   CALCIUM mg/dL 8.8 8.9 8.7 8.5*   < > 8.5*   ALBUMIN g/dL 3.40* 3.60 3.80 3.40*   < > 3.70   MAGNESIUM mg/dL  --  2.0  --  2.3  --  2.0   PHOSPHORUS mg/dL  --   --   --  3.4  --   --     < > = values in this interval not displayed.     Results from last 7 days   Lab Units 01/30/22  0606 01/29/22  2320   LACTATE mmol/L 1.0 1.8     COVID19   Date Value Ref Range Status   01/29/2022 Detected (C) Not Detected - Ref. Range Final     SARS-CoV-2, ISIAH   Date Value Ref Range Status   01/25/2022 CANCELED       Comment:     Test not performed. Insufficient specimen to perform or complete  analysis.    Result canceled by the ancillary.       No results found for: HGBA1C, POCGLU    XR Chest 1 View  Narrative: PORTABLE RADIOGRAPHIC VIEW OF THE CHEST     CLINICAL HISTORY: Altered mental status.     Portable radiographic view of the chest demonstrates an indeterminate  2.9 cm vague opacity within the right base which could represent an area  of scarring, or atelectatic change although I cannot exclude the  possibility of a mass. Further evaluation with a CT scan of the chest is  suggested. There are areas of atelectatic change or less likely  pneumonia within the left base. Overall, the lung volumes are relatively  low. There is a tortuous thoracic aorta. The osseous structures are  unremarkable.     Impression:    Within the right lung base, there is an indeterminate opacity  which  measures up to approximately 2.9 cm in diameter and could represent an  area of scarring or atelectatic change although I cannot exclude the  possibility of a mass at this site. I recommend further evaluation with  a CT scan of the chest.     These findings and recommendations were discussed with Gorge Resendiz MD,  on 01/29/2022 at approximately 12:05 AM.     This report was finalized on 1/30/2022 12:33 AM by Dr. Nacho Maria M.D.     CT Head Without Contrast  Narrative: Patient: BART BENSON  Time Out: 00:12  Exam(s): CT HEAD Without Contrast     EXAM:    CT Head Without Intravenous Contrast    CLINICAL HISTORY:     Reason for exam: Delirium.    TECHNIQUE:    Axial computed tomography images of the head brain without intravenous   contrast.  CTDI is 54.8 mGy and DLP is 1007.3 mGy-cm.  This CT exam was   performed according to the principle of ALARA (As Low As Reasonably   Achievable) by using one or more of the following dose reduction   techniques: automated exposure control, adjustment of the mA and or kV   according to patient size, and or use of iterative reconstruction   technique.    COMPARISON:    No previous studies.    FINDINGS:    Limitations:  Markedly limited evaluation due to motion artifact.    Brain:  No abnormal extra-axial collection is noted.  No hemorrhage.    Midline shift:  Midline anatomy is unremarkable.    Ventricles:  There is prominence of the ventricular system, cortical   sulci, basilar cisterns, compatible with age related atrophy.    Bones joints:  Calvarium is within normal limits.  No acute fracture.    Soft tissues:  Unremarkable.    Vasculature:  Atherosclerotic disease.    Sinuses:  Mild to moderate chronic ethmoid sinusitis.    Mastoid air cells:  Mastoid air cells are unremarkable.    IMPRESSION:       1.  Age-related atrophy and small vessel disease of aging.  2.  No acute intracranial pathology is noted.  3.  If there is concern for etiology such as early acute  lacunar infarcts,   MRI imaging of the brain with diffusion-weighted sequences should be   performed.  Impression: Electronically signed by Rodrigo Rodríguez MD on 01-30-22 at 0012    I reviewed the patient's daily medications.  Scheduled Medications  amLODIPine, 5 mg, Oral, Daily  atorvastatin, 10 mg, Oral, Nightly  cholecalciferol, 1,000 Units, Oral, Daily  enoxaparin, 40 mg, Subcutaneous, Q24H  fluvoxaMINE, 100 mg, Oral, Nightly  magnesium oxide, 200 mg, Oral, Daily  melatonin, 3 mg, Oral, Nightly  multivitamin with minerals, 1 tablet, Oral, Daily  OLANZapine, 2.5 mg, Oral, Nightly  pantoprazole, 40 mg, Oral, Daily  sucralfate, 1 g, Oral, Nightly  tamsulosin, 0.4 mg, Oral, Nightly  vitamin B-12, 1,000 mcg, Oral, Daily    Infusions   Diet  Diet Regular       Assessment/Plan     Active Hospital Problems    Diagnosis  POA   • **Encephalopathy due to COVID-19 virus [U07.1, G93.49]  Yes   • Age-related physical debility [R54]  Unknown   • Altered mental status [R41.82]  Yes   • Stage 3a chronic kidney disease (HCC) [N18.31]  Yes   • Hypertension [I10]  Yes   • GERD (gastroesophageal reflux disease) [K21.9]  Yes   • Anxiety [F41.9]  Yes      Resolved Hospital Problems   No resolved problems to display.       77 y.o. male admitted with Encephalopathy due to COVID-19 virus.    Today: Unfortunately his mental status is not improving.  He required restraints overnight for his safety.  This morning, he is very difficult to arouse.  Withdraws to pain only.  Unfortunately he is sleeping all day and awake all night.  Will try to keep him awake all day today, and decrease dose of scheduled nightly Zyprexa. Keep as needed Zyprexa dose at 5 mg.  I do not think he is safe to go home currently.    Addendum: Discussed with patient's son.  We agreed that he would require too much care to go home, even with home health.  I will consult case management to look into rehab facilities.  He still needs a couple more days to improve and have  "restraints removed.    COVID-19 infection with encephalopathy  -Currently comfortable on room air.  No disease specific treatment indicated at this time. Supportive care, trend inflammatory markers, encourage incentive spirometry  -Evaluated by psychiatry.  Continue nightly and as needed olanzapine.  Continue delirium precautions    Incidental chest x-ray findings needing outpatient follow-up: \"Within the right lung base, there is an indeterminate opacity which measures up to approximately 2.9 cm in diameter and could represent an area of scarring or atelectatic change although I cannot exclude the possibility of a mass at this site. I recommend further evaluation with a CT scan of the chest\".    Lovenox 40 mg SC daily for DVT prophylaxis.  Full code.  Discussed with nursing staff  Anticipate discharge TBD.  Will consult case management to investigate SNF referrals      Elena Pacheco DO  [facility]  02/05/22  13:44 EST    Patient was placed in face mask on first look.  I wore protective equipment throughout this patient encounter including a face mask, gloves and protective eyewear.  Hand hygiene was performed before donning protective equipment and after removal when leaving the room.        "

## 2022-02-05 NOTE — PLAN OF CARE
Problem: Restraint, Nonbehavioral (Nonviolent)  Goal: Discontinuation Criteria Achieved  Intervention: Implement Least-restrictive Safety Strategies  Recent Flowsheet Documentation  Taken 2/4/2022 2300 by Mabel Goodrich RN  Medical Device Protection: tubing secured  Taken 2/4/2022 2226 by Mabel Goodrich RN  Medical Device Protection: (bilateral soft wrist restriants)   tubing secured   other (see comments)  Taken 2/4/2022 2030 by Mabel Goodrich RN  Medical Device Protection:   torso covered   tubing secured  Diversional Activities: television     Problem: Restraint, Nonbehavioral (Nonviolent)  Goal: Personal Dignity and Safety Maintained  Intervention: Protect Dignity, Rights, and Personal Wellbeing  Flowsheets  Taken 2/5/2022 0510  Trust Relationship/Rapport:   care explained   choices provided   reassurance provided  Taken 2/4/2022 2030  Trust Relationship/Rapport:   care explained   choices provided   reassurance provided     Problem: Restraint, Nonbehavioral (Nonviolent)  Goal: Personal Dignity and Safety Maintained  Intervention: Protect Skin and Joint Integrity  Recent Flowsheet Documentation  Taken 2/5/2022 0510 by Mabel Goodrich RN  Body Position:   position changed independently   turned  Range of Motion:   active ROM (range of motion) encouraged   ROM (range of motion) performed   other (see comments)  Taken 2/5/2022 0200 by Mabel Goodrich RN  Body Position: sitting up in bed  Taken 2/4/2022 2226 by Mabel Goodrich RN  Body Position: sitting up in bed  Taken 2/4/2022 2030 by Mabel Goodrich RN  Body Position: sitting up in bed  Range of Motion: active ROM (range of motion) encouraged     Problem: Restraint, Nonbehavioral (Nonviolent)  Goal: Personal Dignity and Safety Maintained  Intervention: Protect Skin and Joint Integrity  Recent Flowsheet Documentation  Taken 2/5/2022 0510 by Mabel Goodrich RN  Body Position:   position changed independently   turned  Range of Motion:   active ROM (range of motion)  encouraged   ROM (range of motion) performed   other (see comments)  Taken 2/5/2022 0200 by Mabel Goodrich, RN  Body Position: sitting up in bed  Taken 2/4/2022 2226 by Mabel Goodrich, RN  Body Position: sitting up in bed  Taken 2/4/2022 2030 by Mabel Goodrich, RN  Body Position: sitting up in bed  Range of Motion: active ROM (range of motion) encouraged   Goal Outcome Evaluation:  Plan of Care Reviewed With: patient           Outcome Summary: pt was awake most of this shift, talking about going home, attempting to get out of bed, removing his O2 sensor. Attempted to redirect pt and keep him in the bed, however staff was unsuccessful, Call for an order to restart the wrist restraints so that he wouldn't fall and injury himself. Advised wife of the current situation, she stated that she was ok with the restraints as long as he was safe and free from possible injury. Staff will CTM.

## 2022-02-05 NOTE — THERAPY TREATMENT NOTE
Patient Name: Tyron Hunt  : 1945    MRN: [id number]                              Today's Date: 2022       Admit Date: 2022    Visit Dx:     ICD-10-CM ICD-9-CM   1. Age-related physical debility  R54 797   2. Altered mental status, unspecified altered mental status type  R41.82 780.97   3. COVID-19 virus infection  U07.1 079.89   4. Cervical radiculitis  M54.12 723.4   5. Cervical spinal stenosis  M48.02 723.0   6. Encephalopathy due to COVID-19 virus  U07.1 348.39    G93.49 079.89     Patient Active Problem List   Diagnosis   • Hypertension   • GERD (gastroesophageal reflux disease)   • Hyperlipidemia   • Anxiety   • Benign prostatic hyperplasia with lower urinary tract symptoms   • Impaired fasting glucose   • Recurrent major depressive disorder, in full remission (HCC)   • Cervical disc disease   • Cervical spinal stenosis   • Foraminal stenosis of cervical region   • Cervical radiculitis   • Altered mental status   • Stage 3a chronic kidney disease (HCC)   • Encephalopathy due to COVID-19 virus   • Age-related physical debility     Past Medical History:   Diagnosis Date   • Anxiety    • Diverticulitis    • GERD (gastroesophageal reflux disease)    • Gout    • H/O complete eye exam 2017   • Hyperlipidemia    • Hypertension    • Impaired fasting blood sugar    • Kidney stones    • Memory loss    • Sleep apnea      Past Surgical History:   Procedure Laterality Date   • CERVICAL EPIDURAL N/A 2021    Procedure: CERVICAL EPIDURAL;  Surgeon: Nataliia Avilez MD;  Location: [facility] MAIN OR;  Service: Pain Management;  Laterality: N/A;   • CERVICAL EPIDURAL N/A 10/18/2021    Procedure: CERVICAL EPIDURAL 7-1;  Surgeon: Nataliia Avilez MD;  Location: [facility] MAIN OR;  Service: Pain Management;  Laterality: N/A;   • CYST REMOVAL     • CYSTOSCOPY W/ LITHOLAPAXY / EHL     • TONSILLECTOMY        General Information     Row Name 22 1302          Physical Therapy Time and Intention    Document  Type therapy note (daily note)  -     Mode of Treatment physical therapy; individual therapy  -[facility] Name 02/05/22 1302          General Information    Patient Profile Reviewed yes  -     Existing Precautions/Restrictions fall  -[facility] Name 02/05/22 1302          Cognition    Orientation Status (Cognition) oriented to; person  -MH     Row Name 02/05/22 1302          Safety Issues, Functional Mobility    Impairments Affecting Function (Mobility) balance; cognition; endurance/activity tolerance; strength; coordination  -           User Key  (r) = Recorded By, (t) = Taken By, (c) = Cosigned By    Initials Name Provider Type     Neli Kamara, PT Physical Therapist               Mobility     [facility] Name 02/05/22 1303          Bed Mobility    Supine-Sit [place] (Bed Mobility) unable to assess  -     Sit-Supine [place] (Bed Mobility) unable to assess  -[facility] 02/05/22 1303          Bed-Chair Transfer    Bed-Chair [place] (Transfers) unable to assess  -[facility] 02/05/22 1303          Sit-Stand Transfer    Sit-Stand [place] (Transfers) unable to assess  -[facility] 02/05/22 1303          Gait/Stairs (Locomotion)    [place] Level (Gait) unable to assess  -           User Key  (r) = Recorded By, (t) = Taken By, (c) = Cosigned By    Initials Name Provider Type     Neli Kamara, LENNY Physical Therapist               Obj/Interventions     [facility] Name 02/05/22 1303          Motor Skills    Therapeutic Exercise other (see comments)  supine ankle pumps x10ea, SAQ, x10ea, heel slides x10ea, SLR x10ea, attempted hip abd/add but pt. unable to follow commands  -           User Key  (r) = Recorded By, (t) = Taken By, (c) = Cosigned By    Initials Name Provider Type     Neli Kamara, PT Physical Therapist               Goals/Plan    No documentation.                Clinical Impression     [facility] Name 02/05/22 1304          Pain    Additional Documentation Pain Scale:  FACES Pre/Post-Treatment (Group)  -     Row Name 02/05/22 1308          Pain Scale: FACES Pre/Post-Treatment    Pain: FACES Scale, Pretreatment 0-->no hurt  -     Posttreatment Pain Rating 0-->no hurt  -     Row Name 02/05/22 1302          Plan of Care Review    Plan of Care Reviewed With patient  -     Outcome Summary RN agreeable to PT this date, reports pt. had a rough night and has been sleeping much of AM. Pt. very lethargic and remains confused, attempted bed ther ex to improve pt. mobility, required frequent cueing and assist to complete several exercises in supine. Pt. able to independently compelte first 1-2 reps and would then again require assist to remain on task. Pt. unable to follow commands consistently to attempte safe STS/ambulation. Pt. has been placed in restraints due to confusion over PM. Will continue to monitor.  -     Row Name 02/05/22 1309          Therapy Assessment/Plan (PT)    Rehab Potential (PT) good, to achieve stated therapy goals  -     Criteria for Skilled Interventions Met (PT) skilled treatment is necessary  -[facility] Name 02/05/22 1302          Positioning and Restraints    Pre-Treatment Position in bed  -     Post Treatment Position bed  -MH     In Bed supine; call light within reach; encouraged to call for assist; exit alarm on  -           User Key  (r) = Recorded By, (t) = Taken By, (c) = Cosigned By    Initials Name Provider Type     Neli Kamara, PT Physical Therapist               Outcome Measures     Row Name 02/05/22 5758          How much help from another person do you currently need...    Turning from your back to your side while in flat bed without using bedrails? 2  -MH     Moving from lying on back to sitting on the side of a flat bed without bedrails? 2  -MH     Moving to and from a bed to a chair (including a wheelchair)? 1  -MH     Standing up from a chair using your arms (e.g., wheelchair, bedside chair)? 2  -MH     Climbing 3-5 steps  with a railing? 1  -     To walk in hospital room? 1  -     AM-PAC 6 Clicks Score (PT) 9  -     Row Name 02/05/22 1307          Functional Assessment    Outcome Measure Options AM-PAC 6 Clicks Basic Mobility (PT)  -           User Key  (r) = Recorded By, (t) = Taken By, (c) = Cosigned By    Initials Name Provider Type     Neli Kamara, LENNY Physical Therapist                             Physical Therapy Education                 Title: PT OT SLP Therapies (In Progress)     Topic: Physical Therapy (In Progress)     Point: Mobility training (Done)     Learning Progress Summary           Patient Acceptance, E,TB,D, VU,NR by  at 2/3/2022 1151    Acceptance, E,TB,D, VU,NR by  at 2/1/2022 1355                   Point: Home exercise program (Not Started)     Learner Progress:  Not documented in this visit.          Point: Body mechanics (Done)     Learning Progress Summary           Patient Acceptance, E,TB,D, VU,NR by  at 2/3/2022 1151    Acceptance, E,TB,D, VU,NR by  at 2/1/2022 1355                   Point: Precautions (Done)     Learning Progress Summary           Patient Acceptance, E,TB,D, VU,NR by  at 2/3/2022 1151    Acceptance, E,TB,D, VU,NR by  at 2/1/2022 1355                               User Key     Initials Effective Dates Name Provider Type [facility] 06/16/21 -  Awa Vernon, PT Physical Therapist PT              PT Recommendation and Plan     Plan of Care Reviewed With: patient  Outcome Summary: RN agreeable to PT this date, reports pt. had a rough night and has been sleeping much of AM. Pt. very lethargic and remains confused, attempted bed ther ex to improve pt. mobility, required frequent cueing and assist to complete several exercises in supine. Pt. able to independently compelte first 1-2 reps and would then again require assist to remain on task. Pt. unable to follow commands consistently to attempte safe STS/ambulation. Pt. has been placed in restraints due to  confusion over PM. Will continue to monitor.     Time Calculation:    PT Charges     Row Name 02/05/22 1308             Time Calculation    Start Time 1035  -MH      Stop Time 1045  -MH      Time Calculation (min) 10 min  -      PT Received On 02/05/22  -      PT - Next Appointment 02/07/22  -              Time Calculation- PT    Total Timed Code Minutes- PT 10 minute(s)  -MH              Timed Charges    53951 - PT Therapeutic Exercise Minutes 10  -MH              Total Minutes    Timed Charges Total Minutes 10  -MH       Total Minutes 10  -MH            User Key  (r) = Recorded By, (t) = Taken By, (c) = Cosigned By    Initials Name Provider Type     Neli Kamara, PT Physical Therapist              Therapy Charges for Today     Code Description Service Date Service Provider Modifiers Qty    [id number] HC PT THER PROC EA 15 MIN 2/5/2022 Neli Kamara PT GP 1          PT G-Codes  Outcome Measure Options: AM-PAC 6 Clicks Basic Mobility (PT)  AM-PAC 6 Clicks Score (PT): 9  AM-PAC 6 Clicks Score (OT): 7    Neli Kamara PT  2/5/2022

## 2022-02-05 NOTE — PLAN OF CARE
Goal Outcome Evaluation:           Progress: improving   VSS on room air.  Alert to self.  Pt in bilateral wrist restraints.  Pt confused when awake but was asleep for a lot of the day.  This nurse attempted to keep pt awake while in the room but pt would fall asleep shortly after leaving.  Pt has been having days and nights mixed up.  Pt drank ensure shake for lunch and dinner.  Will CTM.

## 2022-02-05 NOTE — PLAN OF CARE
Goal Outcome Evaluation:  Plan of Care Reviewed With: patient        Progress: improving  Outcome Summary: Patient has been very lethargic and groggy this shift. MD aware. He remains alert to self and mumbles in between short sentences. Plan to d/c home with HH tomorrow. Will CTM the rest of my shift.

## 2022-02-05 NOTE — PROGRESS NOTES
The patient again slept poorly last evening and made multiple attempts to get out of bed requiring application of wrist restraints.  When seen today the patient is sleeping soundly and again cannot be awakened for interview.  His oxygen sats are stable on room air.

## 2022-02-05 NOTE — PLAN OF CARE
Goal Outcome Evaluation:  Plan of Care Reviewed With: patient           Outcome Summary: RN agreeable to PT this date, reports pt. had a rough night and has been sleeping much of AM. Pt. very lethargic and remains confused, attempted bed ther ex to improve pt. mobility, required frequent cueing and assist to complete several exercises in supine. Pt. able to independently compelte first 1-2 reps and would then again require assist to remain on task. Pt. unable to follow commands consistently to attempte safe STS/ambulation. Pt. has been placed in restraints due to confusion over PM. Will continue to monitor.    Patient was not wearing a face mask during this therapy encounter. Therapist used appropriate personal protective equipment including gown, eye protection, mask and gloves.  Mask used was N95/duckbill. Appropriate PPE was worn during the entire therapy session. Hand hygiene was completed before and after therapy session. Patient is in enhanced droplet precautions.

## 2022-02-06 LAB
ALBUMIN SERPL-MCNC: 3.5 G/DL (ref 3.5–5.2)
ALBUMIN/GLOB SERPL: 1.1 G/DL
ALP SERPL-CCNC: 86 U/L (ref 39–117)
ALT SERPL W P-5'-P-CCNC: 40 U/L (ref 1–41)
ANION GAP SERPL CALCULATED.3IONS-SCNC: 13.6 MMOL/L (ref 5–15)
AST SERPL-CCNC: 33 U/L (ref 1–40)
BASOPHILS # BLD AUTO: 0.03 10*3/MM3 (ref 0–0.2)
BASOPHILS NFR BLD AUTO: 0.4 % (ref 0–1.5)
BILIRUB SERPL-MCNC: 0.9 MG/DL (ref 0–1.2)
BUN SERPL-MCNC: 22 MG/DL (ref 8–23)
BUN/CREAT SERPL: 23.7 (ref 7–25)
CALCIUM SPEC-SCNC: 9.4 MG/DL (ref 8.6–10.5)
CHLORIDE SERPL-SCNC: 110 MMOL/L (ref 98–107)
CO2 SERPL-SCNC: 23.4 MMOL/L (ref 22–29)
CREAT SERPL-MCNC: 0.93 MG/DL (ref 0.76–1.27)
CRP SERPL-MCNC: 5.66 MG/DL (ref 0–0.5)
DEPRECATED RDW RBC AUTO: 42.1 FL (ref 37–54)
EOSINOPHIL # BLD AUTO: 0.12 10*3/MM3 (ref 0–0.4)
EOSINOPHIL NFR BLD AUTO: 1.7 % (ref 0.3–6.2)
ERYTHROCYTE [DISTWIDTH] IN BLOOD BY AUTOMATED COUNT: 12.6 % (ref 12.3–15.4)
FERRITIN SERPL-MCNC: 599 NG/ML (ref 30–400)
GFR SERPL CREATININE-BSD FRML MDRD: 79 ML/MIN/1.73
GLOBULIN UR ELPH-MCNC: 3.1 GM/DL
GLUCOSE SERPL-MCNC: 121 MG/DL (ref 65–99)
HCT VFR BLD AUTO: 41.8 % (ref 37.5–51)
HGB BLD-MCNC: 14.2 G/DL (ref 13–17.7)
IMM GRANULOCYTES # BLD AUTO: 0.03 10*3/MM3 (ref 0–0.05)
IMM GRANULOCYTES NFR BLD AUTO: 0.4 % (ref 0–0.5)
LYMPHOCYTES # BLD AUTO: 1.08 10*3/MM3 (ref 0.7–3.1)
LYMPHOCYTES NFR BLD AUTO: 15.6 % (ref 19.6–45.3)
MCH RBC QN AUTO: 31.1 PG (ref 26.6–33)
MCHC RBC AUTO-ENTMCNC: 34 G/DL (ref 31.5–35.7)
MCV RBC AUTO: 91.5 FL (ref 79–97)
MONOCYTES # BLD AUTO: 0.7 10*3/MM3 (ref 0.1–0.9)
MONOCYTES NFR BLD AUTO: 10.1 % (ref 5–12)
NEUTROPHILS NFR BLD AUTO: 4.97 10*3/MM3 (ref 1.7–7)
NEUTROPHILS NFR BLD AUTO: 71.8 % (ref 42.7–76)
NRBC BLD AUTO-RTO: 0 /100 WBC (ref 0–0.2)
PLATELET # BLD AUTO: 164 10*3/MM3 (ref 140–450)
PMV BLD AUTO: 11.5 FL (ref 6–12)
POTASSIUM SERPL-SCNC: 4 MMOL/L (ref 3.5–5.2)
PROT SERPL-MCNC: 6.6 G/DL (ref 6–8.5)
RBC # BLD AUTO: 4.57 10*6/MM3 (ref 4.14–5.8)
SODIUM SERPL-SCNC: 147 MMOL/L (ref 136–145)
WBC NRBC COR # BLD: 6.93 10*3/MM3 (ref 3.4–10.8)

## 2022-02-06 PROCEDURE — 85025 COMPLETE CBC W/AUTO DIFF WBC: CPT

## 2022-02-06 PROCEDURE — 82728 ASSAY OF FERRITIN: CPT

## 2022-02-06 PROCEDURE — 25010000002 METHYLPREDNISOLONE PER 125 MG: Performed by: STUDENT IN AN ORGANIZED HEALTH CARE EDUCATION/TRAINING PROGRAM

## 2022-02-06 PROCEDURE — 36415 COLL VENOUS BLD VENIPUNCTURE: CPT

## 2022-02-06 PROCEDURE — 80053 COMPREHEN METABOLIC PANEL: CPT

## 2022-02-06 PROCEDURE — 86140 C-REACTIVE PROTEIN: CPT

## 2022-02-06 PROCEDURE — 25010000002 ENOXAPARIN PER 10 MG

## 2022-02-06 RX ORDER — METHYLPREDNISOLONE SODIUM SUCCINATE 125 MG/2ML
125 INJECTION, POWDER, LYOPHILIZED, FOR SOLUTION INTRAMUSCULAR; INTRAVENOUS DAILY
Status: COMPLETED | OUTPATIENT
Start: 2022-02-06 | End: 2022-02-08

## 2022-02-06 RX ADMIN — MAGNESIUM OXIDE 400 MG (241.3 MG MAGNESIUM) TABLET 200 MG: TABLET at 11:43

## 2022-02-06 RX ADMIN — AMLODIPINE BESYLATE 5 MG: 5 TABLET ORAL at 11:44

## 2022-02-06 RX ADMIN — OLANZAPINE 5 MG: 10 INJECTION, POWDER, FOR SOLUTION INTRAMUSCULAR at 17:46

## 2022-02-06 RX ADMIN — TAMSULOSIN HYDROCHLORIDE 0.4 MG: 0.4 CAPSULE ORAL at 21:38

## 2022-02-06 RX ADMIN — Medication 1000 MCG: at 11:43

## 2022-02-06 RX ADMIN — SUCRALFATE 1 G: 1 TABLET ORAL at 21:37

## 2022-02-06 RX ADMIN — SODIUM CHLORIDE, PRESERVATIVE FREE 10 ML: 5 INJECTION INTRAVENOUS at 21:38

## 2022-02-06 RX ADMIN — ENOXAPARIN SODIUM 40 MG: 100 INJECTION SUBCUTANEOUS at 11:43

## 2022-02-06 RX ADMIN — METHYLPREDNISOLONE SODIUM SUCCINATE 125 MG: 125 INJECTION, POWDER, FOR SOLUTION INTRAMUSCULAR; INTRAVENOUS at 14:41

## 2022-02-06 RX ADMIN — MULTIPLE VITAMINS W/ MINERALS TAB 1 TABLET: TAB at 11:43

## 2022-02-06 RX ADMIN — Medication 3 MG: at 21:37

## 2022-02-06 RX ADMIN — Medication 1000 UNITS: at 11:43

## 2022-02-06 RX ADMIN — FLUVOXAMINE MALEATE 100 MG: 50 TABLET, FILM COATED ORAL at 21:37

## 2022-02-06 RX ADMIN — ATORVASTATIN CALCIUM 10 MG: 20 TABLET, FILM COATED ORAL at 21:36

## 2022-02-06 RX ADMIN — PANTOPRAZOLE SODIUM 40 MG: 40 TABLET, DELAYED RELEASE ORAL at 11:43

## 2022-02-06 NOTE — PROGRESS NOTES
Name: Tyron Hunt ADMIT: 2022   : 1945  PCP: Tyron Hunt MD    MRN: [id number] LOS: 7 days   AGE/SEX: 77 y.o. male  ROOM: [facility]     Subjective   Subjective   Mental status unchanged.  Still awake overnight and very sleepy during the day    Review of Systems  Not able to obtain due to patient's mental status     Objective   Objective   Vital Signs  Temp:  [97 °F (36.1 °C)-97.7 °F (36.5 °C)] 97.7 °F (36.5 °C)  Heart Rate:  [71-88] 73  Resp:  [16-18] 18  BP: (105-151)/(72-93) 110/72  SpO2:  [90 %-100 %] 97 %  on   ;   Device (Oxygen Therapy): room air  Body mass index is 24.65 kg/m².  Physical Exam  Constitutional:       General: He is not in acute distress.     Appearance: He is not diaphoretic.   HENT:      Mouth/Throat:      Mouth: Mucous membranes are moist.   Eyes:      General: No scleral icterus.  Cardiovascular:      Rate and Rhythm: Normal rate and regular rhythm.   Pulmonary:      Effort: Pulmonary effort is normal. No respiratory distress.      Breath sounds: No wheezing or rhonchi.   Abdominal:      General: There is no distension.      Palpations: Abdomen is soft.      Tenderness: There is no abdominal tenderness. There is no guarding.   Musculoskeletal:      Right lower leg: No edema.      Left lower leg: No edema.   Skin:     General: Skin is warm and dry.   Neurological:      Comments: Lethargic.  Does not follow commands.  Withdraws to pain.  Nonverbal         Results Review     I reviewed the patient's new clinical results.  Results from last 7 days   Lab Units 22  0406 22  0452 22  0922  0505   WBC 10*3/mm3 6.93 6.71 5.63 5.30   HEMOGLOBIN g/dL 14.2 14.9 14.7 15.1   PLATELETS 10*3/mm3 164 146 118* 100*     Results from last 7 days   Lab Units 22  0406 22  0452 22  0912 22  0505   SODIUM mmol/L 147* 145 146* 142   POTASSIUM mmol/L 4.0 3.4* 3.8 3.4*   CHLORIDE mmol/L 110* 107 109* 107   CO2 mmol/L 23.4 25.4 25.1 22.0   BUN  mg/dL 22 19 16 15   CREATININE mg/dL 0.93 0.82 0.73* 0.74*   GLUCOSE mg/dL 121* 106* 113* 100*   Estimated Creatinine Clearance: 60.4 mL/min (by C-G formula based on SCr of 0.93 mg/dL).  Results from last 7 days   Lab Units 02/06/22  0406 02/05/22  0452 02/04/22  0912 02/03/22  0505   ALBUMIN g/dL 3.50 3.40* 3.60 3.80   BILIRUBIN mg/dL 0.9 1.0 1.0 1.2   ALK PHOS U/L 86 88 94 93   AST (SGOT) U/L 33 38 38 50*   ALT (SGPT) U/L 40 40 36 32     Results from last 7 days   Lab Units 02/06/22  0406 02/05/22  0452 02/04/22  0912 02/03/22  0505 02/02/22  0634 02/02/22  0634 02/01/22  0623 02/01/22  0623   CALCIUM mg/dL 9.4 8.8 8.9 8.7   < > 8.5*   < > 8.5*   ALBUMIN g/dL 3.50 3.40* 3.60 3.80   < > 3.40*   < > 3.70   MAGNESIUM mg/dL  --   --  2.0  --   --  2.3  --  2.0   PHOSPHORUS mg/dL  --   --   --   --   --  3.4  --   --     < > = values in this interval not displayed.         COVID19   Date Value Ref Range Status   01/29/2022 Detected (C) Not Detected - Ref. Range Final     SARS-CoV-2, ISIAH   Date Value Ref Range Status   01/25/2022 CANCELED       Comment:     Test not performed. Insufficient specimen to perform or complete  analysis.    Result canceled by the ancillary.       No results found for: HGBA1C, POCGLU    XR Chest 1 View  Narrative: PORTABLE RADIOGRAPHIC VIEW OF THE CHEST     CLINICAL HISTORY: Altered mental status.     Portable radiographic view of the chest demonstrates an indeterminate  2.9 cm vague opacity within the right base which could represent an area  of scarring, or atelectatic change although I cannot exclude the  possibility of a mass. Further evaluation with a CT scan of the chest is  suggested. There are areas of atelectatic change or less likely  pneumonia within the left base. Overall, the lung volumes are relatively  low. There is a tortuous thoracic aorta. The osseous structures are  unremarkable.     Impression:    Within the right lung base, there is an indeterminate opacity which  measures  up to approximately 2.9 cm in diameter and could represent an  area of scarring or atelectatic change although I cannot exclude the  possibility of a mass at this site. I recommend further evaluation with  a CT scan of the chest.     These findings and recommendations were discussed with Gorge Resendiz MD,  on 01/29/2022 at approximately 12:05 AM.     This report was finalized on 1/30/2022 12:33 AM by Dr. Nacho Maria M.D.     CT Head Without Contrast  Narrative: Patient: BART BENSON  Time Out: 00:12  Exam(s): CT HEAD Without Contrast     EXAM:    CT Head Without Intravenous Contrast    CLINICAL HISTORY:     Reason for exam: Delirium.    TECHNIQUE:    Axial computed tomography images of the head brain without intravenous   contrast.  CTDI is 54.8 mGy and DLP is 1007.3 mGy-cm.  This CT exam was   performed according to the principle of ALARA (As Low As Reasonably   Achievable) by using one or more of the following dose reduction   techniques: automated exposure control, adjustment of the mA and or kV   according to patient size, and or use of iterative reconstruction   technique.    COMPARISON:    No previous studies.    FINDINGS:    Limitations:  Markedly limited evaluation due to motion artifact.    Brain:  No abnormal extra-axial collection is noted.  No hemorrhage.    Midline shift:  Midline anatomy is unremarkable.    Ventricles:  There is prominence of the ventricular system, cortical   sulci, basilar cisterns, compatible with age related atrophy.    Bones joints:  Calvarium is within normal limits.  No acute fracture.    Soft tissues:  Unremarkable.    Vasculature:  Atherosclerotic disease.    Sinuses:  Mild to moderate chronic ethmoid sinusitis.    Mastoid air cells:  Mastoid air cells are unremarkable.    IMPRESSION:       1.  Age-related atrophy and small vessel disease of aging.  2.  No acute intracranial pathology is noted.  3.  If there is concern for etiology such as early acute lacunar infarcts,    MRI imaging of the brain with diffusion-weighted sequences should be   performed.  Impression: Electronically signed by Rodrigo Rodríguez MD on 01-30-22 at 0012    I reviewed the patient's daily medications.  Scheduled Medications  amLODIPine, 5 mg, Oral, Daily  atorvastatin, 10 mg, Oral, Nightly  cholecalciferol, 1,000 Units, Oral, Daily  enoxaparin, 40 mg, Subcutaneous, Q24H  fluvoxaMINE, 100 mg, Oral, Nightly  magnesium oxide, 200 mg, Oral, Daily  melatonin, 3 mg, Oral, Nightly  methylPREDNISolone sodium succinate, 125 mg, Intravenous, Daily  multivitamin with minerals, 1 tablet, Oral, Daily  OLANZapine, 2.5 mg, Oral, Nightly  pantoprazole, 40 mg, Oral, Daily  sucralfate, 1 g, Oral, Nightly  tamsulosin, 0.4 mg, Oral, Nightly  vitamin B-12, 1,000 mcg, Oral, Daily    Infusions   Diet  Diet Regular       Assessment/Plan     Active Hospital Problems    Diagnosis  POA   • **Encephalopathy due to COVID-19 virus [U07.1, G93.49]  Yes   • Age-related physical debility [R54]  Unknown   • Altered mental status [R41.82]  Yes   • Stage 3a chronic kidney disease (HCC) [N18.31]  Yes   • Hypertension [I10]  Yes   • GERD (gastroesophageal reflux disease) [K21.9]  Yes   • Anxiety [F41.9]  Yes      Resolved Hospital Problems   No resolved problems to display.       77 y.o. male admitted with Encephalopathy due to COVID-19 virus.    Today: Discussed case with the patient's son, Dr. Tyron Hunt.  After a risk/benefits discussion, we agree to trial high-dose IV methylpred for 3 days.  There are case studies documenting efficacy of very high-dose steroids in cases of Covid encephalopathy: https://www.ncbi.nlm.nih.gov/labs/pmc/articles/EKC1604656/    COVID-19 infection with encephalopathy  -Currently comfortable on room air.  Continue supportive care, trend inflammatory markers, encourage incentive spirometry  -Evaluated by psychiatry.  Initiated as needed olanzapine.  Continue delirium precautions  -High-dose steroids as  "above    Incidental chest x-ray findings needing outpatient follow-up: \"Within the right lung base, there is an indeterminate opacity which measures up to approximately 2.9 cm in diameter and could represent an area of scarring or atelectatic change although I cannot exclude the possibility of a mass at this site. I recommend further evaluation with a CT scan of the chest\".    Lovenox 40 mg SC daily for DVT prophylaxis.  Full code.  Discussed with nursing staff  Anticipate discharge TBD.  Will consult case management for SNF referrals      Elena Pacheco [facility]  02/06/22  13:55 EST    Patient was placed in face mask on first look.  I wore protective equipment throughout this patient encounter including a face mask, gloves and protective eyewear.  Hand hygiene was performed before donning protective equipment and after removal when leaving the room.        "

## 2022-02-06 NOTE — PROGRESS NOTES
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  He does seem significantly improved from yesterday.

## 2022-02-06 NOTE — PLAN OF CARE
Problem: Restraint, Nonbehavioral (Nonviolent)  Goal: Discontinuation Criteria Achieved  2/6/2022 0536 by Corina Rendon RN  Outcome: Ongoing, Progressing  2/6/2022 0529 by Corina Rendon RN  Outcome: Ongoing, Progressing  Intervention: Implement Least-restrictive Safety Strategies  Recent Flowsheet Documentation  Taken 2/6/2022 0010 by Corina Rendon RN  Medical Device Protection: tubing secured  Diversional Activities: television  Taken 2/5/2022 2102 by Corina Rendon RN  Medical Device Protection: tubing secured  Diversional Activities: television  Goal: Personal Dignity and Safety Maintained  2/6/2022 0536 by Corina Rendon RN  Outcome: Ongoing, Progressing  2/6/2022 0529 by Corina Rendon RN  Outcome: Ongoing, Progressing  Intervention: Protect Dignity, Rights, and Personal Wellbeing  Recent Flowsheet Documentation  Taken 2/6/2022 0010 by Corina Rendon RN  Trust Relationship/Rapport: care explained  Intervention: Protect Skin and Joint Integrity  Recent Flowsheet Documentation  Taken 2/6/2022 0400 by Corina Rendon RN  Body Position: weight shift assistance provided  Taken 2/6/2022 0217 by Corina Rendon RN  Body Position: weight shift assistance provided  Taken 2/6/2022 0010 by Corina Rendon RN  Body Position: supine, legs elevated  Taken 2/5/2022 2221 by Corina Rendon RN  Body Position: sitting up in bed  Taken 2/5/2022 2102 by Corina Rendon RN  Body Position: sitting up in bed     Problem: Restraint, Nonbehavioral (Nonviolent)  Goal: Discontinuation Criteria Achieved  Intervention: Implement Least-restrictive Safety Strategies  Recent Flowsheet Documentation  Taken 2/6/2022 0010 by Corina Rendon RN  Medical Device Protection: tubing secured  Diversional Activities: television  Taken 2/5/2022 2102 by Corina Rendon RN  Medical Device Protection: tubing secured  Diversional Activities: television  Goal: Personal Dignity  and Safety Maintained  Intervention: Protect Dignity, Rights, and Personal Wellbeing  Recent Flowsheet Documentation  Taken 2/6/2022 0010 by Corina Rendon RN  Trust Relationship/Rapport: care explained  Intervention: Protect Skin and Joint Integrity  Recent Flowsheet Documentation  Taken 2/6/2022 0400 by Corina Rendon RN  Body Position: weight shift assistance provided  Taken 2/6/2022 0217 by Corina Rendon RN  Body Position: weight shift assistance provided  Taken 2/6/2022 0010 by Corina Rendon RN  Body Position: supine, legs elevated  Taken 2/5/2022 2221 by Corina Rendon RN  Body Position: sitting up in bed  Taken 2/5/2022 2102 by Corina Rendon RN  Body Position: sitting up in bed   Goal Outcome Evaluation:  Plan of Care Reviewed With: patient        Progress: improving  Outcome Summary: Patient alert and oriented to self.  Awake most of the shift where appeared to sleep after 0400.  Having periods of Hallicinations both visual and auditory where he thinks he sees and hears his family members.  VSS.  WCTM.

## 2022-02-06 NOTE — PLAN OF CARE
Goal Outcome Evaluation:           Progress: improving   VSS on room air.  Pt more awake today and moving around in the bed a lot trying to get up.  Pt alert to self.  Will CTM.

## 2022-02-06 NOTE — PLAN OF CARE
Goal Outcome Evaluation:  Plan of Care Reviewed With: patient        Progress: improving  Outcome Summary: Patient alert and oriented to self.  Awake most of the shift where appeared to sleep after 0400.  Having periods of Hallicinations both visual and auditory where he thinks he sees and hears his family members.  LANNY.  CLAUDE.

## 2022-02-07 PROBLEM — N17.9 AKI (ACUTE KIDNEY INJURY) (HCC): Status: ACTIVE | Noted: 2022-02-07

## 2022-02-07 PROBLEM — N17.9 AKI (ACUTE KIDNEY INJURY): Status: RESOLVED | Noted: 2022-02-07 | Resolved: 2022-02-07

## 2022-02-07 LAB
ALBUMIN SERPL-MCNC: 3.3 G/DL (ref 3.5–5.2)
ALBUMIN/GLOB SERPL: 1.1 G/DL
ALP SERPL-CCNC: 92 U/L (ref 39–117)
ALT SERPL W P-5'-P-CCNC: 35 U/L (ref 1–41)
ANION GAP SERPL CALCULATED.3IONS-SCNC: 11.3 MMOL/L (ref 5–15)
AST SERPL-CCNC: 20 U/L (ref 1–40)
BASOPHILS # BLD AUTO: 0.01 10*3/MM3 (ref 0–0.2)
BASOPHILS NFR BLD AUTO: 0.2 % (ref 0–1.5)
BILIRUB SERPL-MCNC: 0.7 MG/DL (ref 0–1.2)
BUN SERPL-MCNC: 25 MG/DL (ref 8–23)
BUN/CREAT SERPL: 28.4 (ref 7–25)
CALCIUM SPEC-SCNC: 8.9 MG/DL (ref 8.6–10.5)
CHLORIDE SERPL-SCNC: 107 MMOL/L (ref 98–107)
CO2 SERPL-SCNC: 24.7 MMOL/L (ref 22–29)
CREAT SERPL-MCNC: 0.88 MG/DL (ref 0.76–1.27)
CRP SERPL-MCNC: 5.14 MG/DL (ref 0–0.5)
DEPRECATED RDW RBC AUTO: 40.4 FL (ref 37–54)
EOSINOPHIL # BLD AUTO: 0 10*3/MM3 (ref 0–0.4)
EOSINOPHIL NFR BLD AUTO: 0 % (ref 0.3–6.2)
ERYTHROCYTE [DISTWIDTH] IN BLOOD BY AUTOMATED COUNT: 12.2 % (ref 12.3–15.4)
FERRITIN SERPL-MCNC: 547 NG/ML (ref 30–400)
GFR SERPL CREATININE-BSD FRML MDRD: 84 ML/MIN/1.73
GLOBULIN UR ELPH-MCNC: 3.1 GM/DL
GLUCOSE SERPL-MCNC: 162 MG/DL (ref 65–99)
HCT VFR BLD AUTO: 40.8 % (ref 37.5–51)
HGB BLD-MCNC: 14.1 G/DL (ref 13–17.7)
IMM GRANULOCYTES # BLD AUTO: 0.03 10*3/MM3 (ref 0–0.05)
IMM GRANULOCYTES NFR BLD AUTO: 0.6 % (ref 0–0.5)
LYMPHOCYTES # BLD AUTO: 0.69 10*3/MM3 (ref 0.7–3.1)
LYMPHOCYTES NFR BLD AUTO: 12.7 % (ref 19.6–45.3)
MCH RBC QN AUTO: 31.6 PG (ref 26.6–33)
MCHC RBC AUTO-ENTMCNC: 34.6 G/DL (ref 31.5–35.7)
MCV RBC AUTO: 91.5 FL (ref 79–97)
MONOCYTES # BLD AUTO: 0.17 10*3/MM3 (ref 0.1–0.9)
MONOCYTES NFR BLD AUTO: 3.1 % (ref 5–12)
NEUTROPHILS NFR BLD AUTO: 4.55 10*3/MM3 (ref 1.7–7)
NEUTROPHILS NFR BLD AUTO: 83.4 % (ref 42.7–76)
NRBC BLD AUTO-RTO: 0 /100 WBC (ref 0–0.2)
PLATELET # BLD AUTO: 174 10*3/MM3 (ref 140–450)
PMV BLD AUTO: 11.6 FL (ref 6–12)
POTASSIUM SERPL-SCNC: 3.8 MMOL/L (ref 3.5–5.2)
PROT SERPL-MCNC: 6.4 G/DL (ref 6–8.5)
RBC # BLD AUTO: 4.46 10*6/MM3 (ref 4.14–5.8)
SODIUM SERPL-SCNC: 143 MMOL/L (ref 136–145)
WBC NRBC COR # BLD: 5.45 10*3/MM3 (ref 3.4–10.8)

## 2022-02-07 PROCEDURE — 80053 COMPREHEN METABOLIC PANEL: CPT

## 2022-02-07 PROCEDURE — 25010000002 METHYLPREDNISOLONE PER 125 MG: Performed by: STUDENT IN AN ORGANIZED HEALTH CARE EDUCATION/TRAINING PROGRAM

## 2022-02-07 PROCEDURE — 25010000002 ENOXAPARIN PER 10 MG

## 2022-02-07 PROCEDURE — 84145 PROCALCITONIN (PCT): CPT | Performed by: STUDENT IN AN ORGANIZED HEALTH CARE EDUCATION/TRAINING PROGRAM

## 2022-02-07 PROCEDURE — 36415 COLL VENOUS BLD VENIPUNCTURE: CPT

## 2022-02-07 PROCEDURE — 82728 ASSAY OF FERRITIN: CPT

## 2022-02-07 PROCEDURE — 86140 C-REACTIVE PROTEIN: CPT

## 2022-02-07 PROCEDURE — 85025 COMPLETE CBC W/AUTO DIFF WBC: CPT

## 2022-02-07 RX ADMIN — SUCRALFATE 1 G: 1 TABLET ORAL at 20:16

## 2022-02-07 RX ADMIN — ENOXAPARIN SODIUM 40 MG: 100 INJECTION SUBCUTANEOUS at 09:14

## 2022-02-07 RX ADMIN — Medication 3 MG: at 20:16

## 2022-02-07 RX ADMIN — FLUVOXAMINE MALEATE 100 MG: 50 TABLET, FILM COATED ORAL at 20:16

## 2022-02-07 RX ADMIN — METHYLPREDNISOLONE SODIUM SUCCINATE 125 MG: 125 INJECTION, POWDER, FOR SOLUTION INTRAMUSCULAR; INTRAVENOUS at 09:13

## 2022-02-07 RX ADMIN — ATORVASTATIN CALCIUM 10 MG: 20 TABLET, FILM COATED ORAL at 20:15

## 2022-02-07 RX ADMIN — TAMSULOSIN HYDROCHLORIDE 0.4 MG: 0.4 CAPSULE ORAL at 20:16

## 2022-02-07 NOTE — PROGRESS NOTES
Name: Tyron Hunt ADMIT: 2022   : 1945  PCP: Tyron Hunt MD    MRN: [id number] LOS: 8 days   AGE/SEX: 77 y.o. male  ROOM: [facility]     Subjective   Subjective   Today he is arousable to touch.  Verbal but nonsensical speech.  Follows commands    Review of Systems  Not able to obtain due to patient's mental status     Objective   Objective   Vital Signs  Temp:  [97.4 °F (36.3 °C)-97.7 °F (36.5 °C)] 97.4 °F (36.3 °C)  Heart Rate:  [71-88] 75  Resp:  [16-19] 18  BP: (105-134)/(65-91) 105/72  SpO2:  [95 %-100 %] 100 %  on   ;   Device (Oxygen Therapy): room air  Body mass index is 24.41 kg/m².  Physical Exam  Constitutional:       General: He is not in acute distress.     Appearance: He is not diaphoretic.   HENT:      Mouth/Throat:      Mouth: Mucous membranes are moist.   Eyes:      General: No scleral icterus.  Cardiovascular:      Rate and Rhythm: Normal rate and regular rhythm.   Pulmonary:      Effort: Pulmonary effort is normal. No respiratory distress.      Breath sounds: No wheezing or rhonchi.   Abdominal:      General: There is no distension.      Palpations: Abdomen is soft.      Tenderness: There is no abdominal tenderness. There is no guarding.   Musculoskeletal:      Right lower leg: No edema.      Left lower leg: No edema.   Skin:     General: Skin is warm and dry.   Neurological:      Comments: A little more alert today.  Follows commands.  Nonsensical speech         Results Review     I reviewed the patient's new clinical results.  Results from last 7 days   Lab Units 22  0622  0406 22  0452 22  0912   WBC 10*3/mm3 5.45 6.93 6.71 5.63   HEMOGLOBIN g/dL 14.1 14.2 14.9 14.7   PLATELETS 10*3/mm3 174 164 146 118*     Results from last 7 days   Lab Units 22  0622  0406 22  0452 22  0912   SODIUM mmol/L 143 147* 145 146*   POTASSIUM mmol/L 3.8 4.0 3.4* 3.8   CHLORIDE mmol/L 107 110* 107 109*   CO2 mmol/L 24.7 23.4 25.4 25.1   BUN  mg/dL 25* 22 19 16   CREATININE mg/dL 0.88 0.93 0.82 0.73*   GLUCOSE mg/dL 162* 121* 106* 113*   Estimated Creatinine Clearance: 63.1 mL/min (by C-G formula based on SCr of 0.88 mg/dL).  Results from last 7 days   Lab Units 02/07/22  0619 02/06/22  0406 02/05/22  0452 02/04/22  0912   ALBUMIN g/dL 3.30* 3.50 3.40* 3.60   BILIRUBIN mg/dL 0.7 0.9 1.0 1.0   ALK PHOS U/L 92 86 88 94   AST (SGOT) U/L 20 33 38 38   ALT (SGPT) U/L 35 40 40 36     Results from last 7 days   Lab Units 02/07/22 0619 02/06/22 0406 02/05/22  0452 02/04/22  0912 02/03/22  0505 02/02/22  0634 02/01/22  0623 02/01/22  0623   CALCIUM mg/dL 8.9 9.4 8.8 8.9   < > 8.5*   < > 8.5*   ALBUMIN g/dL 3.30* 3.50 3.40* 3.60   < > 3.40*   < > 3.70   MAGNESIUM mg/dL  --   --   --  2.0  --  2.3  --  2.0   PHOSPHORUS mg/dL  --   --   --   --   --  3.4  --   --     < > = values in this interval not displayed.         COVID19   Date Value Ref Range Status   01/29/2022 Detected (C) Not Detected - Ref. Range Final     SARS-CoV-2, ISIAH   Date Value Ref Range Status   01/25/2022 CANCELED       Comment:     Test not performed. Insufficient specimen to perform or complete  analysis.    Result canceled by the ancillary.       No results found for: HGBA1C, POCGLU    XR Chest 1 View  Narrative: PORTABLE RADIOGRAPHIC VIEW OF THE CHEST     CLINICAL HISTORY: Altered mental status.     Portable radiographic view of the chest demonstrates an indeterminate  2.9 cm vague opacity within the right base which could represent an area  of scarring, or atelectatic change although I cannot exclude the  possibility of a mass. Further evaluation with a CT scan of the chest is  suggested. There are areas of atelectatic change or less likely  pneumonia within the left base. Overall, the lung volumes are relatively  low. There is a tortuous thoracic aorta. The osseous structures are  unremarkable.     Impression:    Within the right lung base, there is an indeterminate opacity  which  measures up to approximately 2.9 cm in diameter and could represent an  area of scarring or atelectatic change although I cannot exclude the  possibility of a mass at this site. I recommend further evaluation with  a CT scan of the chest.     These findings and recommendations were discussed with Gorge Resendiz MD,  on 01/29/2022 at approximately 12:05 AM.     This report was finalized on 1/30/2022 12:33 AM by Dr. Nacho Maria M.D.     CT Head Without Contrast  Narrative: Patient: BART BENSON  Time Out: 00:12  Exam(s): CT HEAD Without Contrast     EXAM:    CT Head Without Intravenous Contrast    CLINICAL HISTORY:     Reason for exam: Delirium.    TECHNIQUE:    Axial computed tomography images of the head brain without intravenous   contrast.  CTDI is 54.8 mGy and DLP is 1007.3 mGy-cm.  This CT exam was   performed according to the principle of ALARA (As Low As Reasonably   Achievable) by using one or more of the following dose reduction   techniques: automated exposure control, adjustment of the mA and or kV   according to patient size, and or use of iterative reconstruction   technique.    COMPARISON:    No previous studies.    FINDINGS:    Limitations:  Markedly limited evaluation due to motion artifact.    Brain:  No abnormal extra-axial collection is noted.  No hemorrhage.    Midline shift:  Midline anatomy is unremarkable.    Ventricles:  There is prominence of the ventricular system, cortical   sulci, basilar cisterns, compatible with age related atrophy.    Bones joints:  Calvarium is within normal limits.  No acute fracture.    Soft tissues:  Unremarkable.    Vasculature:  Atherosclerotic disease.    Sinuses:  Mild to moderate chronic ethmoid sinusitis.    Mastoid air cells:  Mastoid air cells are unremarkable.    IMPRESSION:       1.  Age-related atrophy and small vessel disease of aging.  2.  No acute intracranial pathology is noted.  3.  If there is concern for etiology such as early acute  lacunar infarcts,   MRI imaging of the brain with diffusion-weighted sequences should be   performed.  Impression: Electronically signed by Rodrigo Rodríguez MD on 01-30-22 at 0012    I reviewed the patient's daily medications.  Scheduled Medications  amLODIPine, 5 mg, Oral, Daily  atorvastatin, 10 mg, Oral, Nightly  cholecalciferol, 1,000 Units, Oral, Daily  enoxaparin, 40 mg, Subcutaneous, Q24H  fluvoxaMINE, 100 mg, Oral, Nightly  magnesium oxide, 200 mg, Oral, Daily  melatonin, 3 mg, Oral, Nightly  methylPREDNISolone sodium succinate, 125 mg, Intravenous, Daily  multivitamin with minerals, 1 tablet, Oral, Daily  pantoprazole, 40 mg, Oral, Daily  sucralfate, 1 g, Oral, Nightly  tamsulosin, 0.4 mg, Oral, Nightly  vitamin B-12, 1,000 mcg, Oral, Daily    Infusions   Diet  Diet Regular       Assessment/Plan     Active Hospital Problems    Diagnosis  POA   • **Encephalopathy due to COVID-19 virus [U07.1, G93.49]  Yes   • Age-related physical debility [R54]  Unknown   • Altered mental status [R41.82]  Yes   • Stage 3a chronic kidney disease (HCC) [N18.31]  Yes   • Hypertension [I10]  Yes   • GERD (gastroesophageal reflux disease) [K21.9]  Yes   • Anxiety [F41.9]  Yes      Resolved Hospital Problems   No resolved problems to display.       77 y.o. male admitted with Encephalopathy due to COVID-19 virus.    Today: Mild improvement in his mental status.  He is verbal but nonsensical speech.  He follows commands.  Continue IV methylPred for 3 days total.  I also stopped his scheduled nightly olanzapine yesterday, possibly why he is more alert this morning.  Continue olanzapine as needed.  Change labs to every 3 days to decrease early morning awakening.    COVID-19 infection with encephalopathy  -Currently comfortable on room air.  Continue supportive care, trend inflammatory markers, encourage incentive spirometry  -Evaluated by psychiatry.  Initiated as needed olanzapine.  Continue delirium precautions  -Discussed case with  "the patient's son, Dr. Tyron Hunt.  After a risk/benefits discussion, we agree to trial high-dose IV methylpred for 3 days.  There are case studies documenting efficacy of very high-dose steroids in cases of Covid encephalopathy: https://www.ncbi.nlm.nih.gov/labs/pmc/articles/IVK0229219/    Incidental chest x-ray findings needing outpatient follow-up: \"Within the right lung base, there is an indeterminate opacity which measures up to approximately 2.9 cm in diameter and could represent an area of scarring or atelectatic change although I cannot exclude the possibility of a mass at this site. I recommend further evaluation with a CT scan of the chest\".  -CT chest ordered today    Lovenox 40 mg SC daily for DVT prophylaxis.  Full code.  Discussed with nursing staff  Anticipate discharge to SNF.  Pending improvement in mental status. Anticipate 2-3 days.      Elena Pacheco DO  [facility]  02/07/22  11:06 EST    Patient was placed in face mask on first look.  I wore protective equipment throughout this patient encounter including a face mask, gloves and protective eyewear.  Hand hygiene was performed before donning protective equipment and after removal when leaving the room.        "

## 2022-02-07 NOTE — PROGRESS NOTES
The patient is sleeping soundly today and cannot be awakened for interview.  Charting indicates that he has been difficult to redirect and he remains in soft wrist restraints.

## 2022-02-07 NOTE — DISCHARGE PLACEMENT REQUEST
"Bart Hunt (77 y.o. Male)             Date of Birth Social Security Number Address Home Phone MRN    1945  [id number]  [facility] [address] [phone] [id number]    Mu-ism Marital Status             Mu-ism        Admission Date Admission Type Admitting Provider Attending Provider Department, Room/Bed    1/29/22 Emergency Ramila Gutierrez MD George, Katherine, DO [address], [address]    Discharge Date Discharge Disposition Discharge Destination                         Attending Provider: Elena Pacheco DO    Allergies: Sulfa Antibiotics    Isolation: Enh Drop/Con   Infection: COVID (confirmed) (01/30/22)   Code Status: CPR   Advance Care Planning Activity    Ht: 162.6 cm (64.02\")   Wt: 64.5 kg (142 lb 4.8 oz)    Admission Cmt: None   Principal Problem: Encephalopathy due to COVID-19 virus [U07.1,G93.49]                 Active Insurance as of 1/29/2022     Primary Coverage     Payor Plan Insurance Group Employer/Plan Group    ANTHEM MEDICARE REPLACEMENT ANTHEM MEDICARE ADVANTAGE [id number]     Payor Plan Address Payor Plan Phone Number Payor Plan Fax Number Effective Dates    [address] [phone]  1/1/2018 - None Entered    [facility] [id number]       Subscriber Name Subscriber Birth Date Member ID       BART HUNT 1945 [id number]                 Emergency Contacts      (Rel.) Home Phone Work Phone Mobile Phone    ZANDER HUNT (Spouse) [phone] -- --    Dr Bart Hunt (Son) -- [phone]              "

## 2022-02-07 NOTE — CASE MANAGEMENT/SOCIAL WORK
Continued Stay Note  [facility]     Patient Name: Tyron Hunt  MRN: [id number]  Today's Date: 2/7/2022    Admit Date: 1/29/2022     Discharge Plan     Row Name 02/07/22 0854       Plan    Plan covid snf referrals pending    Patient/Family in Agreement with Plan other (see comments)    Plan Comments CCP consulted for Rehab placements. CCP made referrals to [facility] and [facility] for COVID units and possible bed availablity. Also reached out to [facility] regarding their covid SNF acceptance. Aurelia Morales RN/CCP               Discharge Codes    No documentation.               Expected Discharge Date and Time     Expected Discharge Date Expected Discharge Time    Feb 5, 2022             Melisa Anaya RN

## 2022-02-07 NOTE — PLAN OF CARE
Problem: Restraint, Nonbehavioral (Nonviolent)  Goal: Personal Dignity and Safety Maintained  Intervention: Protect Dignity, Rights, and Personal Wellbeing  Recent Flowsheet Documentation  Taken 2/7/2022 0013 by Mabel Goodrich RN  Trust Relationship/Rapport: care explained  Taken 2/6/2022 2010 by Mabel Goodrich RN  Trust Relationship/Rapport:   care explained   reassurance provided     Problem: Restraint, Nonbehavioral (Nonviolent)  Goal: Discontinuation Criteria Achieved  Intervention: Implement Least-restrictive Safety Strategies  Recent Flowsheet Documentation  Taken 2/7/2022 0400 by Mabel Goodrich RN  Medical Device Protection:   torso covered   tubing secured  Taken 2/7/2022 0200 by Mabel Goodrich RN  Medical Device Protection:   torso covered   tubing secured  Taken 2/7/2022 0013 by Mabel Goodrich RN  Medical Device Protection:   torso covered   tubing secured  Diversional Activities: television  Taken 2/6/2022 2212 by Mabel Goodrich RN  Medical Device Protection:   tubing secured   torso covered  Taken 2/6/2022 2010 by Mabel Goodrich RN  Medical Device Protection:   tubing secured   torso covered  Diversional Activities: television     Problem: Restraint, Nonbehavioral (Nonviolent)  Goal: Personal Dignity and Safety Maintained  Intervention: Protect Skin and Joint Integrity  Recent Flowsheet Documentation  Taken 2/7/2022 0400 by Mabel Goodrich RN  Body Position: supine  Taken 2/7/2022 0200 by Mabel Goodrich RN  Body Position: supine  Taken 2/7/2022 0013 by Mabel Goodrich RN  Body Position: supine  Range of Motion: active ROM (range of motion) encouraged  Taken 2/6/2022 2212 by Mabel Goodrich RN  Body Position: position maintained  Taken 2/6/2022 2010 by Mabel Goodrich RN  Body Position: sitting up in bed  Range of Motion: ROM (range of motion) performed     Problem: Restraint, Nonbehavioral (Nonviolent)  Goal: Personal Dignity and Safety Maintained  Intervention: Protect Skin and Joint Integrity  Recent  Flowsheet Documentation  Taken 2/7/2022 0400 by Mabel Goodrich RN  Body Position: supine  Taken 2/7/2022 0200 by Mabel Goodrich RN  Body Position: supine  Taken 2/7/2022 0013 by Mabel Goodrich RN  Body Position: supine  Range of Motion: active ROM (range of motion) encouraged  Taken 2/6/2022 2212 by Mabel Goodrich RN  Body Position: position maintained  Taken 2/6/2022 2010 by Mabel Goodrich RN  Body Position: sitting up in bed  Range of Motion: ROM (range of motion) performed   Goal Outcome Evaluation:  Plan of Care Reviewed With: patient        Progress: no change  Outcome Summary: pt was awake most of this shift, talking about going home, attempting to get out of bed, removing his O2 sensor. Attempted to redirect pt and keep him in the bed, however staff was unsuccessful, Call for an order to restart the wrist restraints so that he wouldn't fall and injury himself. Advised wife of the current situation, she stated that she was ok with the restraints as long as he was safe and free from possible injury. Staff will CTM.

## 2022-02-07 NOTE — SIGNIFICANT NOTE
02/07/22 1513   OTHER   Discipline physical therapy assistant   Rehab Time/Intention   Session Not Performed other (see comments)  (pt has been sleeping most of the day and not appropriate to see per RN; PT will f/u tomorrow)   Recommendation   PT - Next Appointment 02/08/22

## 2022-02-08 ENCOUNTER — APPOINTMENT (OUTPATIENT)
Dept: CT IMAGING | Facility: HOSPITAL | Age: 77
End: 2022-02-08

## 2022-02-08 LAB — PROCALCITONIN SERPL-MCNC: 0.1 NG/ML (ref 0–0.25)

## 2022-02-08 PROCEDURE — 25010000002 ENOXAPARIN PER 10 MG

## 2022-02-08 PROCEDURE — 97530 THERAPEUTIC ACTIVITIES: CPT

## 2022-02-08 PROCEDURE — 97535 SELF CARE MNGMENT TRAINING: CPT

## 2022-02-08 PROCEDURE — 25010000002 METHYLPREDNISOLONE PER 125 MG: Performed by: STUDENT IN AN ORGANIZED HEALTH CARE EDUCATION/TRAINING PROGRAM

## 2022-02-08 PROCEDURE — 71250 CT THORAX DX C-: CPT

## 2022-02-08 PROCEDURE — 97110 THERAPEUTIC EXERCISES: CPT

## 2022-02-08 PROCEDURE — 97116 GAIT TRAINING THERAPY: CPT

## 2022-02-08 RX ORDER — DEXTROSE AND SODIUM CHLORIDE 5; .45 G/100ML; G/100ML
75 INJECTION, SOLUTION INTRAVENOUS CONTINUOUS
Status: ACTIVE | OUTPATIENT
Start: 2022-02-08 | End: 2022-02-09

## 2022-02-08 RX ORDER — METHYLPREDNISOLONE SODIUM SUCCINATE 125 MG/2ML
125 INJECTION, POWDER, LYOPHILIZED, FOR SOLUTION INTRAMUSCULAR; INTRAVENOUS DAILY
Status: COMPLETED | OUTPATIENT
Start: 2022-02-09 | End: 2022-02-10

## 2022-02-08 RX ADMIN — DEXTROSE AND SODIUM CHLORIDE 75 ML/HR: 5; 450 INJECTION, SOLUTION INTRAVENOUS at 10:09

## 2022-02-08 RX ADMIN — FLUVOXAMINE MALEATE 100 MG: 50 TABLET, FILM COATED ORAL at 20:21

## 2022-02-08 RX ADMIN — ATORVASTATIN CALCIUM 10 MG: 20 TABLET, FILM COATED ORAL at 20:20

## 2022-02-08 RX ADMIN — SUCRALFATE 1 G: 1 TABLET ORAL at 20:20

## 2022-02-08 RX ADMIN — TAMSULOSIN HYDROCHLORIDE 0.4 MG: 0.4 CAPSULE ORAL at 20:20

## 2022-02-08 RX ADMIN — DEXTROSE AND SODIUM CHLORIDE 75 ML/HR: 5; 450 INJECTION, SOLUTION INTRAVENOUS at 23:40

## 2022-02-08 RX ADMIN — METHYLPREDNISOLONE SODIUM SUCCINATE 125 MG: 125 INJECTION, POWDER, FOR SOLUTION INTRAMUSCULAR; INTRAVENOUS at 09:56

## 2022-02-08 RX ADMIN — ENOXAPARIN SODIUM 40 MG: 100 INJECTION SUBCUTANEOUS at 09:56

## 2022-02-08 RX ADMIN — Medication 3 MG: at 20:20

## 2022-02-08 NOTE — PLAN OF CARE
Goal Outcome Evaluation:  Plan of Care Reviewed With: patient        Progress: improving  Outcome Summary: Pt tolerated treatment fair this date. Still very confused, requiring increased time to complete tasks. Pt required SBA for bed mobility, then mod A x2 to stand w/ heavy posterior lean. Pt eventually required min A x2 to ambulate 5ft x2, tehn 10ft x2 w/ Rw. Cues given to stay on task and to keep walker on the ground.

## 2022-02-08 NOTE — PROGRESS NOTES
Adult Nutrition  Assessment/PES    Patient Name:  Tyron Hunt  YOB: 1945  MRN: [id number]  Admit Date:  1/29/2022    Assessment Date:  2/8/2022    Comments:  Follow up note. Pt on Regular diet with Boost Plus. Po intake poor 0-25%. Pt confused, needs help with meals. Skin reviewed. Labs glu 162, BUN 25, alb 3.30. Will continue to monitor intake.      Reason for Assessment     Row Name 02/08/22 1054          Reason for Assessment    Reason For Assessment follow-up protocol                Nutrition/Diet History     Row Name 02/08/22 1055          Nutrition/Diet History    Typical Food/Fluid Intake po 0-25%, pt confused                Anthropometrics     Row Name 02/08/22 0510          Anthropometrics    Weight 65 kg (143 lb 3.2 oz)                Labs/Tests/Procedures/Meds     Row Name 02/08/22 1055          Labs/Procedures/Meds    Lab Results Reviewed reviewed            Diagnostic Tests/Procedures    Diagnostic Test/Procedure Reviewed reviewed            Medications    Pertinent Medications Reviewed reviewed                Physical Findings     Row Name 02/08/22 1055          Physical Findings    Oral/Mouth Cavity poor dentition     Skin poor skin integrity/turgor  bruised, ecchymotic                  Nutrition Prescription Ordered     Row Name 02/08/22 1055          Nutrition Prescription PO    Current PO Diet Regular     Supplement Boost Plus (Ensure Enlive, Ensure Plus)                       Problem/Interventions:           Intervention Goal     Row Name 02/08/22 1055          Intervention Goal    General Maintain nutrition; Disease management/therapy; Reduce/improve symptoms     PO Increase intake; PO intake (%)     PO Intake % 75 %     Weight Maintain weight                Nutrition Intervention     Row Name 02/08/22 1056          Nutrition Intervention    RD/Tech Action Follow Tx progress; Care plan reviewd; Encourage intake; Supplement provided                  Education/Evaluation     Row  Name 02/08/22 1056          Monitor/Evaluation    Monitor Per protocol; PO intake; Supplement intake; Pertinent labs; Weight; Skin status                 Electronically signed by:  Marina Bowden RD  02/08/22 10:56 EST

## 2022-02-08 NOTE — THERAPY TREATMENT NOTE
Patient Name: Tyron Hunt  : 1945    MRN: [id number]                              Today's Date: 2022       Admit Date: 2022    Visit Dx:     ICD-10-CM ICD-9-CM   1. Age-related physical debility  R54 797   2. Altered mental status, unspecified altered mental status type  R41.82 780.97   3. COVID-19 virus infection  U07.1 079.89   4. Cervical radiculitis  M54.12 723.4   5. Cervical spinal stenosis  M48.02 723.0   6. Encephalopathy due to COVID-19 virus  U07.1 348.39    G93.49 079.89     Patient Active Problem List   Diagnosis   • Hypertension   • GERD (gastroesophageal reflux disease)   • Hyperlipidemia   • Anxiety   • Benign prostatic hyperplasia with lower urinary tract symptoms   • Impaired fasting glucose   • Recurrent major depressive disorder, in full remission (HCC)   • Cervical disc disease   • Cervical spinal stenosis   • Foraminal stenosis of cervical region   • Cervical radiculitis   • Altered mental status   • Encephalopathy due to COVID-19 virus   • Age-related physical debility     Past Medical History:   Diagnosis Date   • Anxiety    • Diverticulitis    • GERD (gastroesophageal reflux disease)    • Gout    • H/O complete eye exam 2017   • Hyperlipidemia    • Hypertension    • Impaired fasting blood sugar    • Kidney stones    • Memory loss    • Sleep apnea      Past Surgical History:   Procedure Laterality Date   • CERVICAL EPIDURAL N/A 2021    Procedure: CERVICAL EPIDURAL;  Surgeon: Nataliia Avilez MD;  Location: [facility] MAIN OR;  Service: Pain Management;  Laterality: N/A;   • CERVICAL EPIDURAL N/A 10/18/2021    Procedure: CERVICAL EPIDURAL 7-1;  Surgeon: Nataliia Avilez MD;  Location: [facility] MAIN OR;  Service: Pain Management;  Laterality: N/A;   • CYST REMOVAL     • CYSTOSCOPY W/ LITHOLAPAXY / EHL     • TONSILLECTOMY        General Information     Row Name 22 0955          OT Time and Intention    Document Type therapy note (daily note)  -SM (r) LR (t) SM (c)      Mode of Treatment occupational therapy; physical therapy; co-treatment  -SM (r) LR (t) SM (c)     Row Name 02/08/22 0925          General Information    Patient Profile Reviewed yes  -SM (r) LR (t) SM (c)     Existing Precautions/Restrictions fall  -SM (r) LR (t) SM (c)     Barriers to Rehab cognitive status  -SM (r) LR (t) SM (c)     Row Name 02/08/22 0963          Cognition    Orientation Status (Cognition) oriented to; person  -SM (r) LR (t) SM (c)     Row Name 02/08/22 0953          Safety Issues, Functional Mobility    Safety Issues Affecting Function (Mobility) ability to follow commands; at risk behavior observed; awareness of need for assistance; impulsivity; safety precaution awareness; problem-solving; positioning of assistive device; judgment; insight into deficits/self-awareness; safety precautions follow-through/compliance; sequencing abilities  -SM (r) LR (t) SM (c)     Impairments Affecting Function (Mobility) balance; cognition; endurance/activity tolerance; strength; coordination; postural/trunk control  -SM (r) LR (t) SM (c)     Cognitive Impairments, Mobility Safety/Performance awareness, need for assistance; impulsivity; insight into deficits/self-awareness; judgment; safety precaution awareness; sequencing abilities  -SM (r) LR (t) SM (c)           User Key  (r) = Recorded By, (t) = Taken By, (c) = Cosigned By    Initials Name Provider Type    SM Brianna Armstrong, OT Occupational Therapist    LR Beata Short OT Student OT Student                 Mobility/ADL's     Row Name 02/08/22 0975          Bed Mobility    Bed Mobility supine-sit; sit-supine; scooting/bridging  -SM (r) LR (t) SM (c)     Scooting/Bridging [place] (Bed Mobility) supervision  -SM (r) LR (t) SM (c)     Supine-Sit [place] (Bed Mobility) supervision  -SM (r) LR (t) SM (c)     Sit-Supine [place] (Bed Mobility) supervision  -SM (r) LR (t) SM (c)     Row Name 02/08/22 0991          Transfers    Transfers  toilet transfer; sit-stand transfer  -SM (r) LR (t) SM (c)     Comment (Transfers) Pt required mod A x2 to transfer from sitting EOB to standing with rwx, pt initially with posterior lean. Pt progressed to more upright posture for functional mobility and required min A x2 with rwx. Pt required verbal cues to use grab bars in bathroom for safety on toilet transfers, pt demo'ed understanding.  -SM (r) LR (t) SM (c)     Sit-Stand [place] (Transfers) moderate assist (50% patient effort); minimum assist (75% patient effort); 2 person assist; verbal cues  -SM (r) LR (t) SM (c)     [place] Level (Toilet Transfer) minimum assist (75% patient effort); verbal cues  -SM (r) LR (t) SM (c)     Assistive Device (Toilet Transfer) grab bars/safety frame  -SM (r) LR (t) SM (c)     Row Name 02/08/22 0956          Sit-Stand Transfer    Assistive Device (Sit-Stand Transfers) walker, front-wheeled  -SM (r) LR (t) SM (c)     Row Name 02/08/22 0956          Toilet Transfer    Type (Toilet Transfer) sit-stand; stand-sit  -SM (r) LR (t) SM (c)     Row Name 02/08/22 0956          Functional Mobility    Functional Mobility- Ind. Level minimum assist (75% patient effort); 2 person assist required; verbal cues required  -SM (r) LR (t) SM (c)     Functional Mobility-Distance (Feet) 16  -SM (r) LR (t) SM (c)     Functional Mobility- Safety Issues sequencing ability decreased  -SM (r) LR (t) SM (c)     Functional Mobility- Comment Pt with decreased sequencing at times using rwx to transfer to sink and bathroom. Pt did not follow commands to step into walker during mobility. Pt with shaking legs at one point during walk from bathroom to bed, then pt picked up walker and danced. Pt required verbal and tactile cues during turns to position rwx for turns before sitting.  -SM (r) LR (t) SM (c)     Row Name 02/08/22 0956          Activities of Daily Living    BADL Assessment/Intervention grooming; toileting  -SM (r) LR (t) SM (c)     Row Name  02/08/22 0956          Grooming Assessment/Training    [place] Level (Grooming) grooming skills; oral care regimen; wash face, hands; set up; minimum assist (75% patient effort)  -SM (r) LR (t) SM (c)     Position (Grooming) supported sitting; supported standing; sink side  -SM (r) LR (t) SM (c)     Comment (Grooming) Pt required set-up to brush teeth sitting in bedside chair at sink. Pt required set-up and verbal cues to complete teeth brushing and face washing standing at sink with min A x2 to maintain standing balance at sink.  -SM (r) LR (t) SM (c)     Row Name 02/08/22 0956          Toileting Assessment/Training    [place] Level (Toileting) toileting skills; adjust/manage clothing; perform perineal hygiene; contact guard assist; dependent (less than 25% patient effort)  -SM (r) LR (t) SM (c)     Assistive Devices (Toileting) commode  -SM (r) LR (t) SM (c)     Position (Toileting) unsupported sitting; edge of bed sitting  -SM (r) LR (t) SM (c)     Comment (Toileting) Pt required min A to transfer to/from toilet. Pt able to void. Pt required CGA to complete ladi hygiene sitting, and min A x2 to complete ladi hygiene standing, pt did not inform prior to performing hygiene during standing. Pt dependent in brief management.  -SM (r) LR (t) SM (c)           User Key  (r) = Recorded By, (t) = Taken By, (c) = Cosigned By    Initials Name Provider Type    Brianna Kincaid OT Occupational Therapist    LR Beata Short OT Student OT Student               Obj/Interventions     Row Name 02/08/22 1005          Motor Skills    Motor Skills posture  -SM (r) LR (t) SM (c)     Row Name 02/08/22 1005          Balance    Balance Assessment sitting static balance; sitting dynamic balance; sit to stand dynamic balance  -SM (r) LR (t) SM (c)     Static Sitting Balance WFL; sitting, edge of bed  -SM (r) LR (t) SM (c)     Dynamic Sitting Balance WFL; supported; sitting in chair  -SM (r) LR (t) SM (c)     Sit to Stand  Dynamic Balance moderate impairment; supported; standing  -SM (r) LR (t) SM (c)     Static Standing Balance mild impairment; supported; standing  -SM (r) LR (t) SM (c)     Dynamic Standing Balance moderate impairment; supported; standing  -SM (r) LR (t) SM (c)     Balance Interventions occupation based/functional task; sitting; standing; sit to stand  -SM (r) LR (t) SM (c)     Comment, Balance Standing with posterior lean initially, progressing to min A x2 with rwx during functional mobility.  -SM (r) LR (t) SM (c)     Row Name 02/08/22 1005          Posture    Posture postural control  -SM (r) LR (t) SM (c)     Row Name 02/08/22 1005          Postural Control Assessment    Comment, Issues Impacting Performance (Postural Control) Pt with posterior lean when standing intermittently, does not appear to lean posteriorly during functional mobility.  -SM (r) LR (t) SM (c)           User Key  (r) = Recorded By, (t) = Taken By, (c) = Cosigned By    Initials Name Provider Type    SM Brianna Armstrong, OT Occupational Therapist    LR Beata Short, OT Student OT Student               Goals/Plan    No documentation.                Clinical Impression     Row Name 02/08/22 1010          Pain Scale: FACES Pre/Post-Treatment    Pain: FACES Scale, Pretreatment 0-->no hurt  -SM (r) LR (t) SM (c)     Posttreatment Pain Rating 0-->no hurt  -SM (r) LR (t) SM (c)     Row Name 02/08/22 1010          Plan of Care Review    Progress improving  -SM (r) LR (t) SM (c)     Outcome Summary Co-treat with PT due to pt's impuslivity and confusion for safety during encounter. Pt improving in self-care and mobility. Pt is confused, not oriented to place or situation. Pt required mod A x2 with rwx to transfer from sitting EOB to standing, heavy posterior lean. Pt required min A x2 and rwx for functional mobility in room to sink and bathroom. Pt completed toilet hygiene with CGA sitting and min A x2 standing to maintain standing balance for  safety. Pt required min A x1 and verbal cues to use grab bars in bathroom for toilet transfers. Pt dependent in brief management. Pt required set-up and verbal cues to complete teeth brushing sitting in bedside chair at sink, and min A x2 for standing at sink to complete teeth brushing and face washing. Recommend continued OT services to increase safety and independence in self-care, functional mobility, and increase activity tolerance. Recommend subacute rehab at d/c.  -SM (r) LR (t) SM (c)     Row Name 02/08/22 1010          Therapy Assessment/Plan (OT)    Rehab Potential (OT) good, to achieve stated therapy goals  -SM (r) LR (t) SM (c)     Criteria for Skilled Therapeutic Interventions Met (OT) yes; skilled treatment is necessary  -SM (r) LR (t) SM (c)     Row Name 02/08/22 1010          Therapy Plan Review/Discharge Plan (OT)    Anticipated Discharge Disposition (OT) skilled nursing facility  -SM (r) LR (t) SM (c)     Row Name 02/08/22 1010          Positioning and Restraints    Pre-Treatment Position in bed  -SM (r) LR (t) SM (c)     Post Treatment Position bed  -SM (r) LR (t) SM (c)     In Bed notified nsg; fowlers; exit alarm on; encouraged to call for assist; call light within reach  -SM (r) LR (t) SM (c)     Restraints reapplied:; soft limb  -SM (r) LR (t) SM (c)           User Key  (r) = Recorded By, (t) = Taken By, (c) = Cosigned By    Initials Name Provider Type    Brianna Kincaid, OT Occupational Therapist    LR Beata Short, OT Student OT Student               Outcome Measures     Row Name 02/08/22 1016          How much help from another is currently needed...    Putting on and taking off regular lower body clothing? 1  -SM (r) LR (t) SM (c)     Bathing (including washing, rinsing, and drying) 2  -SM (r) LR (t) SM (c)     Toileting (which includes using toilet bed pan or urinal) 2  -SM (r) LR (t) SM (c)     Putting on and taking off regular upper body clothing 2  -SM (r) LR (t) SM (c)      Taking care of personal grooming (such as brushing teeth) 3  -SM (r) LR (t) SM (c)     Eating meals 3  -SM (r) LR (t) SM (c)     AM-PAC 6 Clicks Score (OT) 13  -SM (r) LR (t)     Row Name 02/08/22 1016          Functional Assessment    Outcome Measure Options AM-PAC 6 Clicks Daily Activity (OT)  -SM (r) LR (t) SM (c)           User Key  (r) = Recorded By, (t) = Taken By, (c) = Cosigned By    Initials Name Provider Type     Brianna Armstrong, OT Occupational Therapist    LR Beata Short, OT Student OT Student                Occupational Therapy Education                 Title: PT OT SLP Therapies (In Progress)     Topic: Occupational Therapy (In Progress)     Point: ADL training (Not Started)     Description:   Instruct learner(s) on proper safety adaptation and remediation techniques during self care or transfers.   Instruct in proper use of assistive devices.              Learner Progress:  Not documented in this visit.          Point: Home exercise program (Not Started)     Description:   Instruct learner(s) on appropriate technique for monitoring, assisting and/or progressing therapeutic exercises/activities.              Learner Progress:  Not documented in this visit.          Point: Precautions (Done)     Description:   Instruct learner(s) on prescribed precautions during self-care and functional transfers.              Learning Progress Summary           Patient Acceptance, E, VU,NR by  at 2/1/2022 1490    Comment: educated pt on falls precautions, use of call light for nsg assist, safety with not getting OOB by himself due to high risk of falls.                   Point: Body mechanics (Not Started)     Description:   Instruct learner(s) on proper positioning and spine alignment during self-care, functional mobility activities and/or exercises.              Learner Progress:  Not documented in this visit.                      User Key     Initials Effective Dates Name Provider Type Discipline      04/02/20 -  Brianna Armstrong OT Occupational Therapist OT              OT Recommendation and Plan     Plan of Care Review  Progress: improving  Outcome Summary: Co-treat with PT due to pt's impuslivity and confusion for safety during encounter. Pt improving in self-care and mobility. Pt is confused, not oriented to place or situation. Pt required mod A x2 with rwx to transfer from sitting EOB to standing, heavy posterior lean. Pt required min A x2 and rwx for functional mobility in room to sink and bathroom. Pt completed toilet hygiene with CGA sitting and min A x2 standing to maintain standing balance for safety. Pt required min A x1 and verbal cues to use grab bars in bathroom for toilet transfers. Pt dependent in brief management. Pt required set-up and verbal cues to complete teeth brushing sitting in bedside chair at sink, and min A x2 for standing at sink to complete teeth brushing and face washing. Recommend continued OT services to increase safety and independence in self-care, functional mobility, and increase activity tolerance. Recommend subacute rehab at d/c.     Time Calculation:    Time Calculation- OT     Row Name 02/08/22 1017             Time Calculation- OT    OT Start Time 0845  -SM (r) LR (t) SM (c)      OT Stop Time 0920  -SM (r) LR (t) SM (c)      OT Time Calculation (min) 35 min  -SM (r) LR (t)      Total Timed Code Minutes- OT 35 minute(s)  -SM (r) LR (t) SM (c)      OT Received On 02/08/22  -SM (r) LR (t) SM (c)      OT - Next Appointment 02/09/22  -SM (r) LR (t) SM (c)              Timed Charges    65666 - OT Therapeutic Activity Minutes 10  -SM (r) LR (t) SM (c)      58497 - OT Self Care/Mgmt Minutes 25  -SM (r) LR (t) SM (c)              Total Minutes    Timed Charges Total Minutes 35  -SM (r) LR (t)       Total Minutes 35  -SM (r) LR (t)            User Key  (r) = Recorded By, (t) = Taken By, (c) = Cosigned By    Initials Name Provider Type    Brianna Kincaid, OT Occupational  Therapist    LR Beata Short, OT Student OT Student              Therapy Charges for Today     Code Description Service Date Service Provider Modifiers Qty    [id number]  OT THERAPEUTIC ACT EA 15 MIN 2/8/2022 Beata Short, RK Student GO 1    [id number]  OT SELF CARE/MGMT/TRAIN EA 15 MIN 2/8/2022 Beata Short, OT Student GO 1               BEATA SHORT OT Student  2/8/2022

## 2022-02-08 NOTE — CASE MANAGEMENT/SOCIAL WORK
Continued Stay Note  [facility]     Patient Name: Tyron Hunt  MRN: [id number]  Today's Date: 2/8/2022    Admit Date: 1/29/2022     Discharge Plan     Row Name 02/08/22 0854       Plan    Plan SNF- Covid vs non-covid?    Patient/Family in Agreement with Plan yes    Plan Comments Spoke with Dr. Hunt pts son regarding dc planning. Today is day 10 from Covid test day. CCP explained would call Infection control and ask for Dr. López review to assess for removal of isolation precautions. With pts confusion family hopeful that seeing family will improve mental status. CCP explained will update Dr. Hunt after Infection control reviews chart. Anticipate SNF at dc. Hany RUBIO/CCP               Discharge Codes    No documentation.               Expected Discharge Date and Time     Expected Discharge Date Expected Discharge Time    Feb 9, 2022             Melisa Anaya, JOANNE

## 2022-02-08 NOTE — PLAN OF CARE
Problem: Adult Inpatient Plan of Care  Goal: Plan of Care Review  Outcome: Ongoing, Progressing  Flowsheets (Taken 2/8/2022 0520)  Plan of Care Reviewed With: patient  Outcome Summary: PT continues to have his days and nights confused. He tends to wake up at 1600 and is awake the majority of the shift. He is still having visual and auditory hallcinations and talks to his family and friends. When attempting to redirect him he wants you to engage in the conversations. He is voiding well, no BM this shift. VSS, still unable to discontinue the restraints as he wants to get oob and go home. Staff WCTM.   Goal Outcome Evaluation:  Plan of Care Reviewed With: patient        Progress: no change  Outcome Summary: PT continues to have his days and nights confused. He tends to wake up at 1600 and is awake the majority of the shift. He is still having visual and auditory hallcinations and talks to his family and friends. When attempting to redirect him he wants you to engage in the conversations. He is voiding well, no BM this shift. VSS, still unable to discontinue the restraints as he wants to get oob and go home. Staff WCTM.   independent

## 2022-02-08 NOTE — PLAN OF CARE
Goal Outcome Evaluation:           Progress: improving  Outcome Summary: Co-treat with PT due to pt's impuslivity and confusion for safety during encounter. Pt improving in self-care and mobility. Pt is confused, not oriented to place or situation. Pt required mod A x2 with rwx to transfer from sitting EOB to standing, heavy posterior lean. Pt required min A x2 and rwx for functional mobility in room to sink and bathroom. Pt completed toilet hygiene with CGA sitting and min A x2 standing to maintain standing balance for safety. Pt required min A x1 and verbal cues to use grab bars in bathroom for toilet transfers. Pt dependent in brief management. Pt required set-up and verbal cues to complete teeth brushing sitting in bedside chair at sink, and min A x2 for standing at sink to complete teeth brushing and face washing. Recommend continued OT services to increase safety and independence in self-care, functional mobility, and increase activity tolerance. Recommend subacute rehab at d/c.    OTS wore PPE for enhanced droplet precautions and completed hand hygiene prior to/after session. Pt did not wear a mask.

## 2022-02-08 NOTE — THERAPY TREATMENT NOTE
Patient Name: Tyron Hunt  : 1945    MRN: [id number]                              Today's Date: 2022       Admit Date: 2022    Visit Dx:     ICD-10-CM ICD-9-CM   1. Age-related physical debility  R54 797   2. Altered mental status, unspecified altered mental status type  R41.82 780.97   3. COVID-19 virus infection  U07.1 079.89   4. Cervical radiculitis  M54.12 723.4   5. Cervical spinal stenosis  M48.02 723.0   6. Encephalopathy due to COVID-19 virus  U07.1 348.39    G93.49 079.89     Patient Active Problem List   Diagnosis   • Hypertension   • GERD (gastroesophageal reflux disease)   • Hyperlipidemia   • Anxiety   • Benign prostatic hyperplasia with lower urinary tract symptoms   • Impaired fasting glucose   • Recurrent major depressive disorder, in full remission (HCC)   • Cervical disc disease   • Cervical spinal stenosis   • Foraminal stenosis of cervical region   • Cervical radiculitis   • Altered mental status   • Encephalopathy due to COVID-19 virus   • Age-related physical debility     Past Medical History:   Diagnosis Date   • Anxiety    • Diverticulitis    • GERD (gastroesophageal reflux disease)    • Gout    • H/O complete eye exam 2017   • Hyperlipidemia    • Hypertension    • Impaired fasting blood sugar    • Kidney stones    • Memory loss    • Sleep apnea      Past Surgical History:   Procedure Laterality Date   • CERVICAL EPIDURAL N/A 2021    Procedure: CERVICAL EPIDURAL;  Surgeon: Nataliia Avilez MD;  [place] [facility] [place];  Service: Pain Management;  Laterality: N/A;   • CERVICAL EPIDURAL N/A 10/18/2021    Procedure: CERVICAL EPIDURAL 7-1;  Surgeon: Nataliia Avilez MD;  [place]: [facility] [place];  Service: Pain Management;  Laterality: N/A;   • CYST REMOVAL     • CYSTOSCOPY W/ LITHOLAPAXY / EHL     • TONSILLECTOMY        General Information     Row Name 22 1138          Physical Therapy Time and Intention    Document Type therapy note (daily note)  -      Mode of Treatment physical therapy  -     Row Name 02/08/22 1138          General Information    Existing Precautions/Restrictions fall  -     Barriers to Rehab cognitive status  -     Row Name 02/08/22 1138          Cognition    Orientation Status (Cognition) oriented to; person  -           User Key  (r) = Recorded By, (t) = Taken By, (c) = Cosigned By    Initials Name Provider Type     Brianna Resendiz PTA Physical Therapy Assistant               Mobility     Row Name 02/08/22 1138          Bed Mobility    Bed Mobility supine-sit; sit-supine  -     Supine-Sit Harris (Bed Mobility) standby assist  -     Sit-Supine Harris (Bed Mobility) standby assist  -     Assistive Device (Bed Mobility) bed rails; head of bed elevated  -     Row Name 02/08/22 1138          Transfers    Comment (Transfers) posterior lean upon standing  -     Row Name 02/08/22 1138          Sit-Stand Transfer    Sit-Stand Harris (Transfers) moderate assist (50% patient effort); 2 person assist  -     Assistive Device (Sit-Stand Transfers) walker, front-wheeled  -     Row Name 02/08/22 1138          Gait/Stairs (Locomotion)    Harris Level (Gait) minimum assist (75% patient effort); moderate assist (50% patient effort); 2 person assist  -     Assistive Device (Gait) walker, front-wheeled  -     Distance in Feet (Gait) 5ft x2, then 10ft x2  -     Deviations/Abnormal Patterns (Gait) sheila decreased; festinating/shuffling; stride length decreased  -     Bilateral Gait Deviations forward flexed posture  -     Comment (Gait/Stairs) posterior lean initially, constant cues for direction and keeping the walker on the ground  -           User Key  (r) = Recorded By, (t) = Taken By, (c) = Cosigned By    Initials Name Provider Type     Brianna Resendiz PTA Physical Therapy Assistant               Obj/Interventions     Row Name 02/08/22 1143          Motor Skills    Therapeutic Exercise --   supine AP, heel slides, SLR, hip abd/add x10 reps  -SM           User Key  (r) = Recorded By, (t) = Taken By, (c) = Cosigned By    Initials Name Provider Type    Brianna Bryson PTA Physical Therapy Assistant               Goals/Plan    No documentation.                Clinical Impression     Row Name 02/08/22 1144          Pain    Additional Documentation Pain Scale: Numbers Pre/Post-Treatment (Group)  -     Row Name 02/08/22 1144          Pain Scale: Numbers Pre/Post-Treatment    Pretreatment Pain Rating 0/10 - no pain  -     Posttreatment Pain Rating 3/10  -SM     Pain Location - Side Left  -SM     Pain Location knee  -SM     Pain Intervention(s) Repositioned; Ambulation/increased activity; Rest  -     Row Name 02/08/22 1144          Positioning and Restraints    Pre-Treatment Position in bed  -     Post Treatment Position bed  -SM     In Bed supine; call light within reach; encouraged to call for assist; exit alarm on; notified nsg  -     Restraints reapplied:; soft limb  -SM           User Key  (r) = Recorded By, (t) = Taken By, (c) = Cosigned By    Initials Name Provider Type    Brianna Bryson PTA Physical Therapy Assistant               Outcome Measures     Row Name 02/08/22 1145          How much help from another person do you currently need...    Turning from your back to your side while in flat bed without using bedrails? 3  -SM     Moving from lying on back to sitting on the side of a flat bed without bedrails? 3  -SM     Moving to and from a bed to a chair (including a wheelchair)? 2  -SM     Standing up from a chair using your arms (e.g., wheelchair, bedside chair)? 2  -SM     Climbing 3-5 steps with a railing? 2  -SM     To walk in hospital room? 2  -SM     AM-PAC 6 Clicks Score (PT) 14  -SM     Row Name 02/08/22 1145 02/08/22 1016       Functional Assessment    Outcome Measure Options AM-PAC 6 Clicks Basic Mobility (PT)  - AM-PAC 6 Clicks Daily Activity (OT)  -[facility] (r) LR  (t)  (c)          User Key  (r) = Recorded By, (t) = Taken By, (c) = Cosigned By    Initials Name Provider Type     Brianna Resendiz, FIORELLA Physical Therapy Assistant    Brianna York, OT Occupational Therapist    Beata Whitten, OT Student OT Student                             Physical Therapy Education                 Title: PT OT SLP Therapies (In Progress)     Topic: Physical Therapy (Done)     Point: Mobility training (Done)     Learning Progress Summary           Patient Acceptance, E,TB,D, VU,NR by  at 2/8/2022 1145    Acceptance, E,TB,D, VU,NR by  at 2/3/2022 1151    Acceptance, E,TB,D, VU,NR by  at 2/1/2022 1355                   Point: Home exercise program (Done)     Learning Progress Summary           Patient Acceptance, E,TB,D, VU,NR by  at 2/8/2022 1145                   Point: Body mechanics (Done)     Learning Progress Summary           Patient Acceptance, E,TB,D, VU,NR by  at 2/8/2022 1145    Acceptance, E,TB,D, VU,NR by  at 2/3/2022 1151    Acceptance, E,TB,D, VU,NR by  at 2/1/2022 1355                   Point: Precautions (Done)     Learning Progress Summary           Patient Acceptance, E,TB,D, VU,NR by  at 2/8/2022 1145    Acceptance, E,TB,D, VU,NR by  at 2/3/2022 1151    Acceptance, E,TB,D, VU,NR by  at 2/1/2022 1355                               User Key     Initials Effective Dates Name Provider Type Discipline     06/16/21 -  Awa Vernon, PT Physical Therapist PT     03/07/18 -  Brianna Resendiz PTA Physical Therapy Assistant PT              PT Recommendation and Plan     Plan of Care Reviewed With: patient  Progress: improving  Outcome Summary: Pt tolerated treatment fair this date. Still very confused, requiring increased time to complete tasks. Pt required SBA for bed mobility, then mod A x2 to stand w/ heavy posterior lean. Pt eventually required min A x2 to ambulate 5ft x2, tehn 10ft x2 w/ Rw. Cues given to stay on task and to keep  walker on the ground.     Time Calculation:    PT Charges     Row Name 02/08/22 1150             Time Calculation    Start Time 0833  -      Stop Time 0920  -      Time Calculation (min) 47 min  -      PT Received On 02/08/22  -      PT - Next Appointment 02/10/22  -            User Key  (r) = Recorded By, (t) = Taken By, (c) = Cosigned By    Initials Name Provider Type     Brianna Resendiz PTA Physical Therapy Assistant              Therapy Charges for Today     Code Description Service Date Service Provider Modifiers Qty    66453697365 HC PT THER PROC EA 15 MIN 2/8/2022 Brianna Resendiz, FIORELLA GP 1    52894211262 HC PT THERAPEUTIC ACT EA 15 MIN 2/8/2022 Brianna Resendiz, FIORELLA GP 1    77641354004 HC GAIT TRAINING EA 15 MIN 2/8/2022 Brianna Resendiz, FIORELLA GP 1    10773581015 HC PT THER SUPP EA 15 MIN 2/8/2022 Brianna Resendiz, FIORELLA GP 2          PT G-Codes  Outcome Measure Options: AM-PAC 6 Clicks Basic Mobility (PT)  AM-PAC 6 Clicks Score (PT): 14  AM-PAC 6 Clicks Score (OT): 13    Brianna Resendiz PTA  2/8/2022

## 2022-02-08 NOTE — PROGRESS NOTES
The patient appears marginally improved today.  Staff reports that he ate well this morning and he is out of restraints.  While still confused he is able to converse superficially with his physician.  His improvement is encouraging.

## 2022-02-08 NOTE — PLAN OF CARE
Problem: Restraint, Nonbehavioral (Nonviolent)  Goal: Discontinuation Criteria Achieved  Intervention: Implement Least-restrictive Safety Strategies  Recent Flowsheet Documentation  Taken 2/8/2022 0600 by Mabel Goodrich RN  Medical Device Protection:   torso covered   tubing secured  Taken 2/8/2022 0200 by Mabel Goodrich RN  Medical Device Protection:   torso covered   skin sleeve  Taken 2/8/2022 0015 by Mabel Goodrich RN  Medical Device Protection:   torso covered   tubing secured  Taken 2/7/2022 2200 by Mabel Goodrich RN  Medical Device Protection:   torso covered   tubing secured  Taken 2/7/2022 2023 by Mabel Goodrich RN  Medical Device Protection:   torso covered   tubing secured  Taken 2/7/2022 2000 by Mabel Goodrich RN  Diversional Activities: television     Problem: Restraint, Nonbehavioral (Nonviolent)  Goal: Personal Dignity and Safety Maintained  Intervention: Protect Dignity, Rights, and Personal Wellbeing  Recent Flowsheet Documentation  Taken 2/8/2022 0644 by Mabel Goodrich RN  Trust Relationship/Rapport:   care explained   choices provided   questions encouraged   reassurance provided  Taken 2/7/2022 2000 by Mabel Goodrich RN  Trust Relationship/Rapport:   care explained   choices provided   reassurance provided     Problem: Restraint, Nonbehavioral (Nonviolent)  Goal: Personal Dignity and Safety Maintained  Intervention: Protect Skin and Joint Integrity  Recent Flowsheet Documentation  Taken 2/8/2022 0644 by Mabel Goodrich RN  Body Position:   turned   foot of bed elevated  Range of Motion: ROM (range of motion) performed  Taken 2/8/2022 0600 by Mabel Goodrich RN  Body Position: supine  Taken 2/8/2022 0200 by Mabel Goodrich RN  Body Position: position changed independently  Taken 2/8/2022 0015 by Mabel Goodrich RN  Body Position: position changed independently  Taken 2/7/2022 2200 by Mabel Goodrich RN  Body Position: position changed independently  Taken 2/7/2022 2023 by Mabel Goodrich RN  Body  Position: position changed independently  Range of Motion: ROM (range of motion) performed  Taken 2/7/2022 2000 by Mabel Goodrich RN  Range of Motion: ROM (range of motion) performed   Goal Outcome Evaluation:  Plan of Care Reviewed With: patient        Progress: no change  Outcome Summary: PT continues to have his days and nights confused. He tends to wake up at 1600 and is awake the majority of the shift. He is still having visual and auditory hallcinations and talks to his family and friends. When attempting to redirect him he wants you to engage in the conversations. He is voiding well, no BM this shift. VSS, still unable to discontinue the restraints as he wants to get oob and go home. Staff WCTM.

## 2022-02-08 NOTE — PROGRESS NOTES
Name: Tyron Hunt ADMIT: 2022   : 1945  PCP: Tyron Hunt MD    MRN: [id number] LOS: 9 days   AGE/SEX: 77 y.o. male  ROOM: [facility]     Subjective   Subjective   More awake today.  Watching the [email or web address] on TV.  Oriented to person only.  Nonsensical speech but follows commands    Review of Systems  Not able to obtain due to patient's mental status     Objective   Objective   Vital Signs  Temp:  [97.1 °F (36.2 °C)-98 °F (36.7 °C)] 98 °F (36.7 °C)  Heart Rate:  [72-92] 72  Resp:  [18-20] 18  BP: (109-132)/(55-85) 132/85  SpO2:  [97 %-100 %] 100 %  on   ;   Device (Oxygen Therapy): room air  Body mass index is 24.57 kg/m².  Physical Exam  Constitutional:       General: He is not in acute distress.     Appearance: He is not diaphoretic.   HENT:      Mouth/Throat:      Mouth: Mucous membranes are moist.   Eyes:      General: No scleral icterus.  Cardiovascular:      Rate and Rhythm: Normal rate and regular rhythm.   Pulmonary:      Effort: Pulmonary effort is normal. No respiratory distress.      Breath sounds: No wheezing or rhonchi.   Abdominal:      General: There is no distension.      Palpations: Abdomen is soft.      Tenderness: There is no abdominal tenderness. There is no guarding.   Musculoskeletal:      Right lower leg: No edema.      Left lower leg: No edema.   Skin:     General: Skin is warm and dry.   Neurological:      Mental Status: He is alert.      Comments: Follows commands.  Still some nonsensical speech.  Oriented to person only         Results Review     I reviewed the patient's new clinical results.  Results from last 7 days   Lab Units 22  0619 22  0406 22  0452 22  0912   WBC 10*3/mm3 5.45 6.93 6.71 5.63   HEMOGLOBIN g/dL 14.1 14.2 14.9 14.7   PLATELETS 10*3/mm3 174 164 146 118*     Results from last 7 days   Lab Units 22  0619 22  0406 22  0452 22  0912   SODIUM mmol/L 143 147* 145 146*   POTASSIUM mmol/L 3.8 4.0 3.4* 3.8    CHLORIDE mmol/L 107 110* 107 109*   CO2 mmol/L 24.7 23.4 25.4 25.1   BUN mg/dL 25* 22 19 16   CREATININE mg/dL 0.88 0.93 0.82 0.73*   GLUCOSE mg/dL 162* 121* 106* 113*   Estimated Creatinine Clearance: 63.6 mL/min (by C-G formula based on SCr of 0.88 mg/dL).  Results from last 7 days   Lab Units 02/07/22  0619 02/06/22 0406 02/05/22 0452 02/04/22  0912   ALBUMIN g/dL 3.30* 3.50 3.40* 3.60   BILIRUBIN mg/dL 0.7 0.9 1.0 1.0   ALK PHOS U/L 92 86 88 94   AST (SGOT) U/L 20 33 38 38   ALT (SGPT) U/L 35 40 40 36     Results from last 7 days   Lab Units 02/07/22  0619 02/06/22  0406 02/05/22 0452 02/04/22  0912 02/03/22  0505 02/02/22  0634   CALCIUM mg/dL 8.9 9.4 8.8 8.9   < > 8.5*   ALBUMIN g/dL 3.30* 3.50 3.40* 3.60   < > 3.40*   MAGNESIUM mg/dL  --   --   --  2.0  --  2.3   PHOSPHORUS mg/dL  --   --   --   --   --  3.4    < > = values in this interval not displayed.         COVID19   Date Value Ref Range Status   01/29/2022 Detected (C) Not Detected - Ref. Range Final     SARS-CoV-2, ISIAH   Date Value Ref Range Status   01/25/2022 CANCELED       Comment:     Test not performed. Insufficient specimen to perform or complete  analysis.    Result canceled by the ancillary.       No results found for: HGBA1C, POCGLU    CT Chest Without Contrast Diagnostic  Narrative: CT OF THE CHEST WITHOUT CONTRAST 02/08/2022     HISTORY: Increased density in right lung on chest radiograph of  01/29/2022. COVID positive.     Axial images were obtained from the lung apices to the upper abdomen. No  intravenous contrast was given.     There is moderately severe ill-defined increased density in the right  lower lobe with some mild increased density in the right upper lobe.  Some of these areas of increased density have a slightly groundglass  appearance. There is some mild patchy infiltrate in the left lower lobe.  At least 2 calcified granulomas are seen in the left lower lobe.     There is some aortic and coronary calcification.     No  pathologically enlarged lymph nodes are seen.     The upper abdomen is unremarkable.     Impression: 1. Moderate bilateral pulmonary infiltrates most severe in the right  lower lobe. Some of these areas of infiltrate have a groundglass  appearance.  2. Findings are consistent with pneumonia, suspected to be COVID-19  pneumonia.     Radiation dose reduction techniques were utilized, including automated  exposure control and exposure modulation based on body size.     This report was finalized on 2/8/2022 11:11 AM by Dr. Jossue Katz M.D.       I reviewed the patient's daily medications.  Scheduled Medications  amLODIPine, 5 mg, Oral, Daily  atorvastatin, 10 mg, Oral, Nightly  cholecalciferol, 1,000 Units, Oral, Daily  enoxaparin, 40 mg, Subcutaneous, Q24H  fluvoxaMINE, 100 mg, Oral, Nightly  magnesium oxide, 200 mg, Oral, Daily  melatonin, 3 mg, Oral, Nightly  multivitamin with minerals, 1 tablet, Oral, Daily  pantoprazole, 40 mg, Oral, Daily  sucralfate, 1 g, Oral, Nightly  tamsulosin, 0.4 mg, Oral, Nightly  vitamin B-12, 1,000 mcg, Oral, Daily    Infusions  dextrose 5 % and sodium chloride 0.45 %, 75 mL/hr, Last Rate: 75 mL/hr (02/08/22 1009)    Diet  Diet Regular       Assessment/Plan     Active Hospital Problems    Diagnosis  POA   • **Encephalopathy due to COVID-19 virus [U07.1, G93.49]  Yes   • Age-related physical debility [R54]  Yes   • Altered mental status [R41.82]  Yes   • Hypertension [I10]  Yes   • GERD (gastroesophageal reflux disease) [K21.9]  Yes   • Anxiety [F41.9]  Yes      Resolved Hospital Problems    Diagnosis Date Resolved POA   • MILLY (acute kidney injury) ([facility]) [N17.9] 02/07/2022 Yes       77 y.o. male with no significant past medical history who was brought in by his family for worsening confusion.  He is found to have COVID-19 pneumonia without hypoxia, with encephalopathy.    Today: More awake again this morning, but still very disoriented. Redirectable and cooperative with exam.  Day  3/5 of IV Methylpred today  Will DC telemetry and continuous pulse ox to help with delirium.  [facility] floor once he is out of isolation.  Hopeful that he can be taken out of isolation and that family can visit.  CT chest ordered for indeterminate opacity on CXR shows bilateral pneumonia, right worse than left. No other concerning findings.    COVID-19 pneumonia, complicated by encephalopathy  -Currently comfortable on room air.  Continue supportive care, trend inflammatory markers, encourage incentive spirometry  -Evaluated by psychiatry.  Initiated as needed olanzapine.  Continue delirium precautions  -Discussed case with the patient's son, Dr. Tyron Hunt.  After a risk/benefits discussion, we agree to trial high-dose IV methylpred for 5 days.  There are case studies documenting efficacy of very high-dose steroids in cases of Covid encephalopathy: https://www.ncbi.nlm.nih.gov/labs/pmc/articles/NJH8584609/    Lovenox 40 mg SC daily for DVT prophylaxis.  Full code.  Discussed with nursing staff and family  Anticipate discharge to SNF.  Pending improvement in mental status. Anticipate 2-3 days.      Elena Pacheco DO  [facility]  02/08/22  11:25 EST    Patient was placed in face mask on first look.  I wore protective equipment throughout this patient encounter including a face mask, gloves and protective eyewear.  Hand hygiene was performed before donning protective equipment and after removal when leaving the room.

## 2022-02-09 PROCEDURE — 25010000002 METHYLPREDNISOLONE PER 125 MG: Performed by: STUDENT IN AN ORGANIZED HEALTH CARE EDUCATION/TRAINING PROGRAM

## 2022-02-09 PROCEDURE — 25010000002 ENOXAPARIN PER 10 MG

## 2022-02-09 RX ADMIN — TAMSULOSIN HYDROCHLORIDE 0.4 MG: 0.4 CAPSULE ORAL at 20:59

## 2022-02-09 RX ADMIN — Medication 1000 MCG: at 09:52

## 2022-02-09 RX ADMIN — SUCRALFATE 1 G: 1 TABLET ORAL at 20:59

## 2022-02-09 RX ADMIN — MULTIPLE VITAMINS W/ MINERALS TAB 1 TABLET: TAB at 09:53

## 2022-02-09 RX ADMIN — FLUVOXAMINE MALEATE 100 MG: 50 TABLET, FILM COATED ORAL at 20:59

## 2022-02-09 RX ADMIN — MAGNESIUM OXIDE 400 MG (241.3 MG MAGNESIUM) TABLET 200 MG: TABLET at 09:53

## 2022-02-09 RX ADMIN — Medication 3 MG: at 20:59

## 2022-02-09 RX ADMIN — PANTOPRAZOLE SODIUM 40 MG: 40 TABLET, DELAYED RELEASE ORAL at 09:52

## 2022-02-09 RX ADMIN — ATORVASTATIN CALCIUM 10 MG: 20 TABLET, FILM COATED ORAL at 20:59

## 2022-02-09 RX ADMIN — AMLODIPINE BESYLATE 5 MG: 5 TABLET ORAL at 09:53

## 2022-02-09 RX ADMIN — METHYLPREDNISOLONE SODIUM SUCCINATE 125 MG: 125 INJECTION, POWDER, FOR SOLUTION INTRAMUSCULAR; INTRAVENOUS at 09:53

## 2022-02-09 RX ADMIN — ENOXAPARIN SODIUM 40 MG: 100 INJECTION SUBCUTANEOUS at 09:53

## 2022-02-09 RX ADMIN — Medication 1000 UNITS: at 09:52

## 2022-02-09 NOTE — PROGRESS NOTES
The patient is more groggy this morning and cannot be awakened for interview.  He remains in bilateral wrist restraints for safety.  Charting indicates that he may be discharged from [facility] isolation pending infectious disease consultation.

## 2022-02-09 NOTE — PLAN OF CARE
Goal Outcome Evaluation:   Patient has remained in restraints and very confused with hallucinations for duration of shift. He does not appear to have improved at all since I cared for him last week. Permission has been granted for wife to visit, per Autumn. VSS. Patient believes wife is in room and talks to her. Not sure what the plan is going to be for him. Request to infection control to remove isol precautions as today is day 11, but they say we need to wait 14 days. M/S orders.

## 2022-02-09 NOTE — PLAN OF CARE
Goal Outcome Evaluation:  Plan of Care Reviewed With: patient        Progress: improving  Outcome Summary: VSS, patient in marce wrist NVR, ID to possible D/C covid isolation, orders to transfer to med surg, will CTM

## 2022-02-09 NOTE — PROGRESS NOTES
Name: Tyron Hunt ADMIT: 2022   : 1945  PCP: Tyron Hunt MD    MRN: [id number] LOS: 10 days   AGE/SEX: 77 y.o. male  ROOM: [facility]     Subjective   Subjective   Awake and alert on my exam today.  Oriented to person.  Knows he is in [facility].  Disoriented to situation.    Review of Systems  Denies chest pain, abdominal pain, dysuria, shortness of breath     Objective   Objective   Vital Signs  Temp:  [97.2 °F (36.2 °C)-97.6 °F (36.4 °C)] 97.5 °F (36.4 °C)  Heart Rate:  [70-74] 72  Resp:  [18] 18  BP: (125-140)/(65-89) 128/82  SpO2:  [98 %-100 %] 100 %  on   ;   Device (Oxygen Therapy): room air  Body mass index is 24.57 kg/m².  Physical Exam  Constitutional:       General: He is not in acute distress.     Appearance: He is not diaphoretic.   HENT:      Mouth/Throat:      Mouth: Mucous membranes are moist.   Eyes:      General: No scleral icterus.  Cardiovascular:      Rate and Rhythm: Normal rate and regular rhythm.   Pulmonary:      Effort: Pulmonary effort is normal. No respiratory distress.      Breath sounds: No wheezing or rhonchi.   Abdominal:      General: There is no distension.      Palpations: Abdomen is soft.      Tenderness: There is no abdominal tenderness. There is no guarding.   Musculoskeletal:      Right lower leg: No edema.      Left lower leg: No edema.   Skin:     General: Skin is warm and dry.   Neurological:      Mental Status: He is alert.      Comments: Follows commands.  Still some nonsensical speech.  Oriented to person only         Results Review     I reviewed the patient's new clinical results.  Results from last 7 days   Lab Units 22  0619 22  0406 22  0452 22  0912   WBC 10*3/mm3 5.45 6.93 6.71 5.63   HEMOGLOBIN g/dL 14.1 14.2 14.9 14.7   PLATELETS 10*3/mm3 174 164 146 118*     Results from last 7 days   Lab Units 22  0619 22  0406 22  0452 22  0912   SODIUM mmol/L 143 147* 145 146*   POTASSIUM mmol/L 3.8  4.0 3.4* 3.8   CHLORIDE mmol/L 107 110* 107 109*   CO2 mmol/L 24.7 23.4 25.4 25.1   BUN mg/dL 25* 22 19 16   CREATININE mg/dL 0.88 0.93 0.82 0.73*   GLUCOSE mg/dL 162* 121* 106* 113*   Estimated Creatinine Clearance: 63.6 mL/min (by C-G formula based on SCr of 0.88 mg/dL).  Results from last 7 days   Lab Units 02/07/22 0619 02/06/22 0406 02/05/22 0452 02/04/22  0912   ALBUMIN g/dL 3.30* 3.50 3.40* 3.60   BILIRUBIN mg/dL 0.7 0.9 1.0 1.0   ALK PHOS U/L 92 86 88 94   AST (SGOT) U/L 20 33 38 38   ALT (SGPT) U/L 35 40 40 36     Results from last 7 days   Lab Units 02/07/22  0619 02/06/22 0406 02/05/22 0452 02/04/22  0912   CALCIUM mg/dL 8.9 9.4 8.8 8.9   ALBUMIN g/dL 3.30* 3.50 3.40* 3.60   MAGNESIUM mg/dL  --   --   --  2.0     Results from last 7 days   Lab Units 02/07/22 0619   PROCALCITONIN ng/mL 0.10     COVID19   Date Value Ref Range Status   01/29/2022 Detected (C) Not Detected - Ref. Range Final     SARS-CoV-2, ISIAH   Date Value Ref Range Status   01/25/2022 CANCELED       Comment:     Test not performed. Insufficient specimen to perform or complete  analysis.    Result canceled by the ancillary.       No results found for: HGBA1C, POCGLU    CT Chest Without Contrast Diagnostic  Narrative: CT OF THE CHEST WITHOUT CONTRAST 02/08/2022     HISTORY: Increased density in right lung on chest radiograph of  01/29/2022. COVID positive.     Axial images were obtained from the lung apices to the upper abdomen. No  intravenous contrast was given.     There is moderately severe ill-defined increased density in the right  lower lobe with some mild increased density in the right upper lobe.  Some of these areas of increased density have a slightly groundglass  appearance. There is some mild patchy infiltrate in the left lower lobe.  At least 2 calcified granulomas are seen in the left lower lobe.     There is some aortic and coronary calcification.     No pathologically enlarged lymph nodes are seen.     The upper abdomen  is unremarkable.     Impression: 1. Moderate bilateral pulmonary infiltrates most severe in the right  lower lobe. Some of these areas of infiltrate have a groundglass  appearance.  2. Findings are consistent with pneumonia, suspected to be COVID-19  pneumonia.     Radiation dose reduction techniques were utilized, including automated  exposure control and exposure modulation based on body size.     This report was finalized on 2/8/2022 11:11 AM by Dr. Jossue Katz M.D.       I reviewed the patient's daily medications.  Scheduled Medications  amLODIPine, 5 mg, Oral, Daily  atorvastatin, 10 mg, Oral, Nightly  cholecalciferol, 1,000 Units, Oral, Daily  enoxaparin, 40 mg, Subcutaneous, Q24H  fluvoxaMINE, 100 mg, Oral, Nightly  magnesium oxide, 200 mg, Oral, Daily  melatonin, 3 mg, Oral, Nightly  methylPREDNISolone sodium succinate, 125 mg, Intravenous, Daily  multivitamin with minerals, 1 tablet, Oral, Daily  pantoprazole, 40 mg, Oral, Daily  sucralfate, 1 g, Oral, Nightly  tamsulosin, 0.4 mg, Oral, Nightly  vitamin B-12, 1,000 mcg, Oral, Daily    Infusions   Diet  Diet Regular       Assessment/Plan     Active Hospital Problems    Diagnosis  POA   • **Encephalopathy due to COVID-19 virus [U07.1, G93.49]  Yes   • Age-related physical debility [R54]  Yes   • Altered mental status [R41.82]  Yes   • Hypertension [I10]  Yes   • GERD (gastroesophageal reflux disease) [K21.9]  Yes   • Anxiety [F41.9]  Yes      Resolved Hospital Problems    Diagnosis Date Resolved POA   • MILLY (acute kidney injury) ([facility]) [N17.9] 02/07/2022 Yes       77 y.o. male with no significant past medical history who was brought in by his family for worsening confusion.  He is found to have COVID-19 pneumonia without hypoxia, with encephalopathy.    Today: Awake and alert on my exam this morning, but still very disoriented. Redirectable and cooperative.  Day 4/5 of IV Methylpred   Hopeful that we can DC Covid isolation today so the family may visit.   I feel this will help his delirium.    COVID-19 pneumonia, complicated by encephalopathy  -Currently comfortable on room air.  Continue supportive care, trend inflammatory markers, encourage incentive spirometry  -Evaluated by psychiatry.  Initiated as needed olanzapine (he has not received since 2/6).  Continue delirium precautions  -Previously discussed case with the patient's son, Dr. Tyron Hunt.  After a risk/benefits discussion, we agree to trial high-dose IV methylpred for 5 days.  There are case studies documenting efficacy of very high-dose steroids in cases of Covid encephalopathy: https://www.ncbi.nlm.nih.gov/labs/pmc/articles/MSU8582939/    Lovenox 40 mg SC daily for DVT prophylaxis.  Full code.  Discussed with nursing staff and patient  Anticipate discharge to SNF.  Pending improvement in mental status. Anticipate 2-3 days.      Elena Pacheco [facility]  02/09/22  13:25 EST    Patient was placed in face mask on first look.  I wore protective equipment throughout this patient encounter including a face mask, gloves and protective eyewear.  Hand hygiene was performed before donning protective equipment and after removal when leaving the room.

## 2022-02-09 NOTE — CASE MANAGEMENT/SOCIAL WORK
Continued Stay Note  [facility]     Patient Name: Tyron Hunt  MRN: [id number]  Today's Date: 2/9/2022    Admit Date: 1/29/2022     Discharge Plan     Row Name 02/09/22 1600       Plan    Plan Pending hospital progress- Rehab vs     Patient/Family in Agreement with Plan other (see comments)    Plan Comments Spoke with Shannon/Infection control, they reviewed with Dr. López Infectious Disease pt will need to remain in isolation full 14 days and then on day 15 they can re-review isolation status. Spoke with RN Mirza and Nurse Manager Marii regarding process of wife to visit. CCP notified pts son Dr. Hunt. Pt remains in restraints for safety. Awaiting medical readiness for further dc planning. Andrew RUBIO/CCP               Discharge Codes    No documentation.               Expected Discharge Date and Time     Expected Discharge Date Expected Discharge Time    Feb 11, 2022             Melisa Anaya RN

## 2022-02-10 LAB
ALBUMIN SERPL-MCNC: 3.6 G/DL (ref 3.5–5.2)
ALBUMIN/GLOB SERPL: 1.6 G/DL
ALP SERPL-CCNC: 79 U/L (ref 39–117)
ALT SERPL W P-5'-P-CCNC: 34 U/L (ref 1–41)
ANION GAP SERPL CALCULATED.3IONS-SCNC: 8.2 MMOL/L (ref 5–15)
AST SERPL-CCNC: 21 U/L (ref 1–40)
BASOPHILS # BLD AUTO: 0 10*3/MM3 (ref 0–0.2)
BASOPHILS NFR BLD AUTO: 0 % (ref 0–1.5)
BILIRUB SERPL-MCNC: 0.9 MG/DL (ref 0–1.2)
BUN SERPL-MCNC: 25 MG/DL (ref 8–23)
BUN/CREAT SERPL: 29.1 (ref 7–25)
CALCIUM SPEC-SCNC: 8.7 MG/DL (ref 8.6–10.5)
CHLORIDE SERPL-SCNC: 108 MMOL/L (ref 98–107)
CO2 SERPL-SCNC: 26.8 MMOL/L (ref 22–29)
CREAT SERPL-MCNC: 0.86 MG/DL (ref 0.76–1.27)
CRP SERPL-MCNC: 0.54 MG/DL (ref 0–0.5)
DEPRECATED RDW RBC AUTO: 40.5 FL (ref 37–54)
EOSINOPHIL # BLD AUTO: 0 10*3/MM3 (ref 0–0.4)
EOSINOPHIL NFR BLD AUTO: 0 % (ref 0.3–6.2)
ERYTHROCYTE [DISTWIDTH] IN BLOOD BY AUTOMATED COUNT: 12.2 % (ref 12.3–15.4)
GFR SERPL CREATININE-BSD FRML MDRD: 86 ML/MIN/1.73
GLOBULIN UR ELPH-MCNC: 2.3 GM/DL
GLUCOSE SERPL-MCNC: 124 MG/DL (ref 65–99)
HCT VFR BLD AUTO: 39.1 % (ref 37.5–51)
HGB BLD-MCNC: 13.4 G/DL (ref 13–17.7)
IMM GRANULOCYTES # BLD AUTO: 0.01 10*3/MM3 (ref 0–0.05)
IMM GRANULOCYTES NFR BLD AUTO: 0.2 % (ref 0–0.5)
LYMPHOCYTES # BLD AUTO: 0.98 10*3/MM3 (ref 0.7–3.1)
LYMPHOCYTES NFR BLD AUTO: 16.8 % (ref 19.6–45.3)
MCH RBC QN AUTO: 30.9 PG (ref 26.6–33)
MCHC RBC AUTO-ENTMCNC: 34.3 G/DL (ref 31.5–35.7)
MCV RBC AUTO: 90.3 FL (ref 79–97)
MONOCYTES # BLD AUTO: 0.68 10*3/MM3 (ref 0.1–0.9)
MONOCYTES NFR BLD AUTO: 11.6 % (ref 5–12)
NEUTROPHILS NFR BLD AUTO: 4.17 10*3/MM3 (ref 1.7–7)
NEUTROPHILS NFR BLD AUTO: 71.4 % (ref 42.7–76)
NRBC BLD AUTO-RTO: 0 /100 WBC (ref 0–0.2)
PLATELET # BLD AUTO: 177 10*3/MM3 (ref 140–450)
PMV BLD AUTO: 11.4 FL (ref 6–12)
POTASSIUM SERPL-SCNC: 3.8 MMOL/L (ref 3.5–5.2)
PROT SERPL-MCNC: 5.9 G/DL (ref 6–8.5)
RBC # BLD AUTO: 4.33 10*6/MM3 (ref 4.14–5.8)
SODIUM SERPL-SCNC: 143 MMOL/L (ref 136–145)
WBC NRBC COR # BLD: 5.84 10*3/MM3 (ref 3.4–10.8)

## 2022-02-10 PROCEDURE — 80053 COMPREHEN METABOLIC PANEL: CPT | Performed by: STUDENT IN AN ORGANIZED HEALTH CARE EDUCATION/TRAINING PROGRAM

## 2022-02-10 PROCEDURE — 85025 COMPLETE CBC W/AUTO DIFF WBC: CPT | Performed by: STUDENT IN AN ORGANIZED HEALTH CARE EDUCATION/TRAINING PROGRAM

## 2022-02-10 PROCEDURE — 97110 THERAPEUTIC EXERCISES: CPT

## 2022-02-10 PROCEDURE — 25010000002 METHYLPREDNISOLONE PER 125 MG: Performed by: STUDENT IN AN ORGANIZED HEALTH CARE EDUCATION/TRAINING PROGRAM

## 2022-02-10 PROCEDURE — 99222 1ST HOSP IP/OBS MODERATE 55: CPT | Performed by: PSYCHIATRY & NEUROLOGY

## 2022-02-10 PROCEDURE — 86140 C-REACTIVE PROTEIN: CPT | Performed by: STUDENT IN AN ORGANIZED HEALTH CARE EDUCATION/TRAINING PROGRAM

## 2022-02-10 PROCEDURE — 97530 THERAPEUTIC ACTIVITIES: CPT

## 2022-02-10 PROCEDURE — 25010000002 ENOXAPARIN PER 10 MG

## 2022-02-10 RX ORDER — DONEPEZIL HYDROCHLORIDE 5 MG/1
5 TABLET, FILM COATED ORAL NIGHTLY
Status: DISCONTINUED | OUTPATIENT
Start: 2022-02-10 | End: 2022-02-11 | Stop reason: HOSPADM

## 2022-02-10 RX ORDER — QUETIAPINE FUMARATE 25 MG/1
12.5 TABLET, FILM COATED ORAL NIGHTLY
Status: DISCONTINUED | OUTPATIENT
Start: 2022-02-10 | End: 2022-02-11 | Stop reason: HOSPADM

## 2022-02-10 RX ORDER — DONEPEZIL HYDROCHLORIDE 10 MG/1
10 TABLET, FILM COATED ORAL NIGHTLY
Status: DISCONTINUED | OUTPATIENT
Start: 2022-02-17 | End: 2022-02-11 | Stop reason: HOSPADM

## 2022-02-10 RX ADMIN — DONEPEZIL HYDROCHLORIDE 5 MG: 5 TABLET, FILM COATED ORAL at 22:00

## 2022-02-10 RX ADMIN — PANTOPRAZOLE SODIUM 40 MG: 40 TABLET, DELAYED RELEASE ORAL at 08:58

## 2022-02-10 RX ADMIN — FLUVOXAMINE MALEATE 100 MG: 50 TABLET, FILM COATED ORAL at 20:30

## 2022-02-10 RX ADMIN — Medication 3 MG: at 20:30

## 2022-02-10 RX ADMIN — AMLODIPINE BESYLATE 5 MG: 5 TABLET ORAL at 08:58

## 2022-02-10 RX ADMIN — MAGNESIUM OXIDE 400 MG (241.3 MG MAGNESIUM) TABLET 200 MG: TABLET at 08:58

## 2022-02-10 RX ADMIN — ATORVASTATIN CALCIUM 10 MG: 20 TABLET, FILM COATED ORAL at 20:30

## 2022-02-10 RX ADMIN — ENOXAPARIN SODIUM 40 MG: 100 INJECTION SUBCUTANEOUS at 08:58

## 2022-02-10 RX ADMIN — Medication 1000 UNITS: at 08:58

## 2022-02-10 RX ADMIN — SUCRALFATE 1 G: 1 TABLET ORAL at 20:30

## 2022-02-10 RX ADMIN — Medication 1000 MCG: at 08:58

## 2022-02-10 RX ADMIN — QUETIAPINE FUMARATE 12.5 MG: 25 TABLET ORAL at 20:30

## 2022-02-10 RX ADMIN — METHYLPREDNISOLONE SODIUM SUCCINATE 125 MG: 125 INJECTION, POWDER, FOR SOLUTION INTRAMUSCULAR; INTRAVENOUS at 08:58

## 2022-02-10 RX ADMIN — TAMSULOSIN HYDROCHLORIDE 0.4 MG: 0.4 CAPSULE ORAL at 20:30

## 2022-02-10 RX ADMIN — MULTIPLE VITAMINS W/ MINERALS TAB 1 TABLET: TAB at 08:58

## 2022-02-10 NOTE — CONSULTS
"Neurology Consult Note    Consult Date: 2/10/2022    Referring MD: Ramila Gutierrez MD    Reason for Consult I have been asked to see the patient in neurological consultation to render advice and opinion regarding altered mental status    Tyron Hunt is a 77 y.o. male with moderate cognitive impairment on Aricept, diverticulitis, hypertension, hyperlipidemia, JOE.  He developed symptoms of COVID-19 in late January.  He ultimately developed mild encephalopathy and was brought to the hospital for that admitted for COVID-19 and optimal status.  He has been admitted since January 29 with fluctuating delirium.  He has been intermittently restrained.  His agitation has been only mild but it has been difficult with restraints due to impulsivity.  We were consulted today for the duration of his delirium.    I spoke with his son by phone who notes that the patient has had some mild cognitive changes over the past year.  They have noticed some mild impairment of memory and work on difficulty.  The patient enjoys constructing models as a hobby and has been some impact on his skill.  He saw my colleague Dr. Miranda Lai in the office and was started on Aricept which he has tolerated well.    Since admission his mental status is fluctuated with some associated visualizations but as mentioned patient has been fairly calm and cooperative despite requiring restraints.,  + Confusion    Past Medical History:   Diagnosis Date   • Anxiety    • Diverticulitis    • GERD (gastroesophageal reflux disease)    • Gout    • H/O complete eye exam 06/2017   • Hyperlipidemia    • Hypertension    • Impaired fasting blood sugar    • Kidney stones    • Memory loss    • Sleep apnea        ROS:  No fevers, chills  No weakness, numbness disoriented,    Exam  /75 (BP Location: Right arm)   Pulse 70   Temp 97.5 °F (36.4 °C) (Oral)   Resp 16   Ht 162.6 cm (64.02\")   Wt 65 kg (143 lb 3.2 oz)   SpO2 98%   BMI 24.57 kg/m²   Gen: NAD, " vitals reviewed  MS: Disoriented, recent/remote memory impaired, impaired attention/concentration, language intact, no neglect.  CN: visual acuity grossly normal, PERRL, EOMI, no facial droop, no dysarthria  Motor: 5/5 throughout upper and lower extremities, normal tone    DATA:    Lab Results   Component Value Date    GLUCOSE 124 (H) 02/10/2022    CALCIUM 8.7 02/10/2022     02/10/2022    K 3.8 02/10/2022    CO2 26.8 02/10/2022     (H) 02/10/2022    BUN 25 (H) 02/10/2022    CREATININE 0.86 02/10/2022    EGFRIFAFRI 75 09/21/2021    EGFRIFNONA 86 02/10/2022    BCR 29.1 (H) 02/10/2022    ANIONGAP 8.2 02/10/2022     Lab Results   Component Value Date    WBC 5.84 02/10/2022    HGB 13.4 02/10/2022    HCT 39.1 02/10/2022    MCV 90.3 02/10/2022     02/10/2022       Lab review: CBC normal, BMP unremarkable    Imaging review: I personally reviewed his CT scan performed admission which is significant for moderate to severe generalized atrophy.  Radiology report reviewed.    Diagnoses:  Alzheimer's dementia, late onset, without behavioral disturbance  Hospital delirium  COVID-19 infection    Comment: 77-year-old man with mild Alzheimer's disease who is asymptomatic from recent COVID-19 infection but has hospital-acquired delirium, most likely from his prolonged admission and ongoing use of restraints.  I would recommend minimizing restraints, mobilizing patient as much as possible, increasing natural light in the room and having family bedside as much as possible.  Ideal situation for the patient would be to discharge home with family support ASAP at which point I expect him to rapidly recover to his mental baseline.    PLAN:  Restart Aricept  Minimize restraints  Had a bed chair possible  Ambulate 3 times daily  Discharge home tomorrow with family support if possible.    Discussed at length with patient's son by phone.

## 2022-02-10 NOTE — PLAN OF CARE
Goal Outcome Evaluation:   Remains in restraints. No improvement. Wife visited today and said she will not be able to care for him in this state. He walks to well for rehab. [place].

## 2022-02-10 NOTE — PLAN OF CARE
Goal Outcome Evaluation:  Plan of Care Reviewed With: patient        Progress: no change  Outcome Summary: VSS, NVR maintained, bath given, patient very confused and restless, melatonin given with no affect, patient did not sleep more than 20 mins, telemetry orders D/C'ed, Wife has permission to visit per Autumn, will CTM

## 2022-02-10 NOTE — PROGRESS NOTES
Name: Tyron Hunt ADMIT: 2022   : 1945  PCP: Tyron Hunt MD    MRN: [id number] LOS: 11 days   AGE/SEX: 77 y.o. male  ROOM: [facility]     Subjective   Subjective   Little change in mental status today.  Talkative but speech is nonsensical.  Calm and cooperative    Review of Systems  Denies chest pain, abdominal pain, dysuria, shortness of breath     Objective   Objective   Vital Signs  Temp:  [95.6 °F (35.3 °C)-97.8 °F (36.6 °C)] 97.2 °F (36.2 °C)  Heart Rate:  [56-70] 70  Resp:  [16-20] 16  BP: (126-150)/(78-93) 150/93  SpO2:  [97 %-100 %] 97 %  on   ;   Device (Oxygen Therapy): room air  Body mass index is 24.57 kg/m².  Physical Exam  Constitutional:       General: He is not in acute distress.     Appearance: He is not diaphoretic.   HENT:      Mouth/Throat:      Mouth: Mucous membranes are moist.   Eyes:      General: No scleral icterus.  Cardiovascular:      Rate and Rhythm: Normal rate and regular rhythm.   Pulmonary:      Effort: Pulmonary effort is normal. No respiratory distress.      Breath sounds: No wheezing or rhonchi.   Abdominal:      General: There is no distension.      Palpations: Abdomen is soft.      Tenderness: There is no abdominal tenderness. There is no guarding.   Musculoskeletal:      Right lower leg: No edema.      Left lower leg: No edema.   Skin:     General: Skin is warm and dry.   Neurological:      Mental Status: He is alert.      Comments: Follows commands.  Still nonsensical speech.  Oriented to person only         Results Review     I reviewed the patient's new clinical results.  Results from last 7 days   Lab Units 02/10/22  0834 22  0619 22  0406 22  0452   WBC 10*3/mm3 5.84 5.45 6.93 6.71   HEMOGLOBIN g/dL 13.4 14.1 14.2 14.9   PLATELETS 10*3/mm3 177 174 164 146     Results from last 7 days   Lab Units 02/10/22  0834 22  0619 22  0406 22  0452   SODIUM mmol/L 143 143 147* 145   POTASSIUM mmol/L 3.8 3.8 4.0 3.4*   CHLORIDE  mmol/L 108* 107 110* 107   CO2 mmol/L 26.8 24.7 23.4 25.4   BUN mg/dL 25* 25* 22 19   CREATININE mg/dL 0.86 0.88 0.93 0.82   GLUCOSE mg/dL 124* 162* 121* 106*   Estimated Creatinine Clearance: 65.1 mL/min (by C-G formula based on SCr of 0.86 mg/dL).  Results from last 7 days   Lab Units 02/10/22  0834 02/07/22  0619 02/06/22  0406 02/05/22  0452   ALBUMIN g/dL 3.60 3.30* 3.50 3.40*   BILIRUBIN mg/dL 0.9 0.7 0.9 1.0   ALK PHOS U/L 79 92 86 88   AST (SGOT) U/L 21 20 33 38   ALT (SGPT) U/L 34 35 40 40     Results from last 7 days   Lab Units 02/10/22  0834 02/07/22  0619 02/06/22  0406 02/05/22 0452 02/04/22  0912 02/04/22  0912   CALCIUM mg/dL 8.7 8.9 9.4 8.8   < > 8.9   ALBUMIN g/dL 3.60 3.30* 3.50 3.40*   < > 3.60   MAGNESIUM mg/dL  --   --   --   --   --  2.0    < > = values in this interval not displayed.     Results from last 7 days   Lab Units 02/07/22  0619   PROCALCITONIN ng/mL 0.10     COVID19   Date Value Ref Range Status   01/29/2022 Detected (C) Not Detected - Ref. Range Final     SARS-CoV-2, ISIAH   Date Value Ref Range Status   01/25/2022 CANCELED       Comment:     Test not performed. Insufficient specimen to perform or complete  analysis.    Result canceled by the ancillary.       No results found for: HGBA1C, POCGLU    CT Chest Without Contrast Diagnostic  Narrative: CT OF THE CHEST WITHOUT CONTRAST 02/08/2022     HISTORY: Increased density in right lung on chest radiograph of  01/29/2022. COVID positive.     Axial images were obtained from the lung apices to the upper abdomen. No  intravenous contrast was given.     There is moderately severe ill-defined increased density in the right  lower lobe with some mild increased density in the right upper lobe.  Some of these areas of increased density have a slightly groundglass  appearance. There is some mild patchy infiltrate in the left lower lobe.  At least 2 calcified granulomas are seen in the left lower lobe.     There is some aortic and coronary  calcification.     No pathologically enlarged lymph nodes are seen.     The upper abdomen is unremarkable.     Impression: 1. Moderate bilateral pulmonary infiltrates most severe in the right  lower lobe. Some of these areas of infiltrate have a groundglass  appearance.  2. Findings are consistent with pneumonia, suspected to be COVID-19  pneumonia.     Radiation dose reduction techniques were utilized, including automated  exposure control and exposure modulation based on body size.     This report was finalized on 2/8/2022 11:11 AM by Dr. Jossue Katz M.D.       I reviewed the patient's daily medications.  Scheduled Medications  amLODIPine, 5 mg, Oral, Daily  atorvastatin, 10 mg, Oral, Nightly  cholecalciferol, 1,000 Units, Oral, Daily  enoxaparin, 40 mg, Subcutaneous, Q24H  fluvoxaMINE, 100 mg, Oral, Nightly  magnesium oxide, 200 mg, Oral, Daily  melatonin, 3 mg, Oral, Nightly  multivitamin with minerals, 1 tablet, Oral, Daily  pantoprazole, 40 mg, Oral, Daily  sucralfate, 1 g, Oral, Nightly  tamsulosin, 0.4 mg, Oral, Nightly  vitamin B-12, 1,000 mcg, Oral, Daily    Infusions   Diet  Diet Regular       Assessment/Plan     Active Hospital Problems    Diagnosis  POA   • **Encephalopathy due to COVID-19 virus [U07.1, G93.49]  Yes   • Age-related physical debility [R54]  Yes   • Altered mental status [R41.82]  Yes   • Hypertension [I10]  Yes   • GERD (gastroesophageal reflux disease) [K21.9]  Yes   • Anxiety [F41.9]  Yes      Resolved Hospital Problems    Diagnosis Date Resolved POA   • MILLY (acute kidney injury) ([facility]) [N17.9] 02/07/2022 Yes       77 y.o. male with no significant past medical history who was brought in by his family for worsening confusion.  He is found to have COVID-19 pneumonia without hypoxia, with encephalopathy.    Today: Little change in mental status today.  Day 5/5 of IV Methylpred   Wife planning to visit today, hopeful that this will help reorient him  Family requests nutrition consult  and boost supplements, orders placed.  Noted that he did not sleep well last night.  Will try scheduled low dose Seroquel tonight to help adjust his sleep cycle.  I have not ordered an MRI thus far because I do not think he will be able to hold still for it.  I think the risks of sedating him will outweigh the yield.  Will consult neurology, and they can determine if an MRI is warranted.  He has never had any focal deficits on my exam.    COVID-19 pneumonia, complicated by encephalopathy  -Currently comfortable on room air.  Continue supportive care, trend inflammatory markers, encourage incentive spirometry  -Evaluated by psychiatry.  Initiated as needed olanzapine (he has not received since 2/6).  Continue delirium precautions  -Previously discussed case with the patient's son, Hedy Tyron Hunt.  After a risk/benefits discussion, we agree to trial high-dose IV methylpred for 5 days.  There are case studies documenting efficacy of very high-dose steroids in cases of Covid encephalopathy: https://www.ncbi.nlm.nih.gov/labs/pmc/articles/PYH1272963/    Lovenox 40 mg SC daily for DVT prophylaxis.  Full code.  Discussed with nursing staff and patient  Anticipate discharge to SNF.  Pending improvement in mental status.       Elena Pacheco DO  [facility]  02/10/22  13:49 EST    Patient was placed in face mask on first look.  I wore protective equipment throughout this patient encounter including a face mask, gloves and protective eyewear.  Hand hygiene was performed before donning protective equipment and after removal when leaving the room.

## 2022-02-10 NOTE — THERAPY TREATMENT NOTE
Patient Name: Tyron Hunt  : 1945    MRN: [id number]                              Today's Date: 2/10/2022       Admit Date: 2022    Visit Dx:     ICD-10-CM ICD-9-CM   1. Age-related physical debility  R54 797   2. Altered mental status, unspecified altered mental status type  R41.82 780.97   3. COVID-19 virus infection  U07.1 079.89   4. Cervical radiculitis  M54.12 723.4   5. Cervical spinal stenosis  M48.02 723.0   6. Encephalopathy due to COVID-19 virus  U07.1 348.39    G93.49 079.89     Patient Active Problem List   Diagnosis   • Hypertension   • GERD (gastroesophageal reflux disease)   • Hyperlipidemia   • Anxiety   • Benign prostatic hyperplasia with lower urinary tract symptoms   • Impaired fasting glucose   • Recurrent major depressive disorder, in full remission (HCC)   • Cervical disc disease   • Cervical spinal stenosis   • Foraminal stenosis of cervical region   • Cervical radiculitis   • Altered mental status   • Encephalopathy due to COVID-19 virus   • Age-related physical debility     Past Medical History:   Diagnosis Date   • Anxiety    • Diverticulitis    • GERD (gastroesophageal reflux disease)    • Gout    • H/O complete eye exam 2017   • Hyperlipidemia    • Hypertension    • Impaired fasting blood sugar    • Kidney stones    • Memory loss    • Sleep apnea      Past Surgical History:   Procedure Laterality Date   • CERVICAL EPIDURAL N/A 2021    Procedure: CERVICAL EPIDURAL;  Surgeon: Nataliia Avilez MD;  Location: [facility] MAIN OR;  Service: Pain Management;  Laterality: N/A;   • CERVICAL EPIDURAL N/A 10/18/2021    Procedure: CERVICAL EPIDURAL 7-1;  Surgeon: Nataliia Avilez MD;  Location: [facility] MAIN OR;  Service: Pain Management;  Laterality: N/A;   • CYST REMOVAL     • CYSTOSCOPY W/ LITHOLAPAXY / EHL     • TONSILLECTOMY        General Information     Row Name 02/10/22 0940          Physical Therapy Time and Intention    Document Type therapy note (daily note)  -      Mode of Treatment physical therapy  -     Row Name 02/10/22 0940          General Information    Existing Precautions/Restrictions fall  -     Barriers to Rehab cognitive status  -     Row Name 02/10/22 0940          Cognition    Orientation Status (Cognition) oriented to; person  -           User Key  (r) = Recorded By, (t) = Taken By, (c) = Cosigned By    Initials Name Provider Type    Brianna Bryson PTA Physical Therapy Assistant               Mobility     Row Name 02/10/22 0941          Bed Mobility    Bed Mobility supine-sit; sit-supine  -     Supine-Sit Crook (Bed Mobility) standby assist  -     Sit-Supine Cro[place] (Bed Mobility) standby assist  -     Assistive Device (Bed Mobility) bed rails; head of bed elevated  -     Row Name 02/10/22 0941          Sit-Stand Transfer    Sit-Stand Cro[place] (Transfers) contact guard  -     Row Name 02/10/22 0941          Gait/Stairs (Locomotion)    Crook Level (Gait) contact guard; minimum assist (75% patient effort)  -     Assistive Device (Gait) walker, front-wheeled  initially w/ Rw, then w/o AD  -     Distance in Feet (Gait) 90  -SM     Deviations/Abnormal Patterns (Gait) sheila decreased; stride length decreased  -SM           User Key  (r) = Recorded By, (t) = Taken By, (c) = Cosigned By    Initials Name Provider Type    Brianna Bryson PTA Physical Therapy Assistant               Obj/Interventions     Row Name 02/10/22 0942          Motor Skills    Therapeutic Exercise --  standing heel/toe raises and marches x10 reps; min A for balance  -           User Key  (r) = Recorded By, (t) = Taken By, (c) = Cosigned By    Initials Name Provider Type    Brianna Bryson PTA Physical Therapy Assistant               Goals/Plan    No documentation.                Clinical Impression     Row Name 02/10/22 0943          Pain    Additional Documentation Pain Scale: Numbers Pre/Post-Treatment (Group)  -[facility]  Name 02/10/22 0943          Pain Scale: Numbers Pre/Post-Treatment    Pretreatment Pain Rating 0/10 - no pain  -SM     Posttreatment Pain Rating 0/10 - no pain  -SM     Row Name 02/10/22 0943          Positioning and Restraints    Pre-Treatment Position in bed  -SM     Post Treatment Position bed  -SM     In Bed supine; call light within reach; encouraged to call for assist; exit alarm on  -SM     Restraints reapplied:; soft limb  -SM           User Key  (r) = Recorded By, (t) = Taken By, (c) = Cosigned By    Initials Name Provider Type    Brianna Bryson PTA Physical Therapy Assistant               Outcome Measures     Row Name 02/10/22 0943          How much help from another person do you currently need...    Turning from your back to your side while in flat bed without using bedrails? 3  -SM     Moving from lying on back to sitting on the side of a flat bed without bedrails? 3  -SM     Moving to and from a bed to a chair (including a wheelchair)? 3  -SM     Standing up from a chair using your arms (e.g., wheelchair, bedside chair)? 3  -SM     Climbing 3-5 steps with a railing? 2  -SM     To walk in hospital room? 3  -SM     AM-PAC 6 Clicks Score (PT) 17  -SM     Row Name 02/10/22 0943          Functional Assessment    Outcome Measure Options AM-PAC 6 Clicks Basic Mobility (PT)  -SM           User Key  (r) = Recorded By, (t) = Taken By, (c) = Cosigned By    Initials Name Provider Type    Brianna Bryson PTA Physical Therapy Assistant                             Physical Therapy Education                 Title: PT OT SLP Therapies (Done)     Topic: Physical Therapy (Done)     Point: Mobility training (Done)     Learning Progress Summary           Patient Acceptance, E,TB,D, VU,NR by  at 2/10/2022 0944      Show all documentation for this point (5)                 Point: Home exercise program (Done)     Learning Progress Summary           Patient Acceptance, E,TB,D, VU,NR by  at 2/10/2022 0944       Show all documentation for this point (3)                 Point: Body mechanics (Done)     Learning Progress Summary           Patient Acceptance, E,TB,D, VU,NR by  at 2/10/2022 0944      Show all documentation for this point (5)                 Point: Precautions (Done)     Learning Progress Summary           Patient Acceptance, E,TB,D, VU,NR by  at 2/10/2022 0944      Show all documentation for this point (5)                             User Key     Initials Effective Dates Name Provider Type Discipline     03/07/18 -  Brianna Resedniz PTA Physical Therapy Assistant PT              PT Recommendation and Plan     Plan of Care Reviewed With: patient  Progress: improving  Outcome Summary: Pt tolerated treatment well this date. Still very limited cog status, only oriented to self. Required much less assist today, ambulating 90ft w/ CGA-min A. Initially used Rw for support, though pt began lifting it up off the ground and ambulated the rest of the distance w/out AD. Pt can be impulsive at times, though pleasant.     Time Calculation:    PT Charges     Row Name 02/10/22 0947             Time Calculation    Start Time 0842  -      Stop Time 0924  -      Time Calculation (min) 42 min  -      PT Received On 02/10/22  -      PT - Next Appointment 02/13/22  -            User Key  (r) = Recorded By, (t) = Taken By, (c) = Cosigned By    Initials Name Provider Type     Brianna Resnediz PTA Physical Therapy Assistant              Therapy Charges for Today     Code Description Service Date Service Provider Modifiers Qty    56638975918 HC PT THER PROC EA 15 MIN 2/10/2022 Brianna Resendiz PTA GP 2    02854658911 HC PT THERAPEUTIC ACT EA 15 MIN 2/10/2022 Brianna Resendiz PTA GP 1          PT G-Codes  Outcome Measure Options: AM-PAC 6 Clicks Basic Mobility (PT)  AM-PAC 6 Clicks Score (PT): 17  AM-PAC 6 Clicks Score (OT): 13    Brianna Resendiz PTA  2/10/2022

## 2022-02-10 NOTE — PROGRESS NOTES
The patient is awake alert but oriented to person only when seen today.  He is more conversant but believes that we are in [place], [place].  No restraint devices are in place at this time.

## 2022-02-10 NOTE — PLAN OF CARE
Goal Outcome Evaluation:  Plan of Care Reviewed With: patient        Progress: improving  Outcome Summary: Pt tolerated treatment well this date. Still very limited cog status, only oriented to self. Required much less assist today, ambulating 90ft w/ CGA-min A. Initially used Rw for support, though pt began lifting it up off the ground and ambulated the rest of the distance w/out AD. Pt can be impulsive at times, though pleasant.

## 2022-02-10 NOTE — CASE MANAGEMENT/SOCIAL WORK
Continued Stay Note  [facility]     Patient Name: Tyron Hunt  MRN: [id number]  Today's Date: 2/10/2022    Admit Date: 1/29/2022     Discharge Plan     Row Name 02/10/22 0936       Plan    Plan SNF vs HH- Pending on hospital course    Patient/Family in Agreement with Plan yes    Plan Comments Recieved message from Dr. Hunt pts son who asked about pts wife visitation. He also had concerns with pts weight loss. CCP explained would have MD call him to further discuss that and explained JOANNE Blue would call pts wife to arrange one time visit. CCP following for further dc planning. Per Geovanna/Berty no beds available this week. Marcello/Aung Iyer following, pt will need to be restraint free prior to dc. Hany RUBIO/CCP               Discharge Codes    No documentation.               Expected Discharge Date and Time     Expected Discharge Date Expected Discharge Time    Feb 11, 2022             Melisa Anaya RN

## 2022-02-11 ENCOUNTER — HOME HEALTH ADMISSION (OUTPATIENT)
Dept: HOME HEALTH SERVICES | Facility: HOME HEALTHCARE | Age: 77
End: 2022-02-11

## 2022-02-11 ENCOUNTER — READMISSION MANAGEMENT (OUTPATIENT)
Dept: CALL CENTER | Facility: HOSPITAL | Age: 77
End: 2022-02-11

## 2022-02-11 VITALS
DIASTOLIC BLOOD PRESSURE: 87 MMHG | SYSTOLIC BLOOD PRESSURE: 123 MMHG | OXYGEN SATURATION: 98 % | TEMPERATURE: 96.2 F | HEART RATE: 72 BPM | WEIGHT: 143.2 LBS | BODY MASS INDEX: 24.45 KG/M2 | HEIGHT: 64 IN | RESPIRATION RATE: 16 BRPM

## 2022-02-11 PROBLEM — D89.831 CYTOKINE RELEASE SYNDROME, GRADE 1: Status: ACTIVE | Noted: 2022-02-11

## 2022-02-11 PROCEDURE — 99232 SBSQ HOSP IP/OBS MODERATE 35: CPT | Performed by: NURSE PRACTITIONER

## 2022-02-11 PROCEDURE — 25010000002 ENOXAPARIN PER 10 MG

## 2022-02-11 RX ORDER — DONEPEZIL HYDROCHLORIDE 10 MG/1
10 TABLET, FILM COATED ORAL NIGHTLY
Qty: 30 TABLET | Refills: 0 | Status: SHIPPED | OUTPATIENT
Start: 2022-02-17 | End: 2022-02-17 | Stop reason: SDUPTHER

## 2022-02-11 RX ORDER — DONEPEZIL HYDROCHLORIDE 5 MG/1
5 TABLET, FILM COATED ORAL NIGHTLY
Qty: 6 TABLET | Refills: 0 | Status: SHIPPED | OUTPATIENT
Start: 2022-02-11 | End: 2022-02-17

## 2022-02-11 RX ADMIN — MULTIPLE VITAMINS W/ MINERALS TAB 1 TABLET: TAB at 08:57

## 2022-02-11 RX ADMIN — MAGNESIUM OXIDE 400 MG (241.3 MG MAGNESIUM) TABLET 200 MG: TABLET at 08:57

## 2022-02-11 RX ADMIN — Medication 1000 MCG: at 08:57

## 2022-02-11 RX ADMIN — PANTOPRAZOLE SODIUM 40 MG: 40 TABLET, DELAYED RELEASE ORAL at 08:57

## 2022-02-11 RX ADMIN — ENOXAPARIN SODIUM 40 MG: 100 INJECTION SUBCUTANEOUS at 08:57

## 2022-02-11 RX ADMIN — Medication 1000 UNITS: at 08:57

## 2022-02-11 RX ADMIN — AMLODIPINE BESYLATE 5 MG: 5 TABLET ORAL at 08:57

## 2022-02-11 NOTE — CASE MANAGEMENT/SOCIAL WORK
Case Management Discharge Note      Final Note: Home with [facility] HH and family, DME thru [employer] and private caregivers charity deivne rn/ccp         Selected Continued Care - Admitted Since 1/29/2022     Destination    No services have been selected for the patient.              Durable Medical Equipment     Service Provider Selected Services Address Phone Fax Patient Preferred    [facility]  Durable Medical Equipment [address], [place] [address] [phone] --          Dialysis/Infusion    No services have been selected for the patient.              Home Medical Care Coordination complete.    Service Provider Selected Services Address Phone Fax Patient Preferred    [facility]  Home Health Services [address] [address] [phone] --          Therapy    No services have been selected for the patient.              Community Resources    No services have been selected for the patient.              Community & DME    No services have been selected for the patient.                       Final Discharge Disposition Code: 06 - home with home health care

## 2022-02-11 NOTE — PROGRESS NOTES
"DOS: 2022  NAME: Tyron Hunt   : 1945  PCP: Tyron Hunt MD  Chief Complaint   Patient presents with   • Weakness - Generalized   • Altered Mental Status     Stroke    Subjective: He was initially sleeping when I entered but aroused easily to voice. He is pleasant, denies headache or SOA. No significant events overnight. Pt seen in follow up today, however the problem is new to the examiner.      Objective:  Vital signs: /94 (BP Location: Right arm)   Pulse 71   Temp 96.6 °F (35.9 °C) (Axillary) Comment: oral 95.1  Resp 16   Ht 162.6 cm (64.02\")   Wt 65 kg (143 lb 3.2 oz)   SpO2 100%   BMI 24.57 kg/m²       General appearance: Well developed, well nourished, alert and cooperative.   HEENT: Normocephalic.   Cardiac: Regular rate and rhythm.   Peripheral Vasculature: Radial pulses are equal and symmetric.  Chest Exam: Clear to auscultation bilaterally, no wheezes, no rhonchi.  Extremities: Normal, no edema.   Skin: No rashes or birthmarks.     Higher integrative function: Oriented to self, but not hospital, situation, or date. States he's in [place], [place]. States it's September. Spontaneous speech and naming intact.   Cns: Visual fields grossly intact, EOMI without nystagmus, PERRL, face symmetric, shoulder shrug symmetric, tongue midline. Mild dysarthria.  Motor: Normal muscle strength, bulk, and tone in upper and lower extremities. No fasciculations, rigidity, spasticity or abnormal movements.   Sensation: Intact to LT in all extremities.   Station and gait: Deferred.      Scheduled Meds:amLODIPine, 5 mg, Oral, Daily  atorvastatin, 10 mg, Oral, Nightly  cholecalciferol, 1,000 Units, Oral, Daily  donepezil, 5 mg, Oral, Nightly   Followed by  [START ON 2022] donepezil, 10 mg, Oral, Nightly  enoxaparin, 40 mg, Subcutaneous, Q24H  fluvoxaMINE, 100 mg, Oral, Nightly  magnesium oxide, 200 mg, Oral, Daily  melatonin, 3 mg, Oral, Nightly  multivitamin with minerals, 1 tablet, Oral, " Daily  pantoprazole, 40 mg, Oral, Daily  QUEtiapine, 12.5 mg, Oral, Nightly  sucralfate, 1 g, Oral, Nightly  tamsulosin, 0.4 mg, Oral, Nightly  vitamin B-12, 1,000 mcg, Oral, Daily      Continuous Infusions:   PRN Meds:.•  acetaminophen  •  magnesium sulfate **OR** magnesium sulfate **OR** magnesium sulfate  •  OLANZapine  •  ondansetron **OR** ondansetron  •  potassium chloride  •  potassium chloride  •  [COMPLETED] Insert peripheral IV **AND** sodium chloride      Laboratory results:  Lab Results   Component Value Date    GLUCOSE 124 (H) 02/10/2022    CALCIUM 8.7 02/10/2022     02/10/2022    K 3.8 02/10/2022    CO2 26.8 02/10/2022     (H) 02/10/2022    BUN 25 (H) 02/10/2022    CREATININE 0.86 02/10/2022    EGFRIFAFRI 75 09/21/2021    EGFRIFNONA 86 02/10/2022    BCR 29.1 (H) 02/10/2022    ANIONGAP 8.2 02/10/2022     Lab Results   Component Value Date    WBC 5.84 02/10/2022    HGB 13.4 02/10/2022    HCT 39.1 02/10/2022    MCV 90.3 02/10/2022     02/10/2022     No results found for: CHOL  Lab Results   Component Value Date    HDL 44 09/19/2017    HDL 42 09/01/2016     Lab Results   Component Value Date    LDL 60 09/19/2017    LDL 67 09/01/2016     Lab Results   Component Value Date    TRIG 73 06/18/2021    TRIG 124 01/28/2021    TRIG 91 06/10/2020          Review and interpretation of imaging:  Patient: BART BENSON  Time Out: 00:12  Exam(s): CT HEAD Without Contrast      EXAM:    CT Head Without Intravenous Contrast     CLINICAL HISTORY:     Reason for exam: Delirium.     TECHNIQUE:    Axial computed tomography images of the head brain without intravenous   contrast.  CTDI is 54.8 mGy and DLP is 1007.3 mGy-cm.  This CT exam was   performed according to the principle of ALARA (As Low As Reasonably   Achievable) by using one or more of the following dose reduction   techniques: automated exposure control, adjustment of the mA and or kV   according to patient size, and or use of iterative  reconstruction   technique.     COMPARISON:    No previous studies.     FINDINGS:    Limitations:  Markedly limited evaluation due to motion artifact.    Brain:  No abnormal extra-axial collection is noted.  No hemorrhage.    Midline shift:  Midline anatomy is unremarkable.    Ventricles:  There is prominence of the ventricular system, cortical   sulci, basilar cisterns, compatible with age related atrophy.    Bones joints:  Calvarium is within normal limits.  No acute fracture.    Soft tissues:  Unremarkable.    Vasculature:  Atherosclerotic disease.    Sinuses:  Mild to moderate chronic ethmoid sinusitis.    Mastoid air cells:  Mastoid air cells are unremarkable.     IMPRESSION:       1.  Age-related atrophy and small vessel disease of aging.  2.  No acute intracranial pathology is noted.  3.  If there is concern for etiology such as early acute lacunar infarcts,   MRI imaging of the brain with diffusion-weighted sequences should be   performed.     IMPRESSION:        Electronically signed by Rodrigo Rodríguez MD on 01-30-22 at 0012    Impression:  77 yo right-handed male with PMH HTN, HLD, kidney stones, gout, anxiety/depression, diverticulitis, mild JOE, and dementia (followed by Dr Lai as outpatient) who had a recent diagnosis of COVID and was admitted 1/29 with encephalopathy. Since admission he has had fluctuating delirium symptoms without any significant improvement. Neurology was consulted yesterday due to lack of cognitive improvement. He had a CT head when he was admitted that showed no acute findings. When he was initially evaluated by Dr Varma yesterday. He was noted to be calm and cooperative with some visual hallucinations but no focal symptoms.     Dx:   Delirium in setting of baseline moderate cognitive impairment  COVID 19    Plan:  Aricept restarted.  Dr Varma has recommended OOB/ increased mobilization with delirium precautions including increased natural light, family in the room,  minimizing restraints vs d/c home with increased support. The patent's family and primary team have opted for the later and patient is to be d/c today. Please call neurology with further questions/concerns. D/W Dr Varma today.

## 2022-02-11 NOTE — PLAN OF CARE
Problem: Restraint, Nonbehavioral (Nonviolent)  Goal: Discontinuation Criteria Achieved  2/11/2022 0613 by Quin López RN  Outcome: Ongoing, Progressing  2/11/2022 0611 by Quin López RN  Outcome: Ongoing, Progressing  2/11/2022 0520 by Quin López RN  Outcome: Ongoing, Progressing  Intervention: Implement Least-restrictive Safety Strategies  Recent Flowsheet Documentation  Taken 2/11/2022 0400 by Quin López RN  Medical Device Protection: IV pole/bag removed from visual field  Taken 2/11/2022 0200 by Quin López RN  Medical Device Protection: IV pole/bag removed from visual field  Taken 2/11/2022 0000 by Quin López RN  Medical Device Protection: IV pole/bag removed from visual field  Taken 2/10/2022 2200 by Quin López RN  Medical Device Protection: IV pole/bag removed from visual field  Taken 2/10/2022 2000 by Quin López RN  Medical Device Protection: IV pole/bag removed from visual field  Diversional Activities: television   Goal Outcome Evaluation:  Plan of Care Reviewed With: patient        Progress: no change  Outcome Summary: VSS, marce NVR continued, seroquel and Aricept started, no s/s of painor nausea, patient is polite and impulsive, will CTM

## 2022-02-11 NOTE — CONSULTS
Adult Nutrition  Assessment/PES    Patient Name:  Tyron Hunt  YOB: 1945  MRN: [id number]  Admit Date:  1/29/2022    Assessment Date:  2/11/2022    Comments:  Nutrition Consult for unintended wt loss.    We have been following pt since admission and supplements had been added to meals TID. Pt continues with poor po intake. Note 9lb wt loss per wt report. Staff assisting with meal. Po continued to be encouraged. Will also try other supplements and speak with family regarding food pref's.      Reason for Assessment     Row Name 02/11/22 0617          Reason for Assessment    Reason For Assessment physician consult                Nutrition/Diet History     Row Name 02/11/22 0618          Nutrition/Diet History    Typical Food/Fluid Intake continued poor po intake. already has supplement in place, 9lb wt loss since admission, staff assisting with feeding                  Labs/Tests/Procedures/Meds     Row Name 02/11/22 0619          Labs/Procedures/Meds    Lab Results Reviewed reviewed     Lab Results Comments glu, bun, crp            Diagnostic Tests/Procedures    Diagnostic Test/Procedure Reviewed reviewed            Medications    Pertinent Medications Reviewed reviewed     Pertinent Medications Comments lipitor, Vit D, lovenox, melatonin, mvi, protonix, B12                Physical Findings     Row Name 02/11/22 0620          Physical Findings    Oral/Mouth Cavity poor dentition     Skin poor skin integrity/turgor  bruised, ecchymotic, abrasion                Estimated/Assessed Needs     Row Name 02/11/22 0620          Calculation Measurements    Weight Used For Calculations 65 kg (143 lb 4.8 oz)            Estimated/Assessed Needs    Additional Documentation KCAL/KG (Group); Fluid Requirements (Group); Protein Requirements (Group)            KCAL/KG    KCAL/KG 30 Kcal/Kg (kcal)     30 Kcal/Kg (kcal) 1950            Protein Requirements    Weight Used For Protein Calculations 65 kg (143 lb 4.8 oz)      Est Protein Requirement Amount (gms/kg) 1.0 gm protein     Estimated Protein Requirements (gms/day) 65            Fluid Requirements    Fluid Requirements (mL/day) 1950     RDA Method (mL) 1950                Nutrition Prescription Ordered     Row Name 02/11/22 0620          Nutrition Prescription PO    Current PO Diet Regular     Supplement Boost Plus (Ensure Enlive, Ensure Plus)     Supplement Frequency 3 times a day                       Problem/Interventions:           Intervention Goal     Row Name 02/11/22 0621          Intervention Goal    General Maintain nutrition; Disease management/therapy; Reduce/improve symptoms     PO Increase intake; PO intake (%)     PO Intake % 75 %     Weight Maintain weight                Nutrition Intervention     Row Name 02/11/22 0621          Nutrition Intervention    RD/Tech Action Follow Tx progress; Care plan reviewd; Encourage intake; Supplement provided                  Education/Evaluation     Row Name 02/11/22 0621          Monitor/Evaluation    Monitor Per protocol; PO intake; Supplement intake; Pertinent labs; Weight; Skin status                 Electronically signed by:  Marina Bowden RD  02/11/22 06:22 EST

## 2022-02-11 NOTE — DISCHARGE SUMMARY
Patient Name: Tyron Hunt  : 1945  MRN: [id number]    Date of Admission: 2022  Date of Discharge:  2022  Primary Care Physician: Tyron Hunt MD      Chief Complaint:   Weakness - Generalized and Altered Mental Status      Discharge Diagnoses     Active Hospital Problems    Diagnosis  POA   • **Encephalopathy due to COVID-19 virus [U07.1, G93.49]  Yes   • Cytokine release syndrome, grade 1 [D89.831]  No   • Age-related physical debility [R54]  Yes   • Altered mental status [R41.82]  Yes   • Hypertension [I10]  Yes   • GERD (gastroesophageal reflux disease) [K21.9]  Yes   • Anxiety [F41.9]  Yes      Resolved Hospital Problems    Diagnosis Date Resolved POA   • MILLY (acute kidney injury) (HCC) [N17.9] 2022 Yes        Hospital Course     Mr. Hunt is a 77 y.o. male who was brought in by his family for worsening confusion.  He is found to have COVID-19 pneumonia without hypoxia, with encephalopathy.     COVID-19 pneumonia, complicated by encephalopathy: He was evaluated by both psychiatry and neurology for his encephalopathy. He had negative CT head on admission. He never had any focal deficits to indicate MRI. He was treated with nonpharmacologic delirium precautions as well as nightly Seroquel. He was also initiated on Aricept due to family's report of recent memory issues.  The patient's son is a physician and requested that we trial 5 days of high-dose steroids based on case studies of COVID encephalopathy. He received 5 days of IV methylpred 125 mg. There was little improvement in his mental status unfortunately.  His family has elected to take him home with caregiver support. Hopeful that his mental status will improve when he is in a more familiar setting.      Day of Discharge     Subjective:  Resting comfortably in bed this morning. Arousable to voice, follows commands. Otherwise remains disoriented    Review of Systems  Not reliable due to patient's mental status    Physical  Exam:  Temp:  [96.2 °F (35.7 °C)-97.6 °F (36.4 °C)] 96.2 °F (35.7 °C)  Heart Rate:  [70-72] 72  Resp:  [16] 16  BP: (123-143)/(87-94) 123/87  Body mass index is 24.57 kg/m².  Physical Exam  Constitutional:       General: He is not in acute distress.     Appearance: He is not diaphoretic.   HENT:      Mouth/Throat:      Mouth: Mucous membranes are moist.   Eyes:      General: No scleral icterus.  Cardiovascular:      Rate and Rhythm: Normal rate and regular rhythm.   Pulmonary:      Effort: Pulmonary effort is normal. No respiratory distress.      Breath sounds: No wheezing or rhonchi.   Abdominal:      General: There is no distension.      Palpations: Abdomen is soft.      Tenderness: There is no abdominal tenderness. There is no guarding.   Musculoskeletal:      Right lower leg: No edema.      Left lower leg: No edema.   Skin:     General: Skin is warm and dry.   Neurological:      Mental Status: He is alert.      Comments: Follows commands.  Still nonsensical speech.  Oriented to person only         Consultants     Consult Orders (all) (From admission, onward)     Start     Ordered    02/10/22 1503  Inpatient Neurology Consult General  Once        Specialty:  Neurology  Provider:  Tyron Varma MD    02/10/22 1503    02/10/22 1050  Inpatient Nutrition Consult  Once        Provider:  (Not yet assigned)    02/10/22 1049    02/02/22 1330  Inpatient Psychiatrist Consult  Once        Specialty:  Psychiatry  Provider:  Terry Hernandez III, MD    02/02/22 1329    01/31/22 1602  Inpatient Nutrition Consult  Once        Provider:  (Not yet assigned)    01/31/22 1602    01/30/22 0118  [facility] (on-call MD unless specified) Details  Once        Specialty:  Hospitalist  Provider:  (Not yet assigned)    01/30/22 0117              Procedures     Imaging Results (All)     Procedure Component Value Units Date/Time    CT Chest Without Contrast Diagnostic [[id number]] Collected: 02/08/22 0955     Updated: 02/08/22  1114    Narrative:      CT OF THE CHEST WITHOUT CONTRAST 02/08/2022     HISTORY: Increased density in right lung on chest radiograph of  01/29/2022. COVID positive.     Axial images were obtained from the lung apices to the upper abdomen. No  intravenous contrast was given.     There is moderately severe ill-defined increased density in the right  lower lobe with some mild increased density in the right upper lobe.  Some of these areas of increased density have a slightly groundglass  appearance. There is some mild patchy infiltrate in the left lower lobe.  At least 2 calcified granulomas are seen in the left lower lobe.     There is some aortic and coronary calcification.     No pathologically enlarged lymph nodes are seen.     The upper abdomen is unremarkable.       Impression:      1. Moderate bilateral pulmonary infiltrates most severe in the right  lower lobe. Some of these areas of infiltrate have a groundglass  appearance.  2. Findings are consistent with pneumonia, suspected to be COVID-19  pneumonia.     Radiation dose reduction techniques were utilized, including automated  exposure control and exposure modulation based on body size.     This report was finalized on 2/8/2022 11:11 AM by Dr. Jossue Katz M.D.       XR Chest 1 View [[id number]] Collected: 01/30/22 0023     Updated: 01/30/22 0041    Narrative:      PORTABLE RADIOGRAPHIC VIEW OF THE CHEST     CLINICAL HISTORY: Altered mental status.     Portable radiographic view of the chest demonstrates an indeterminate  2.9 cm vague opacity within the right base which could represent an area  of scarring, or atelectatic change although I cannot exclude the  possibility of a mass. Further evaluation with a CT scan of the chest is  suggested. There are areas of atelectatic change or less likely  pneumonia within the left base. Overall, the lung volumes are relatively  low. There is a tortuous thoracic aorta. The osseous structures are  unremarkable.        Impression:         Within the right lung base, there is an indeterminate opacity which  measures up to approximately 2.9 cm in diameter and could represent an  area of scarring or atelectatic change although I cannot exclude the  possibility of a mass at this site. I recommend further evaluation with  a CT scan of the chest.     These findings and recommendations were discussed with Gorge Resendiz MD,  on 01/29/2022 at approximately 12:05 AM.     This report was finalized on 1/30/2022 12:33 AM by Dr. Nacho Maria M.D.       CT Head Without Contrast [[id number]] Collected: 01/30/22 0012     Updated: 01/30/22 0012    Narrative:        Patient: BART BENSON  Time Out: 00:12  Exam(s): CT HEAD Without Contrast     EXAM:    CT Head Without Intravenous Contrast    CLINICAL HISTORY:     Reason for exam: Delirium.    TECHNIQUE:    Axial computed tomography images of the head brain without intravenous   contrast.  CTDI is 54.8 mGy and DLP is 1007.3 mGy-cm.  This CT exam was   performed according to the principle of ALARA (As Low As Reasonably   Achievable) by using one or more of the following dose reduction   techniques: automated exposure control, adjustment of the mA and or kV   according to patient size, and or use of iterative reconstruction   technique.    COMPARISON:    No previous studies.    FINDINGS:    Limitations:  Markedly limited evaluation due to motion artifact.    Brain:  No abnormal extra-axial collection is noted.  No hemorrhage.    Midline shift:  Midline anatomy is unremarkable.    Ventricles:  There is prominence of the ventricular system, cortical   sulci, basilar cisterns, compatible with age related atrophy.    Bones joints:  Calvarium is within normal limits.  No acute fracture.    Soft tissues:  Unremarkable.    Vasculature:  Atherosclerotic disease.    Sinuses:  Mild to moderate chronic ethmoid sinusitis.    Mastoid air cells:  Mastoid air cells are unremarkable.    IMPRESSION:       1.   Age-related atrophy and small vessel disease of aging.  2.  No acute intracranial pathology is noted.  3.  If there is concern for etiology such as early acute lacunar infarcts,   MRI imaging of the brain with diffusion-weighted sequences should be   performed.      Impression:          Electronically signed by Rodrigo Rodríguez MD on 01-30-22 at 0012          Pertinent Labs     Results from last 7 days   Lab Units 02/10/22  0834 02/07/22  0619 02/06/22 0406 02/05/22  0452   WBC 10*3/mm3 5.84 5.45 6.93 6.71   HEMOGLOBIN g/dL 13.4 14.1 14.2 14.9   PLATELETS 10*3/mm3 177 174 164 146     Results from last 7 days   Lab Units 02/10/22  0834 02/07/22  0619 02/06/22 0406 02/05/22  0452   SODIUM mmol/L 143 143 147* 145   POTASSIUM mmol/L 3.8 3.8 4.0 3.4*   CHLORIDE mmol/L 108* 107 110* 107   CO2 mmol/L 26.8 24.7 23.4 25.4   BUN mg/dL 25* 25* 22 19   CREATININE mg/dL 0.86 0.88 0.93 0.82   GLUCOSE mg/dL 124* 162* 121* 106*   Estimated Creatinine Clearance: 65.1 mL/min (by C-G formula based on SCr of 0.86 mg/dL).  Results from last 7 days   Lab Units 02/10/22  0834 02/07/22  0619 02/06/22 0406 02/05/22  0452   ALBUMIN g/dL 3.60 3.30* 3.50 3.40*   BILIRUBIN mg/dL 0.9 0.7 0.9 1.0   ALK PHOS U/L 79 92 86 88   AST (SGOT) U/L 21 20 33 38   ALT (SGPT) U/L 34 35 40 40     Results from last 7 days   Lab Units 02/10/22  0834 02/07/22  0619 02/06/22  0406 02/05/22  0452   CALCIUM mg/dL 8.7 8.9 9.4 8.8   ALBUMIN g/dL 3.60 3.30* 3.50 3.40*               Invalid input(s): LDLCALC        Test Results Pending at Discharge       Discharge Details        Discharge Medications      New Medications      Instructions Start Date   donepezil 5 MG tablet  Commonly known as: ARICEPT   5 mg, Oral, Nightly      donepezil 10 MG tablet  Commonly known as: ARICEPT   10 mg, Oral, Nightly   Start Date: February 17, 2022        Continue These Medications      Instructions Start Date   amLODIPine 5 MG tablet  Commonly known as: NORVASC   5 mg, Oral, Daily       atorvastatin 10 MG tablet  Commonly known as: LIPITOR   10 mg, Oral, Daily      coenzyme Q10 100 MG capsule   100 mg, Oral, Daily      COMPLETE MULTIVITAMIN/MINERAL PO   Oral      fluvoxaMINE 100 MG tablet  Commonly known as: LUVOX   100 mg, Oral, Nightly      losartan 25 MG tablet  Commonly known as: Cozaar   25 mg, Oral, Daily, Continue taking the Norvasc 5mg      Magnesium 200 MG tablet   200 mg, Oral      pantoprazole 40 MG EC tablet  Commonly known as: PROTONIX   40 mg, Oral, Daily      PREVAGEN PO   1 tablet, Oral, Daily      QUEtiapine 25 MG tablet  Commonly known as: SEROquel   25 mg, Oral, Nightly      sucralfate 1 g tablet  Commonly known as: CARAFATE   TAKE ONE TABLET BY MOUTH ONCE NIGHTLY      tamsulosin 0.4 MG capsule 24 hr capsule  Commonly known as: FLOMAX   0.4 mg, Oral, Nightly      vitamin B-12 1000 MCG tablet  Commonly known as: CYANOCOBALAMIN   1,000 mcg, Oral, Daily      VITAMIN D3 PO   Oral      ZINC PO   Oral         Stop These Medications    famotidine 20 MG tablet  Commonly known as: Pepcid            Allergies   Allergen Reactions   • Sulfa Antibiotics          Discharge Disposition:  [place]Health [place] [facility]    Discharge Diet:  Diet Order   Procedures   • Diet Regular       Discharge Activity:   As tolerated    CODE STATUS:    Code Status and Medical Interventions:   Ordered at: 01/30/22 0202     Level Of Support Discussed With:    Patient     Code Status (Patient has no pulse and is not breathing):    CPR (Attempt to Resuscitate)     Medical Interventions (Patient has pulse or is breathing):    Full       Future Appointments   Date Time Provider Department Center   2/12/2022 To Be Determined Doris Gonzalez RN [facility] None   2/15/2022 To Be Determined Ivonne Hernandez PT [facility] None   2/24/2022 11:20 AM Nataliia Avilez MD [facility]     Additional Instructions for the Follow-ups that You Need to Schedule     Ambulatory Referral to Home Health   As directed      Face to Face  Visit Date: 2/4/2022    Follow-up provider for Plan of Care?: I treated the patient in an acute care facility and will not continue treatment after discharge.    Follow-up provider: BART HUNT [[id number]]    Reason/Clinical Findings: Encephalopathy due to COVID-19 virus    Describe mobility limitations that make leaving home difficult: Dementia, covid19    Nursing/Therapeutic Services Requested: Physical Therapy Skilled Nursing    Skilled nursing orders: Medication education Cardiopulmonary assessments    PT orders: Therapeutic exercise Transfer training Strengthening Gait Training Home safety assessment    Weight Bearing Status: As Tolerated    Frequency: 1 Week 1            Contact information for follow-up providers     Bart Hunt MD. Schedule an appointment as soon as possible for a visit in 1 week(s).    Specialty: Family Medicine  Contact information:  [address]  [address]  [facility] [id number] [phone]                   Contact information for after-discharge care     Durable Medical Equipment     [facility] .    Service: Durable Medical Equipment  Contact information:  [address]  [place] [id number] [phone]                 Home Medical Care     [facility] .    Service: Home Health Services  Contact information:  [address]  [place] [address] [phone]                             Additional Instructions for the Follow-ups that You Need to Schedule     Ambulatory Referral to Home Health   As directed      Face to Face Visit Date: 2/4/2022    Follow-up provider for Plan of Care?: I treated the patient in an acute care facility and will not continue treatment after discharge.    Follow-up provider: BART HUNT [[id number]]    Reason/Clinical Findings: Encephalopathy due to COVID-19 virus    Describe mobility limitations that make leaving home difficult: Dementia, covid19    Nursing/Therapeutic  Services Requested: Physical Therapy Skilled Nursing    Skilled nursing orders: Medication education Cardiopulmonary assessments    PT orders: Therapeutic exercise Transfer training Strengthening Gait Training Home safety assessment    Weight Bearing Status: As Tolerated    Frequency: 1 Week 1           Time Spent on Discharge:  Greater than 30 minutes      DO [facility]  02/11/22  09:59 EST

## 2022-02-11 NOTE — NURSING NOTE
Discharge instructions given to patient and discussed with patient wife. He is being discharged home with  and home care. Walker delivered and provided at discharge. Patient remains confused but understands that he is going home. Discharged by wheelchair to private vehicle. Restraints removed at time of discharge.

## 2022-02-11 NOTE — PROGRESS NOTES
[facility] to provide Home Care services. Patient and family agreeable. PCP and contact information confirmed. PCP is the patient's father, Dr Tyron Hunt.

## 2022-02-11 NOTE — CASE MANAGEMENT/SOCIAL WORK
Continued Stay Note  [facility]     Patient Name: Tyron Hunt  MRN: [id number]  Today's Date: 2/11/2022    Admit Date: 1/29/2022     Discharge Plan     Row Name 02/11/22 0957       Plan    Plan Home with [facility] and family, DME thru [facility] and private caregivers info    Patient/Family in Agreement with Plan yes    Plan Comments Spoke with both son and wife regarding dc plan. They wish to take him home and have [facility] and are also looking into private caregivers. Son will be assisting the next 3 days as he is off. CCP emailed privatecaregiver list to him at [email or web address]. Wife states she will run to the grocery and then we will call her when pt is close to dc for coordination of dc time. She states they recieved bedside commode but awaiting walker from [employer]. Called [facility]Cj and will recieved walker prior to dc. Called [facility] and notified of dc. Notified MD and RN fanta. IMM notice given to wife. Jakob GOODECCP    Final Discharge Disposition Code 06 - home with home health care    Final Note Home with [facility] and family, DME thru [facility] and private caregivers info. sybil goode/ccp               Discharge Codes    No documentation.               Expected Discharge Date and Time     Expected Discharge Date Expected Discharge Time    Feb 11, 2022             Melisa Anaya RN

## 2022-02-11 NOTE — PLAN OF CARE
Goal Outcome Evaluation:  Plan of Care Reviewed With: patient        Progress: no change  Outcome Summary: VSS, marce NVR continued, seroquel and Aricept started, no s/s of painor nausea, patient is polite and impulsive, will CTM

## 2022-02-11 NOTE — PROGRESS NOTES
The patient offers no new complaints when seen today and the chart indicates that he is scheduled for discharge home with home health services.  I would recommend continuation of Aricept, Luvox and Seroquel following discharge.

## 2022-02-12 ENCOUNTER — HOME CARE VISIT (OUTPATIENT)
Dept: HOME HEALTH SERVICES | Facility: HOME HEALTHCARE | Age: 77
End: 2022-02-12

## 2022-02-12 ENCOUNTER — TRANSITIONAL CARE MANAGEMENT TELEPHONE ENCOUNTER (OUTPATIENT)
Dept: CALL CENTER | Facility: HOSPITAL | Age: 77
End: 2022-02-12

## 2022-02-12 PROCEDURE — G0299 HHS/HOSPICE OF RN EA 15 MIN: HCPCS

## 2022-02-12 NOTE — OUTREACH NOTE
Call Center TCM Note      Responses   [facility] patient discharged from? [place]   Does the patient have one of the following disease processes/diagnoses(primary or secondary)? COVID-19   COVID-19 underlying condition? None   TCM attempt successful? Yes   Call start time 0931   Call end time 0948   General alerts for this patient This is Dr Hunt's father   Discharge diagnosis encephalopathy d/t COVID-19 PNA, MILLY   Is patient permission given to speak with other caregiver? Yes   Person spoke with today (if not patient) and relationship Julia wife    Meds reviewed with patient/caregiver? Yes   Is the patient having any side effects they believe may be caused by any medication additions or changes? No   Does the patient have all medications ordered at discharge? No  [Have not had time to  med yet- it will be picked up today. ]   Nursing Interventions No intervention needed,  Nurse provided patient education   Is the patient taking all medications as directed (includes completed medication regime)? No   Does the patient have a primary care provider?  Yes   Comments regarding PCP Scheduled a followup with Dr Hunt on 2/17/2022 for a hospital followup   Does the patient have an appointment with their PCP or specialist within 7 days of discharge? Yes   Has the patient kept scheduled appointments due by today? N/A   What is the Home health agency?  [facility]   Has home health visited the patient within 72 hours of discharge? Call prior to 72 hours   Home health comments HH coming today at noon   What DME was ordered? [employer] [facility] walker from [facility]   Has all DME been delivered? Yes   Psychosocial issues? No   Did the patient receive a copy of their discharge instructions? Yes   Did the patient receive a copy of COVID-19 specific instructions? Yes   Nursing interventions Reviewed instructions with patient   What is the patient's perception of their health status since discharge? Improving   Does the patient have  any of the following symptoms? None  [Wife reports  weakess but he is walking to bathroom on his own. ]   Nursing Interventions Nurse provided patient education   Pulse Ox monitoring None   Is the patient/caregiver able to teach back steps to recovery at home? Set small, achievable goals for return to baseline health,  Rest and rebuild strength, gradually increase activity,  Make a list of questions for provider's appointment   If the patient is a current smoker, are they able to teach back resources for cessation? Not a smoker   Is the patient/caregiver able to teach back the hierarchy of who to call/visit for symptoms/problems? PCP, Specialist, Home health nurse, Urgent Care, ED, 911 Yes   TCM call completed? Yes   Wrap up additional comments Very pleasant family. Wife of patient reports patient is doing well. Able to walk to bathroom without assistance, No SOB or issues. No questions or concerns.           Stef Joe RN    2/12/2022, 09:48 EST

## 2022-02-12 NOTE — OUTREACH NOTE
Prep Survey      Responses   [facility] facility patient discharged from? [place]   Is LACE score < 7 ? No   Emergency Room discharge w/ pulse ox? No   Eligibility [facility]   Date of Admission 01/29/22   Date of Discharge 02/11/22   Discharge Disposition Home-Health Care [facility]   Discharge diagnosis encephalopathy d/t COVID-19 PNA, MILLY   Does the patient have one of the following disease processes/diagnoses(primary or secondary)? COVID-19   Does the patient have Home health ordered? Yes   What is the Home health agency?  [facility]    Is there a DME ordered? Yes   What DME was ordered? [facility] & walker from [facility]   Prep survey completed? Yes          Mayra Watts RN

## 2022-02-13 ENCOUNTER — READMISSION MANAGEMENT (OUTPATIENT)
Dept: CALL CENTER | Facility: HOSPITAL | Age: 77
End: 2022-02-13

## 2022-02-13 ENCOUNTER — HOME CARE VISIT (OUTPATIENT)
Dept: HOME HEALTH SERVICES | Facility: HOME HEALTHCARE | Age: 77
End: 2022-02-13

## 2022-02-14 ENCOUNTER — TELEPHONE (OUTPATIENT)
Dept: FAMILY MEDICINE CLINIC | Facility: CLINIC | Age: 77
End: 2022-02-14

## 2022-02-14 ENCOUNTER — READMISSION MANAGEMENT (OUTPATIENT)
Dept: CALL CENTER | Facility: HOSPITAL | Age: 77
End: 2022-02-14

## 2022-02-14 DIAGNOSIS — F03.90 DEMENTIA WITHOUT BEHAVIORAL DISTURBANCE, UNSPECIFIED DEMENTIA TYPE: Primary | ICD-10-CM

## 2022-02-14 NOTE — TELEPHONE ENCOUNTER
DELETE AFTER REVIEWING: Telephone encounter to be sent to the clinical pool     Caller: [facility]    Relationship: Home Health    Best call back number: [phone]    What orders are you requesting (i.e. lab or imaging): SPEECH THERAPY EVAL    In what timeframe would the patient need to come in: ASAP    Additional notes: PATIENT'S HOME HEALTH NURSE CALLING TO REQUEST SPEECH THERAPY EVALUATION.

## 2022-02-14 NOTE — OUTREACH NOTE
COVID-19 Week 1 Survey      Responses   [facility] patient discharged from? [facility]   Does the patient have one of the following disease processes/diagnoses(primary or secondary)? COVID-19   COVID-19 underlying condition? None   Call Number Call 3   Week 1 Call successful? Yes   Call start time 1153   Call end time 1159   General alerts for this patient This is Dr Hunt's father   Discharge diagnosis encephalopathy d/t COVID-19 PNA, MILLY   Is patient permission given to speak with other caregiver? Yes   List who call center can speak with Julia   Person spoke with today (if not patient) and relationship Julia wife    Has the patient kept scheduled appointments due by today? N/A   What is the Home health agency?  [facility]   Home health comments HH still coming    Psychosocial issues? No   What is the patient's perception of their health status since discharge? Improving   Does the patient have any of the following symptoms? None   Nursing Interventions Nurse provided patient education   Pulse Ox monitoring None   Is the patient/caregiver able to teach back the hierarchy of who to call/visit for symptoms/problems? PCP, Specialist, Home health nurse, Urgent Care, ED, 911 Yes   COVID-19 call completed? Yes   Wrap up additional comments Pt doing better but still having confusion. Enc wife to discuss this with pt's PCP          Smitha Collins RN

## 2022-02-14 NOTE — TELEPHONE ENCOUNTER
Caller: [facility]    Relationship to patient: Home Health    Best call back number: [phone]    Patient is needing: PATIENT'S MEDICATION fluvoxaMINE (LUVOX) 100 MG tablet HAS THE POTENTIAL TO INTERACT WITH QUEtiapine (SEROquel) 25 MG tablet. PLEASE CALL PATIENT.

## 2022-02-15 ENCOUNTER — HOME CARE VISIT (OUTPATIENT)
Dept: HOME HEALTH SERVICES | Facility: HOME HEALTHCARE | Age: 77
End: 2022-02-15

## 2022-02-15 ENCOUNTER — TELEPHONE (OUTPATIENT)
Dept: FAMILY MEDICINE CLINIC | Facility: CLINIC | Age: 77
End: 2022-02-15

## 2022-02-15 VITALS
HEART RATE: 72 BPM | DIASTOLIC BLOOD PRESSURE: 60 MMHG | SYSTOLIC BLOOD PRESSURE: 110 MMHG | OXYGEN SATURATION: 96 % | TEMPERATURE: 97.3 F | RESPIRATION RATE: 18 BRPM

## 2022-02-15 VITALS
BODY MASS INDEX: 26.68 KG/M2 | SYSTOLIC BLOOD PRESSURE: 142 MMHG | RESPIRATION RATE: 18 BRPM | HEIGHT: 66 IN | WEIGHT: 166 LBS | TEMPERATURE: 97.6 F | HEART RATE: 59 BPM | OXYGEN SATURATION: 97 % | DIASTOLIC BLOOD PRESSURE: 84 MMHG

## 2022-02-15 VITALS
OXYGEN SATURATION: 96 % | DIASTOLIC BLOOD PRESSURE: 62 MMHG | SYSTOLIC BLOOD PRESSURE: 110 MMHG | HEART RATE: 72 BPM | TEMPERATURE: 97.3 F

## 2022-02-15 PROCEDURE — G0299 HHS/HOSPICE OF RN EA 15 MIN: HCPCS

## 2022-02-15 PROCEDURE — G0151 HHCP-SERV OF PT,EA 15 MIN: HCPCS

## 2022-02-15 NOTE — HOME HEALTH
Patient referred to [facility] following hospitalization from 1/29-2/11 for encephalopathy d/t COVID 19; (COVID dx on 1/30); dementia; AMS; age related debility; HTN; GERD and anxiety.    He was started on Aricept while in the hospital.  Wife reports that prior to illnmess pt was independent in home and although he was forgetful, he is now far from his mental baseline.  He is walking unassisted, besides constant supervision by wife, because he would forget a cane d/t his impulsivity and would be more likely to fall using his walker.  He walks very fast and is impulsive.  I educated wife on supervision and safety measures.  I requested orders for ST to rajesh to see if they could help with his memory and communication issues.  During SOC it seemed like he was meaning to say one word but another unrelated word would come out, multiple times.  His wife is not used to this level of care and is very invovled in learning.  He will need teaching and monitoring on home safety; disease processes, new medications and skin breakdown prevention, as well as psychological monitoring.  As of SOC patient was washing up at the sink with wife's help.  They are supposed to have a shower chair delivered that was ordered while in hospital.  Instructed wife to call [place] if she hasn't received it by Monday.

## 2022-02-15 NOTE — HOME HEALTH
Reason for referral Recent hospitalization with Covid     Diagnosis 1/29 to 2/11/22 in [facility] with Covid pneumonia, hypoxia, encephalopathy    surgical procedure na    PMHx HTN, anxiety    Prior level of function ambulated independently without assistive device, independent with ADLs, spouse does the coooking, laundry, still was driving     Home social environment Patient resides with spouse in 2 story home, has 4 steps with rail to enter home     Next MD appointment 2/17/22

## 2022-02-15 NOTE — CASE COMMUNICATION
PT evaluation only, patient is independent with ambulation without assistidevice, independent with going up and down steps to enter and exit home, is independent with transfers and ADLs. May benefit from ST due to cognitive deficits.

## 2022-02-17 ENCOUNTER — HOME CARE VISIT (OUTPATIENT)
Dept: HOME HEALTH SERVICES | Facility: HOME HEALTHCARE | Age: 77
End: 2022-02-17

## 2022-02-17 ENCOUNTER — OFFICE VISIT (OUTPATIENT)
Dept: FAMILY MEDICINE CLINIC | Facility: CLINIC | Age: 77
End: 2022-02-17

## 2022-02-17 ENCOUNTER — READMISSION MANAGEMENT (OUTPATIENT)
Dept: CALL CENTER | Facility: HOSPITAL | Age: 77
End: 2022-02-17

## 2022-02-17 VITALS
BODY MASS INDEX: 25.61 KG/M2 | HEART RATE: 78 BPM | TEMPERATURE: 97.4 F | HEIGHT: 64 IN | OXYGEN SATURATION: 97 % | DIASTOLIC BLOOD PRESSURE: 73 MMHG | RESPIRATION RATE: 16 BRPM | WEIGHT: 150 LBS | SYSTOLIC BLOOD PRESSURE: 114 MMHG

## 2022-02-17 VITALS — RESPIRATION RATE: 18 BRPM | HEART RATE: 77 BPM | TEMPERATURE: 96 F | OXYGEN SATURATION: 99 %

## 2022-02-17 DIAGNOSIS — F03.90 DEMENTIA WITHOUT BEHAVIORAL DISTURBANCE, UNSPECIFIED DEMENTIA TYPE: Chronic | ICD-10-CM

## 2022-02-17 DIAGNOSIS — I10 ESSENTIAL HYPERTENSION: Chronic | ICD-10-CM

## 2022-02-17 DIAGNOSIS — Z09 HOSPITAL DISCHARGE FOLLOW-UP: ICD-10-CM

## 2022-02-17 DIAGNOSIS — G93.49 ENCEPHALOPATHY DUE TO COVID-19 VIRUS: Primary | ICD-10-CM

## 2022-02-17 DIAGNOSIS — U07.1 ENCEPHALOPATHY DUE TO COVID-19 VIRUS: Primary | ICD-10-CM

## 2022-02-17 DIAGNOSIS — N40.0 BENIGN PROSTATIC HYPERPLASIA WITHOUT LOWER URINARY TRACT SYMPTOMS: Chronic | ICD-10-CM

## 2022-02-17 DIAGNOSIS — K21.9 GASTROESOPHAGEAL REFLUX DISEASE WITHOUT ESOPHAGITIS: Chronic | ICD-10-CM

## 2022-02-17 DIAGNOSIS — F41.9 ANXIETY: Chronic | ICD-10-CM

## 2022-02-17 DIAGNOSIS — E78.5 HYPERLIPIDEMIA, UNSPECIFIED HYPERLIPIDEMIA TYPE: Chronic | ICD-10-CM

## 2022-02-17 PROCEDURE — 99495 TRANSJ CARE MGMT MOD F2F 14D: CPT | Performed by: FAMILY MEDICINE

## 2022-02-17 PROCEDURE — G0153 HHCP-SVS OF S/L PATH,EA 15MN: HCPCS

## 2022-02-17 RX ORDER — TAMSULOSIN HYDROCHLORIDE 0.4 MG/1
1 CAPSULE ORAL 2 TIMES DAILY
Qty: 180 CAPSULE | Refills: 3 | Status: SHIPPED | OUTPATIENT
Start: 2022-02-17 | End: 2022-12-30 | Stop reason: SDUPTHER

## 2022-02-17 RX ORDER — AMLODIPINE BESYLATE 2.5 MG/1
2.5 TABLET ORAL DAILY
Qty: 90 TABLET | Refills: 3 | Status: SHIPPED | OUTPATIENT
Start: 2022-02-17 | End: 2023-01-01

## 2022-02-17 RX ORDER — DONEPEZIL HYDROCHLORIDE 10 MG/1
10 TABLET, FILM COATED ORAL NIGHTLY
Qty: 90 TABLET | Refills: 3 | Status: SHIPPED | OUTPATIENT
Start: 2022-02-17 | End: 2023-01-01

## 2022-02-17 RX ORDER — AMLODIPINE BESYLATE 5 MG/1
5 TABLET ORAL DAILY
Qty: 90 TABLET | Refills: 3 | Status: CANCELLED | OUTPATIENT
Start: 2022-02-17

## 2022-02-17 RX ORDER — PANTOPRAZOLE SODIUM 40 MG/1
40 TABLET, DELAYED RELEASE ORAL DAILY
Qty: 90 TABLET | Refills: 3 | Status: SHIPPED | OUTPATIENT
Start: 2022-02-17 | End: 2022-03-25

## 2022-02-17 RX ORDER — ATORVASTATIN CALCIUM 10 MG/1
10 TABLET, FILM COATED ORAL DAILY
Qty: 90 TABLET | Refills: 3 | Status: SHIPPED | OUTPATIENT
Start: 2022-02-17 | End: 2022-12-30 | Stop reason: SDUPTHER

## 2022-02-17 RX ORDER — FLUVOXAMINE MALEATE 50 MG/1
50 TABLET, COATED ORAL NIGHTLY
Qty: 90 TABLET | Refills: 3 | Status: SHIPPED | OUTPATIENT
Start: 2022-02-17 | End: 2022-09-07

## 2022-02-17 RX ORDER — LOSARTAN POTASSIUM 25 MG/1
25 TABLET ORAL DAILY
Qty: 90 TABLET | Refills: 3 | Status: SHIPPED | OUTPATIENT
Start: 2022-02-17 | End: 2022-12-28 | Stop reason: SDUPTHER

## 2022-02-17 NOTE — OUTREACH NOTE
COVID-19 Week 2 Survey      Responses   [facility] patient discharged from? [facility]   Does the patient have one of the following disease processes/diagnoses(primary or secondary)? COVID-19   COVID-19 underlying condition? None   Call Number Call 1   COVID-19 Week 2: Call 1 attempt successful? Yes   Call start time 1111   Call end time 1115   General alerts for this patient This is Dr Gilbert's father   Discharge diagnosis encephalopathy d/t COVID-19 PNA, MILLY   Is patient permission given to speak with other caregiver? Yes   List who call center can speak with Julia   Person spoke with today (if not patient) and relationship Julia wife    Meds reviewed with patient/caregiver? Yes   Is the patient having any side effects they believe may be caused by any medication additions or changes? No   Does the patient have all medications ordered at discharge? Yes   Is the patient taking all medications as directed (includes completed medication regime)? Yes   Comments regarding PCP Patient cancelled 2/17/22 PCP appt. waiting on call to reschedule.   Does the patient have an appointment with their PCP or specialist within 7 days of discharge? No   What is preventing the patient from scheduling follow up appointments within 7 days of discharge? Waiting on return call   Nursing Interventions Educated patient on importance of making appointment,  Advised patient to make appointment   Has the patient kept scheduled appointments due by today? N/A   Has home health visited the patient within 72 hours of discharge? Yes   What DME was ordered? [employer] from [facility]   What is the patient's perception of their health status since discharge? Improving   Does the patient have any of the following symptoms? None   Nursing Interventions Nurse provided patient education   Pulse Ox monitoring None   Is the patient/caregiver able to teach back steps to recovery at home? Set small, achievable goals for return to baseline health,  Rest  and rebuild strength, gradually increase activity,  Eat a well-balance diet   If the patient is a current smoker, are they able to teach back resources for cessation? Not a smoker   Is the patient/caregiver able to teach back the hierarchy of who to call/visit for symptoms/problems? PCP, Specialist, Home health nurse, Urgent Care, ED, 911 Yes   COVID-19 call completed? Yes          Clarice Rosario RN

## 2022-02-17 NOTE — PROGRESS NOTES
Transitional Care Follow Up Visit  Subjective     Tyron Hunt is a 77 y.o. male who presents for a transitional care management visit.    Within 48 business hours after discharge our office contacted him via telephone to coordinate his care and needs.      I reviewed and discussed the details of that call along with the discharge summary, hospital problems, inpatient lab results, inpatient diagnostic studies, and consultation reports with Tyron.     Current outpatient and discharge medications have been reconciled for the patient.  Reviewed by: Tyron Hunt MD      Date of TCM Phone Call 2/11/2022   [facility]   Date of Admission 1/29/2022   Date of Discharge 2/11/2022   Discharge Disposition [facility]     Risk for Readmission (LACE) Score: 8 (2/11/2022  6:01 AM)      History of Present Illness   Course During Hospital Stay:      Date of Admission: 1/29/2022  Date of Discharge:  2/11/2022  Primary Care Physician: Tyron Hunt MD        Chief Complaint:   Weakness - Generalized and Altered Mental Status        Discharge Diagnoses            Active Hospital Problems     Diagnosis   POA   • **Encephalopathy due to COVID-19 virus [U07.1, G93.49]   Yes   • Cytokine release syndrome, grade 1 [D89.831]   No   • Age-related physical debility [R54]   Yes   • Altered mental status [R41.82]   Yes   • Hypertension [I10]   Yes   • GERD (gastroesophageal reflux disease) [K21.9]   Yes   • Anxiety [F41.9]   Yes       Resolved Hospital Problems     Diagnosis Date Resolved POA   • MILLY (acute kidney injury) (HCC) [N17.9] 02/07/2022 Yes         Hospital Course      Mr. Hunt is a 77 y.o. male who was brought in by his family for worsening confusion.  He is found to have COVID-19 pneumonia without hypoxia, with encephalopathy.     COVID-19 pneumonia, complicated by encephalopathy: He was evaluated by both psychiatry and neurology for his encephalopathy. He had negative CT head on admission. He never had  "any focal deficits to indicate MRI. He was treated with nonpharmacologic delirium precautions as well as nightly Seroquel. He was also initiated on Aricept due to family's report of recent memory issues.  The patient's son is a physician and requested that we trial 5 days of high-dose steroids based on case studies of COVID encephalopathy. He received 5 days of IV methylpred 125 mg. There was little improvement in his mental status unfortunately.  His family has elected to take him home with caregiver support. Hopeful that his mental status will improve when he is in a more familiar setting.    Current outpatient and discharge medications have been reconciled for the patient.  Reviewed by: Tyron Hunt MD    Pt is home and mentally returning to his baseline, although not fully recovered yet. Tolerating his new meds well w/o issues.     The following portions of the patient's history were reviewed and updated as appropriate: allergies, current medications, past family history, past medical history, past social history, past surgical history and problem list.    Review of Systems   Constitutional: Negative for activity change, chills and fever.   Respiratory: Negative for cough.    Cardiovascular: Negative for chest pain.   Psychiatric/Behavioral: Negative for dysphoric mood.       /73   Pulse 78   Temp 97.4 °F (36.3 °C) (Oral)   Resp 16   Ht 162.6 cm (64.02\")   Wt 68 kg (150 lb)   SpO2 97%   BMI 25.73 kg/m²     Objective   Physical Exam  Constitutional:       General: He is not in acute distress.     Appearance: He is well-developed.   Cardiovascular:      Rate and Rhythm: Normal rate and regular rhythm.   Pulmonary:      Effort: Pulmonary effort is normal.      Breath sounds: Normal breath sounds.   Neurological:      Mental Status: He is alert and oriented to person, place, and time.   Psychiatric:         Behavior: Behavior normal.         Thought Content: Thought content normal.     Hospital " records reviewed with pt confirming HPI.      Assessment/Plan   Diagnoses and all orders for this visit:    1. Encephalopathy due to COVID-19 virus (Primary)    2. Dementia without behavioral disturbance, unspecified dementia type (HCC)  -     donepezil (ARICEPT) 10 MG tablet; Take 1 tablet by mouth Every Night.  Dispense: 90 tablet; Refill: 3    3. Hospital discharge follow-up    4. Essential hypertension  -     losartan (Cozaar) 25 MG tablet; Take 1 tablet by mouth Daily.  Dispense: 90 tablet; Refill: 3  -     amLODIPine (NORVASC) 2.5 MG tablet; Take 1 tablet by mouth Daily.  Dispense: 90 tablet; Refill: 3    5. Hyperlipidemia, unspecified hyperlipidemia type  -     atorvastatin (LIPITOR) 10 MG tablet; Take 1 tablet by mouth Daily.  Dispense: 90 tablet; Refill: 3    6. Benign prostatic hyperplasia without lower urinary tract symptoms  -     tamsulosin (FLOMAX) 0.4 MG capsule 24 hr capsule; Take 1 capsule by mouth 2 (Two) Times a Day.  Dispense: 180 capsule; Refill: 3    7. Anxiety  -     fluvoxaMINE (LUVOX) 50 MG tablet; Take 1 tablet by mouth Every Night.  Dispense: 90 tablet; Refill: 3    8. Gastroesophageal reflux disease without esophagitis  -     pantoprazole (PROTONIX) 40 MG EC tablet; Take 1 tablet by mouth Daily.  Dispense: 90 tablet; Refill: 3    Have already placed a referral to see Neurology as an oupt and is getting HH with Speech.

## 2022-02-18 NOTE — HOME HEALTH
SPEECH LANGUAGE SWALLOWING EVALUATION    REASON FOR REFERRAL: Skilled Speech Therapy referral post hospitalization for COVID-19. Speech Therapy to evaluate and establish a plan of care.  PRIMARY DIAGNOSIS Cognitive impairment  SECONDARY DIAGNOSIS Altered mental status   PERTINENT HISTORY: Altered mental status,  Encephalopathy due to COVID-19 virus, Age-related physical debility, Anxiety, GERD,  MILLY (acute kidney injury), Cytokine release syndrome, grade 1.   PRIOR LEVEL OF FUNCTION Patient living in his home with his wife. Patient had some dementia, but was functioning in his home.    FUNCTIONAL OBJECTIVE FINDINGS STATEMENT OF MEDICAL NECESSITY: Cognitive linguistic therapy to restore cognition to increase safety, communication and function in home. Skilled Speech Therapy interventions are necessary due to hospitalization for COVID-19 and related functional decline and increased dependence on caregiver. Speech Therapy is necessary to assist this patient to recovery, as quickly and feasibly, as possible.

## 2022-02-18 NOTE — CASE COMMUNICATION
Dear Dr. Tyron Hunt    Re:Tyron Hunt  :1945    The LPN home visit  on 2022 for the above patient was missed due to wife cancelling visit when calling to confirm , therefore, the prescribed frequency of visits was not met.    If you have questions or would like further information about this patient, please contact us at [phone].    Regards,    Ananya Chan LPN

## 2022-02-22 ENCOUNTER — HOME CARE VISIT (OUTPATIENT)
Dept: HOME HEALTH SERVICES | Facility: HOME HEALTHCARE | Age: 77
End: 2022-02-22

## 2022-02-22 VITALS
RESPIRATION RATE: 18 BRPM | TEMPERATURE: 94.2 F | SYSTOLIC BLOOD PRESSURE: 120 MMHG | DIASTOLIC BLOOD PRESSURE: 74 MMHG | HEART RATE: 82 BPM | OXYGEN SATURATION: 100 %

## 2022-02-22 VITALS
TEMPERATURE: 96.9 F | OXYGEN SATURATION: 99 % | HEART RATE: 80 BPM | SYSTOLIC BLOOD PRESSURE: 114 MMHG | DIASTOLIC BLOOD PRESSURE: 72 MMHG | RESPIRATION RATE: 16 BRPM | WEIGHT: 150.25 LBS | BODY MASS INDEX: 25.77 KG/M2

## 2022-02-22 PROCEDURE — G0299 HHS/HOSPICE OF RN EA 15 MIN: HCPCS

## 2022-02-22 PROCEDURE — G0153 HHCP-SVS OF S/L PATH,EA 15MN: HCPCS

## 2022-02-23 ENCOUNTER — OFFICE VISIT (OUTPATIENT)
Dept: NEUROLOGY | Facility: CLINIC | Age: 77
End: 2022-02-23

## 2022-02-23 VITALS
HEART RATE: 55 BPM | DIASTOLIC BLOOD PRESSURE: 86 MMHG | BODY MASS INDEX: 26.07 KG/M2 | OXYGEN SATURATION: 97 % | WEIGHT: 152 LBS | SYSTOLIC BLOOD PRESSURE: 128 MMHG

## 2022-02-23 DIAGNOSIS — F02.80 LATE ONSET ALZHEIMER'S DEMENTIA WITHOUT BEHAVIORAL DISTURBANCE: Primary | ICD-10-CM

## 2022-02-23 DIAGNOSIS — G30.1 LATE ONSET ALZHEIMER'S DEMENTIA WITHOUT BEHAVIORAL DISTURBANCE: Primary | ICD-10-CM

## 2022-02-23 PROCEDURE — 99214 OFFICE O/P EST MOD 30 MIN: CPT | Performed by: PSYCHIATRY & NEUROLOGY

## 2022-02-23 RX ORDER — SUCRALFATE 1 G/1
1 TABLET ORAL 4 TIMES DAILY
COMMUNITY
End: 2022-03-12 | Stop reason: SDUPTHER

## 2022-02-23 RX ORDER — QUETIAPINE FUMARATE 25 MG/1
25 TABLET, FILM COATED ORAL NIGHTLY
COMMUNITY
End: 2022-03-25

## 2022-02-23 NOTE — PROGRESS NOTES
DOS: 2022  NAME: Tyron Hunt   : 1945  PCP: Tyron Hunt MD  Chief Complaint   Patient presents with   • Encephalopathy   • Dementia       Chief complaint: memory loss, hospital follow-up  Subjective: This is a 69-year-old man seen by me in the hospital for delirium.  He has a history of slowly progressive memory loss.  He was started on Aricept for this.  He was recently admitted to the hospital in the setting of mild COVID-19 pneumonia and had a prolonged stay for delirium.  He was discharged home and has essentially recovered back to his neurologic baseline.  He and his wife complain of intermittent memory loss.  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  The patient continues to be very physically active with weightlifting and walking.  [facility] and typically drives with his wife in the car.  She feels comfortable with him driving.  He does have guns in his house.    Objective:  Vital signs: /86 (BP Location: Right arm, Patient Position: Sitting, Cuff Size: Adult)   Pulse 55   Wt 68.9 kg (152 lb)   SpO2 97%   BMI 26.07 kg/m²    Exam:  vitals reviewed  MS: oriented x3, recent/remote memory impaired, impaired attention/concentration, language intact, no neglect, able to draw clock with hands in the correct positions but draws the wrong time, unable to copy a cube, impaired verbal fluency, names 5 words beginning with the letter F in 60 seconds  CN: visual acuity grossly normal, PERRL, EOMI, no facial droop, no dysarthria  Motor: 5/5 throughout upper and lower extremities, normal tone  Sensory: intact to cold temperature and vibration throughout  Gait: Normal station, no ataxia    Laboratory results:  Lab Results   Component Value Date    LDL 60 2017    LDL 67 2016         Review and interpretation of imaging: I personally reviewed his CT scan performed in the hospital which shows  moderate to severe generalized atrophy most prominent in the medial temporal lobes.  Radiology report reviewed    Diagnoses:  Alzheimer's disease, late onset, without behavioral disturbance    Assessment/comments: He has moderate Alzheimer's dementia at this point.  He is functioning well at home in his usual environment with the support of his wife.  From a safety standpoint he has been cleared to drive by [employer] but I do not think he should drive without his wife to help orient him.  I am concerned about the presence of firearms in their home and I think these should be removed given his risk of sundowning.  I will talk to his son about this today.  Otherwise he is doing well and I would recommend continued regular exercise, engagement in his hobbies and remaining socially active.    Plan:  1.  Continue Aricept  2.  Continue regular exercise  3.  Consider removal of firearms for safety reasons  4.  He should not drive alone    I spent a total of 35 minutes on this clinical encounter including chart/records review, review of laboratory data, personal review of imaging studies, patient counseling, and care coordination.    He can follow-up in our clinic on an as-needed basis.

## 2022-02-23 NOTE — HOME HEALTH
Patient was in the basement today exercising and reported no pain.     PLAN FOR NEXT VISIT    MEDICAL NECESSITY FOR ONGOING SKILLED THERAPY: Cognitive linguistic therapy to restore cognition to increase safety, communication and function in home. Skilled Speech Therapy interventions are necessary due to hospitalization for COVID-19 and related functional decline and increased dependence on caregiver. Speech Therapy is necessary to assist this patient to recovery, as quickly and feasibly, as possible.  SPECIFIC INTERVENTIONS AND GOALS TO ADDRESS ON NEXT VISIT:  Memory exercises/tasks  Expressive language tasks  FREQUENCY AND DURATION: 2w3  ANY OTHER FOLLOW UP NEEDED: None  REASSESSMENT DUE DATE: 3/12/22

## 2022-02-24 ENCOUNTER — HOME CARE VISIT (OUTPATIENT)
Dept: HOME HEALTH SERVICES | Facility: HOME HEALTHCARE | Age: 77
End: 2022-02-24

## 2022-02-24 ENCOUNTER — READMISSION MANAGEMENT (OUTPATIENT)
Dept: CALL CENTER | Facility: HOSPITAL | Age: 77
End: 2022-02-24

## 2022-02-24 NOTE — OUTREACH NOTE
COVID-19 Week 3 Survey      Responses   [facility] patient discharged from? [place]   Does the patient have one of the following disease processes/diagnoses(primary or secondary)? COVID-19   COVID-19 underlying condition? None   Call Number Call 1   COVID-19 Week 3: Call 1 attempt successful? Yes   Call start time 1026   Call end time 1028   Discharge diagnosis encephalopathy d/t COVID-19 PNA, MILLY   Is the patient taking all medications as directed (includes completed medication regime)? Yes   Has the patient kept scheduled appointments due by today? Yes   Psychosocial issues? No   What is the patient's perception of their health status since discharge? Improving   Pulse Ox monitoring None   Is the patient/caregiver able to teach back steps to recovery at home? Rest and rebuild strength, gradually increase activity   Is the patient/caregiver able to teach back the hierarchy of who to call/visit for symptoms/problems? PCP, Specialist, Home health nurse, Urgent Care, ED, 911 Yes   COVID-19 call completed? Yes   Revoked No further contact(revokes)-requires comment   Is the patient interested in additional calls from an ambulatory ?  NOTE:  applies to high risk patients requiring additional follow-up. No   Wrap up additional comments Pt doing very well. Sounds very clear on phone and is very thankful for staff at hospital.           Smitha Collins RN

## 2022-02-25 ENCOUNTER — HOME CARE VISIT (OUTPATIENT)
Dept: HOME HEALTH SERVICES | Facility: HOME HEALTHCARE | Age: 77
End: 2022-02-25

## 2022-02-25 PROCEDURE — G0300 HHS/HOSPICE OF LPN EA 15 MIN: HCPCS

## 2022-02-27 VITALS
OXYGEN SATURATION: 97 % | SYSTOLIC BLOOD PRESSURE: 140 MMHG | RESPIRATION RATE: 18 BRPM | HEART RATE: 74 BPM | DIASTOLIC BLOOD PRESSURE: 80 MMHG | TEMPERATURE: 96.9 F

## 2022-02-28 NOTE — HOME HEALTH
The patient is forgetful.  He asked me three times How are you doing and  would you like something to eat?  The patient walks without any assistive devices.

## 2022-03-01 ENCOUNTER — HOME CARE VISIT (OUTPATIENT)
Dept: HOME HEALTH SERVICES | Facility: HOME HEALTHCARE | Age: 77
End: 2022-03-01

## 2022-03-01 PROCEDURE — G0180 MD CERTIFICATION HHA PATIENT: HCPCS | Performed by: FAMILY MEDICINE

## 2022-03-02 ENCOUNTER — HOME CARE VISIT (OUTPATIENT)
Dept: HOME HEALTH SERVICES | Facility: HOME HEALTHCARE | Age: 77
End: 2022-03-02

## 2022-03-02 VITALS
DIASTOLIC BLOOD PRESSURE: 60 MMHG | SYSTOLIC BLOOD PRESSURE: 110 MMHG | OXYGEN SATURATION: 97 % | HEART RATE: 76 BPM | TEMPERATURE: 98.6 F | RESPIRATION RATE: 16 BRPM

## 2022-03-02 PROCEDURE — G0153 HHCP-SVS OF S/L PATH,EA 15MN: HCPCS

## 2022-03-02 NOTE — HOME HEALTH
Patient and his wife were present for the session. Patient reported doing well today.    PLAN FOR NEXT VISIT   MEDICAL NECESSITY FOR ONGOING SKILLED THERAPY: Cognitive linguistic therapy to restore cognition to increase safety, communication and function in home. Skilled Speech Therapy interventions are necessary due to hospitalization for COVID-19 and related functional decline and increased dependence on caregiver. Speech Therapy is necessary to assist this patient to recovery, as quickly and feasibly, as possible.   SPECIFIC INTERVENTIONS AND GOALS TO ADDRESS ON NEXT VISIT:   Memory exercises/tasks   Expressive language tasks   FREQUENCY AND DURATION: 2w3   ANY OTHER FOLLOW UP NEEDED: None   REASSESSMENT DUE DATE: 3/12/22

## 2022-03-03 ENCOUNTER — HOME CARE VISIT (OUTPATIENT)
Dept: HOME HEALTH SERVICES | Facility: HOME HEALTHCARE | Age: 77
End: 2022-03-03

## 2022-03-03 VITALS
RESPIRATION RATE: 20 BRPM | DIASTOLIC BLOOD PRESSURE: 58 MMHG | SYSTOLIC BLOOD PRESSURE: 106 MMHG | OXYGEN SATURATION: 97 % | HEART RATE: 72 BPM | TEMPERATURE: 96.5 F

## 2022-03-03 PROCEDURE — G0299 HHS/HOSPICE OF RN EA 15 MIN: HCPCS

## 2022-03-03 PROCEDURE — G0153 HHCP-SVS OF S/L PATH,EA 15MN: HCPCS

## 2022-03-05 VITALS
HEART RATE: 81 BPM | OXYGEN SATURATION: 99 % | SYSTOLIC BLOOD PRESSURE: 110 MMHG | DIASTOLIC BLOOD PRESSURE: 60 MMHG | TEMPERATURE: 98.2 F

## 2022-03-12 RX ORDER — SUCRALFATE 1 G/1
1 TABLET ORAL 4 TIMES DAILY
Qty: 360 TABLET | Refills: 3 | Status: SHIPPED | OUTPATIENT
Start: 2022-03-12 | End: 2022-08-10 | Stop reason: SDUPTHER

## 2022-03-14 DIAGNOSIS — K21.9 GASTROESOPHAGEAL REFLUX DISEASE WITHOUT ESOPHAGITIS: Primary | ICD-10-CM

## 2022-03-14 DIAGNOSIS — K29.50 CHRONIC GASTRITIS WITHOUT BLEEDING, UNSPECIFIED GASTRITIS TYPE: ICD-10-CM

## 2022-03-25 ENCOUNTER — OFFICE VISIT (OUTPATIENT)
Dept: GASTROENTEROLOGY | Facility: CLINIC | Age: 77
End: 2022-03-25

## 2022-03-25 VITALS — HEIGHT: 64 IN | TEMPERATURE: 97.1 F | BODY MASS INDEX: 25.06 KG/M2 | WEIGHT: 146.8 LBS

## 2022-03-25 DIAGNOSIS — Z91.09 OTHER ALLERGY STATUS, OTHER THAN TO DRUGS AND BIOLOGICAL SUBSTANCES: ICD-10-CM

## 2022-03-25 DIAGNOSIS — R10.13 EPIGASTRIC PAIN: Primary | ICD-10-CM

## 2022-03-25 DIAGNOSIS — K21.9 GASTROESOPHAGEAL REFLUX DISEASE, UNSPECIFIED WHETHER ESOPHAGITIS PRESENT: ICD-10-CM

## 2022-03-25 PROCEDURE — 99214 OFFICE O/P EST MOD 30 MIN: CPT | Performed by: NURSE PRACTITIONER

## 2022-03-25 RX ORDER — DEXLANSOPRAZOLE 60 MG/1
60 CAPSULE, DELAYED RELEASE ORAL DAILY
Qty: 90 CAPSULE | Refills: 3 | Status: SHIPPED | OUTPATIENT
Start: 2022-03-25 | End: 2022-03-28

## 2022-03-25 NOTE — PROGRESS NOTES
Chief Complaint   Patient presents with   • Abdominal Pain       HPI    Tyron Hunt is a  77 y.o. male here to establish care as a new patient for abdominal pain with a history of gastritis.    This patient will also follow with Dr. Morales.    This patient was in the hospital in February brought in for worsening confusion found to have COVID-19 pneumonia without hypoxia.  Also noted to have encephalopathy.  Was evaluated by psychiatry and neurology.  Head CT was negative.  He was also started on Aricept due to family reports of recent memory issues.  This patient son is a primary care provider at [facility], Dr. Tyron Hunt.  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    On visit today patient reports intermittent epigastric pain described as a burning sensation that comes and goes improved with eating.  Can occur throughout the day even in the middle of the night.  Frequently when pain occurs he will experience a chill that passes over his body.  Denies nausea, vomiting, dysphagia, odynophagia.  Appetite slowly improving since having Covid.  He lost 20 pounds with COVID-19 but weight has been stable since then.    Denies diarrhea, constipation, or rectal bleeding.    Reviewed EGD performed with Dr. Nice at [facility] 10/5/2021 with gastritis and ectopic mucosa in the upper third of the esophagus.  Pathology was negative for H. pylori, metaplasia, or dysplasia.    Unsure of when his last colonoscopy occurred but was done at [facility] possibly by Dr. Perkins.  Denies family history of colon cancer.    Recent LFTs and hemogram normal.    Past Medical History:   Diagnosis Date   • Anxiety    • COVID-19 01/29/2022   • Diverticulitis    • Gastritis    • GERD (gastroesophageal reflux disease)    • Gout    • H/O complete eye exam 06/2017   • Hyperlipidemia    • Hypertension    • Impaired fasting blood sugar    • Kidney stones    • Memory loss    • Sleep apnea        Past  Surgical History:   Procedure Laterality Date   • CERVICAL EPIDURAL N/A 08/27/2021    Procedure: CERVICAL EPIDURAL;  Surgeon: Nataliia Avilez MD;  Location: [facility];  Service: Pain Management;  Laterality: N/A;   • CERVICAL EPIDURAL N/A 10/18/2021    Procedure: CERVICAL EPIDURAL 7-1;  Surgeon: Nataliia Avilez MD;  Location: [facility];  Service: Pain Management;  Laterality: N/A;   • COLONOSCOPY      date unknow-Gregorio GRIFFIN   • CYST REMOVAL     • CYSTOSCOPY W/ LITHOLAPAXY / EHL     • TONSILLECTOMY  1950   • UPPER GASTROINTESTINAL ENDOSCOPY  10/05/2021    Gastritis   Arlet GRIFFIN       Scheduled Meds:     Continuous Infusions: No current facility-administered medications for this visit.      PRN Meds:     Allergies   Allergen Reactions   • Sulfa Antibiotics        Social History     Socioeconomic History   • Marital status:    Tobacco Use   • Smoking status: Never Smoker   • Smokeless tobacco: Never Used   Vaping Use   • Vaping Use: Never used   Substance and Sexual Activity   • Alcohol use: Never   • Drug use: No   • Sexual activity: Yes     Partners: Male       Family History   Problem Relation Age of Onset   • Arthritis Mother    • Heart disease Mother    • Thyroid disease Mother    • Hypertension Brother    • Obesity Brother    • Pancreatic cancer Maternal Aunt    • Dementia Maternal Grandmother        Review of Systems   Constitutional: Negative for activity change, appetite change, fatigue and unexpected weight change.   HENT: Negative for trouble swallowing.    Eyes: Negative.    Respiratory: Negative.    Cardiovascular: Negative.    Gastrointestinal: Positive for abdominal pain. Negative for abdominal distention, anal bleeding, blood in stool, constipation, diarrhea, nausea, rectal pain and vomiting.   Endocrine: Negative.    Genitourinary: Negative.    Musculoskeletal: Negative.    Allergic/Immunologic: Negative.    Neurological: Negative.    Hematological: Negative.    Psychiatric/Behavioral:  Negative.        Vitals:    03/25/22 0956   Temp: 97.1 °F (36.2 °C)       Physical Exam  Constitutional:       Appearance: He is well-developed.   Abdominal:      General: Bowel sounds are normal. There is no distension.      Palpations: Abdomen is soft. There is no mass.      Tenderness: There is no abdominal tenderness. There is no guarding.      Hernia: No hernia is present.   Skin:     General: Skin is warm and dry.      Capillary Refill: Capillary refill takes less than 2 seconds.   Neurological:      Mental Status: He is alert and oriented to person, place, and time.   Psychiatric:         Behavior: Behavior normal.     Assessment    Diagnoses and all orders for this visit:    Epigastric pain (Primary)  -     Food Allergy Profile  -     Celiac Comprehensive Panel    Gastroesophageal reflux disease, unspecified whether esophagitis present    Other orders  -     dexlansoprazole (Dexilant) 60 MG capsule; Take 1 capsule by mouth Daily.  Dispense: 90 capsule; Refill: 3    Plan    Plan of care discussed further with Dr. Morales.  At this point we recommend changing up his PPI to see if we get better coverage.  Stopping pantoprazole and starting Dexilant 60 mg once daily.  He can continue Carafate for now.  Additional labs today as above.  Follow an antireflux diet.  Patient to trial new medications for 2 weeks and then call us with an update of symptoms and we will arrange follow-up accordingly.         ERIK Arora  [facility]  [address] [place] [address]  Office: [phone]

## 2022-03-28 LAB
ENDOMYSIUM IGA SER QL: NEGATIVE
GLIADIN PEPTIDE IGA SER-ACNC: 4 UNITS (ref 0–19)
GLIADIN PEPTIDE IGG SER-ACNC: 2 UNITS (ref 0–19)
IGA SERPL-MCNC: 138 MG/DL (ref 61–437)
TTG IGA SER-ACNC: <2 U/ML (ref 0–3)
TTG IGG SER-ACNC: <2 U/ML (ref 0–5)

## 2022-03-28 RX ORDER — LANSOPRAZOLE 30 MG/1
30 CAPSULE, DELAYED RELEASE ORAL 2 TIMES DAILY
Qty: 180 CAPSULE | Refills: 3 | Status: SHIPPED | OUTPATIENT
Start: 2022-03-28 | End: 2022-05-03 | Stop reason: SDUPTHER

## 2022-03-31 LAB
CLAM IGE QN: <0.1 KU/L
CODFISH IGE QN: <0.1 KU/L
CONV CLASS DESCRIPTION: NORMAL
CORN IGE QN: <0.1 KU/L
COW MILK IGE QN: <0.1 KU/L
EGG WHITE IGE QN: <0.1 KU/L
PEANUT IGE QN: <0.1 KU/L
SCALLOP IGE QN: <0.1 KU/L
SESAME SEED IGE QN: <0.1 KU/L
SHRIMP IGE QN: <0.1 KU/L
SOYBEAN IGE QN: <0.1 KU/L
WALNUT IGE QN: <0.1 KU/L
WHEAT IGE QN: <0.1 KU/L

## 2022-04-01 ENCOUNTER — TELEPHONE (OUTPATIENT)
Dept: GASTROENTEROLOGY | Facility: CLINIC | Age: 77
End: 2022-04-01

## 2022-04-01 NOTE — TELEPHONE ENCOUNTER
----- Message from ERIK Ontiveros sent at 4/1/2022  9:14 AM EDT -----  Please call the patient and let him know his food allergies last was normal.

## 2022-04-13 DIAGNOSIS — R10.13 EPIGASTRIC PAIN: Primary | ICD-10-CM

## 2022-04-13 DIAGNOSIS — R10.13 DYSPEPSIA: ICD-10-CM

## 2022-04-14 ENCOUNTER — TELEPHONE (OUTPATIENT)
Dept: GASTROENTEROLOGY | Facility: CLINIC | Age: 77
End: 2022-04-14

## 2022-04-14 NOTE — TELEPHONE ENCOUNTER
Per IM from Jackie, NP - Would you mind calling this patient and letting him know that we want to set him up for gallbladder ultrasound.  Orders are in just provide him with radiology number to schedule.      Called pt and advised of Jackie MUÑOZ's note. Gave pt phone # of central scheduling.  Pt verbalized understanding.

## 2022-04-15 ENCOUNTER — HOSPITAL ENCOUNTER (OUTPATIENT)
Dept: ULTRASOUND IMAGING | Facility: HOSPITAL | Age: 77
Discharge: HOME | End: 2022-04-15
Admitting: NURSE PRACTITIONER

## 2022-04-15 ENCOUNTER — OFFICE VISIT (OUTPATIENT)
Dept: GASTROENTEROLOGY | Facility: CLINIC | Age: 77
End: 2022-04-15

## 2022-04-15 VITALS
BODY MASS INDEX: 24.94 KG/M2 | DIASTOLIC BLOOD PRESSURE: 67 MMHG | WEIGHT: 155.2 LBS | HEIGHT: 66 IN | SYSTOLIC BLOOD PRESSURE: 108 MMHG | TEMPERATURE: 97.3 F

## 2022-04-15 DIAGNOSIS — R10.13 EPIGASTRIC PAIN: ICD-10-CM

## 2022-04-15 DIAGNOSIS — R10.13 DYSPEPSIA: ICD-10-CM

## 2022-04-15 DIAGNOSIS — R10.13 EPIGASTRIC PAIN: Primary | ICD-10-CM

## 2022-04-15 PROCEDURE — 76705 ECHO EXAM OF ABDOMEN: CPT

## 2022-04-15 PROCEDURE — 99214 OFFICE O/P EST MOD 30 MIN: CPT | Performed by: NURSE PRACTITIONER

## 2022-04-15 NOTE — PROGRESS NOTES
Chief Complaint   Patient presents with   • Abdominal Pain       HPI    Tyron Hunt is a  77 y.o. male here for a follow up visit for abdominal pain.    This patient will also follow with Dr. Morales.     EGD performed with Dr. Nice at [facility] 10/5/2021 with gastritis and ectopic mucosa in the upper third of the esophagus.  Pathology was negative for H. pylori, metaplasia, or dysplasia.     Unsure of when his last colonoscopy occurred but was done at [facility] possibly by Dr. Perkins.  Denies family history of colon cancer.    This patient was in the hospital in February brought in for worsening confusion found to have COVID-19 pneumonia without hypoxia.  Also noted to have encephalopathy.  Was evaluated by psychiatry and neurology.  Head CT was negative.  He was also started on Aricept due to family reports of recent memory issues.  This patient son is a primary care provider at [facility],  Tyron Gilbert.  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    Patient transition to twice daily dosing lansoprazole (Dexilant unaffordable) following last office visit taken in combination with Carafate.  Patient did not have improvement in abdominal pain therefore gallbladder ultrasound was performed.  Gallbladder ultrasound showed evidence of right-sided nephrolithiasis and right renal cyst but no evidence of cholecystitis or cholelithiasis.    Food allergy profile and celiac comprehensive panel was normal.    On visit today patient still complains of upper abdominal pain that localizes to the epigastric area can awaken him from sleep.  Usually occurs about 3 AM.  He will take Gaviscon upon awakening which will bishop pain and he will be able to go back to sleep.  During the day he seems to be doing well.  His appetite is excellent.  He follows an antireflux diet for the most part stopped carbonated beverages and orange juice recently.  Compliant with twice daily dosing  "lansoprazole.  No nausea, vomiting, dysphagia, or odynophagia.    Reports since having COVID in February he has had some mild constipation small-volume stools approximately 3 times a day.  Intermittently takes MiraLAX when he \"remembers to.\"  Has not tried daily dosing or high-fiber supplementation.  No rectal bleeding.    Past Medical History:   Diagnosis Date   • Anxiety    • COVID-19 01/29/2022   • Diverticulitis    • Gastritis    • GERD (gastroesophageal reflux disease)    • Gout    • H/O complete eye exam 06/2017   • Hyperlipidemia    • Hypertension    • Impaired fasting blood sugar    • Kidney stones    • Memory loss    • Sleep apnea        Past Surgical History:   Procedure Laterality Date   • CERVICAL EPIDURAL N/A 08/27/2021    Procedure: CERVICAL EPIDURAL;  Surgeon: Nataliia Avilez MD;  Location: [facility];  Service: Pain Management;  Laterality: N/A;   • CERVICAL EPIDURAL N/A 10/18/2021    Procedure: CERVICAL EPIDURAL 7-1;  Surgeon: Nataliia Avilez MD;  Location: [facility];  Service: Pain Management;  Laterality: N/A;   • COLONOSCOPY      date unknow-Gregorio GRIFFIN   • CYST REMOVAL     • CYSTOSCOPY W/ LITHOLAPAXY / E[facility]     • TONSILLECTOMY  1949   • UPPER GASTROINTESTINAL ENDOSCOPY  10/05/2021    Gastritis   Arlet GRIFFIN       Scheduled Meds:     Continuous Infusions: No current facility-administered medications for this visit.      PRN Meds:     Allergies   Allergen Reactions   • Sulfa Antibiotics        Social History     Socioeconomic History   • Marital status:    Tobacco Use   • Smoking status: Never Smoker   • Smokeless tobacco: Never Used   Vaping Use   • Vaping Use: Never used   Substance and Sexual Activity   • Alcohol use: Never   • Drug use: No   • Sexual activity: Yes     Partners: Male       Family History   Problem Relation Age of Onset   • Arthritis Mother    • Heart disease Mother    • Thyroid disease Mother    • Hypertension Brother    • Obesity Brother    • Pancreatic cancer Maternal " Aunt    • Dementia Maternal Grandmother        Review of Systems   Constitutional: Negative for activity change, appetite change, fatigue, fever and unexpected weight change.   HENT: Negative for trouble swallowing.    Respiratory: Negative for apnea, cough, choking, chest tightness, shortness of breath and wheezing.    Cardiovascular: Negative for chest pain, palpitations and leg swelling.   Gastrointestinal: Positive for abdominal pain. Negative for abdominal distention, anal bleeding, blood in stool, constipation, diarrhea, nausea, rectal pain and vomiting.       Vitals:    04/15/22 1431   BP: 108/67   Temp: 97.3 °F (36.3 °C)       Physical Exam  Constitutional:       Appearance: He is well-developed.   Abdominal:      General: Bowel sounds are normal. There is no distension.      Palpations: Abdomen is soft. There is no mass.      Tenderness: There is no abdominal tenderness. There is no guarding.      Hernia: No hernia is present.   Skin:     General: Skin is warm and dry.      Capillary Refill: Capillary refill takes less than 2 seconds.   Neurological:      Mental Status: He is alert and oriented to person, place, and time.   Psychiatric:         Behavior: Behavior normal.     Assessment    Diagnoses and all orders for this visit:    1. Epigastric pain (Primary)  -     NM HIDA SCAN WITH PHARMACOLOGICAL INTERVENTION; Future    2. Dyspepsia  -     NM HIDA SCAN WITH PHARMACOLOGICAL INTERVENTION; Future       Plan    Arrange HIDA scan to rule out dyskinesia as gallbladder ultrasound did not show any evidence of gallstones.  Continue twice daily dosing lansoprazole.  Antireflux measures and dietary modifications reviewed. Low acid diet reviewed. Keep head of bed elevated. Stop eating/drinking at least 3 hours prior to bedtime. Eliminate caffeine and carbonated beverages.    Recommend he try taking Gaviscon prior to bedtime.  Discussed daily MiraLAX to improve bowel pattern and patient is agreeable to 2-week  trial.  Follow-up with Dr. Morales next available.  Further recommendations pending HIDA scan results.          ERIK Arora  [facility] [place], operated by [facility]  [address] [place] [address]  Office: [phone]

## 2022-04-18 ENCOUNTER — TELEPHONE (OUTPATIENT)
Dept: GASTROENTEROLOGY | Facility: CLINIC | Age: 77
End: 2022-04-18

## 2022-04-18 NOTE — TELEPHONE ENCOUNTER
----- Message from Johanne Quezada sent at 4/18/2022 11:49 AM EDT -----  Regarding: pt questions  Contact: [phone]  Pt is wanting to know more about the HIDA scan. He wants to know why he has to get it and what is the scan about.

## 2022-05-02 ENCOUNTER — HOSPITAL ENCOUNTER (OUTPATIENT)
Dept: NUCLEAR MEDICINE | Facility: HOSPITAL | Age: 77
Discharge: HOME | End: 2022-05-02

## 2022-05-02 ENCOUNTER — TELEPHONE (OUTPATIENT)
Dept: GASTROENTEROLOGY | Facility: CLINIC | Age: 77
End: 2022-05-02

## 2022-05-02 DIAGNOSIS — R10.13 EPIGASTRIC PAIN: ICD-10-CM

## 2022-05-02 DIAGNOSIS — R10.13 DYSPEPSIA: ICD-10-CM

## 2022-05-02 PROCEDURE — 0 TECHNETIUM TC 99M MEBROFENIN KIT: Performed by: NURSE PRACTITIONER

## 2022-05-02 PROCEDURE — A9537 TC99M MEBROFENIN: HCPCS | Performed by: NURSE PRACTITIONER

## 2022-05-02 PROCEDURE — 78226 HEPATOBILIARY SYSTEM IMAGING: CPT

## 2022-05-02 RX ORDER — KIT FOR THE PREPARATION OF TECHNETIUM TC 99M MEBROFENIN 45 MG/10ML
1 INJECTION, POWDER, LYOPHILIZED, FOR SOLUTION INTRAVENOUS
Status: COMPLETED | OUTPATIENT
Start: 2022-05-02 | End: 2022-05-02

## 2022-05-02 RX ADMIN — MEBROFENIN 1 DOSE: 45 INJECTION, POWDER, LYOPHILIZED, FOR SOLUTION INTRAVENOUS at 08:25

## 2022-05-02 NOTE — TELEPHONE ENCOUNTER
PATIENT WIFE CALLED IN TO REQUEST THAT lansoprazole (PREVACID) 30 MG capsule  BE REFILLED AS GENERIC VERSION, INSURANCE WILL NOT COVER ANYMORE

## 2022-05-02 NOTE — TELEPHONE ENCOUNTER
Returned phone call to patient. He requested I speak with his spouse.   She states the insurance will not cover Prevacid, needs to be generic.   Advised the generic script was sent to the pharmacy. When asked if it was the twice a day dosing she said she did not know. She dropped the letter off at the office and requested it be given to Jackie. Advised will send an update to Jackie. She verb understanding.

## 2022-05-03 RX ORDER — LANSOPRAZOLE 30 MG/1
30 CAPSULE, DELAYED RELEASE ORAL DAILY
Qty: 90 CAPSULE | Refills: 3 | Status: SHIPPED | OUTPATIENT
Start: 2022-05-03 | End: 2022-05-05

## 2022-05-03 RX ORDER — LANSOPRAZOLE 30 MG/1
30 CAPSULE, DELAYED RELEASE ORAL DAILY
Qty: 14 CAPSULE | Refills: 0 | Status: SHIPPED | OUTPATIENT
Start: 2022-05-03 | End: 2022-05-03

## 2022-05-03 RX ORDER — LANSOPRAZOLE 30 MG/1
30 CAPSULE, DELAYED RELEASE ORAL DAILY
Qty: 14 CAPSULE | Refills: 0 | Status: SHIPPED | OUTPATIENT
Start: 2022-05-03 | End: 2022-05-03 | Stop reason: SDUPTHER

## 2022-05-04 ENCOUNTER — TELEPHONE (OUTPATIENT)
Dept: GASTROENTEROLOGY | Facility: CLINIC | Age: 77
End: 2022-05-04

## 2022-05-04 NOTE — PROGRESS NOTES
Please inform the patient that his HIDA scan is normal.  He can discuss further with Dr. Morales tomorrow at his office visit.  We may end up sending him for a CT scan.

## 2022-05-04 NOTE — TELEPHONE ENCOUNTER
----- Message from ERIK Arora sent at 5/4/2022 12:29 PM EDT -----  Please inform the patient that his HIDA scan is normal.  He can discuss further with Dr. Morales tomorrow at his office visit.  We may end up sending him for a CT scan.

## 2022-05-05 ENCOUNTER — OFFICE VISIT (OUTPATIENT)
Dept: GASTROENTEROLOGY | Facility: CLINIC | Age: 77
End: 2022-05-05

## 2022-05-05 VITALS
WEIGHT: 145.4 LBS | TEMPERATURE: 96.6 F | BODY MASS INDEX: 23.37 KG/M2 | DIASTOLIC BLOOD PRESSURE: 75 MMHG | HEIGHT: 66 IN | SYSTOLIC BLOOD PRESSURE: 120 MMHG | HEART RATE: 62 BPM

## 2022-05-05 DIAGNOSIS — K21.9 GASTROESOPHAGEAL REFLUX DISEASE WITHOUT ESOPHAGITIS: Primary | ICD-10-CM

## 2022-05-05 DIAGNOSIS — R10.13 DYSPEPSIA: ICD-10-CM

## 2022-05-05 PROCEDURE — 99213 OFFICE O/P EST LOW 20 MIN: CPT | Performed by: INTERNAL MEDICINE

## 2022-05-05 RX ORDER — LANSOPRAZOLE 30 MG/1
30 CAPSULE, DELAYED RELEASE ORAL 2 TIMES DAILY
Qty: 180 CAPSULE | Refills: 3 | Status: SHIPPED | OUTPATIENT
Start: 2022-05-05 | End: 2022-05-06 | Stop reason: SDUPTHER

## 2022-05-05 NOTE — PROGRESS NOTES
Chief Complaint   Patient presents with   • Nausea       Tyron Hunt is a  77 y.o. male here for a follow up visit for dyspepsia.    HPI     77-year-old male with history of hypertension, hyperlipidemia and gastritis here for follow-up for dyspepsia.  Epigastric queasiness is described of the burning previously described now was denied.  Patient reports most of his symptoms worsened after COVID infection now here for further recommendations.  Review of the work-up so far showed labs normal with negative ultrasound and negative HIDA scan.    Past Medical History:   Diagnosis Date   • Anxiety    • COVID-19 01/29/2022   • Diverticulitis    • Gastritis    • GERD (gastroesophageal reflux disease)    • Gout    • H/O complete eye exam 06/2017   • Hyperlipidemia    • Hypertension    • Impaired fasting blood sugar    • Kidney stones    • Memory loss    • Sleep apnea          Current Outpatient Medications:   •  amLODIPine (NORVASC) 2.5 MG tablet, Take 1 tablet by mouth Daily. (Patient taking differently: Take 5 mg by mouth Daily.), Disp: 90 tablet, Rfl: 3  •  atorvastatin (LIPITOR) 10 MG tablet, Take 1 tablet by mouth Daily., Disp: 90 tablet, Rfl: 3  •  Cholecalciferol (VITAMIN D3 PO), Take 125 mcg by mouth Daily. 5000 IU, Disp: , Rfl:   •  donepezil (ARICEPT) 10 MG tablet, Take 1 tablet by mouth Every Night., Disp: 90 tablet, Rfl: 3  •  fluvoxaMINE (LUVOX) 50 MG tablet, Take 1 tablet by mouth Every Night., Disp: 90 tablet, Rfl: 3  •  lansoprazole (PREVACID) 30 MG capsule, Take 1 capsule by mouth 2 (Two) Times a Day., Disp: 180 capsule, Rfl: 3  •  losartan (Cozaar) 25 MG tablet, Take 1 tablet by mouth Daily., Disp: 90 tablet, Rfl: 3  •  Multiple Vitamins-Minerals (ZINC PO), Take  by mouth., Disp: , Rfl:   •  sucralfate (CARAFATE) 1 g tablet, Take 1 tablet by mouth 4 (Four) Times a Day., Disp: 360 tablet, Rfl: 3  •  tamsulosin (FLOMAX) 0.4 MG capsule 24 hr capsule, Take 1 capsule by mouth 2 (Two) Times a Day., Disp: 180  capsule, Rfl: 3  •  coenzyme Q10 100 MG capsule, Take 200 mg by mouth Daily., Disp: , Rfl:   •  vitamin B-12 (CYANOCOBALAMIN) 1000 MCG tablet, Take 1,000 mcg by mouth Daily., Disp: , Rfl:     Allergies   Allergen Reactions   • Sulfa Antibiotics        Social History     Socioeconomic History   • Marital status:    Tobacco Use   • Smoking status: Never Smoker   • Smokeless tobacco: Never Used   Vaping Use   • Vaping Use: Never used   Substance and Sexual Activity   • Alcohol use: Never   • Drug use: No   • Sexual activity: Yes     Partners: Male       Family History   Problem Relation Age of Onset   • Arthritis Mother    • Heart disease Mother    • Thyroid disease Mother    • Hypertension Brother    • Obesity Brother    • Pancreatic cancer Maternal Aunt    • Dementia Maternal Grandmother        Review of Systems   Constitutional: Negative.    Respiratory: Negative.    Cardiovascular: Negative.    Gastrointestinal: Positive for abdominal pain. Negative for abdominal distention, anal bleeding, blood in stool, constipation, diarrhea, nausea, rectal pain and vomiting.   Musculoskeletal: Negative.    Skin: Negative.    Hematological: Negative.        Vitals:    05/05/22 1037   BP: 120/75   Pulse: 62   Temp: 96.6 °F (35.9 °C)       Physical Exam  Vitals reviewed.   Constitutional:       Appearance: Normal appearance. He is well-developed and normal weight.   HENT:      Head: Normocephalic and atraumatic.   Eyes:      General: No scleral icterus.     Pupils: Pupils are equal, round, and reactive to light.   Pulmonary:      Effort: Pulmonary effort is normal. No respiratory distress.   Abdominal:      General: Bowel sounds are normal. There is no distension.      Palpations: Abdomen is soft. There is no mass.      Tenderness: There is no abdominal tenderness.      Hernia: No hernia is present.   Skin:     General: Skin is warm and dry.      Coloration: Skin is not jaundiced.      Findings: No rash.   Neurological:       General: No focal deficit present.      Mental Status: He is alert and oriented to person, place, and time.      Cranial Nerves: No cranial nerve deficit.   Psychiatric:         Behavior: Behavior normal.         Thought Content: Thought content normal.         Office Visit on 03/25/2022   Component Date Value Ref Range Status   • Class Description 03/25/2022 Comment   Final   • Egg White 03/25/2022 <0.10  Class 0 kU/L Final   • Peanut 03/25/2022 <0.10  Class 0 kU/L Final   • Soybean 03/25/2022 <0.10  Class 0 kU/L Final   • Milk, Cow's 03/25/2022 <0.10  Class 0 kU/L Final   • Clams 03/25/2022 <0.10  Class 0 kU/L Final   • Shrimp 03/25/2022 <0.10  Class 0 kU/L Final   • [place] 03/25/2022 <0.10  Class 0 kU/L Final   • CodFish 03/25/2022 <0.10  Class 0 kU/L Final   • Scallop 03/25/2022 <0.10  Class 0 kU/L Final   • Wheat 03/25/2022 <0.10  Class 0 kU/L Final   • Corn 03/25/2022 <0.10  Class 0 kU/L Final   • Sesame Seed 03/25/2022 <0.10  Class 0 kU/L Final   • Gliadin Deamidated Peptide Ab, IgA 03/25/2022 4  0 - 19 units Final   • Deaminated Gliadin Ab IgG 03/25/2022 2  0 - 19 units Final   • Tissue Transglutaminase IgA 03/25/2022 <2  0 - 3 U/mL Final   • Tissue Transglutaminase IgG 03/25/2022 <2  0 - 5 U/mL Final   • Endomysial IgA 03/25/2022 Negative  Negative Final   • IgA 03/25/2022 138  61 - 437 mg/dL Final       Diagnoses and all orders for this visit:    1. Gastroesophageal reflux disease without esophagitis (Primary)    2. Dyspepsia    Other orders  -     lansoprazole (PREVACID) 30 MG capsule; Take 1 capsule by mouth 2 (Two) Times a Day.  Dispense: 180 capsule; Refill: 3      Patient 77-year-old male with history of gastritis, hypertension, hyperlipidemia presenting with ongoing upper GI symptoms.  Patient reports the burning discomfort previously described seems improved but still with some dyspepsia and almost a nausea type queasiness in his epigastrium that occurs usually early in the morning that wakes him  up at night.  Patient gets some relief with the Gaviscon but does well during the day particularly after he gets something to eat.  Seems to be that the PPI is losing potency overnight.  We will try twice daily lansoprazole and follow-up clinically.

## 2022-05-06 ENCOUNTER — TELEPHONE (OUTPATIENT)
Dept: GASTROENTEROLOGY | Facility: CLINIC | Age: 77
End: 2022-05-06

## 2022-05-06 RX ORDER — LANSOPRAZOLE 30 MG/1
30 CAPSULE, DELAYED RELEASE ORAL 2 TIMES DAILY
Qty: 30 CAPSULE | Refills: 0 | Status: SHIPPED | OUTPATIENT
Start: 2022-05-06 | End: 2022-05-06

## 2022-05-06 RX ORDER — LANSOPRAZOLE 30 MG/1
30 CAPSULE, DELAYED RELEASE ORAL 2 TIMES DAILY
Qty: 60 CAPSULE | Refills: 0 | Status: SHIPPED | OUTPATIENT
Start: 2022-05-06 | End: 2022-12-07 | Stop reason: SDUPTHER

## 2022-05-06 NOTE — TELEPHONE ENCOUNTER
----- Message from Johanne Quezada sent at 5/6/2022 10:10 AM EDT -----  Regarding: lansoprazole (PREVACID) 30 MG capsule  Contact: [phone]  Pt is wanting a refill sent to the pharmacy below until the mail order comes in because he is out completely.    [facility] [id number] [place] [address] [phone]  - [phone] FX

## 2022-05-09 ENCOUNTER — TELEPHONE (OUTPATIENT)
Dept: GASTROENTEROLOGY | Facility: CLINIC | Age: 77
End: 2022-05-09

## 2022-05-09 NOTE — TELEPHONE ENCOUNTER
Prior authorization for lansoprazole has been submitted via [facility] and was approved. Pharmacy was called and left a message.

## 2022-05-22 DIAGNOSIS — G89.29 CHRONIC PAIN OF RIGHT HIP: Primary | ICD-10-CM

## 2022-05-22 DIAGNOSIS — M25.551 CHRONIC PAIN OF RIGHT HIP: Primary | ICD-10-CM

## 2022-05-24 ENCOUNTER — OFFICE VISIT (OUTPATIENT)
Dept: ORTHOPEDIC SURGERY | Facility: CLINIC | Age: 77
End: 2022-05-24

## 2022-05-24 VITALS — BODY MASS INDEX: 20.89 KG/M2 | WEIGHT: 130 LBS | HEIGHT: 66 IN | TEMPERATURE: 97.7 F

## 2022-05-24 DIAGNOSIS — M70.61 GREATER TROCHANTERIC BURSITIS OF RIGHT HIP: Primary | ICD-10-CM

## 2022-05-24 PROCEDURE — 99203 OFFICE O/P NEW LOW 30 MIN: CPT | Performed by: ORTHOPAEDIC SURGERY

## 2022-05-24 PROCEDURE — 73502 X-RAY EXAM HIP UNI 2-3 VIEWS: CPT | Performed by: ORTHOPAEDIC SURGERY

## 2022-05-24 NOTE — PROGRESS NOTES
General Exam    Patient: Tyron Hunt    YOB: 1945    Medical Record Number: [id number]    Chief Complaints: Right hip pain    History of Present Illness:     77 y.o. male patient who presents for retreatment of right hip pain.  Patient states he is noticed some lateral type hip pain over his bony prominence about his upper thigh for the past 2 to 3 months.  States his symptoms are more related to increased activity.  At rest and with most daily activities he states he does fairly well.  Currently is not have any issues.  Denies any groin pain.  Denies any radiation of pain.    Denies any numbness or tingling.  Denies any fevers, cough or shortness of breath.    Allergies:   Allergies   Allergen Reactions   • Sulfa Antibiotics        Home Medications:      Current Outpatient Medications:   •  amLODIPine (NORVASC) 2.5 MG tablet, Take 1 tablet by mouth Daily. (Patient taking differently: Take 5 mg by mouth Daily.), Disp: 90 tablet, Rfl: 3  •  atorvastatin (LIPITOR) 10 MG tablet, Take 1 tablet by mouth Daily., Disp: 90 tablet, Rfl: 3  •  Cholecalciferol (VITAMIN D3 PO), Take 125 mcg by mouth Daily. 5000 IU, Disp: , Rfl:   •  coenzyme Q10 100 MG capsule, Take 200 mg by mouth Daily., Disp: , Rfl:   •  donepezil (ARICEPT) 10 MG tablet, Take 1 tablet by mouth Every Night., Disp: 90 tablet, Rfl: 3  •  fluvoxaMINE (LUVOX) 50 MG tablet, Take 1 tablet by mouth Every Night., Disp: 90 tablet, Rfl: 3  •  lansoprazole (PREVACID) 30 MG capsule, Take 1 capsule by mouth 2 (Two) Times a Day., Disp: 60 capsule, Rfl: 0  •  losartan (Cozaar) 25 MG tablet, Take 1 tablet by mouth Daily., Disp: 90 tablet, Rfl: 3  •  Multiple Vitamins-Minerals (ZINC PO), Take  by mouth., Disp: , Rfl:   •  sucralfate (CARAFATE) 1 g tablet, Take 1 tablet by mouth 4 (Four) Times a Day., Disp: 360 tablet, Rfl: 3  •  tamsulosin (FLOMAX) 0.4 MG capsule 24 hr capsule, Take 1 capsule by mouth 2 (Two) Times a Day., Disp: 180 capsule, Rfl: 3  •   "vitamin B-12 (CYANOCOBALAMIN) 1000 MCG tablet, Take 1,000 mcg by mouth Daily., Disp: , Rfl:     Past Medical History:   Diagnosis Date   • Anxiety    • COVID-19 01/29/2022   • Diverticulitis    • Gastritis    • GERD (gastroesophageal reflux disease)    • Gout    • H/O complete eye exam 06/2017   • Hyperlipidemia    • Hypertension    • Impaired fasting blood sugar    • Kidney stones    • Memory loss    • Sleep apnea        Past Surgical History:   Procedure Laterality Date   • CERVICAL EPIDURAL N/A 08/27/2021    Procedure: CERVICAL EPIDURAL;  Surgeon: Nataliia Avilez MD;  Location[facility];  Service: Pain Management;  Laterality: N/A;   • CERVICAL EPIDURAL N/A 10/18/2021    Procedure: CERVICAL EPIDURAL 7-1;  Surgeon: Nataliia Avilez MD;  Location: [facility];  Service: Pain Management;  Laterality: N/A;   • COLONOSCOPY      date unknow-Gregorio GRIFFIN   • CYST REMOVAL     • CYSTOSCOPY W/ LITHOLAPAXY / [facility]     • TONSILLECTOMY  1949   • UPPER GASTROINTESTINAL ENDOSCOPY  10/05/2021    Gastritis   Arlet GRIFFIN       Social History     Occupational History   • Not on file   Tobacco Use   • Smoking status: Never Smoker   • Smokeless tobacco: Never Used   Vaping Use   • Vaping Use: Never used   Substance and Sexual Activity   • Alcohol use: Never   • Drug use: No   • Sexual activity: Yes     Partners: Male      Social History     Social History Narrative   • Not on file       Family History   Problem Relation Age of Onset   • Arthritis Mother    • Heart disease Mother    • Thyroid disease Mother    • Hypertension Brother    • Obesity Brother    • Pancreatic cancer Maternal Aunt    • Dementia Maternal Grandmother        Review of Systems:      Constitutional: Denies fever, shaking or chills         All other pertinent positives and negatives as noted above in HPI.    Physical Exam: 77 y.o. male    Vitals:    05/24/22 1301   Temp: 97.7 °F (36.5 °C)   Weight: 59 kg (130 lb)   Height: 167.6 cm (66\")       General:  Patient is " awake and alert.  Appears in no acute distress or discomfort.      Musculoskeletal/Extremities:    Right lower extremity examined: Positive tenderness palpation over the greater trochanter.  Overall hip range of motion is full and painless.  Negative straight leg raise and Stinchfield.  Patient can stand from a seated position ambulates with normal gait unassisted.  Motor and sensory intact distally.  2+ pedal pulses.         Radiology:       3 views of the right hip include AP pelvis, AP and lateral taken reviewed to evaluate the patient's complaint/s.    Imaging demonstrates some mild degenerative changes mainly appreciated on the lateral view about the inferior aspect of the acetabulum with possible early osteophyte formation.  There are some early bone sclerosis noted.  Small early cystic changes involving the superior lateral acetabulum.  Overall though still has a healthy moderate joint space superiorly.     No imaging for comparison.    Assessment: Right hip greater trochanteric bursitis      Plan:      I discussed the findings with the patient and his wife.  I feel that this is more soft tissue in nature.  This type of issue can sometimes take 6 to 8 weeks to resolve and can return.  Recommend activity modification, formal physical therapy, and anti-inflammatories either oral or gel as tolerated.  Patient may also do some icing and/or heat going forward.  Recommend allowing therapy to build him back into his activities as he states he stays pretty active and does weights.  Think it is fine to continue upper extremity weights but allow for lower extremity usage to be progressed by therapy.  If symptoms or not improved or worsen over the next 3 to 4 weeks despite the above told the patient we may consider a steroid injection if warranted.  The patient is wife will let me know how things go and may follow-up as needed.  I did give him a sample of Pennsaid to try if Voltaren or the over-the-counter gel does not  work for him.  Needed be happy to call anything in.           We will plan for follow up as needed.    All questions were answered.  Patient understands and agrees with the plan.    Abraham Ledbetter MD    05/24/2022    CC to Tyron Hunt MD

## 2022-05-25 ENCOUNTER — PATIENT ROUNDING (BHMG ONLY) (OUTPATIENT)
Dept: ORTHOPEDIC SURGERY | Facility: CLINIC | Age: 77
End: 2022-05-25

## 2022-05-25 NOTE — PROGRESS NOTES
A [employer] Message has been sent to the patient for PATIENT ROUNDING with [facility]

## 2022-05-31 ENCOUNTER — TELEPHONE (OUTPATIENT)
Dept: NEUROLOGY | Facility: CLINIC | Age: 77
End: 2022-05-31

## 2022-05-31 NOTE — TELEPHONE ENCOUNTER
Spoke with patient to notify him about the appointment change.  Also added patient to the wait list.

## 2022-06-09 RX ORDER — METHYLPREDNISOLONE 4 MG/1
TABLET ORAL
Qty: 21 TABLET | Refills: 0 | Status: SHIPPED | OUTPATIENT
Start: 2022-06-09 | End: 2022-10-10

## 2022-06-14 ENCOUNTER — OFFICE VISIT (OUTPATIENT)
Dept: GASTROENTEROLOGY | Facility: CLINIC | Age: 77
End: 2022-06-14

## 2022-06-14 ENCOUNTER — TELEPHONE (OUTPATIENT)
Dept: FAMILY MEDICINE CLINIC | Facility: CLINIC | Age: 77
End: 2022-06-14

## 2022-06-14 VITALS
BODY MASS INDEX: 25.13 KG/M2 | WEIGHT: 156.4 LBS | HEART RATE: 59 BPM | SYSTOLIC BLOOD PRESSURE: 129 MMHG | DIASTOLIC BLOOD PRESSURE: 78 MMHG | TEMPERATURE: 96.5 F | HEIGHT: 66 IN

## 2022-06-14 DIAGNOSIS — K21.9 GASTROESOPHAGEAL REFLUX DISEASE WITHOUT ESOPHAGITIS: Primary | ICD-10-CM

## 2022-06-14 PROCEDURE — 99212 OFFICE O/P EST SF 10 MIN: CPT | Performed by: INTERNAL MEDICINE

## 2022-06-14 NOTE — TELEPHONE ENCOUNTER
Caller: [facility] - [place], [place] - [address] - [phone] [facility] [phone] FX    Relationship to patient: Pharmacy    Best call back number: [phone]    Patient is needing: ARACELI VICENTE IS CALLING TO CONFIRM THE PATIENT IS TAKING BOTH OF THE FOLLOWING MEDICATIONS, OR IF HE SHOULD BE TAKING ONE OR THE OTHER.     PANTOPRAZOLE AND    lansoprazole            REFERENCE #[id number]      PLEASE ADVISE.

## 2022-06-14 NOTE — PROGRESS NOTES
Chief Complaint   Patient presents with   • Heartburn   • Nausea       Tyron Hunt is a  77 y.o. male here for a follow up visit for GERD.    HPI     Patient 77-year-old male history of hypertension, hyperlipidemia and reflux doing much better with twice daily Prevacid and as needed Gaviscon.  Patient denies any further nausea though does have some reflux at times if he overeats for the most part is done well.  Patient does avoid caffeine but this does seem to help    Past Medical History:   Diagnosis Date   • Anxiety    • COVID-19 01/29/2022   • Diverticulitis    • Gastritis    • GERD (gastroesophageal reflux disease)    • Gout    • H/O complete eye exam 06/2017   • Hyperlipidemia    • Hypertension    • Impaired fasting blood sugar    • Kidney stones    • Memory loss    • Sleep apnea          Current Outpatient Medications:   •  aluminum hydroxide-mag carbonate (GAVISCON EXTRA RELIEF) 160-105 MG chewable tablet chewable tablet, Chew As Needed., Disp: , Rfl:   •  amLODIPine (NORVASC) 2.5 MG tablet, Take 1 tablet by mouth Daily. (Patient taking differently: Take 5 mg by mouth Daily.), Disp: 90 tablet, Rfl: 3  •  atorvastatin (LIPITOR) 10 MG tablet, Take 1 tablet by mouth Daily., Disp: 90 tablet, Rfl: 3  •  Cholecalciferol (VITAMIN D3 PO), Take 125 mcg by mouth Daily. 5000 IU, Disp: , Rfl:   •  coenzyme Q10 100 MG capsule, Take 200 mg by mouth Daily., Disp: , Rfl:   •  donepezil (ARICEPT) 10 MG tablet, Take 1 tablet by mouth Every Night., Disp: 90 tablet, Rfl: 3  •  fluvoxaMINE (LUVOX) 50 MG tablet, Take 1 tablet by mouth Every Night., Disp: 90 tablet, Rfl: 3  •  lansoprazole (PREVACID) 30 MG capsule, Take 1 capsule by mouth 2 (Two) Times a Day., Disp: 60 capsule, Rfl: 0  •  losartan (Cozaar) 25 MG tablet, Take 1 tablet by mouth Daily., Disp: 90 tablet, Rfl: 3  •  methylPREDNISolone (Medrol) 4 MG dose pack, follow package directions, Disp: 21 tablet, Rfl: 0  •  Multiple Vitamins-Minerals (ZINC PO), Take  by  mouth., Disp: , Rfl:   •  sucralfate (CARAFATE) 1 g tablet, Take 1 tablet by mouth 4 (Four) Times a Day., Disp: 360 tablet, Rfl: 3  •  tamsulosin (FLOMAX) 0.4 MG capsule 24 hr capsule, Take 1 capsule by mouth 2 (Two) Times a Day., Disp: 180 capsule, Rfl: 3  •  vitamin B-12 (CYANOCOBALAMIN) 1000 MCG tablet, Take 1,000 mcg by mouth Daily., Disp: , Rfl:     Allergies   Allergen Reactions   • Sulfa Antibiotics        Social History     Socioeconomic History   • Marital status:    Tobacco Use   • Smoking status: Never Smoker   • Smokeless tobacco: Never Used   Vaping Use   • Vaping Use: Never used   Substance and Sexual Activity   • Alcohol use: Never   • Drug use: Never   • Sexual activity: Yes     Partners: Male       Family History   Problem Relation Age of Onset   • Arthritis Mother    • Heart disease Mother    • Thyroid disease Mother    • Hypertension Brother    • Obesity Brother    • Pancreatic cancer Maternal Aunt    • Dementia Maternal Grandmother        Review of Systems   Constitutional: Negative.    Respiratory: Negative.    Cardiovascular: Negative.    Gastrointestinal: Negative.    Musculoskeletal: Positive for arthralgias and gait problem.   Skin: Negative.    Hematological: Negative.        Vitals:    06/14/22 1114   BP: 129/78   Pulse: 59   Temp: 96.5 °F (35.8 °C)       Physical Exam  Vitals reviewed.   Constitutional:       Appearance: Normal appearance. He is well-developed and normal weight.   HENT:      Head: Normocephalic and atraumatic.   Eyes:      General: No scleral icterus.     Pupils: Pupils are equal, round, and reactive to light.   Pulmonary:      Effort: Pulmonary effort is normal. No respiratory distress.      Breath sounds: Normal breath sounds.   Abdominal:      General: Bowel sounds are normal. There is no distension.      Palpations: Abdomen is soft. There is no mass.      Tenderness: There is no abdominal tenderness.      Hernia: No hernia is present.   Musculoskeletal:          General: No swelling or tenderness. Normal range of motion.   Skin:     General: Skin is warm and dry.      Coloration: Skin is not jaundiced.      Findings: No rash.   Neurological:      General: No focal deficit present.      Mental Status: He is alert and oriented to person, place, and time.      Cranial Nerves: No cranial nerve deficit.   Psychiatric:         Behavior: Behavior normal.         Thought Content: Thought content normal.         Judgment: Judgment normal.         No visits with results within 2 Month(s) from this visit.   Latest known visit with results is:   Office Visit on 03/25/2022   Component Date Value Ref Range Status   • Class Description 03/25/2022 Comment   Final   • Egg White 03/25/2022 <0.10  Class 0 kU/L Final   • Peanut 03/25/2022 <0.10  Class 0 kU/L Final   • Soybean 03/25/2022 <0.10  Class 0 kU/L Final   • Milk, Cow's 03/25/2022 <0.10  Class 0 kU/L Final   • Clams 03/25/2022 <0.10  Class 0 kU/L Final   • Shrimp 03/25/2022 <0.10  Class 0 kU/L Final   • Allen 03/25/2022 <0.10  Class 0 kU/L Final   • CodFish 03/25/2022 <0.10  Class 0 kU/L Final   • Scallop 03/25/2022 <0.10  Class 0 kU/L Final   • Wheat 03/25/2022 <0.10  Class 0 kU/L Final   • Corn 03/25/2022 <0.10  Class 0 kU/L Final   • Sesame Seed 03/25/2022 <0.10  Class 0 kU/L Final   • Gliadin Deamidated Peptide Ab, IgA 03/25/2022 4  0 - 19 units Final   • Deaminated Gliadin Ab IgG 03/25/2022 2  0 - 19 units Final   • Tissue Transglutaminase IgA 03/25/2022 <2  0 - 3 U/mL Final   • Tissue Transglutaminase IgG 03/25/2022 <2  0 - 5 U/mL Final   • Endomysial IgA 03/25/2022 Negative  Negative Final   • IgA 03/25/2022 138  61 - 437 mg/dL Final       Diagnoses and all orders for this visit:    1. Gastroesophageal reflux disease without esophagitis (Primary)      Patient 77-year-old male with a history of GERD doing well.  Patient currently taking twice daily PPI with excellent effect.  Patient still gets some benefits from Gaviscon if he  eats late at night or eats sweets but otherwise is doing well without it.  We will continue current therapy and follow-up as needed

## 2022-06-15 NOTE — TELEPHONE ENCOUNTER
PHARM ADVISED SPOKE WITH ARACELI -Pantoprazole was discontinued by gastro and he was started on lansoprazole instead.

## 2022-06-30 DIAGNOSIS — M25.551 CHRONIC PAIN OF RIGHT HIP: Primary | ICD-10-CM

## 2022-06-30 DIAGNOSIS — G89.29 CHRONIC PAIN OF RIGHT HIP: Primary | ICD-10-CM

## 2022-07-12 ENCOUNTER — OFFICE VISIT (OUTPATIENT)
Dept: ORTHOPEDIC SURGERY | Facility: CLINIC | Age: 77
End: 2022-07-12

## 2022-07-12 VITALS — BODY MASS INDEX: 25.39 KG/M2 | TEMPERATURE: 96.7 F | WEIGHT: 158 LBS | HEIGHT: 66 IN

## 2022-07-12 DIAGNOSIS — M70.61 GREATER TROCHANTERIC BURSITIS OF RIGHT HIP: Primary | ICD-10-CM

## 2022-07-12 PROCEDURE — 99212 OFFICE O/P EST SF 10 MIN: CPT | Performed by: ORTHOPAEDIC SURGERY

## 2022-07-12 NOTE — PROGRESS NOTES
Patient: Tyron Hunt  YOB: 1945 77 y.o. male  Medical Record Number: [id number]    Chief Complaint:   Chief Complaint   Patient presents with   • Right Hip - Follow-up       History of Present Illness:Tyron Hunt is a 77 y.o. male who presents for follow-up of right hip pain.  Patient has been seen previously diagnosed with right hip greater enteric bursitis.  Patient states he is much improved still using the Voltaren gel occasionally states his walking is much improved.  Occasionally does get some lateral pain but quickly subsides is much improved since recently seen.  No complaints today.    Allergies:   Allergies   Allergen Reactions   • Sulfa Antibiotics        Medications:   Current Outpatient Medications   Medication Sig Dispense Refill   • aluminum hydroxide-mag carbonate (GAVISCON EXTRA RELIEF) 160-105 MG chewable tablet chewable tablet Chew As Needed.     • amLODIPine (NORVASC) 2.5 MG tablet Take 1 tablet by mouth Daily. (Patient taking differently: Take 5 mg by mouth Daily.) 90 tablet 3   • atorvastatin (LIPITOR) 10 MG tablet Take 1 tablet by mouth Daily. 90 tablet 3   • Cholecalciferol (VITAMIN D3 PO) Take 125 mcg by mouth Daily. 5000 IU     • coenzyme Q10 100 MG capsule Take 200 mg by mouth Daily.     • donepezil (ARICEPT) 10 MG tablet Take 1 tablet by mouth Every Night. 90 tablet 3   • fluvoxaMINE (LUVOX) 50 MG tablet Take 1 tablet by mouth Every Night. 90 tablet 3   • lansoprazole (PREVACID) 30 MG capsule Take 1 capsule by mouth 2 (Two) Times a Day. 60 capsule 0   • losartan (Cozaar) 25 MG tablet Take 1 tablet by mouth Daily. 90 tablet 3   • methylPREDNISolone (Medrol) 4 MG dose pack follow package directions 21 tablet 0   • Multiple Vitamins-Minerals (ZINC PO) Take  by mouth.     • sucralfate (CARAFATE) 1 g tablet Take 1 tablet by mouth 4 (Four) Times a Day. 360 tablet 3   • tamsulosin (FLOMAX) 0.4 MG capsule 24 hr capsule Take 1 capsule by mouth 2 (Two) Times a Day. 180 capsule  "3   • vitamin B-12 (CYANOCOBALAMIN) 1000 MCG tablet Take 1,000 mcg by mouth Daily.       No current facility-administered medications for this visit.         The following portions of the patient's history were reviewed and updated as appropriate: allergies, current medications, past family history, past medical history, past social history, past surgical history and problem list.    Review of Systems:   A 14 point review of systems was performed. All systems negative except pertinent positives/negative listed in HPI above    Physical Exam:   Vitals:    07/12/22 1319   Temp: 96.7 °F (35.9 °C)   Weight: 71.7 kg (158 lb)   Height: 167.6 cm (66\")   PainSc:   3       General: A and O x 3, ASA, NAD    SCLERA:    Normal    DENTITION:   Normal  Right lower extremity examined no tenderness palpation about the right hip.  Hip range of motion full and painless.  Patient stands from seated position ambulates with normal gait unassisted.      Assessment/Plan:  Right hip greater trochanteric bursitis    Seems to be doing quite well.  Told him if things start to flareup we can consider physical therapy plus or minus an injection if warranted.  If he is not symptomatic he can decreased his use of anti-inflammatory medication.    Follow-up as needed.  All questions answered.  Patient understands and agrees with plan.    Abraham Ledbetter MD  7/12/2022  '  "

## 2022-07-18 DIAGNOSIS — J06.9 ACUTE URI: Primary | ICD-10-CM

## 2022-07-19 LAB
LABCORP SARS-COV-2, NAA 2 DAY TAT: NORMAL
SARS-COV-2 RNA RESP QL NAA+PROBE: NOT DETECTED

## 2022-07-29 ENCOUNTER — DOCUMENTATION (OUTPATIENT)
Dept: FAMILY MEDICINE CLINIC | Facility: CLINIC | Age: 77
End: 2022-07-29

## 2022-07-29 RX ORDER — PROMETHAZINE HYDROCHLORIDE 25 MG/1
25 TABLET ORAL NIGHTLY PRN
Qty: 20 TABLET | Refills: 11 | Status: SHIPPED | OUTPATIENT
Start: 2022-07-29

## 2022-08-10 RX ORDER — SUCRALFATE 1 G/1
1 TABLET ORAL 4 TIMES DAILY
Qty: 56 TABLET | Refills: 3 | Status: SHIPPED | OUTPATIENT
Start: 2022-08-10 | End: 2022-08-10 | Stop reason: SDUPTHER

## 2022-08-10 RX ORDER — SUCRALFATE 1 G/1
1 TABLET ORAL 4 TIMES DAILY
Qty: 360 TABLET | Refills: 3 | Status: CANCELLED | OUTPATIENT
Start: 2022-08-10

## 2022-08-10 RX ORDER — SUCRALFATE 1 G/1
1 TABLET ORAL 4 TIMES DAILY
Qty: 360 TABLET | Refills: 3 | Status: SHIPPED | OUTPATIENT
Start: 2022-08-10 | End: 2022-12-30 | Stop reason: SDUPTHER

## 2022-08-10 NOTE — TELEPHONE ENCOUNTER
Caller: [facility]     Relationship: PHARMACY     Best call back number: [phone]    Requested Prescriptions:   Requested Prescriptions     Pending Prescriptions Disp Refills   • sucralfate (CARAFATE) 1 g tablet 360 tablet 3     Sig: Take 1 tablet by mouth 4 (Four) Times a Day.        Pharmacy where request should be sent: [facility] - [place], [place] - [address] - [phone]  - [phone]     Additional details provided by patient: PATIENT IS NEEDING A 90 DAY SUPPLY SENT TO HIS LOCAL PHARMACY. PLEASE ADVISE.     Does the patient have less than a 3 day supply:  [x] Yes  [] No    Johanne Tobias Rep   08/10/22 08:37 EDT

## 2022-09-07 RX ORDER — DULOXETIN HYDROCHLORIDE 30 MG/1
30 CAPSULE, DELAYED RELEASE ORAL DAILY
Qty: 90 CAPSULE | Refills: 3 | Status: SHIPPED | OUTPATIENT
Start: 2022-09-07 | End: 2022-11-30

## 2022-10-10 ENCOUNTER — OFFICE VISIT (OUTPATIENT)
Dept: FAMILY MEDICINE CLINIC | Facility: CLINIC | Age: 77
End: 2022-10-10

## 2022-10-10 VITALS
BODY MASS INDEX: 24.27 KG/M2 | SYSTOLIC BLOOD PRESSURE: 112 MMHG | TEMPERATURE: 97.2 F | DIASTOLIC BLOOD PRESSURE: 62 MMHG | HEIGHT: 66 IN | WEIGHT: 151 LBS | HEART RATE: 82 BPM | OXYGEN SATURATION: 98 % | RESPIRATION RATE: 16 BRPM

## 2022-10-10 DIAGNOSIS — Z00.00 MEDICARE ANNUAL WELLNESS VISIT, SUBSEQUENT: Primary | ICD-10-CM

## 2022-10-10 DIAGNOSIS — Z12.12 SCREENING FOR MALIGNANT NEOPLASM OF THE RECTUM: ICD-10-CM

## 2022-10-10 DIAGNOSIS — M25.551 RIGHT HIP PAIN: ICD-10-CM

## 2022-10-10 DIAGNOSIS — Z12.11 SPECIAL SCREENING FOR MALIGNANT NEOPLASMS, COLON: ICD-10-CM

## 2022-10-10 PROCEDURE — G0439 PPPS, SUBSEQ VISIT: HCPCS | Performed by: FAMILY MEDICINE

## 2022-10-10 PROCEDURE — 1159F MED LIST DOCD IN RCRD: CPT | Performed by: FAMILY MEDICINE

## 2022-10-10 PROCEDURE — 1170F FXNL STATUS ASSESSED: CPT | Performed by: FAMILY MEDICINE

## 2022-10-10 PROCEDURE — 1126F AMNT PAIN NOTED NONE PRSNT: CPT | Performed by: FAMILY MEDICINE

## 2022-10-10 NOTE — PATIENT INSTRUCTIONS
Medicare Wellness  Personal Prevention Plan of Service     Date of Office Visit:    Encounter Provider:  Tyron Hunt MD  Place of Service:  [facility]  Patient Name: Tyron Albertoer  :  1945    As part of the Medicare Wellness portion of your visit today, we are providing you with this personalized preventive plan of services (PPPS). This plan is based upon recommendations of the [place] Preventive Services Task Force (USPSTF) and the Advisory Committee on Immunization Practices (ACIP).    This lists the preventive care services that should be considered, and provides dates of when you are due. Items listed as completed are up-to-date and do not require any further intervention.    Health Maintenance   Topic Date Due    Hepatitis B (1 of 3 - Risk 3-dose series) Never done    HEPATITIS C SCREENING  Never done    COVID-19 Vaccine (3 - Booster for Pfizer series) 2021    COLORECTAL CANCER SCREENING  2022    LIPID PANEL  2022    Pneumococcal Vaccine 65+ (2 - PPSV23 if available, else PCV20) 2023    ANNUAL WELLNESS VISIT  10/10/2023    INFLUENZA VACCINE  Completed    ZOSTER VACCINE  Completed    TDAP/TD VACCINES  Discontinued       Orders Placed This Encounter   Procedures    Ambulatory Referral to Physical Therapy Evaluate and treat     Referral Priority:   Routine     Referral Type:   Physical Therapy     Referral Reason:   Specialty Services Required     Requested Specialty:   Physical Therapy     Number of Visits Requested:   1       Return in about 1 year (around 10/10/2023) for Recheck.

## 2022-10-10 NOTE — PROGRESS NOTES
The ABCs of the Annual Wellness Visit  Subsequent Medicare Wellness Visit    Chief Complaint   Patient presents with   • medicare wellness due      Per dr durand      Subjective    History of Present Illness:  Tyron Durand is a 77 y.o. male who presents for a Subsequent Medicare Wellness Visit.    The following portions of the patient's history were reviewed and   updated as appropriate: allergies, current medications, past family history, past medical history, past social history, past surgical history and problem list.    Compared to one year ago, the patient feels his physical   health is the same.    Compared to one year ago, the patient feels his mental   health is worse.    Recent Hospitalizations:  This patient has had a [facility] admission record on file within the last 365 days.    Current Medical Providers:  Patient Care Team:  Tyron Durand MD as PCP - General  Tyron Durand MD as PCP - Family Medicine  [facility] [place] [facility] MD as Consulting Physician (Urology)    Outpatient Medications Prior to Visit   Medication Sig Dispense Refill   • aluminum hydroxide-mag carbonate (GAVISCON EXTRA RELIEF) 160-105 MG chewable tablet chewable tablet Chew As Needed.     • amLODIPine (NORVASC) 2.5 MG tablet Take 1 tablet by mouth Daily. (Patient taking differently: Take 5 mg by mouth Daily.) 90 tablet 3   • atorvastatin (LIPITOR) 10 MG tablet Take 1 tablet by mouth Daily. 90 tablet 3   • Cholecalciferol (VITAMIN D3 PO) Take 125 mcg by mouth Daily. 5000 IU     • coenzyme Q10 100 MG capsule Take 200 mg by mouth Daily.     • donepezil (ARICEPT) 10 MG tablet Take 1 tablet by mouth Every Night. 90 tablet 3   • DULoxetine (Cymbalta) 30 MG capsule Take 1 capsule by mouth Daily. 90 capsule 3   • lansoprazole (PREVACID) 30 MG capsule Take 1 capsule by mouth 2 (Two) Times a Day. 60 capsule 0   • losartan (Cozaar) 25 MG tablet Take 1 tablet by mouth Daily. 90 tablet 3   • Multiple Vitamins-Minerals (ZINC PO) Take   "by mouth.     • promethazine (PHENERGAN) 25 MG tablet Take 1 tablet by mouth At Night As Needed for Nausea or Vomiting. 20 tablet 11   • sucralfate (CARAFATE) 1 g tablet Take 1 tablet by mouth 4 (Four) Times a Day. 360 tablet 3   • tamsulosin (FLOMAX) 0.4 MG capsule 24 hr capsule Take 1 capsule by mouth 2 (Two) Times a Day. 180 capsule 3   • vitamin B-12 (CYANOCOBALAMIN) 1000 MCG tablet Take 1,000 mcg by mouth Daily.     • methylPREDNISolone (Medrol) 4 MG dose pack follow package directions 21 tablet 0     No facility-administered medications prior to visit.       No opioid medication identified on active medication list. I have reviewed chart for other potential  high risk medication/s and harmful drug interactions in the elderly.          Aspirin is not on active medication list.  Aspirin use is not indicated based on review of current medical condition/s. Risk of harm outweighs potential benefits.  .    Patient Active Problem List   Diagnosis   • Hypertension   • GERD (gastroesophageal reflux disease)   • Hyperlipidemia   • Anxiety   • Benign prostatic hyperplasia with lower urinary tract symptoms   • Impaired fasting glucose   • Recurrent major depressive disorder, in full remission (HCC)   • Cervical disc disease   • Cervical spinal stenosis   • Foraminal stenosis of cervical region   • Cervical radiculitis   • Altered mental status   • Encephalopathy due to COVID-19 virus   • Age-related physical debility   • Cytokine release syndrome, grade 1   • Dyspepsia     Advance Care Planning  Advance Directive is not on file.  ACP discussion was held with the patient during this visit. Patient has an advance directive (not in EMR), copy requested.          Objective    Vitals:    10/10/22 0903   BP: 112/62   Pulse: 82   Resp: 16   Temp: 97.2 °F (36.2 °C)   TempSrc: Oral   SpO2: 98%   Weight: 68.5 kg (151 lb)   Height: 167.6 cm (66\")   PainSc: 0-No pain     Estimated body mass index is 24.37 kg/m² as calculated from the " "following:    Height as of this encounter: 167.6 cm (66\").    Weight as of this encounter: 68.5 kg (151 lb).    BMI is within normal parameters. No other follow-up for BMI required.      Does the patient have evidence of cognitive impairment? Yes    Physical Exam            HEALTH RISK ASSESSMENT    Smoking Status:  Social History     Tobacco Use   Smoking Status Never   Smokeless Tobacco Never     Alcohol Consumption:  Social History     Substance and Sexual Activity   Alcohol Use Never     Fall Risk Screen:    MIRNAADI Fall Risk Assessment was completed, and patient is at LOW risk for falls.Assessment completed on:2/23/2022    Depression Screening:  PHQ-2/PHQ-9 Depression Screening 9/9/2021   Retired PHQ-9 Total Score 0   Retired Total Score 0       Health Habits and Functional and Cognitive Screening:  Functional & Cognitive Status 10/10/2022   Do you have difficulty preparing food and eating? No   Do you have difficulty bathing yourself, getting dressed or grooming yourself? No   Do you have difficulty using the toilet? No   Do you have difficulty moving around from place to place? No   Do you have trouble with steps or getting out of a bed or a chair? No   Current Diet Well Balanced Diet   Dental Exam Up to date   Eye Exam Up to date   Exercise (times per week) 4 times per week   Current Exercises Include Walking   Current Exercise Activities Include -   Do you need help using the phone?  No   Are you deaf or do you have serious difficulty hearing?  No   Do you need help with transportation? No   Do you need help shopping? No   Do you need help preparing meals?  No   Do you need help with housework?  No   Do you need help with laundry? No   Do you need help taking your medications? No   Do you need help managing money? No   Do you ever drive or ride in a car without wearing a seat belt? No   Have you felt unusual stress, anger or loneliness in the last month? No   Who do you live with? Spouse   If you need help, " do you have trouble finding someone available to you? Yes   Have you been bothered in the last four weeks by sexual problems? No   Do you have difficulty concentrating, remembering or making decisions? No       Age-appropriate Screening Schedule:  Refer to the list below for future screening recommendations based on patient's age, sex and/or medical conditions. Orders for these recommended tests are listed in the plan section. The patient has been provided with a written plan.    Health Maintenance   Topic Date Due   • LIPID PANEL  06/18/2022   • INFLUENZA VACCINE  Completed   • ZOSTER VACCINE  Completed   • TDAP/TD VACCINES  Discontinued              Assessment & Plan   CMS Preventative Services Quick Reference  Risk Factors Identified During Encounter  Cardiovascular Disease  Dementia/Memory   The above risks/problems have been discussed with the patient.  Follow up actions/plans if indicated are seen below in the Assessment/Plan Section.  Pertinent information has been shared with the patient in the After Visit Summary.    Diagnoses and all orders for this visit:    1. Medicare annual wellness visit, subsequent (Primary)    2. Right hip pain  -     Ambulatory Referral to Physical Therapy Evaluate and treat    3. Special screening for malignant neoplasms, colon  -     Cologuard - Stool, Per Rectum; Future    4. Screening for malignant neoplasm of the rectum  -     Cologuard - Stool, Per Rectum; Future        Follow Up:   Return in about 1 year (around 10/10/2023) for Recheck.     An After Visit Summary and PPPS were made available to the patient.          I spent 15 minutes caring for Tyron on this date of service. This time includes time spent by me in the following activities:preparing for the visit

## 2022-10-11 ENCOUNTER — TELEPHONE (OUTPATIENT)
Dept: ORTHOPEDIC SURGERY | Facility: CLINIC | Age: 77
End: 2022-10-11

## 2022-10-11 NOTE — TELEPHONE ENCOUNTER
Caller: ZANDER    Relationship to patient: WIFE    Best call back number:[phone]    Chief complaint: RIGHT HIP    Type of visit: INJECTION     Requested date: ASAP         Additional notes: [facility] OFFICE PLEASE

## 2022-10-11 NOTE — TELEPHONE ENCOUNTER
Caller: ZANDER BENSON    Relationship to patient: Emergency Contact    Best call back number: [phone]    Chief complaint: RIGHT HIP PAIN    Type of visit: INJECTION    Requested date: ASAP

## 2022-10-25 ENCOUNTER — CLINICAL SUPPORT (OUTPATIENT)
Dept: ORTHOPEDIC SURGERY | Facility: CLINIC | Age: 77
End: 2022-10-25

## 2022-10-25 VITALS — BODY MASS INDEX: 24.27 KG/M2 | WEIGHT: 151 LBS | RESPIRATION RATE: 16 BRPM | TEMPERATURE: 97 F | HEIGHT: 66 IN

## 2022-10-25 DIAGNOSIS — M70.62 GREATER TROCHANTERIC BURSITIS OF LEFT HIP: ICD-10-CM

## 2022-10-25 DIAGNOSIS — M70.61 GREATER TROCHANTERIC BURSITIS OF RIGHT HIP: Primary | ICD-10-CM

## 2022-10-25 PROCEDURE — 20610 DRAIN/INJ JOINT/BURSA W/O US: CPT | Performed by: ORTHOPAEDIC SURGERY

## 2022-10-25 RX ORDER — METHYLPREDNISOLONE ACETATE 80 MG/ML
80 INJECTION, SUSPENSION INTRA-ARTICULAR; INTRALESIONAL; INTRAMUSCULAR; SOFT TISSUE
Status: COMPLETED | OUTPATIENT
Start: 2022-10-25 | End: 2022-10-25

## 2022-10-25 RX ADMIN — METHYLPREDNISOLONE ACETATE 80 MG: 80 INJECTION, SUSPENSION INTRA-ARTICULAR; INTRALESIONAL; INTRAMUSCULAR; SOFT TISSUE at 13:47

## 2022-10-25 NOTE — PROGRESS NOTES
Large Joint Arthrocentesis: R greater trochanteric bursa  Date/Time: 10/25/2022 1:47 PM  Consent given by: patient  Site marked: site marked  Timeout: Immediately prior to procedure a time out was called to verify the correct patient, procedure, equipment, support staff and site/side marked as required   Supporting Documentation  Indications: pain   Procedure Details  Location: hip - R greater trochanteric bursa  Preparation: Patient was prepped and draped in the usual sterile fashion  Needle gauge: 21.  Approach: posterior  Medications administered: 2 mL lidocaine (cardiac); 80 mg methylPREDNISolone acetate 80 MG/ML  Patient tolerance: patient tolerated the procedure well with no immediate complications        Patient: Tyron Hunt  YOB: 1945 77 y.o. male  Medical Record Number: [id number]    Chief Complaint:   Chief Complaint   Patient presents with   • Right Hip - Injections       History of Present Illness:Tyron Hunt is a 77 y.o. male who presents for follow-up of right hip pain.  Patient has been seen previously diagnosed with right hip bursitis.  States having some more pain lately especially with increased ambulation.  Also notes recent pain on the left part of the hip in the same location.  He has tried some anti-inflammatory gel which does help some but has not been as helpful in the recent past.  He does stay active and lifts weights.    Allergies:   Allergies   Allergen Reactions   • Sulfa Antibiotics        Medications:   Current Outpatient Medications   Medication Sig Dispense Refill   • aluminum hydroxide-mag carbonate (GAVISCON EXTRA RELIEF) 160-105 MG chewable tablet chewable tablet Chew As Needed.     • amLODIPine (NORVASC) 2.5 MG tablet Take 1 tablet by mouth Daily. (Patient taking differently: Take 2 tablets by mouth Daily.) 90 tablet 3   • atorvastatin (LIPITOR) 10 MG tablet Take 1 tablet by mouth Daily. 90 tablet 3   • Cholecalciferol (VITAMIN D3 PO) Take 125 mcg by mouth  "Daily. 5000 IU     • coenzyme Q10 100 MG capsule Take 200 mg by mouth Daily.     • donepezil (ARICEPT) 10 MG tablet Take 1 tablet by mouth Every Night. 90 tablet 3   • DULoxetine (Cymbalta) 30 MG capsule Take 1 capsule by mouth Daily. 90 capsule 3   • lansoprazole (PREVACID) 30 MG capsule Take 1 capsule by mouth 2 (Two) Times a Day. 60 capsule 0   • losartan (Cozaar) 25 MG tablet Take 1 tablet by mouth Daily. 90 tablet 3   • Multiple Vitamins-Minerals (ZINC PO) Take  by mouth.     • promethazine (PHENERGAN) 25 MG tablet Take 1 tablet by mouth At Night As Needed for Nausea or Vomiting. 20 tablet 11   • sucralfate (CARAFATE) 1 g tablet Take 1 tablet by mouth 4 (Four) Times a Day. 360 tablet 3   • tamsulosin (FLOMAX) 0.4 MG capsule 24 hr capsule Take 1 capsule by mouth 2 (Two) Times a Day. 180 capsule 3   • vitamin B-12 (CYANOCOBALAMIN) 1000 MCG tablet Take 1,000 mcg by mouth Daily.       No current facility-administered medications for this visit.         The following portions of the patient's history were reviewed and updated as appropriate: allergies, current medications, past family history, past medical history, past social history, past surgical history and problem list.    Review of Systems:   A 14 point review of systems was performed. All systems negative except pertinent positives/negative listed in HPI above    Physical Exam:   Vitals:    10/25/22 1308   Resp: 16   Temp: 97 °F (36.1 °C)   TempSrc: Temporal   Weight: 68.5 kg (151 lb)   Height: 167.6 cm (65.98\")   PainSc: 0-No pain       General: A and O x 3, ASA, NAD    SCLERA:    Normal    DENTITION:   Normal  Bilateral lower extremities positive trace palpation over the greater trochanters more prominent on the right.  Motor and sensory intact distally.      Assessment/Plan:  Bilateral greater trochanteric bursitis    I discussed the findings with the patient,  because he is continuing to be symptomatic predominatly on the right we will do an injection " today and order formal physical therapy for both hips.  May continue to try anti-inflammatory gel and ice as needed.  Build back into activity per therapy.  Patient will follow-up as needed.  All questions were answered.  Patient understands and agrees with the plan.      Abraham Ledbetter MD  10/25/2022

## 2022-10-26 DIAGNOSIS — R19.5 POSITIVE COLORECTAL CANCER SCREENING USING COLOGUARD TEST: Primary | ICD-10-CM

## 2022-10-27 ENCOUNTER — TREATMENT (OUTPATIENT)
Dept: PHYSICAL THERAPY | Facility: CLINIC | Age: 77
End: 2022-10-27

## 2022-10-27 DIAGNOSIS — M25.551 RIGHT HIP PAIN: ICD-10-CM

## 2022-10-27 DIAGNOSIS — M70.61 TROCHANTERIC BURSITIS OF RIGHT HIP: Primary | ICD-10-CM

## 2022-10-27 PROCEDURE — 97110 THERAPEUTIC EXERCISES: CPT | Performed by: PHYSICAL THERAPIST

## 2022-10-27 PROCEDURE — 97161 PT EVAL LOW COMPLEX 20 MIN: CPT | Performed by: PHYSICAL THERAPIST

## 2022-10-27 NOTE — PROGRESS NOTES
Physical Therapy Initial Evaluation and Plan of Care    Patient: Tyron Hunt   : 1945  Diagnosis/ICD-10 Code:  Trochanteric bursitis of right hip [M70.61]  Referring practitioner: Dr. Abraham Ledbetter  Date of Initial Visit: 10/27/2022  Today's Date: 10/27/2022  Patient seen for 1 sessions           Subjective Questionnaire: LEFS: 73/80    Subjective Evaluation    History of Present Illness  Mechanism of injury: History of current condition: Presents with c/o right hip pain onset a few months ago. Got injection in his hip 2 days ago and the pain is mostly gone now. Pain is mostly on the outside, right on the bone.     Makes symptoms worse: walking a lot    Makes symptoms better: injection, heat    Reported functional limitation: limited walking distance, difficulty working in the yard, some pain on stairs.      Quality of life: excellent    Pain  Current pain ratin    Patient Goals  Patient goals for therapy: decreased pain, improved balance, increased motion, increased strength, independence with ADLs/IADLs and return to sport/leisure activities           Precautions: HTN, hx of vertigo, reports he has some memory issues since having COVID earlier this year.     Objective          Observations     Additional Hip Observation Details  GAIT: slight ER bilaterally    Tenderness     Right Hip   Tenderness in the greater trochanter.     Neurological Testing     Sensation     Hip   Left Hip   Intact: light touch    Right Hip   Intact: light touch    Passive Range of Motion     Right Hip   Flexion: 120 degrees   Abduction: 35 degrees   External rotation (90/90): 30 degrees   Internal rotation (90/90): 10 degrees     Strength/Myotome Testing     Right Hip   Planes of Motion   Flexion: 4  Extension: 4  Abduction: 4    Right Hip Flexibility Comments:   Hamstring tightness: 140 deg per 90/90 test          Assessment & Plan     Assessment  Impairments: abnormal gait, abnormal or restricted ROM, activity  intolerance, impaired balance, impaired physical strength, lacks appropriate home exercise program and pain with function  Functional Limitations: walking, uncomfortable because of pain and standing  Assessment details: The patient is a 77 y.o. male who presents to physical therapy today for right hip pain. Upon initial evaluation, the patient demonstrates the following impairments: limited hip ROM, hip weakness, hamstring tightness. His pain is much better since injection 2 days ago but does demonstrate impairments that require continued therapy. Due to these impairments, the patient is unable to perform or has difficulty with the following functional tasks: walking, stairs, yard work. The patient would benefit from skilled PT services to address functional limitations and impairments and to improve patient quality of life.      Barriers to therapy: cognitive impairement  Prognosis: good    Goals  Plan Goals: STG:  Pt will be independent and compliant with initial HEP in 4 weeks.  Pt will demonstrate an increase in hip IR ROM to 15 degrees within 4 weeks.   Pt will report pain level at worst <4 during walking activity in 4 weeks.    LTG: by DC (12 weeks)  Pt will be independent with final HEP for self-management of condition by DC.     Pt will improve hamstring flexibility to 150 deg in order to be able to ambulate with normal gait mechanics by DC.  Pt will improve hip abduction & extension strength to 5/5 in order to be able to negotiate steps with normal mechanics by DC.      Plan  Therapy options: will be seen for skilled therapy services  Planned modality interventions: cryotherapy, electrical stimulation/Russian stimulation, thermotherapy (hydrocollator packs), ultrasound and dry needling  Planned therapy interventions: abdominal trunk stabilization, ADL retraining, balance/weight-bearing training, body mechanics training, flexibility, functional ROM exercises, gait training, home exercise program, joint  mobilization, manual therapy, motor coordination training, neuromuscular re-education, orthotic fitting/training, postural training, soft tissue mobilization, spinal/joint mobilization, strengthening, stretching and therapeutic activities  Frequency: 2x week  Duration in weeks: 12  Treatment plan discussed with: patient        See flowsheets for treatment detail.    History # of Personal Factors and/or Comorbidities: MODERATE (1-2)  Examination of Body System(s): # of elements: LOW (1-2)  Clinical Presentation: STABLE   Clinical Decision Making: LOW       Timed:         Manual Therapy:         mins  47860;     Therapeutic Exercise:    15     mins  48227;     Neuromuscular Fe:        mins  18010;    Therapeutic Activity:          mins  37214;     Gait Training:           mins  10603;     Ultrasound:          mins  32935;    Ionto                                   mins   34510  Self Care                            mins   11062      Un-Timed:  Electrical Stimulation:         mins  74345 ( );  Dry Needling          mins self-pay  Traction          mins 24098  Low Eval          Mins  54230  Mod Eval     35     Mins  69143  High Eval                            Mins  80844  Re-Eval                               mins  53157      Timed Treatment:   15   mins   Total Treatment:     50   mins    PT SIGNATURE: Ирина Ortega PT   [place] PT license #: [id number]  [place] PT license #: [id number]  DATE TREATMENT INITIATED: 10/27/2022    Initial Certification  Certification Period: 1/24/2023  I certify that the therapy services are furnished while this patient is under my care.  The services outlined above are required by this patient, and will be reviewed every 90 days.    PHYSICIAN: Dr. Abraham Ledbetter                                   DATE:     Please sign and return via fax to [phone] . Thank you, [facility].

## 2022-10-31 ENCOUNTER — TREATMENT (OUTPATIENT)
Dept: PHYSICAL THERAPY | Facility: CLINIC | Age: 77
End: 2022-10-31

## 2022-10-31 DIAGNOSIS — M70.61 TROCHANTERIC BURSITIS OF RIGHT HIP: Primary | ICD-10-CM

## 2022-10-31 DIAGNOSIS — M25.551 RIGHT HIP PAIN: ICD-10-CM

## 2022-10-31 PROCEDURE — 97112 NEUROMUSCULAR REEDUCATION: CPT | Performed by: PHYSICAL THERAPIST

## 2022-10-31 PROCEDURE — 97110 THERAPEUTIC EXERCISES: CPT | Performed by: PHYSICAL THERAPIST

## 2022-10-31 NOTE — PROGRESS NOTES
Physical Therapy Daily Treatment Note  Visit # 2        Patient: Tyron Hunt   : 1945  Referring practitioner: Abraham Ledbetter MD  Date of Initial Evaluation:  Type: THERAPY  Noted: 10/27/2022  Today's Date: 10/31/2022           ICD-10-CM ICD-9-CM   1. Trochanteric bursitis of right hip  M70.61 726.5   2. Right hip pain  M25.551 719.45       Subjective  Tyron Hunt reports:   Doing well overall, no specific areas of pain noted at onset of today's visit.      Objective   See Exercise, Manual, and Modality Logs for complete treatment.   Reviewed current HEP, progressed therex program with exercises as noted.  Added bridging to HEP, written instructions issued via [facility].    Assessment/Plan  Tolerated continued progression of therapeutic exercise/therapeutic activity well today, no increased pain reported during or after session.  Requires CGA with rocker board exercise, VC to slow pace with most exercises.   Would continue to benefit from skilled PT progressing with functional hip strength, WB and balance.        Progress per Plan of Care           Timed:         Manual Therapy:         mins  23565     Therapeutic Exercise:     30    mins  37386     Neuromuscular Fe:    10    mins  53682    Therapeutic Activity:          mins  24942     Gait Training:           mins  60053     Ultrasound:          mins  78146    Ionto                                   mins  06834  Self Care                            mins  52224    Un-Timed:  Electrical Stimulation:         mins 61439 ( )  Traction          mins 27654    Timed Treatment:   40   mins   Total Treatment:     40   mins    FIORELLA Maldonado License #[id number]  Physical Therapist Assistant

## 2022-11-02 ENCOUNTER — TREATMENT (OUTPATIENT)
Dept: PHYSICAL THERAPY | Facility: CLINIC | Age: 77
End: 2022-11-02

## 2022-11-02 DIAGNOSIS — M70.61 TROCHANTERIC BURSITIS OF RIGHT HIP: Primary | ICD-10-CM

## 2022-11-02 DIAGNOSIS — M25.551 RIGHT HIP PAIN: ICD-10-CM

## 2022-11-02 PROCEDURE — 97530 THERAPEUTIC ACTIVITIES: CPT | Performed by: PHYSICAL THERAPIST

## 2022-11-02 PROCEDURE — 97110 THERAPEUTIC EXERCISES: CPT | Performed by: PHYSICAL THERAPIST

## 2022-11-02 NOTE — PROGRESS NOTES
Physical Therapy Daily Treatment Note    Patient: Tyron Hunt  : 1945  Referring practitioner: Tyron Hunt MD  Today's Date: 2022    VISIT#: 3      Subjective   Tyron Hunt reports: Doing well, only had hip pain 1 time since last visit.       Objective     See Exercise, Manual, and Modality Logs for complete treatment.     Patient Education: continue HEP    Assessment/Plan  Good response to session, requires almost constant supervision and cuing to perform exercises correctly. Good tolerance and no pain throughout.     Progress per Plan of Care            Timed:         Manual Therapy:         mins  54310;     Therapeutic Exercise:    20     mins  25872;     Neuromuscular Fe:        mins  85329;    Therapeutic Activity:     10     mins  82733;     Gait Training:           mins  27041;     Ultrasound:          mins  89273;    Ionto:                                   mins   16931  Self Care:                            mins   77861    Un-Timed:  Electrical Stimulation:         mins  40162 ( );  Dry Needling          mins self-pay  Traction          mins 36480  Re-Eval                               mins  22799    Timed Treatment:   30   mins   Total Treatment:     30   mins    Ирина Ortega, PT  Physical Therapist  [place] PT license #: [id number]  [place] PT license #: [id number]

## 2022-11-08 ENCOUNTER — TREATMENT (OUTPATIENT)
Dept: PHYSICAL THERAPY | Facility: CLINIC | Age: 77
End: 2022-11-08

## 2022-11-08 DIAGNOSIS — M25.551 RIGHT HIP PAIN: ICD-10-CM

## 2022-11-08 DIAGNOSIS — M70.61 TROCHANTERIC BURSITIS OF RIGHT HIP: Primary | ICD-10-CM

## 2022-11-08 PROCEDURE — 97110 THERAPEUTIC EXERCISES: CPT | Performed by: PHYSICAL THERAPIST

## 2022-11-08 PROCEDURE — 97530 THERAPEUTIC ACTIVITIES: CPT | Performed by: PHYSICAL THERAPIST

## 2022-11-08 NOTE — PROGRESS NOTES
Physical Therapy Daily Treatment Note    Patient: Tyron Hunt  : 1945  Referring practitioner: Abraham Ledbetter MD  Today's Date: 2022    VISIT#: 4      Subjective   Tyron Hunt reports: Doing well, no pain since last time. Has been walking more.       Objective     See Exercise, Manual, and Modality Logs for complete treatment.     Patient Education: continue HEP    Assessment/Plan  Good response to session, making excellent progress with therapy. Still demonstrates hip and muscle tightness, requires frequent cues for form and monitoring sets and reps of exercises.     Progress per Plan of Care            Timed:         Manual Therapy:         mins  95867;     Therapeutic Exercise:    15     mins  64915;     Neuromuscular Fe:        mins  37527;    Therapeutic Activity:     15     mins  87078;     Gait Training:           mins  66490;     Ultrasound:          mins  10525;    Ionto:                                   mins   38527  Self Care:                            mins   01962    Un-Timed:  Electrical Stimulation:         mins  01494 ( );  Dry Needling          mins self-pay  Traction          mins 38946  Re-Eval                               mins  19750    Timed Treatment:   30   mins   Total Treatment:     30   mins    Ирина Ortega, PT  Physical Therapist  [place] PT license #: [id number]  [place] PT license #: [id number]

## 2022-11-11 ENCOUNTER — TREATMENT (OUTPATIENT)
Dept: PHYSICAL THERAPY | Facility: CLINIC | Age: 77
End: 2022-11-11

## 2022-11-11 DIAGNOSIS — M25.551 RIGHT HIP PAIN: ICD-10-CM

## 2022-11-11 DIAGNOSIS — M70.61 TROCHANTERIC BURSITIS OF RIGHT HIP: Primary | ICD-10-CM

## 2022-11-11 PROCEDURE — 97110 THERAPEUTIC EXERCISES: CPT | Performed by: PHYSICAL THERAPIST

## 2022-11-11 PROCEDURE — 97530 THERAPEUTIC ACTIVITIES: CPT | Performed by: PHYSICAL THERAPIST

## 2022-11-15 ENCOUNTER — TREATMENT (OUTPATIENT)
Dept: PHYSICAL THERAPY | Facility: CLINIC | Age: 77
End: 2022-11-15

## 2022-11-15 DIAGNOSIS — M70.61 TROCHANTERIC BURSITIS OF RIGHT HIP: Primary | ICD-10-CM

## 2022-11-15 DIAGNOSIS — M25.551 RIGHT HIP PAIN: ICD-10-CM

## 2022-11-15 PROCEDURE — 97530 THERAPEUTIC ACTIVITIES: CPT | Performed by: PHYSICAL THERAPIST

## 2022-11-15 PROCEDURE — 97110 THERAPEUTIC EXERCISES: CPT | Performed by: PHYSICAL THERAPIST

## 2022-11-15 NOTE — PROGRESS NOTES
Physical Therapy Daily Treatment Note    Patient: Tyron Hunt  : 1945  Referring practitioner: Abraham Ledbetter MD  Today's Date: 11/15/2022    VISIT#: 6      Subjective   Tyorn Hunt reports: I had a lot of pain in that spot yesterday, I almost called you and told you about it.  It didn't bother me until yesterday.      Objective   See Exercise, Manual, and Modality Logs for complete treatment.     Assessment & Plan     Assessment    Assessment details: Tolerated continued progression of therapeutic exercise/activity well today, adding leg press vs Swiss ball, progression with sets/reps of several exercises.  Pt continues to require cues for repetition and positioning of each exercise.  Demos and voices some mild apprehension with some exercises d/t pain yesterday, but able to participate fully.      Goals  Plan Goals: STG:  Pt will be independent and compliant with initial HEP in 4 weeks.  Pt will demonstrate an increase in hip IR ROM to 15 degrees within 4 weeks.   Pt will report pain level at worst <4 during walking activity in 4 weeks.    LTG: by DC (12 weeks)  Pt will be independent with final HEP for self-management of condition by DC.     Pt will improve hamstring flexibility to 150 deg in order to be able to ambulate with normal gait mechanics by DC.  Pt will improve hip abduction & extension strength to 5/5 in order to be able to negotiate steps with normal mechanics by DC.    Plan  Plan details: Follow up with evaluating PT next visit for reassessment.                  Timed:         Manual Therapy:         mins  22221;     Therapeutic Exercise:    27     mins  11792;     Neuromuscular Fe:        mins  33206;    Therapeutic Activity:     12     mins  90731;     Gait Training:           mins  28894;     Ultrasound:          mins  37816;    Ionto:                                   mins   71083  Self Care:                            mins   87973    Un-Timed:  Electrical Stimulation:          mins  85868 ( );  Dry Needling          mins self-pay  Traction          mins 29714  Re-Eval                               mins  34242    Timed Treatment:   39   mins   Total Treatment:     39   mins    FIORELLA Maldonaod License #[id number]  Physical Therapist Assistant

## 2022-11-23 ENCOUNTER — TREATMENT (OUTPATIENT)
Dept: PHYSICAL THERAPY | Facility: CLINIC | Age: 77
End: 2022-11-23

## 2022-11-23 DIAGNOSIS — M25.551 RIGHT HIP PAIN: ICD-10-CM

## 2022-11-23 DIAGNOSIS — M70.61 TROCHANTERIC BURSITIS OF RIGHT HIP: Primary | ICD-10-CM

## 2022-11-23 DIAGNOSIS — M70.62 GREATER TROCHANTERIC BURSITIS OF LEFT HIP: ICD-10-CM

## 2022-11-23 DIAGNOSIS — M70.61 GREATER TROCHANTERIC BURSITIS OF RIGHT HIP: Primary | ICD-10-CM

## 2022-11-23 PROCEDURE — 97110 THERAPEUTIC EXERCISES: CPT | Performed by: PHYSICAL THERAPIST

## 2022-11-23 PROCEDURE — 97140 MANUAL THERAPY 1/> REGIONS: CPT | Performed by: PHYSICAL THERAPIST

## 2022-11-23 NOTE — PROGRESS NOTES
Physical Therapy Progress Note/Re-assessment    Patient: Tyron Hunt  : 1945  Referring practitioner: Abraham Ledbetter MD  Today's Date: 2022    VISIT#: 7    Subjective   Tyron Hunt reports: Not doing well, was doing great for a number of weeks but then a few days ago his pain in his hip returned.       Objective          Observations     Additional Hip Observation Details  GAIT: slight ER bilaterally    Tenderness     Right Hip   Tenderness in the greater trochanter.     Additional Tenderness Details  More tenderness than on initial evaluation    Passive Range of Motion     Right Hip   Flexion: 120 degrees   Abduction: 35 degrees   External rotation (90/90): 30 degrees with pain  Internal rotation (90/90): 10 degrees     Strength/Myotome Testing     Right Hip   Planes of Motion   Flexion: 4  Extension: 4  Abduction: 4- (painful)    Right Hip Flexibility Comments:   Hamstring tightness: 140 deg per 90/90 test        See Exercise, Manual, and Modality Logs for complete treatment.     Patient Education:    Assessment & Plan     Assessment    Assessment details: Poor tolerance to session today. Mr. Hunt was doing great with therapy for the first few weeks due to getting injection in hip prior to starting therapy. However about 3-4 days ago his pain returned. It appears that his injection has worn off and now he is having significantly increased pain levels, and is showing slight decline since he started therapy. Had to revise HEP to only stretching and instructed to ice regularly over the next few days. Requires continued skilled PT due to recent setback.    Goals  Plan Goals: STG:  Pt will be independent and compliant with initial HEP in 4 weeks. MET  Pt will demonstrate an increase in hip IR ROM to 15 degrees within 4 weeks. NOT MET  Pt will report pain level at worst <4 during walking activity in 4 weeks. NOT MET    LTG: by DC (12 weeks) NOT MET  Pt will be independent with final HEP for  self-management of condition by DC.   Pt will improve hamstring flexibility to 150 deg in order to be able to ambulate with normal gait mechanics by DC.  Pt will improve hip abduction & extension strength to 5/5 in order to be able to negotiate steps with normal mechanics by DC.    Plan  Therapy options: will be seen for skilled therapy services  Frequency: 2x week  Plan details: Continue PT, add in STM and focus on stretching          Progress per Plan of Care            Timed:         Manual Therapy:    8     mins  02632;     Therapeutic Exercise:    20     mins  54815;     Neuromuscular Fe:        mins  17285;    Therapeutic Activity:          mins  04433;     Gait Training:           mins  14715;     Ultrasound:          mins  48790;    Ionto:                                   mins   64631  Self Care:                            mins   48192    Un-Timed:  Electrical Stimulation:         mins  56499 ( );  Dry Needling          mins self-pay  Traction          mins 90923  Re-Eval                               mins  51354    Timed Treatment:   28   mins   Total Treatment:     28   mins    Ирина Ortega, PT  Physical Therapist  [place] PT license #: [id number]  [place] PT license #: [id number]

## 2022-11-30 RX ORDER — FLUOXETINE 10 MG/1
10 CAPSULE ORAL DAILY
Qty: 10 CAPSULE | Refills: 0 | Status: SHIPPED | OUTPATIENT
Start: 2022-11-30 | End: 2023-01-01

## 2022-12-05 ENCOUNTER — OFFICE VISIT (OUTPATIENT)
Dept: ORTHOPEDIC SURGERY | Facility: CLINIC | Age: 77
End: 2022-12-05

## 2022-12-05 VITALS — WEIGHT: 153 LBS | TEMPERATURE: 97.6 F | HEIGHT: 66 IN | BODY MASS INDEX: 24.59 KG/M2

## 2022-12-05 DIAGNOSIS — M25.551 RIGHT HIP PAIN: ICD-10-CM

## 2022-12-05 DIAGNOSIS — M70.61 GREATER TROCHANTERIC BURSITIS OF RIGHT HIP: Primary | ICD-10-CM

## 2022-12-05 PROCEDURE — 99213 OFFICE O/P EST LOW 20 MIN: CPT | Performed by: ORTHOPAEDIC SURGERY

## 2022-12-05 PROCEDURE — 73502 X-RAY EXAM HIP UNI 2-3 VIEWS: CPT | Performed by: ORTHOPAEDIC SURGERY

## 2022-12-05 NOTE — PROGRESS NOTES
Patient: Tyron Hunt  YOB: 1945 77 y.o. male  Medical Record Number: [id number]    Chief Complaint:   Chief Complaint   Patient presents with   • Right Hip - Follow-up, Initial Evaluation       History of Present Illness:Tyron Hunt is a 77 y.o. male who presents for follow-up of right hip pain.  Continues have some lateral hip pain with increased activity as walking.  States at rest and minimal daily activities he does okay with.  Still little tender when he pushes on the side of his hip.  Denies any groin pain or radiation of pain.  States injection a month ago did help some.  Has been doing some therapy's been okay.  Continues to walk at least once a [place] mall.    Allergies:   Allergies   Allergen Reactions   • Sulfa Antibiotics        Medications:   Current Outpatient Medications   Medication Sig Dispense Refill   • aluminum hydroxide-mag carbonate (GAVISCON EXTRA RELIEF) 160-105 MG chewable tablet chewable tablet Chew As Needed.     • amLODIPine (NORVASC) 2.5 MG tablet Take 1 tablet by mouth Daily. (Patient taking differently: Take 5 mg by mouth Daily.) 90 tablet 3   • atorvastatin (LIPITOR) 10 MG tablet Take 1 tablet by mouth Daily. 90 tablet 3   • Cholecalciferol (VITAMIN D3 PO) Take 125 mcg by mouth Daily. 5000 IU     • coenzyme Q10 100 MG capsule Take 200 mg by mouth Daily.     • FLUoxetine (PROzac) 10 MG capsule Take 1 capsule by mouth Daily for 10 days. 10 capsule 0   • lansoprazole (PREVACID) 30 MG capsule Take 1 capsule by mouth 2 (Two) Times a Day. 60 capsule 0   • losartan (Cozaar) 25 MG tablet Take 1 tablet by mouth Daily. 90 tablet 3   • Multiple Vitamins-Minerals (ZINC PO) Take  by mouth.     • promethazine (PHENERGAN) 25 MG tablet Take 1 tablet by mouth At Night As Needed for Nausea or Vomiting. 20 tablet 11   • sucralfate (CARAFATE) 1 g tablet Take 1 tablet by mouth 4 (Four) Times a Day. 360 tablet 3   • tamsulosin (FLOMAX) 0.4 MG capsule 24 hr capsule Take 1  "capsule by mouth 2 (Two) Times a Day. 180 capsule 3   • vitamin B-12 (CYANOCOBALAMIN) 1000 MCG tablet Take 1,000 mcg by mouth Daily.     • donepezil (ARICEPT) 10 MG tablet Take 1 tablet by mouth Every Night. 90 tablet 3     No current facility-administered medications for this visit.         The following portions of the patient's history were reviewed and updated as appropriate: allergies, current medications, past family history, past medical history, past social history, past surgical history and problem list.    Review of Systems:   A 14 point review of systems was performed. All systems negative except pertinent positives/negative listed in HPI above    Physical Exam:   Vitals:    12/05/22 1104   Temp: 97.6 °F (36.4 °C)   TempSrc: Temporal   Weight: 69.4 kg (153 lb)   Height: 167.6 cm (66\")   PainSc:   8   PainLoc: Hip       General: A and O x 3, ASA, NAD    SCLERA:    Normal    DENTITION:   Normal  Overall and change about the right hip is some tenderness along the greater trochanter with palpation.  Motor and sensory intact distally.    Radiology:   AP and lateral films of the right hip taken reviewed do show some mild degenerative changes about the hip joint some early bone sclerosis and osteophyte formation no acute fractures noted.  No significant changes from previous films.    Assessment/Plan:  Right hip greater trochanteric bursitis    Recommend continue conservative treatment at this time seems to be more soft tissue based.  Recommend shutting down some the increase activity and giving this a chance to really calm down.  Continue activity modification, physical therapy, ice, rest as needed, anti-inflammatory gel I did give him some Pennsaid to try as well.  Also let him see our sports medicine colleagues in about a month at which point they can review/evaluate him for possible injections steroid versus PRP for his symptoms.  The patient is wife understand this plan and wished to proceed.  He can " follow-up with me as needed.      Abraham Ledbetter MD  12/5/2022

## 2022-12-07 RX ORDER — LANSOPRAZOLE 30 MG/1
30 CAPSULE, DELAYED RELEASE ORAL 2 TIMES DAILY
Qty: 180 CAPSULE | Refills: 3 | Status: SHIPPED | OUTPATIENT
Start: 2022-12-07 | End: 2023-01-01 | Stop reason: SDUPTHER

## 2022-12-09 ENCOUNTER — TREATMENT (OUTPATIENT)
Dept: PHYSICAL THERAPY | Facility: CLINIC | Age: 77
End: 2022-12-09

## 2022-12-09 DIAGNOSIS — M70.61 TROCHANTERIC BURSITIS OF RIGHT HIP: Primary | ICD-10-CM

## 2022-12-09 DIAGNOSIS — M25.551 RIGHT HIP PAIN: ICD-10-CM

## 2022-12-09 PROCEDURE — 97140 MANUAL THERAPY 1/> REGIONS: CPT | Performed by: PHYSICAL THERAPIST

## 2022-12-09 PROCEDURE — 97110 THERAPEUTIC EXERCISES: CPT | Performed by: PHYSICAL THERAPIST

## 2022-12-09 NOTE — PROGRESS NOTES
Physical Therapy Daily Treatment Note  Visit # 8           ICD-10-CM ICD-9-CM   1. Trochanteric bursitis of right hip  M70.61 726.5   2. Right hip pain  M25.551 719.45       Subjective   Tyron Hunt reports:   I am still really sore in the side of my hip.  I'm going to go see the orthopedic darren soon. (MD appt scheduled 12/12/22)    Objective:  See Exercise, Manual, and Modality Logs for complete treatment.     Assessment/Plan:  Tolerated manual therapy and therapeutic exercise well today, no significant progression of program d/t elevated pain levels recently.  Pt reports no increased pain during or after exercises, does express apprehension with most exercise.        Progress per Plan of Care and Progress strengthening /stabilization /functional activity           Timed:         Manual Therapy:     12    mins  95740     Therapeutic Exercise:     20    mins  72392     Neuromuscular Fe:        mins  16337    Therapeutic Activity:          mins  50394     Gait Training:           mins  67945     Ultrasound:          mins  23456    Ionto                                   mins  78885  Self Care                            mins  23757    Un-Timed:  Electrical Stimulation:         mins 34229 ( )  Traction          mins 48159    Timed Treatment:   32   mins   Total Treatment:     32   mins    FIORELLA Maldonado License #[id number]  Physical Therapist Assistant

## 2022-12-12 ENCOUNTER — OFFICE VISIT (OUTPATIENT)
Dept: SPORTS MEDICINE | Facility: CLINIC | Age: 77
End: 2022-12-12

## 2022-12-12 VITALS
RESPIRATION RATE: 16 BRPM | HEART RATE: 99 BPM | OXYGEN SATURATION: 98 % | BODY MASS INDEX: 24.59 KG/M2 | WEIGHT: 153 LBS | SYSTOLIC BLOOD PRESSURE: 124 MMHG | DIASTOLIC BLOOD PRESSURE: 78 MMHG | TEMPERATURE: 98 F | HEIGHT: 66 IN

## 2022-12-12 DIAGNOSIS — M25.551 GREATER TROCHANTERIC PAIN SYNDROME OF RIGHT LOWER EXTREMITY: Primary | ICD-10-CM

## 2022-12-12 PROCEDURE — 99214 OFFICE O/P EST MOD 30 MIN: CPT | Performed by: FAMILY MEDICINE

## 2022-12-12 NOTE — PROGRESS NOTES
"Tyron is a 77 y.o. year old male     Chief Complaint   Patient presents with   • Hip Pain     Right hip pain for several months, NKI.        History of Present Illness  HPI     Mr. Hunt presents to the office for evaluation of right hip pain. He was referred by Dr. Abraham Ledbetter, for a second opinion. He has experienced lateral right hip pain for several months. The pain is described as sharp in nature. The pain is exacerbated with ambulation, as well as when he gets out of bed in the morning. He describes pinpoint lateral hip pain. His pain is improved with Voltaren gel. He has been attending physical therapy, but has not seen a benefit from that yet. He has received at least 1 injection, which provided him with approximately 2 weeks of relief.    I have reviewed the patient's medical, family, and social history in detail and updated the computerized patient record.    Review of Systems       /78 (BP Location: Right arm, Patient Position: Sitting, Cuff Size: Adult)   Pulse 99   Temp 98 °F (36.7 °C)   Resp 16   Ht 167.6 cm (66\")   Wt 69.4 kg (153 lb)   SpO2 98%   BMI 24.69 kg/m²      Physical Exam    Vital signs reviewed.  General: No acute distress.  Eyes: conjunctiva clear; pupils equally round and reactive  ENT: external ears and nose atraumatic  CV: no peripheral edema, 2+ distal pulses  Resp: normal respiratory effort, no use of accessory muscles  Skin: no rashes or wounds; normal turgor  Psych: mood and affect appropriate; recent and remote memory intact  Neuro: sensation to light touch intact      MSK Exam:  Ortho Exam   Right hip with tenderness to palpation of the greater trochanter. No anterior hip pain or groin pain. Normal, well preserved range of motion of the hip. Negative impingement signs. Mild pain with adduction. Cross leg stretch reproduced on the greater trochanter extending up the abductor muscle bellies. Good strength, but mild pain with resisted abduction and external " rotation. Negative straight leg raise.    Results  I reviewed Dr. Ledbetter's notes including notes from hip x-rays taken recently at his office and notable for some mild arthritis findings.        Diagnoses and all orders for this visit:    Greater trochanteric pain syndrome of right lower extremity  -     Diclofenac Sodium (VOLTAREN) 1 % gel gel; Apply 4 g topically to the appropriate area as directed 4 (Four) Times a Day As Needed (joint pain).      Greater trochanteric pain syndrome in the right lower extremity      -     Overall, I agree that this is greater trochanteric pain. Given his failure to respond to other measures, I suspect he has some gluteal tendinopathy involved here, hence his only short term response to injections. I think the next step would be to continue some topical agents for pain control measures. I will refill his Voltaren gel to the pharmacy, but also would like for him to try a topical compound to perhaps get some better pain relief while we work on longer term benefits with physical therapy. If he fails that, we may want to consider a prolotherapy or PRP to the greater trochanter. We also discussed considering a diagnostic injection to the right hip joint for the potential of referred pain from his early arthritis, although that does seem less likely by my evaluation today.    The patient may follow up in 1 month for re-evaluation, or sooner if any concerns.    Transcribed from ambient dictation for Daniel Medrano MD by Marii Higgins.  12/12/22   12:46 EST    I have personally performed the services described in this document as transcribed by the above individual, and it is both accurate and complete.  Daniel Medrano MD  12/12/2022  13:24 EST

## 2022-12-13 ENCOUNTER — PATIENT ROUNDING (BHMG ONLY) (OUTPATIENT)
Dept: SPORTS MEDICINE | Facility: CLINIC | Age: 77
End: 2022-12-13

## 2022-12-13 NOTE — PROGRESS NOTES
December 13, 2022    A [employer] Message has been sent to the patient for PATIENT ROUNDING with [facility]

## 2022-12-14 ENCOUNTER — TREATMENT (OUTPATIENT)
Dept: PHYSICAL THERAPY | Facility: CLINIC | Age: 77
End: 2022-12-14

## 2022-12-14 DIAGNOSIS — M70.61 TROCHANTERIC BURSITIS OF RIGHT HIP: Primary | ICD-10-CM

## 2022-12-14 DIAGNOSIS — M25.551 RIGHT HIP PAIN: ICD-10-CM

## 2022-12-14 PROCEDURE — 97530 THERAPEUTIC ACTIVITIES: CPT | Performed by: PHYSICAL THERAPIST

## 2022-12-14 PROCEDURE — 97110 THERAPEUTIC EXERCISES: CPT | Performed by: PHYSICAL THERAPIST

## 2022-12-14 NOTE — PROGRESS NOTES
Physical Therapy Progress Note/Re-assessment    Patient: Tyron Hunt  : 1945  Referring practitioner: Abraham Ledbetter MD  Today's Date: 2022    VISIT#: 9    Subjective   Tyron Hunt reports: Doing a little better, still having pain when walking or getting up out of chair or on stairs.     LEFS: 75/80      Objective          Observations     Additional Hip Observation Details  GAIT: slight ER bilaterally    Tenderness     Right Hip   Tenderness in the greater trochanter.     Additional Tenderness Details  Less tenderness than last assessment    Passive Range of Motion     Right Hip   Flexion: 120 degrees   Abduction: 35 degrees   External rotation (90/90): 30 degrees with pain  Internal rotation (90/90): 15 degrees     Strength/Myotome Testing     Right Hip   Planes of Motion   Flexion: 4+  Extension: 4+  Abduction: 4 (painful)    Right Hip Flexibility Comments:   Hamstring tightness: 145 deg per 90/90 test        See Exercise, Manual, and Modality Logs for complete treatment.     Patient Education: reviewed HEP    Assessment & Plan     Assessment    Assessment details: Improved response to therapy over the last 2 weeks. He demonstrates improved hamstring flexibility and LE strength compared to last assessment, though still not to goal. He has now met 2/3 STG demonstrated improved hip IR. He had a set back of progress prior to his last assessment, but is starting to show improved response to therapy treatment. However, he has not met all his goals and requires continued PT to address remaining impairments.     Barriers to therapy: cognitive impairment  Prognosis: good    Goals  Plan Goals: STG:  Pt will be independent and compliant with initial HEP in 4 weeks. MET  Pt will demonstrate an increase in hip IR ROM to 15 degrees within 4 weeks. MET  Pt will report pain level at worst <4 during walking activity in 4 weeks. NOT MET (-7/10)    LTG: by DC (12 weeks)   Pt will be independent with final HEP  for self-management of condition by DC. NOT MET  Pt will improve hamstring flexibility to 150 deg in order to be able to ambulate with normal gait mechanics by DC. PROGRESSING  Pt will improve hip abduction & extension strength to 5/5 in order to be able to negotiate steps with normal mechanics by DC. PROGRESSING    Plan  Therapy options: will be seen for skilled therapy services  Frequency: 2x week  Plan details: Continue PT,  STM and focus on stretching            Timed:         Manual Therapy:         mins  80681;     Therapeutic Exercise:    20     mins  03896;     Neuromuscular Fe:        mins  28870;    Therapeutic Activity:     10    mins  47819;     Gait Training:           mins  65472;     Ultrasound:          mins  59832;    Ionto:                                   mins   99705  Self Care:                            mins   78509    Un-Timed:  Electrical Stimulation:         mins  99957 ( );  Dry Needling          mins self-pay  Traction          mins 12829  Re-Eval                               mins  90594    Timed Treatment:   30   mins   Total Treatment:     30   mins    Ирина Ortega, PT  Physical Therapist  [place] PT license #: [id number]  [place] PT license #: [id number]

## 2022-12-20 ENCOUNTER — OFFICE (AMBULATORY)
Dept: URBAN - METROPOLITAN AREA CLINIC 76 | Facility: CLINIC | Age: 77
End: 2022-12-20

## 2022-12-20 VITALS
WEIGHT: 155 LBS | DIASTOLIC BLOOD PRESSURE: 86 MMHG | SYSTOLIC BLOOD PRESSURE: 121 MMHG | HEIGHT: 66 IN | HEART RATE: 86 BPM

## 2022-12-20 DIAGNOSIS — K21.9 GASTRO-ESOPHAGEAL REFLUX DISEASE WITHOUT ESOPHAGITIS: ICD-10-CM

## 2022-12-20 DIAGNOSIS — K59.00 CONSTIPATION, UNSPECIFIED: ICD-10-CM

## 2022-12-20 PROCEDURE — 99213 OFFICE O/P EST LOW 20 MIN: CPT

## 2022-12-22 ENCOUNTER — TELEPHONE (OUTPATIENT)
Dept: FAMILY MEDICINE CLINIC | Facility: CLINIC | Age: 77
End: 2022-12-22

## 2022-12-22 DIAGNOSIS — G89.29 CHRONIC RIGHT HIP PAIN: Primary | ICD-10-CM

## 2022-12-22 DIAGNOSIS — M70.61 GREATER TROCHANTERIC BURSITIS OF RIGHT HIP: Primary | ICD-10-CM

## 2022-12-22 DIAGNOSIS — M25.551 CHRONIC RIGHT HIP PAIN: Primary | ICD-10-CM

## 2022-12-22 DIAGNOSIS — M70.62 GREATER TROCHANTERIC BURSITIS OF LEFT HIP: ICD-10-CM

## 2022-12-22 NOTE — TELEPHONE ENCOUNTER
Pt's right hip pain continues to worsen, regardless of injections and PT over time. Pt has had several falls prior, so will order an MRI to r/o any occult pathology.

## 2022-12-28 ENCOUNTER — TREATMENT (OUTPATIENT)
Dept: PHYSICAL THERAPY | Facility: CLINIC | Age: 77
End: 2022-12-28

## 2022-12-28 ENCOUNTER — TELEPHONE (OUTPATIENT)
Dept: FAMILY MEDICINE CLINIC | Facility: CLINIC | Age: 77
End: 2022-12-28
Payer: MEDICARE

## 2022-12-28 VITALS
RESPIRATION RATE: 13 BRPM | OXYGEN SATURATION: 98 % | SYSTOLIC BLOOD PRESSURE: 98 MMHG | SYSTOLIC BLOOD PRESSURE: 114 MMHG | RESPIRATION RATE: 4 BRPM | OXYGEN SATURATION: 93 % | DIASTOLIC BLOOD PRESSURE: 88 MMHG | HEIGHT: 66 IN | RESPIRATION RATE: 16 BRPM | DIASTOLIC BLOOD PRESSURE: 65 MMHG | HEART RATE: 62 BPM | RESPIRATION RATE: 11 BRPM | TEMPERATURE: 97.1 F | DIASTOLIC BLOOD PRESSURE: 61 MMHG | SYSTOLIC BLOOD PRESSURE: 106 MMHG | DIASTOLIC BLOOD PRESSURE: 63 MMHG | DIASTOLIC BLOOD PRESSURE: 68 MMHG | HEART RATE: 61 BPM | OXYGEN SATURATION: 96 % | WEIGHT: 150 LBS | DIASTOLIC BLOOD PRESSURE: 59 MMHG | DIASTOLIC BLOOD PRESSURE: 58 MMHG | DIASTOLIC BLOOD PRESSURE: 54 MMHG | SYSTOLIC BLOOD PRESSURE: 102 MMHG | SYSTOLIC BLOOD PRESSURE: 107 MMHG | DIASTOLIC BLOOD PRESSURE: 60 MMHG | HEART RATE: 56 BPM | TEMPERATURE: 98 F | RESPIRATION RATE: 10 BRPM | HEART RATE: 63 BPM | HEART RATE: 60 BPM | HEART RATE: 64 BPM | SYSTOLIC BLOOD PRESSURE: 113 MMHG | DIASTOLIC BLOOD PRESSURE: 73 MMHG | DIASTOLIC BLOOD PRESSURE: 55 MMHG | HEART RATE: 67 BPM | SYSTOLIC BLOOD PRESSURE: 101 MMHG | OXYGEN SATURATION: 99 % | TEMPERATURE: 98.2 F | OXYGEN SATURATION: 97 % | SYSTOLIC BLOOD PRESSURE: 145 MMHG | RESPIRATION RATE: 18 BRPM | SYSTOLIC BLOOD PRESSURE: 166 MMHG | SYSTOLIC BLOOD PRESSURE: 965 MMHG

## 2022-12-28 DIAGNOSIS — M70.61 TROCHANTERIC BURSITIS OF RIGHT HIP: Primary | ICD-10-CM

## 2022-12-28 DIAGNOSIS — M25.551 RIGHT HIP PAIN: ICD-10-CM

## 2022-12-28 DIAGNOSIS — I10 ESSENTIAL HYPERTENSION: Chronic | ICD-10-CM

## 2022-12-28 DIAGNOSIS — E78.5 HYPERLIPIDEMIA, UNSPECIFIED HYPERLIPIDEMIA TYPE: Chronic | ICD-10-CM

## 2022-12-28 DIAGNOSIS — N40.0 BENIGN PROSTATIC HYPERPLASIA WITHOUT LOWER URINARY TRACT SYMPTOMS: Chronic | ICD-10-CM

## 2022-12-28 PROCEDURE — 97110 THERAPEUTIC EXERCISES: CPT | Performed by: PHYSICAL THERAPIST

## 2022-12-28 PROCEDURE — G0283 ELEC STIM OTHER THAN WOUND: HCPCS | Performed by: PHYSICAL THERAPIST

## 2022-12-28 PROCEDURE — 97140 MANUAL THERAPY 1/> REGIONS: CPT | Performed by: PHYSICAL THERAPIST

## 2022-12-28 RX ORDER — SUCRALFATE 1 G/1
1 TABLET ORAL 4 TIMES DAILY
Qty: 360 TABLET | Refills: 3 | Status: CANCELLED | OUTPATIENT
Start: 2022-12-28

## 2022-12-28 RX ORDER — ATORVASTATIN CALCIUM 10 MG/1
10 TABLET, FILM COATED ORAL DAILY
Qty: 90 TABLET | Refills: 3 | Status: CANCELLED | OUTPATIENT
Start: 2022-12-28

## 2022-12-28 RX ORDER — LOSARTAN POTASSIUM 25 MG/1
25 TABLET ORAL DAILY
Qty: 90 TABLET | Refills: 3 | Status: SHIPPED | OUTPATIENT
Start: 2022-12-28 | End: 2023-01-01 | Stop reason: SDUPTHER

## 2022-12-28 RX ORDER — TAMSULOSIN HYDROCHLORIDE 0.4 MG/1
1 CAPSULE ORAL 2 TIMES DAILY
Qty: 180 CAPSULE | Refills: 3 | Status: CANCELLED | OUTPATIENT
Start: 2022-12-28

## 2022-12-28 NOTE — PROGRESS NOTES
Physical Therapy Daily Treatment Note  Visit # 10        Patient: Tyron Hunt   : 1945  Referring practitioner: Abraham Ledbetter MD  Date of Initial Evaluation:  Type: THERAPY  Noted: 10/27/2022  Today's Date: 2022           ICD-10-CM ICD-9-CM   1. Trochanteric bursitis of right hip  M70.61 726.5   2. Right hip pain  M25.551 719.45       Subjective  Tyron Hunt reports:   My hip has really been hurting.  I had to take two Aleve a little while ago.  That did seem to help.     Objective   See Exercise, Manual, and Modality Logs for complete treatment.   Trial application IFC to (R) GT with pt (L) SL as noted.     Assessment/Plan  Tolerated continued therapeutic exercise well, no increased pain reported during or after exercises.  Pt reports some mild discomfort during IFC application, did not find comfortable or relaxing.  Applied CP to address irritation post-IFC.      Progress per Plan of Care and Progress strengthening /stabilization /functional activity, continue MT/stretching/STM to affected area.          Timed:         Manual Therapy:     10    mins  05351     Therapeutic Exercise:     20    mins  61241     Neuromuscular Fe:        mins  92374    Therapeutic Activity:          mins  52192     Gait Training:           mins  46454     Ultrasound:          mins  89098    Ionto                                   mins  73127  Self Care                            mins  67586    Un-Timed:  Electrical Stimulation:     10    mins 31359 ( )  Traction          mins 33268    Timed Treatment:   30   mins   Total Treatment:     40   mins    Sarkis Jimenez PTA  [place] License #[id number]  Physical Therapist Assistant

## 2022-12-30 DIAGNOSIS — I10 ESSENTIAL HYPERTENSION: Chronic | ICD-10-CM

## 2022-12-30 DIAGNOSIS — N40.0 BENIGN PROSTATIC HYPERPLASIA WITHOUT LOWER URINARY TRACT SYMPTOMS: Chronic | ICD-10-CM

## 2022-12-30 DIAGNOSIS — E78.5 HYPERLIPIDEMIA, UNSPECIFIED HYPERLIPIDEMIA TYPE: Chronic | ICD-10-CM

## 2022-12-30 RX ORDER — SUCRALFATE 1 G/1
1 TABLET ORAL 4 TIMES DAILY
Qty: 360 TABLET | Refills: 3 | Status: SHIPPED | OUTPATIENT
Start: 2022-12-30 | End: 2023-01-01 | Stop reason: SDUPTHER

## 2022-12-30 RX ORDER — LOSARTAN POTASSIUM 25 MG/1
25 TABLET ORAL DAILY
Qty: 90 TABLET | Refills: 3 | OUTPATIENT
Start: 2022-12-30

## 2022-12-30 RX ORDER — ATORVASTATIN CALCIUM 10 MG/1
10 TABLET, FILM COATED ORAL DAILY
Qty: 90 TABLET | Refills: 3 | Status: SHIPPED | OUTPATIENT
Start: 2022-12-30 | End: 2023-01-01 | Stop reason: SDUPTHER

## 2022-12-30 RX ORDER — LANSOPRAZOLE 30 MG/1
30 CAPSULE, DELAYED RELEASE ORAL 2 TIMES DAILY
Qty: 180 CAPSULE | Refills: 3 | OUTPATIENT
Start: 2022-12-30

## 2022-12-30 RX ORDER — TAMSULOSIN HYDROCHLORIDE 0.4 MG/1
1 CAPSULE ORAL 2 TIMES DAILY
Qty: 180 CAPSULE | Refills: 3 | Status: SHIPPED | OUTPATIENT
Start: 2022-12-30 | End: 2023-01-01 | Stop reason: SDUPTHER

## 2022-12-30 NOTE — TELEPHONE ENCOUNTER
Caller: SHAHBAZ WITH [facility]    Relationship: PATIENT'S NEW PHARMACY    Best call back number: [phone]    Requested Prescriptions:   Requested Prescriptions     Pending Prescriptions Disp Refills   • atorvastatin (LIPITOR) 10 MG tablet 90 tablet 3     Sig: Take 1 tablet by mouth Daily.   • tamsulosin (FLOMAX) 0.4 MG capsule 24 hr capsule 180 capsule 3     Sig: Take 1 capsule by mouth 2 (Two) Times a Day.   • sucralfate (CARAFATE) 1 g tablet 360 tablet 3     Sig: Take 1 tablet by mouth 4 (Four) Times a Day.   • losartan (Cozaar) 25 MG tablet 90 tablet 3     Sig: Take 1 tablet by mouth Daily.   • lansoprazole (PREVACID) 30 MG capsule 180 capsule 3     Sig: Take 1 capsule by mouth 2 (Two) Times a Day.        Pharmacy where request should be sent: [facility] - [facility] [address] - [phone]  - [phone] FX     Additional details provided by patient: PHARMACY REQUESTED MEDICATIONS ON BEHALF OF PATIENT    Does the patient have less than a 3 day supply:  [x] Yes  [] No    Johanne Ca Rep   12/30/22 09:03 EST

## 2022-12-30 NOTE — TELEPHONE ENCOUNTER
Rx Refill Note  Requested Prescriptions     Pending Prescriptions Disp Refills   • atorvastatin (LIPITOR) 10 MG tablet 90 tablet 3     Sig: Take 1 tablet by mouth Daily.   • tamsulosin (FLOMAX) 0.4 MG capsule 24 hr capsule 180 capsule 3     Sig: Take 1 capsule by mouth 2 (Two) Times a Day.   • sucralfate (CARAFATE) 1 g tablet 360 tablet 3     Sig: Take 1 tablet by mouth 4 (Four) Times a Day.   • losartan (Cozaar) 25 MG tablet 90 tablet 3     Sig: Take 1 tablet by mouth Daily.   • lansoprazole (PREVACID) 30 MG capsule 180 capsule 3     Sig: Take 1 capsule by mouth 2 (Two) Times a Day.      Last office visit with prescribing clinician: 10/10/2022   Next office visit with prescribing clinician: Visit date not found

## 2023-01-01 DIAGNOSIS — E78.5 HYPERLIPIDEMIA, UNSPECIFIED HYPERLIPIDEMIA TYPE: Chronic | ICD-10-CM

## 2023-01-01 DIAGNOSIS — N40.0 BENIGN PROSTATIC HYPERPLASIA WITHOUT LOWER URINARY TRACT SYMPTOMS: Chronic | ICD-10-CM

## 2023-01-01 DIAGNOSIS — I10 ESSENTIAL HYPERTENSION: Chronic | ICD-10-CM

## 2023-01-01 RX ORDER — ATORVASTATIN CALCIUM 10 MG/1
10 TABLET, FILM COATED ORAL DAILY
Qty: 90 TABLET | Refills: 3 | Status: SHIPPED | OUTPATIENT
Start: 2023-01-01

## 2023-01-01 RX ORDER — SUCRALFATE 1 G/1
1 TABLET ORAL 4 TIMES DAILY
Qty: 360 TABLET | Refills: 3 | Status: SHIPPED | OUTPATIENT
Start: 2023-01-01

## 2023-01-01 RX ORDER — LOSARTAN POTASSIUM 25 MG/1
25 TABLET ORAL DAILY
Qty: 90 TABLET | Refills: 3 | Status: SHIPPED | OUTPATIENT
Start: 2023-01-01

## 2023-01-01 RX ORDER — LANSOPRAZOLE 30 MG/1
30 CAPSULE, DELAYED RELEASE ORAL 2 TIMES DAILY
Qty: 180 CAPSULE | Refills: 3 | Status: SHIPPED | OUTPATIENT
Start: 2023-01-01

## 2023-01-01 RX ORDER — TAMSULOSIN HYDROCHLORIDE 0.4 MG/1
1 CAPSULE ORAL 2 TIMES DAILY
Qty: 180 CAPSULE | Refills: 3 | Status: SHIPPED | OUTPATIENT
Start: 2023-01-01

## 2023-01-04 ENCOUNTER — OFFICE (AMBULATORY)
Dept: URBAN - METROPOLITAN AREA PATHOLOGY 4 | Facility: PATHOLOGY | Age: 78
End: 2023-01-04

## 2023-01-04 ENCOUNTER — AMBULATORY SURGICAL CENTER (AMBULATORY)
Dept: URBAN - METROPOLITAN AREA SURGERY 17 | Facility: SURGERY | Age: 78
End: 2023-01-04

## 2023-01-04 DIAGNOSIS — K64.8 OTHER HEMORRHOIDS: ICD-10-CM

## 2023-01-04 DIAGNOSIS — R19.5 OTHER FECAL ABNORMALITIES: ICD-10-CM

## 2023-01-04 DIAGNOSIS — K63.5 POLYP OF COLON: ICD-10-CM

## 2023-01-04 DIAGNOSIS — Z12.11 ENCOUNTER FOR SCREENING FOR MALIGNANT NEOPLASM OF COLON: ICD-10-CM

## 2023-01-04 DIAGNOSIS — K57.30 DIVERTICULOSIS OF LARGE INTESTINE WITHOUT PERFORATION OR ABS: ICD-10-CM

## 2023-01-04 LAB
GI HISTOLOGY: A. UNSPECIFIED: (no result)
GI HISTOLOGY: B. UNSPECIFIED: (no result)
GI HISTOLOGY: PDF REPORT: (no result)

## 2023-01-04 PROCEDURE — 45385 COLONOSCOPY W/LESION REMOVAL: CPT | Mod: PT | Performed by: INTERNAL MEDICINE

## 2023-01-04 PROCEDURE — 88305 TISSUE EXAM BY PATHOLOGIST: CPT | Performed by: INTERNAL MEDICINE

## 2023-01-04 NOTE — SERVICEHPINOTES
Pt is a 77 year old male in follow-up after a positive Cologuard completed by his PCP. I do not have this record for review. His PCP is his son. 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 Today he tells me he typically has a bowel movement every day. There is no blood in his stool. He does report intermittent hard stools. His wife tells me he takes Miralax for this and it works well for him. This is not a change for him. He has no personal history of colon polyps. He has no family history of colon cancers. His reflux/dyspepsia is well controlled on Lansoprazole 15mg daily and Carafate QID. He reports no breakthrough reflux. There is no epigastric pain or nausea. He denies dysphagia, melena, unexplained weight loss or early satiety.I do not have any recent imaging or lab work for review. None

## 2023-01-05 ENCOUNTER — TREATMENT (OUTPATIENT)
Dept: PHYSICAL THERAPY | Facility: CLINIC | Age: 78
End: 2023-01-05
Payer: MEDICARE

## 2023-01-05 DIAGNOSIS — M70.61 TROCHANTERIC BURSITIS OF RIGHT HIP: Primary | ICD-10-CM

## 2023-01-05 DIAGNOSIS — M25.551 RIGHT HIP PAIN: ICD-10-CM

## 2023-01-05 PROCEDURE — 97110 THERAPEUTIC EXERCISES: CPT | Performed by: PHYSICAL THERAPIST

## 2023-01-05 NOTE — PROGRESS NOTES
Physical Therapy Progress Note    Patient: Tyron Hunt  : 1945  Referring practitioner: Abraham Ledbetter MD  Today's Date: 2023    VISIT#: 11      Subjective   Tyron Hunt reports: Not doing great, still having a lot of hip pain, is always worse after therapy sessions and walking a lot. Talked to his wife who was asking about pausing therapy until after his MRI and follow up with Dr. Ledbetter.     Objective          Observations     Additional Hip Observation Details  GAIT: slight ER bilaterally    Tenderness     Right Hip   Tenderness in the greater trochanter.     Additional Tenderness Details  More tenderness than on initial evaluation    Passive Range of Motion     Right Hip   Flexion: 120 degrees   Abduction: 35 degrees   External rotation (90/90): 30 degrees with pain  Internal rotation (90/90): 10 degrees     Strength/Myotome Testing     Right Hip   Planes of Motion   Flexion: 4  Extension: 4  Abduction: 4- (painful)    Right Hip Flexibility Comments:   Hamstring tightness: 140 deg per 90/90 test        See Exercise, Manual, and Modality Logs for complete treatment.     Patient Education: hold on exercises until follow up with MRI and MD    Assessment & Plan     Assessment    Assessment details: Fair tolerance to session today. Mr. Hunt was doing great with therapy for the first few weeks due to getting injection in hip prior to starting therapy. However about 3-4 days ago his pain returned. It appears that his injection has worn off and now he is having significantly increased pain levels, and is showing slight decline since he started therapy. Had to revise HEP to only stretching and instructed to ice regularly over the next few days. Requires continued skilled PT due to recent setback.    Goals  Plan Goals: STG:  Pt will be independent and compliant with initial HEP in 4 weeks. MET  Pt will demonstrate an increase in hip IR ROM to 15 degrees within 4 weeks. NOT MET  Pt will report pain  level at worst <4 during walking activity in 4 weeks. NOT MET    LTG: by DC (12 weeks) NOT MET  Pt will be independent with final HEP for self-management of condition by DC.   Pt will improve hamstring flexibility to 150 deg in order to be able to ambulate with normal gait mechanics by DC.  Pt will improve hip abduction & extension strength to 5/5 in order to be able to negotiate steps with normal mechanics by DC.    Plan  Therapy options: will be seen for skilled therapy services  Frequency: 2x week              Timed:         Manual Therapy:         mins  02390;     Therapeutic Exercise:    25     mins  24504;     Neuromuscular Fe:        mins  67545;    Therapeutic Activity:          mins  22345;     Gait Training:           mins  29115;     Ultrasound:          mins  73923;    Ionto:                                   mins   87401  Self Care:                            mins   04843    Un-Timed:  Electrical Stimulation:         mins  97927 ( );  Dry Needling          mins self-pay  Traction          mins 05847  Re-Eval                               mins  22750    Timed Treatment:   25   mins   Total Treatment:     25   mins    Ирина Ortega, PT  Physical Therapist  [place] PT license #: [id number]  [place] PT license #: [id number]

## 2023-01-09 ENCOUNTER — OFFICE VISIT (OUTPATIENT)
Dept: SPORTS MEDICINE | Facility: CLINIC | Age: 78
End: 2023-01-09
Payer: MEDICARE

## 2023-01-09 VITALS
TEMPERATURE: 97.8 F | WEIGHT: 152 LBS | HEART RATE: 70 BPM | HEIGHT: 66 IN | SYSTOLIC BLOOD PRESSURE: 120 MMHG | OXYGEN SATURATION: 99 % | RESPIRATION RATE: 16 BRPM | BODY MASS INDEX: 24.43 KG/M2 | DIASTOLIC BLOOD PRESSURE: 80 MMHG

## 2023-01-09 DIAGNOSIS — M25.551 GREATER TROCHANTERIC PAIN SYNDROME OF RIGHT LOWER EXTREMITY: Primary | ICD-10-CM

## 2023-01-09 PROCEDURE — 99213 OFFICE O/P EST LOW 20 MIN: CPT | Performed by: FAMILY MEDICINE

## 2023-01-09 PROCEDURE — 20610 DRAIN/INJ JOINT/BURSA W/O US: CPT | Performed by: FAMILY MEDICINE

## 2023-01-09 RX ORDER — TRIAMCINOLONE ACETONIDE 40 MG/ML
40 INJECTION, SUSPENSION INTRA-ARTICULAR; INTRAMUSCULAR ONCE
Status: COMPLETED | OUTPATIENT
Start: 2023-01-09 | End: 2023-01-09

## 2023-01-09 RX ADMIN — TRIAMCINOLONE ACETONIDE 40 MG: 40 INJECTION, SUSPENSION INTRA-ARTICULAR; INTRAMUSCULAR at 12:42

## 2023-01-09 NOTE — PROGRESS NOTES
Tyron is a 78 y.o. year old male     Chief Complaint   Patient presents with   • Follow-up     F/u eval for RT hip pain - had an MRI done on 01/06/2023 at [facility] - here for further evaluation and treatment        History of Present Illness  HPI   Follow-up right hip pain.  Reports minimal change compared to his last visit.  He feels some improvement with reducing his physical therapy intensity.  Topical compound is mildly helpful.    He did have an MRI performed last week.  I reviewed the images and report of this, it shows some gluteal tendinitis/bursitis without any evidence of tearing or bone stress injury.    Review of Systems    /80 (BP Location: Left arm, Patient Position: Sitting, Cuff Size: Adult)   Pulse 70   Temp 97.8 °F (36.6 °C) (Temporal)   Resp 16   Ht 167.6 cm (65.98\")   Wt 68.9 kg (152 lb)   SpO2 99%   BMI 24.55 kg/m²      Physical Exam    Vital signs reviewed.   General: No acute distress.      MSK Exam:  Ortho Exam  Right hip: Normal appearance.  Tenderness palpation on the greater trochanter.  Normal range of motion.  Normal strength.  There is mild pain with adduction stretch and with resisted abduction.    Trochanteric Bursa Injection Procedure Note    Right trochanteric bursa/gluteal tendon insertion injection was discussed with the patient in detail, including indication, risks, benefits, and alternatives. Verbal consent was given for the procedure. Injection was performed by physician.  Injection site was identified by physical examination and cleaned with Betadine and alcohol swabs. Prior to needle insertion, ethyl chloride spray was used for surface anesthesia. Sterile technique was used.  A 25-gauge, 1.5\" needle was directed to the bursa space by direct approach. Injectate was passed without difficulty. The needle was removed and a simple bandage was applied. The procedure was tolerated well without difficulty.    Injection mixture:  1% lidocaine without  epinephrine: 2 mL  40 mg/mL triamcinolone acetonide: 1 mL     Diagnoses and all orders for this visit:    Greater trochanteric pain syndrome of right lower extremity  -     triamcinolone acetonide (KENALOG-40) injection 40 mg      Discussed options with patient and his wife, agreed on attempt of a repeat injection and continued physical therapy exercises.    EMR Dragon/Transcription disclaimer:    Much of this encounter note is an electronic transcription/translation of spoken language to printed text.  The electronic translation of spoken language may permit erroneous, or at times, nonsensical words or phrases to be inadvertently transcribed.  Although I have reviewed the note for such errors some may still exist.

## 2023-01-26 DIAGNOSIS — I10 ESSENTIAL HYPERTENSION: Primary | ICD-10-CM

## 2023-01-26 DIAGNOSIS — R41.0 CONFUSION: ICD-10-CM

## 2023-01-27 LAB
ALBUMIN SERPL-MCNC: 4.7 G/DL (ref 3.5–5.2)
ALBUMIN/GLOB SERPL: 2.8 G/DL
ALP SERPL-CCNC: 55 U/L (ref 39–117)
ALT SERPL-CCNC: 17 U/L (ref 1–41)
APPEARANCE UR: CLEAR
AST SERPL-CCNC: 12 U/L (ref 1–40)
BACTERIA #/AREA URNS HPF: NORMAL /HPF
BASOPHILS # BLD AUTO: 0.04 10*3/MM3 (ref 0–0.2)
BASOPHILS NFR BLD AUTO: 0.5 % (ref 0–1.5)
BILIRUB SERPL-MCNC: 0.7 MG/DL (ref 0–1.2)
BILIRUB UR QL STRIP: NEGATIVE
BUN SERPL-MCNC: 14 MG/DL (ref 8–23)
BUN/CREAT SERPL: 12.6 (ref 7–25)
CALCIUM SERPL-MCNC: 9.8 MG/DL (ref 8.6–10.5)
CASTS URNS QL MICRO: NORMAL /LPF
CHLORIDE SERPL-SCNC: 103 MMOL/L (ref 98–107)
CO2 SERPL-SCNC: 30.1 MMOL/L (ref 22–29)
COLOR UR: YELLOW
CREAT SERPL-MCNC: 1.11 MG/DL (ref 0.76–1.27)
EGFRCR SERPLBLD CKD-EPI 2021: 67.5 ML/MIN/1.73
EOSINOPHIL # BLD AUTO: 0.18 10*3/MM3 (ref 0–0.4)
EOSINOPHIL NFR BLD AUTO: 2.5 % (ref 0.3–6.2)
EPI CELLS #/AREA URNS HPF: NORMAL /HPF (ref 0–10)
ERYTHROCYTE [DISTWIDTH] IN BLOOD BY AUTOMATED COUNT: 12.5 % (ref 12.3–15.4)
GLOBULIN SER CALC-MCNC: 1.7 GM/DL
GLUCOSE SERPL-MCNC: 102 MG/DL (ref 65–99)
GLUCOSE UR QL STRIP: NEGATIVE
HCT VFR BLD AUTO: 44.3 % (ref 37.5–51)
HGB BLD-MCNC: 14.7 G/DL (ref 13–17.7)
HGB UR QL STRIP: NEGATIVE
IMM GRANULOCYTES # BLD AUTO: 0.02 10*3/MM3 (ref 0–0.05)
IMM GRANULOCYTES NFR BLD AUTO: 0.3 % (ref 0–0.5)
KETONES UR QL STRIP: ABNORMAL
LEUKOCYTE ESTERASE UR QL STRIP: NEGATIVE
LYMPHOCYTES # BLD AUTO: 2.04 10*3/MM3 (ref 0.7–3.1)
LYMPHOCYTES NFR BLD AUTO: 28 % (ref 19.6–45.3)
MCH RBC QN AUTO: 32 PG (ref 26.6–33)
MCHC RBC AUTO-ENTMCNC: 33.2 G/DL (ref 31.5–35.7)
MCV RBC AUTO: 96.3 FL (ref 79–97)
MICRO URNS: ABNORMAL
MICRO URNS: ABNORMAL
MONOCYTES # BLD AUTO: 0.63 10*3/MM3 (ref 0.1–0.9)
MONOCYTES NFR BLD AUTO: 8.7 % (ref 5–12)
NEUTROPHILS # BLD AUTO: 4.37 10*3/MM3 (ref 1.7–7)
NEUTROPHILS NFR BLD AUTO: 60 % (ref 42.7–76)
NITRITE UR QL STRIP: NEGATIVE
NRBC BLD AUTO-RTO: 0 /100 WBC (ref 0–0.2)
PH UR STRIP: 5.5 [PH] (ref 5–7.5)
PLATELET # BLD AUTO: 151 10*3/MM3 (ref 140–450)
POTASSIUM SERPL-SCNC: 4.1 MMOL/L (ref 3.5–5.2)
PROT SERPL-MCNC: 6.4 G/DL (ref 6–8.5)
PROT UR QL STRIP: NEGATIVE
RBC # BLD AUTO: 4.6 10*6/MM3 (ref 4.14–5.8)
RBC #/AREA URNS HPF: NORMAL /HPF (ref 0–2)
SODIUM SERPL-SCNC: 141 MMOL/L (ref 136–145)
SP GR UR STRIP: 1.02 (ref 1–1.03)
T4 FREE SERPL-MCNC: 1.46 NG/DL (ref 0.93–1.7)
TSH SERPL DL<=0.005 MIU/L-ACNC: 1.31 UIU/ML (ref 0.27–4.2)
URINALYSIS REFLEX: ABNORMAL
UROBILINOGEN UR STRIP-MCNC: 0.2 MG/DL (ref 0.2–1)
WBC # BLD AUTO: 7.28 10*3/MM3 (ref 3.4–10.8)
WBC #/AREA URNS HPF: NORMAL /HPF (ref 0–5)

## 2023-03-27 ENCOUNTER — TELEPHONE (OUTPATIENT)
Dept: SPORTS MEDICINE | Facility: CLINIC | Age: 78
End: 2023-03-27

## 2023-03-27 NOTE — TELEPHONE ENCOUNTER
Caller: ZANDER BENOSN    Relationship to patient: Emergency Contact    Best call back number:[phone]    Chief complaint: RT HIP    Type of visit: INJECTION    Requested date: ASAP

## 2023-04-10 ENCOUNTER — PROCEDURE VISIT (OUTPATIENT)
Dept: SPORTS MEDICINE | Facility: CLINIC | Age: 78
End: 2023-04-10
Payer: MEDICARE

## 2023-04-10 VITALS
TEMPERATURE: 98.5 F | HEART RATE: 68 BPM | OXYGEN SATURATION: 98 % | WEIGHT: 147 LBS | DIASTOLIC BLOOD PRESSURE: 78 MMHG | BODY MASS INDEX: 23.63 KG/M2 | SYSTOLIC BLOOD PRESSURE: 118 MMHG | HEIGHT: 66 IN

## 2023-04-10 DIAGNOSIS — M25.551 GREATER TROCHANTERIC PAIN SYNDROME OF RIGHT LOWER EXTREMITY: Primary | ICD-10-CM

## 2023-04-10 RX ORDER — METHYLPREDNISOLONE ACETATE 80 MG/ML
80 INJECTION, SUSPENSION INTRA-ARTICULAR; INTRALESIONAL; INTRAMUSCULAR; SOFT TISSUE ONCE
Status: COMPLETED | OUTPATIENT
Start: 2023-04-10 | End: 2023-04-10

## 2023-04-10 RX ORDER — MEMANTINE HYDROCHLORIDE 5 MG/1
5 TABLET ORAL 2 TIMES DAILY
COMMUNITY
End: 2023-04-17 | Stop reason: SDUPTHER

## 2023-04-10 RX ADMIN — METHYLPREDNISOLONE ACETATE 80 MG: 80 INJECTION, SUSPENSION INTRA-ARTICULAR; INTRALESIONAL; INTRAMUSCULAR; SOFT TISSUE at 11:40

## 2023-04-10 NOTE — PROGRESS NOTES
"Tyron is a 78 y.o. year old male     Chief Complaint   Patient presents with   • Hip Pain     RIGHT SIDE - patient in office to get steroid injection       History of Present Illness  HPI   Follow-up right hip pain.  Requesting repeat injection.  Did receive several benefit from first injection but it has already worn off.      Review of Systems    /78 (BP Location: Left arm, Patient Position: Sitting, Cuff Size: Adult)   Pulse 68   Temp 98.5 °F (36.9 °C) (Temporal)   Ht 167.6 cm (65.98\")   Wt 66.7 kg (147 lb)   SpO2 98%   BMI 23.74 kg/m²      Physical Exam    Vital signs reviewed.   General: No acute distress.      MSK Exam:  Ortho Exam    Right hip: Normal appearance.  Tenderness outpatient in the greater trochanter.  Normal passive range of motion.  There is mild pain with adduction stretch.  Negative impingement maneuvers.    Trochanteric Bursa Injection Procedure Note    Right trochanteric bursa/gluteal tendon insertion injection was discussed with the patient in detail, including indication, risks, benefits, and alternatives. Verbal consent was given for the procedure. Injection was performed by physician.  Injection site was identified by physical examination and cleaned with Betadine and alcohol swabs. Prior to needle insertion, ethyl chloride spray was used for surface anesthesia. Sterile technique was used.  A 25-gauge, 1.5\" needle was directed to the bursa space by direct approach. Injectate was passed without difficulty. The needle was removed and a simple bandage was applied. The procedure was tolerated well without difficulty.    Injection mixture:  1% lidocaine without epinephrine: 2 mL  80 mg/mL Depo-Medrol: 1 mL     Diagnoses and all orders for this visit:    Greater trochanteric pain syndrome of right lower extremity  -     methylPREDNISolone acetate (DEPO-medrol) injection 80 mg    Other orders  -     memantine (NAMENDA) 5 MG tablet; Take 1 tablet by mouth 2 (Two) Times a Day.    Will " try changing to Depo-Medrol today to see if he gets a better duration of this.  If the pattern does not begin to change regarding duration of benefit consider alternative treatment strategies.      EMR Dragon/Transcription disclaimer:    Much of this encounter note is an electronic transcription/translation of spoken language to printed text.  The electronic translation of spoken language may permit erroneous, or at times, nonsensical words or phrases to be inadvertently transcribed.  Although I have reviewed the note for such errors some may still exist.

## 2023-04-11 NOTE — PROGRESS NOTES
Patient did not supply medication Methylprednisolone Acetate  This was administered in office. do bill patient.   NDC: 2822-8517-1  LOT:[id number]  EXP DATE: 05/01/2024

## 2023-04-17 RX ORDER — MEMANTINE HYDROCHLORIDE 10 MG/1
10 TABLET ORAL 2 TIMES DAILY
Qty: 180 TABLET | Refills: 3 | Status: SHIPPED | OUTPATIENT
Start: 2023-04-17

## 2023-04-28 RX ORDER — MEMANTINE HYDROCHLORIDE 10 MG/1
10 TABLET ORAL 2 TIMES DAILY
Qty: 28 TABLET | Refills: 3 | Status: SHIPPED | OUTPATIENT
Start: 2023-04-28 | End: 2023-04-30 | Stop reason: SDUPTHER

## 2023-04-30 RX ORDER — MEMANTINE HYDROCHLORIDE 10 MG/1
10 TABLET ORAL 2 TIMES DAILY
Qty: 180 TABLET | Refills: 3 | Status: SHIPPED | OUTPATIENT
Start: 2023-04-30

## 2023-07-07 ENCOUNTER — TELEPHONE (OUTPATIENT)
Dept: SPORTS MEDICINE | Facility: CLINIC | Age: 78
End: 2023-07-07

## 2023-07-07 NOTE — TELEPHONE ENCOUNTER
Caller: ZANDER BENSON    Relationship to patient: Emergency Contact    Best call back number: [phone]    Chief complaint: RIGHT HIP PAIN    Type of visit: INJECTION    Requested date: ASAP ONCE ALLOWED IN TIME FRAME       Additional notes: OKAY TO LVM

## 2023-07-14 ENCOUNTER — PROCEDURE VISIT (OUTPATIENT)
Dept: SPORTS MEDICINE | Facility: CLINIC | Age: 78
End: 2023-07-14
Payer: MEDICARE

## 2023-07-14 VITALS
DIASTOLIC BLOOD PRESSURE: 82 MMHG | OXYGEN SATURATION: 98 % | HEART RATE: 70 BPM | WEIGHT: 157 LBS | BODY MASS INDEX: 25.23 KG/M2 | HEIGHT: 66 IN | SYSTOLIC BLOOD PRESSURE: 166 MMHG

## 2023-07-14 DIAGNOSIS — M25.551 GREATER TROCHANTERIC PAIN SYNDROME OF RIGHT LOWER EXTREMITY: Primary | ICD-10-CM

## 2023-07-14 RX ORDER — TRIAMCINOLONE ACETONIDE 40 MG/ML
80 INJECTION, SUSPENSION INTRA-ARTICULAR; INTRAMUSCULAR ONCE
Status: COMPLETED | OUTPATIENT
Start: 2023-07-14 | End: 2023-07-14

## 2023-07-14 RX ADMIN — TRIAMCINOLONE ACETONIDE 80 MG: 40 INJECTION, SUSPENSION INTRA-ARTICULAR; INTRAMUSCULAR at 15:10

## 2023-07-14 NOTE — PROGRESS NOTES
"Tyron is a 78 y.o. year old male     Chief Complaint   Patient presents with    Right Hip - Injections       History of Present Illness  HPI   Recurrent R hip pain, similar in nature. Requesting repeat injection.       Review of Systems    /82 (BP Location: Left arm, Patient Position: Sitting, Cuff Size: Adult)   Pulse 70   Ht 167.6 cm (65.98\")   Wt 71.2 kg (157 lb)   SpO2 98%   BMI 25.36 kg/m²      Physical Exam    Vital signs reviewed.   General: No acute distress.      MSK Exam:  Ortho Exam    Trochanteric Bursa Injection Procedure Note    Right trochanteric bursa/gluteal tendon insertion injection was discussed with the patient in detail, including indication, risks, benefits, and alternatives. Verbal consent was given for the procedure. Injection was performed by physician.  Injection site was identified by physical examination and cleaned with Betadine and alcohol swabs. Prior to needle insertion, ethyl chloride spray was used for surface anesthesia. Sterile technique was used.  A 25-gauge, 1.5\" needle was directed to the bursa space by direct approach. Injectate was passed without difficulty. The needle was removed and a simple bandage was applied. The procedure was tolerated well without difficulty.    Injection mixture:  1% lidocaine without epinephrine: 2 mL  40 mg/mL triamcinolone acetonide: 2 mL     Diagnoses and all orders for this visit:    Greater trochanteric pain syndrome of right lower extremity  -     triamcinolone acetonide (KENALOG-40) injection 80 mg          EMR Dragon/Transcription disclaimer:    Much of this encounter note is an electronic transcription/translation of spoken language to printed text.  The electronic translation of spoken language may permit erroneous, or at times, nonsensical words or phrases to be inadvertently transcribed.  Although I have reviewed the note for such errors some may still exist.    "

## 2023-07-26 ENCOUNTER — OFFICE (AMBULATORY)
Dept: URBAN - METROPOLITAN AREA CLINIC 76 | Facility: CLINIC | Age: 78
End: 2023-07-26

## 2023-07-26 VITALS
HEART RATE: 74 BPM | OXYGEN SATURATION: 98 % | DIASTOLIC BLOOD PRESSURE: 90 MMHG | WEIGHT: 152 LBS | HEIGHT: 66 IN | SYSTOLIC BLOOD PRESSURE: 133 MMHG

## 2023-07-26 DIAGNOSIS — K57.30 DIVERTICULOSIS OF LARGE INTESTINE WITHOUT PERFORATION OR ABS: ICD-10-CM

## 2023-07-26 DIAGNOSIS — K59.00 CONSTIPATION, UNSPECIFIED: ICD-10-CM

## 2023-07-26 DIAGNOSIS — K21.9 GASTRO-ESOPHAGEAL REFLUX DISEASE WITHOUT ESOPHAGITIS: ICD-10-CM

## 2023-07-26 PROCEDURE — 99213 OFFICE O/P EST LOW 20 MIN: CPT

## 2023-07-30 PROBLEM — K57.30 DIVERTICULOSIS OF LARGE INTESTINE WITHOUT PERFORATION OR ABS: Status: ACTIVE | Noted: 2023-01-04

## 2023-11-09 DIAGNOSIS — I10 ESSENTIAL HYPERTENSION: ICD-10-CM

## 2023-11-09 DIAGNOSIS — E55.9 VITAMIN D DEFICIENCY: ICD-10-CM

## 2023-11-09 DIAGNOSIS — F03.B0 MODERATE DEMENTIA, UNSPECIFIED DEMENTIA TYPE, UNSPECIFIED WHETHER BEHAVIORAL, PSYCHOTIC, OR MOOD DISTURBANCE OR ANXIETY: Primary | ICD-10-CM

## 2023-11-09 DIAGNOSIS — Z12.5 SPECIAL SCREENING FOR MALIGNANT NEOPLASM OF PROSTATE: ICD-10-CM

## 2023-11-18 LAB
25(OH)D3+25(OH)D2 SERPL-MCNC: 101 NG/ML (ref 30–100)
ALBUMIN SERPL-MCNC: 4.6 G/DL (ref 3.5–5.2)
ALBUMIN/GLOB SERPL: 3.1 G/DL
ALP SERPL-CCNC: 50 U/L (ref 39–117)
ALT SERPL-CCNC: 10 U/L (ref 1–41)
AST SERPL-CCNC: 14 U/L (ref 1–40)
BASOPHILS # BLD AUTO: 0.05 10*3/MM3 (ref 0–0.2)
BASOPHILS NFR BLD AUTO: 0.9 % (ref 0–1.5)
BILIRUB SERPL-MCNC: 0.8 MG/DL (ref 0–1.2)
BUN SERPL-MCNC: 18 MG/DL (ref 8–23)
BUN/CREAT SERPL: 16.4 (ref 7–25)
CALCIUM SERPL-MCNC: 9.5 MG/DL (ref 8.6–10.5)
CHLORIDE SERPL-SCNC: 103 MMOL/L (ref 98–107)
CHOLEST SERPL-MCNC: 200 MG/DL (ref 0–200)
CO2 SERPL-SCNC: 26.5 MMOL/L (ref 22–29)
CREAT SERPL-MCNC: 1.1 MG/DL (ref 0.76–1.27)
EGFRCR SERPLBLD CKD-EPI 2021: 68.3 ML/MIN/1.73
EOSINOPHIL # BLD AUTO: 0.23 10*3/MM3 (ref 0–0.4)
EOSINOPHIL NFR BLD AUTO: 4.4 % (ref 0.3–6.2)
ERYTHROCYTE [DISTWIDTH] IN BLOOD BY AUTOMATED COUNT: 12.3 % (ref 12.3–15.4)
FOLATE SERPL-MCNC: 12.7 NG/ML (ref 4.78–24.2)
GLOBULIN SER CALC-MCNC: 1.5 GM/DL
GLUCOSE SERPL-MCNC: 104 MG/DL (ref 65–99)
HCT VFR BLD AUTO: 41.1 % (ref 37.5–51)
HDLC SERPL-MCNC: 44 MG/DL (ref 40–60)
HGB BLD-MCNC: 14 G/DL (ref 13–17.7)
IMM GRANULOCYTES # BLD AUTO: 0.01 10*3/MM3 (ref 0–0.05)
IMM GRANULOCYTES NFR BLD AUTO: 0.2 % (ref 0–0.5)
LDLC SERPL CALC-MCNC: 136 MG/DL (ref 0–100)
LYMPHOCYTES # BLD AUTO: 1.76 10*3/MM3 (ref 0.7–3.1)
LYMPHOCYTES NFR BLD AUTO: 33.3 % (ref 19.6–45.3)
MCH RBC QN AUTO: 33.3 PG (ref 26.6–33)
MCHC RBC AUTO-ENTMCNC: 34.1 G/DL (ref 31.5–35.7)
MCV RBC AUTO: 97.6 FL (ref 79–97)
MONOCYTES # BLD AUTO: 0.44 10*3/MM3 (ref 0.1–0.9)
MONOCYTES NFR BLD AUTO: 8.3 % (ref 5–12)
NEUTROPHILS # BLD AUTO: 2.79 10*3/MM3 (ref 1.7–7)
NEUTROPHILS NFR BLD AUTO: 52.9 % (ref 42.7–76)
NRBC BLD AUTO-RTO: 0 /100 WBC (ref 0–0.2)
PLATELET # BLD AUTO: 141 10*3/MM3 (ref 140–450)
POTASSIUM SERPL-SCNC: 3.9 MMOL/L (ref 3.5–5.2)
PROT SERPL-MCNC: 6.1 G/DL (ref 6–8.5)
PSA SERPL-MCNC: 0.55 NG/ML (ref 0–4)
RBC # BLD AUTO: 4.21 10*6/MM3 (ref 4.14–5.8)
SODIUM SERPL-SCNC: 140 MMOL/L (ref 136–145)
TRIGL SERPL-MCNC: 108 MG/DL (ref 0–150)
TSH SERPL DL<=0.005 MIU/L-ACNC: 1.27 UIU/ML (ref 0.27–4.2)
VIT B12 SERPL-MCNC: 1056 PG/ML (ref 211–946)
VLDLC SERPL CALC-MCNC: 20 MG/DL (ref 5–40)
WBC # BLD AUTO: 5.28 10*3/MM3 (ref 3.4–10.8)

## 2023-11-29 RX ORDER — AZITHROMYCIN 250 MG/1
TABLET, FILM COATED ORAL
Qty: 6 TABLET | Refills: 0 | Status: ON HOLD | OUTPATIENT
Start: 2023-11-29 | End: 2023-12-04

## 2023-11-29 RX ORDER — MONTELUKAST SODIUM 10 MG/1
10 TABLET ORAL NIGHTLY
Qty: 14 TABLET | Refills: 0 | Status: ON HOLD | OUTPATIENT
Start: 2023-11-29 | End: 2023-12-13

## 2023-12-03 ENCOUNTER — HOSPITAL ENCOUNTER (INPATIENT)
Facility: HOSPITAL | Age: 78
LOS: 12 days | Discharge: HOME HEALTH | End: 2023-12-15
Attending: EMERGENCY MEDICINE | Admitting: INTERNAL MEDICINE
Payer: COMMERCIAL

## 2023-12-03 ENCOUNTER — APPOINTMENT (OUTPATIENT)
Dept: CT IMAGING | Facility: HOSPITAL | Age: 78
End: 2023-12-03
Payer: MEDICARE

## 2023-12-03 ENCOUNTER — APPOINTMENT (OUTPATIENT)
Dept: GENERAL RADIOLOGY | Facility: HOSPITAL | Age: 78
End: 2023-12-03
Payer: MEDICARE

## 2023-12-03 DIAGNOSIS — G30.9 ALZHEIMER'S DEMENTIA, UNSPECIFIED DEMENTIA SEVERITY, UNSPECIFIED TIMING OF DEMENTIA ONSET, UNSPECIFIED WHETHER BEHAVIORAL, PSYCHOTIC, OR MOOD DISTURBANCE OR ANXIETY: ICD-10-CM

## 2023-12-03 DIAGNOSIS — U07.1 COVID-19: ICD-10-CM

## 2023-12-03 DIAGNOSIS — G93.40 ACUTE ENCEPHALOPATHY: Primary | ICD-10-CM

## 2023-12-03 DIAGNOSIS — F02.80 ALZHEIMER'S DEMENTIA, UNSPECIFIED DEMENTIA SEVERITY, UNSPECIFIED TIMING OF DEMENTIA ONSET, UNSPECIFIED WHETHER BEHAVIORAL, PSYCHOTIC, OR MOOD DISTURBANCE OR ANXIETY: ICD-10-CM

## 2023-12-03 LAB
ALBUMIN SERPL-MCNC: 4.7 G/DL (ref 3.5–5.2)
ALBUMIN/GLOB SERPL: 2.1 G/DL
ALP SERPL-CCNC: 68 U/L (ref 39–117)
ALT SERPL W P-5'-P-CCNC: 19 U/L (ref 1–41)
ANION GAP SERPL CALCULATED.3IONS-SCNC: 11 MMOL/L (ref 5–15)
AST SERPL-CCNC: 25 U/L (ref 1–40)
B PARAPERT DNA SPEC QL NAA+PROBE: NOT DETECTED
B PERT DNA SPEC QL NAA+PROBE: NOT DETECTED
BACTERIA UR QL AUTO: ABNORMAL /HPF
BASOPHILS # BLD AUTO: 0.02 10*3/MM3 (ref 0–0.2)
BASOPHILS NFR BLD AUTO: 0.3 % (ref 0–1.5)
BILIRUB SERPL-MCNC: 0.6 MG/DL (ref 0–1.2)
BILIRUB UR QL STRIP: NEGATIVE
BUN SERPL-MCNC: 19 MG/DL (ref 8–23)
BUN/CREAT SERPL: 17 (ref 7–25)
C PNEUM DNA NPH QL NAA+NON-PROBE: NOT DETECTED
CALCIUM SPEC-SCNC: 9.6 MG/DL (ref 8.6–10.5)
CHLORIDE SERPL-SCNC: 103 MMOL/L (ref 98–107)
CLARITY UR: CLEAR
CO2 SERPL-SCNC: 27 MMOL/L (ref 22–29)
COLOR UR: YELLOW
CREAT SERPL-MCNC: 1.12 MG/DL (ref 0.76–1.27)
D-LACTATE SERPL-SCNC: 1.6 MMOL/L (ref 0.5–2)
DEPRECATED RDW RBC AUTO: 43.6 FL (ref 37–54)
EGFRCR SERPLBLD CKD-EPI 2021: 66.8 ML/MIN/1.73
EOSINOPHIL # BLD AUTO: 0 10*3/MM3 (ref 0–0.4)
EOSINOPHIL NFR BLD AUTO: 0 % (ref 0.3–6.2)
ERYTHROCYTE [DISTWIDTH] IN BLOOD BY AUTOMATED COUNT: 12.2 % (ref 12.3–15.4)
FLUAV SUBTYP SPEC NAA+PROBE: NOT DETECTED
FLUBV RNA ISLT QL NAA+PROBE: NOT DETECTED
GLOBULIN UR ELPH-MCNC: 2.2 GM/DL
GLUCOSE SERPL-MCNC: 101 MG/DL (ref 65–99)
GLUCOSE UR STRIP-MCNC: NEGATIVE MG/DL
HADV DNA SPEC NAA+PROBE: NOT DETECTED
HCOV 229E RNA SPEC QL NAA+PROBE: NOT DETECTED
HCOV HKU1 RNA SPEC QL NAA+PROBE: NOT DETECTED
HCOV NL63 RNA SPEC QL NAA+PROBE: NOT DETECTED
HCOV OC43 RNA SPEC QL NAA+PROBE: NOT DETECTED
HCT VFR BLD AUTO: 44.4 % (ref 37.5–51)
HGB BLD-MCNC: 14.9 G/DL (ref 13–17.7)
HGB UR QL STRIP.AUTO: ABNORMAL
HMPV RNA NPH QL NAA+NON-PROBE: NOT DETECTED
HPIV1 RNA ISLT QL NAA+PROBE: NOT DETECTED
HPIV2 RNA SPEC QL NAA+PROBE: NOT DETECTED
HPIV3 RNA NPH QL NAA+PROBE: NOT DETECTED
HPIV4 P GENE NPH QL NAA+PROBE: NOT DETECTED
HYALINE CASTS UR QL AUTO: ABNORMAL /LPF
KETONES UR QL STRIP: ABNORMAL
LEUKOCYTE ESTERASE UR QL STRIP.AUTO: NEGATIVE
LYMPHOCYTES # BLD AUTO: 0.99 10*3/MM3 (ref 0.7–3.1)
LYMPHOCYTES NFR BLD AUTO: 14.8 % (ref 19.6–45.3)
M PNEUMO IGG SER IA-ACNC: NOT DETECTED
MCH RBC QN AUTO: 32.5 PG (ref 26.6–33)
MCHC RBC AUTO-ENTMCNC: 33.6 G/DL (ref 31.5–35.7)
MCV RBC AUTO: 96.7 FL (ref 79–97)
MONOCYTES # BLD AUTO: 0.71 10*3/MM3 (ref 0.1–0.9)
MONOCYTES NFR BLD AUTO: 10.6 % (ref 5–12)
NEUTROPHILS NFR BLD AUTO: 4.94 10*3/MM3 (ref 1.7–7)
NEUTROPHILS NFR BLD AUTO: 74 % (ref 42.7–76)
NITRITE UR QL STRIP: NEGATIVE
NT-PROBNP SERPL-MCNC: 656 PG/ML (ref 0–1800)
PH UR STRIP.AUTO: 5.5 [PH] (ref 5–8)
PLATELET # BLD AUTO: 115 10*3/MM3 (ref 140–450)
PMV BLD AUTO: 11 FL (ref 6–12)
POTASSIUM SERPL-SCNC: 4.2 MMOL/L (ref 3.5–5.2)
PROCALCITONIN SERPL-MCNC: 0.11 NG/ML (ref 0–0.25)
PROT SERPL-MCNC: 6.9 G/DL (ref 6–8.5)
PROT UR QL STRIP: ABNORMAL
RBC # BLD AUTO: 4.59 10*6/MM3 (ref 4.14–5.8)
RBC # UR STRIP: ABNORMAL /HPF
REF LAB TEST METHOD: ABNORMAL
RHINOVIRUS RNA SPEC NAA+PROBE: NOT DETECTED
RSV RNA NPH QL NAA+NON-PROBE: NOT DETECTED
SARS-COV-2 RNA NPH QL NAA+NON-PROBE: DETECTED
SODIUM SERPL-SCNC: 141 MMOL/L (ref 136–145)
SP GR UR STRIP: 1.02 (ref 1–1.03)
SQUAMOUS #/AREA URNS HPF: ABNORMAL /HPF
TROPONIN T SERPL HS-MCNC: 23 NG/L
UROBILINOGEN UR QL STRIP: ABNORMAL
WBC # UR STRIP: ABNORMAL /HPF
WBC NRBC COR # BLD AUTO: 6.68 10*3/MM3 (ref 3.4–10.8)

## 2023-12-03 PROCEDURE — 81001 URINALYSIS AUTO W/SCOPE: CPT | Performed by: EMERGENCY MEDICINE

## 2023-12-03 PROCEDURE — P9612 CATHETERIZE FOR URINE SPEC: HCPCS

## 2023-12-03 PROCEDURE — 71045 X-RAY EXAM CHEST 1 VIEW: CPT

## 2023-12-03 PROCEDURE — 36415 COLL VENOUS BLD VENIPUNCTURE: CPT

## 2023-12-03 PROCEDURE — 83605 ASSAY OF LACTIC ACID: CPT | Performed by: EMERGENCY MEDICINE

## 2023-12-03 PROCEDURE — 99285 EMERGENCY DEPT VISIT HI MDM: CPT

## 2023-12-03 PROCEDURE — 84484 ASSAY OF TROPONIN QUANT: CPT | Performed by: EMERGENCY MEDICINE

## 2023-12-03 PROCEDURE — 0202U NFCT DS 22 TRGT SARS-COV-2: CPT | Performed by: EMERGENCY MEDICINE

## 2023-12-03 PROCEDURE — 84145 PROCALCITONIN (PCT): CPT | Performed by: EMERGENCY MEDICINE

## 2023-12-03 PROCEDURE — 87040 BLOOD CULTURE FOR BACTERIA: CPT | Performed by: EMERGENCY MEDICINE

## 2023-12-03 PROCEDURE — 83880 ASSAY OF NATRIURETIC PEPTIDE: CPT | Performed by: EMERGENCY MEDICINE

## 2023-12-03 PROCEDURE — 85025 COMPLETE CBC W/AUTO DIFF WBC: CPT | Performed by: EMERGENCY MEDICINE

## 2023-12-03 PROCEDURE — 70450 CT HEAD/BRAIN W/O DYE: CPT

## 2023-12-03 PROCEDURE — 80053 COMPREHEN METABOLIC PANEL: CPT | Performed by: EMERGENCY MEDICINE

## 2023-12-03 RX ORDER — SODIUM CHLORIDE 9 MG/ML
125 INJECTION, SOLUTION INTRAVENOUS CONTINUOUS
Status: DISCONTINUED | OUTPATIENT
Start: 2023-12-03 | End: 2023-12-05

## 2023-12-03 RX ORDER — ACETAMINOPHEN 500 MG
1000 TABLET ORAL ONCE
Status: DISCONTINUED | OUTPATIENT
Start: 2023-12-03 | End: 2023-12-03

## 2023-12-03 RX ORDER — ACETAMINOPHEN 650 MG/1
650 SUPPOSITORY RECTAL ONCE
Status: COMPLETED | OUTPATIENT
Start: 2023-12-03 | End: 2023-12-03

## 2023-12-03 RX ORDER — SODIUM CHLORIDE 0.9 % (FLUSH) 0.9 %
10 SYRINGE (ML) INJECTION AS NEEDED
Status: DISCONTINUED | OUTPATIENT
Start: 2023-12-03 | End: 2023-12-15 | Stop reason: HOSPADM

## 2023-12-03 RX ADMIN — ACETAMINOPHEN 650 MG: 650 SUPPOSITORY RECTAL at 22:36

## 2023-12-04 PROBLEM — F03.90 DEMENTIA WITHOUT BEHAVIORAL DISTURBANCE: Status: ACTIVE | Noted: 2023-12-04

## 2023-12-04 PROBLEM — N40.0 BPH (BENIGN PROSTATIC HYPERPLASIA): Status: ACTIVE | Noted: 2017-07-31

## 2023-12-04 LAB
ANION GAP SERPL CALCULATED.3IONS-SCNC: 11.6 MMOL/L (ref 5–15)
BUN SERPL-MCNC: 22 MG/DL (ref 8–23)
BUN/CREAT SERPL: 21.8 (ref 7–25)
CALCIUM SPEC-SCNC: 8.7 MG/DL (ref 8.6–10.5)
CHLORIDE SERPL-SCNC: 105 MMOL/L (ref 98–107)
CO2 SERPL-SCNC: 24.4 MMOL/L (ref 22–29)
CREAT SERPL-MCNC: 1.01 MG/DL (ref 0.76–1.27)
DEPRECATED RDW RBC AUTO: 44.1 FL (ref 37–54)
EGFRCR SERPLBLD CKD-EPI 2021: 75.7 ML/MIN/1.73
ERYTHROCYTE [DISTWIDTH] IN BLOOD BY AUTOMATED COUNT: 12.2 % (ref 12.3–15.4)
FERRITIN SERPL-MCNC: 453 NG/ML (ref 30–400)
GEN 5 2HR TROPONIN T REFLEX: 22 NG/L
GLUCOSE SERPL-MCNC: 90 MG/DL (ref 65–99)
HAPTOGLOB SERPL-MCNC: 226 MG/DL (ref 30–200)
HCT VFR BLD AUTO: 37.2 % (ref 37.5–51)
HGB BLD-MCNC: 12.5 G/DL (ref 13–17.7)
IRON 24H UR-MRATE: 20 MCG/DL (ref 59–158)
IRON SATN MFR SERPL: 8 % (ref 20–50)
LDH SERPL-CCNC: 217 U/L (ref 135–225)
MCH RBC QN AUTO: 32.8 PG (ref 26.6–33)
MCHC RBC AUTO-ENTMCNC: 33.6 G/DL (ref 31.5–35.7)
MCV RBC AUTO: 97.6 FL (ref 79–97)
PLATELET # BLD AUTO: 106 10*3/MM3 (ref 140–450)
PMV BLD AUTO: 10.9 FL (ref 6–12)
POTASSIUM SERPL-SCNC: 3.5 MMOL/L (ref 3.5–5.2)
RBC # BLD AUTO: 3.81 10*6/MM3 (ref 4.14–5.8)
RETICS # AUTO: 0.03 10*6/MM3 (ref 0.02–0.13)
RETICS/RBC NFR AUTO: 0.74 % (ref 0.7–1.9)
SODIUM SERPL-SCNC: 141 MMOL/L (ref 136–145)
TIBC SERPL-MCNC: 243 MCG/DL (ref 298–536)
TRANSFERRIN SERPL-MCNC: 163 MG/DL (ref 200–360)
TROPONIN T DELTA: -1 NG/L
TROPONIN T SERPL HS-MCNC: 26 NG/L
WBC NRBC COR # BLD AUTO: 6.87 10*3/MM3 (ref 3.4–10.8)

## 2023-12-04 PROCEDURE — 84466 ASSAY OF TRANSFERRIN: CPT | Performed by: INTERNAL MEDICINE

## 2023-12-04 PROCEDURE — 25010000002 ENOXAPARIN PER 10 MG: Performed by: NURSE PRACTITIONER

## 2023-12-04 PROCEDURE — 83615 LACTATE (LD) (LDH) ENZYME: CPT | Performed by: INTERNAL MEDICINE

## 2023-12-04 PROCEDURE — 84484 ASSAY OF TROPONIN QUANT: CPT | Performed by: EMERGENCY MEDICINE

## 2023-12-04 PROCEDURE — 25010000002 DEXAMETHASONE PER 1 MG: Performed by: INTERNAL MEDICINE

## 2023-12-04 PROCEDURE — 25810000003 SODIUM CHLORIDE 0.9 % SOLUTION: Performed by: EMERGENCY MEDICINE

## 2023-12-04 PROCEDURE — 83540 ASSAY OF IRON: CPT | Performed by: INTERNAL MEDICINE

## 2023-12-04 PROCEDURE — 25010000002 OLANZAPINE 10 MG RECONSTITUTED SOLUTION: Performed by: NURSE PRACTITIONER

## 2023-12-04 PROCEDURE — 36415 COLL VENOUS BLD VENIPUNCTURE: CPT | Performed by: NURSE PRACTITIONER

## 2023-12-04 PROCEDURE — 85045 AUTOMATED RETICULOCYTE COUNT: CPT | Performed by: INTERNAL MEDICINE

## 2023-12-04 PROCEDURE — 97530 THERAPEUTIC ACTIVITIES: CPT

## 2023-12-04 PROCEDURE — 82728 ASSAY OF FERRITIN: CPT | Performed by: INTERNAL MEDICINE

## 2023-12-04 PROCEDURE — 84484 ASSAY OF TROPONIN QUANT: CPT | Performed by: NURSE PRACTITIONER

## 2023-12-04 PROCEDURE — 97162 PT EVAL MOD COMPLEX 30 MIN: CPT

## 2023-12-04 PROCEDURE — 85027 COMPLETE CBC AUTOMATED: CPT | Performed by: NURSE PRACTITIONER

## 2023-12-04 PROCEDURE — 80048 BASIC METABOLIC PNL TOTAL CA: CPT | Performed by: NURSE PRACTITIONER

## 2023-12-04 PROCEDURE — 83010 ASSAY OF HAPTOGLOBIN QUANT: CPT | Performed by: INTERNAL MEDICINE

## 2023-12-04 RX ORDER — SUCRALFATE 1 G/1
1 TABLET ORAL
Status: DISCONTINUED | OUTPATIENT
Start: 2023-12-04 | End: 2023-12-04

## 2023-12-04 RX ORDER — NITROGLYCERIN 0.4 MG/1
0.4 TABLET SUBLINGUAL
Status: DISCONTINUED | OUTPATIENT
Start: 2023-12-04 | End: 2023-12-15 | Stop reason: HOSPADM

## 2023-12-04 RX ORDER — OLANZAPINE 10 MG/2ML
5 INJECTION, POWDER, FOR SOLUTION INTRAMUSCULAR ONCE AS NEEDED
Status: COMPLETED | OUTPATIENT
Start: 2023-12-04 | End: 2023-12-04

## 2023-12-04 RX ORDER — ACETAMINOPHEN 325 MG/1
650 TABLET ORAL EVERY 4 HOURS PRN
Status: DISCONTINUED | OUTPATIENT
Start: 2023-12-04 | End: 2023-12-08

## 2023-12-04 RX ORDER — MELATONIN
5000 DAILY
Status: DISCONTINUED | OUTPATIENT
Start: 2023-12-04 | End: 2023-12-04

## 2023-12-04 RX ORDER — ONDANSETRON 4 MG/1
4 TABLET, FILM COATED ORAL EVERY 6 HOURS PRN
Status: DISCONTINUED | OUTPATIENT
Start: 2023-12-04 | End: 2023-12-08

## 2023-12-04 RX ORDER — SODIUM CHLORIDE 9 MG/ML
40 INJECTION, SOLUTION INTRAVENOUS AS NEEDED
Status: DISCONTINUED | OUTPATIENT
Start: 2023-12-04 | End: 2023-12-15 | Stop reason: HOSPADM

## 2023-12-04 RX ORDER — DEXAMETHASONE SODIUM PHOSPHATE 10 MG/ML
6 INJECTION INTRAMUSCULAR; INTRAVENOUS DAILY
Status: DISCONTINUED | OUTPATIENT
Start: 2023-12-04 | End: 2023-12-05

## 2023-12-04 RX ORDER — FAMOTIDINE 10 MG/ML
20 INJECTION, SOLUTION INTRAVENOUS EVERY 12 HOURS SCHEDULED
Status: DISCONTINUED | OUTPATIENT
Start: 2023-12-04 | End: 2023-12-12

## 2023-12-04 RX ORDER — ENALAPRILAT 1.25 MG/ML
1.25 INJECTION INTRAVENOUS EVERY 6 HOURS
Status: DISCONTINUED | OUTPATIENT
Start: 2023-12-04 | End: 2023-12-06

## 2023-12-04 RX ORDER — PANTOPRAZOLE SODIUM 40 MG/1
40 TABLET, DELAYED RELEASE ORAL
Status: DISCONTINUED | OUTPATIENT
Start: 2023-12-04 | End: 2023-12-04

## 2023-12-04 RX ORDER — POLYETHYLENE GLYCOL 3350 17 G/17G
17 POWDER, FOR SOLUTION ORAL DAILY PRN
Status: DISCONTINUED | OUTPATIENT
Start: 2023-12-04 | End: 2023-12-08

## 2023-12-04 RX ORDER — TAMSULOSIN HYDROCHLORIDE 0.4 MG/1
0.4 CAPSULE ORAL 2 TIMES DAILY
Status: DISCONTINUED | OUTPATIENT
Start: 2023-12-04 | End: 2023-12-05

## 2023-12-04 RX ORDER — AMOXICILLIN 250 MG
2 CAPSULE ORAL 2 TIMES DAILY
Status: DISCONTINUED | OUTPATIENT
Start: 2023-12-04 | End: 2023-12-08

## 2023-12-04 RX ORDER — AZITHROMYCIN 250 MG/1
250 TABLET, FILM COATED ORAL DAILY
COMMUNITY
Start: 2022-11-29 | End: 2023-12-15 | Stop reason: HOSPADM

## 2023-12-04 RX ORDER — CALCIUM CARBONATE 500 MG/1
2 TABLET, CHEWABLE ORAL 2 TIMES DAILY PRN
Status: DISCONTINUED | OUTPATIENT
Start: 2023-12-04 | End: 2023-12-08

## 2023-12-04 RX ORDER — BISACODYL 5 MG/1
5 TABLET, DELAYED RELEASE ORAL DAILY PRN
Status: DISCONTINUED | OUTPATIENT
Start: 2023-12-04 | End: 2023-12-08

## 2023-12-04 RX ORDER — SODIUM CHLORIDE 0.9 % (FLUSH) 0.9 %
10 SYRINGE (ML) INJECTION AS NEEDED
Status: DISCONTINUED | OUTPATIENT
Start: 2023-12-04 | End: 2023-12-15 | Stop reason: HOSPADM

## 2023-12-04 RX ORDER — OLANZAPINE 10 MG/2ML
5 INJECTION, POWDER, FOR SOLUTION INTRAMUSCULAR EVERY 8 HOURS PRN
Status: DISCONTINUED | OUTPATIENT
Start: 2023-12-04 | End: 2023-12-08

## 2023-12-04 RX ORDER — BISACODYL 10 MG
10 SUPPOSITORY, RECTAL RECTAL DAILY PRN
Status: DISCONTINUED | OUTPATIENT
Start: 2023-12-04 | End: 2023-12-08

## 2023-12-04 RX ORDER — MONTELUKAST SODIUM 10 MG/1
10 TABLET ORAL NIGHTLY
Status: DISCONTINUED | OUTPATIENT
Start: 2023-12-04 | End: 2023-12-04

## 2023-12-04 RX ORDER — LOSARTAN POTASSIUM 25 MG/1
25 TABLET ORAL DAILY
Status: DISCONTINUED | OUTPATIENT
Start: 2023-12-04 | End: 2023-12-04

## 2023-12-04 RX ORDER — ENOXAPARIN SODIUM 100 MG/ML
40 INJECTION SUBCUTANEOUS DAILY
Status: DISCONTINUED | OUTPATIENT
Start: 2023-12-04 | End: 2023-12-15 | Stop reason: HOSPADM

## 2023-12-04 RX ORDER — SODIUM CHLORIDE 0.9 % (FLUSH) 0.9 %
10 SYRINGE (ML) INJECTION EVERY 12 HOURS SCHEDULED
Status: DISCONTINUED | OUTPATIENT
Start: 2023-12-04 | End: 2023-12-15 | Stop reason: HOSPADM

## 2023-12-04 RX ORDER — ONDANSETRON 2 MG/ML
4 INJECTION INTRAMUSCULAR; INTRAVENOUS EVERY 6 HOURS PRN
Status: DISCONTINUED | OUTPATIENT
Start: 2023-12-04 | End: 2023-12-08

## 2023-12-04 RX ORDER — MEMANTINE HYDROCHLORIDE 10 MG/1
10 TABLET ORAL 2 TIMES DAILY
Status: DISCONTINUED | OUTPATIENT
Start: 2023-12-04 | End: 2023-12-04

## 2023-12-04 RX ORDER — ACETAMINOPHEN 650 MG/1
650 SUPPOSITORY RECTAL EVERY 4 HOURS PRN
Status: DISCONTINUED | OUTPATIENT
Start: 2023-12-04 | End: 2023-12-08

## 2023-12-04 RX ORDER — ACETAMINOPHEN 160 MG/5ML
650 SOLUTION ORAL EVERY 4 HOURS PRN
Status: DISCONTINUED | OUTPATIENT
Start: 2023-12-04 | End: 2023-12-08

## 2023-12-04 RX ADMIN — ACETAMINOPHEN 650 MG: 650 SUPPOSITORY RECTAL at 04:51

## 2023-12-04 RX ADMIN — ENOXAPARIN SODIUM 40 MG: 100 INJECTION SUBCUTANEOUS at 04:53

## 2023-12-04 RX ADMIN — Medication 10 ML: at 22:48

## 2023-12-04 RX ADMIN — FAMOTIDINE 20 MG: 10 INJECTION INTRAVENOUS at 22:47

## 2023-12-04 RX ADMIN — ENALAPRILAT 1.25 MG: 1.25 INJECTION INTRAVENOUS at 16:56

## 2023-12-04 RX ADMIN — ENALAPRILAT 1.25 MG: 1.25 INJECTION INTRAVENOUS at 22:46

## 2023-12-04 RX ADMIN — DEXAMETHASONE SODIUM PHOSPHATE 6 MG: 10 INJECTION INTRAMUSCULAR; INTRAVENOUS at 12:18

## 2023-12-04 RX ADMIN — SODIUM CHLORIDE 125 ML/HR: 9 INJECTION, SOLUTION INTRAVENOUS at 13:50

## 2023-12-04 RX ADMIN — SODIUM CHLORIDE 125 ML/HR: 9 INJECTION, SOLUTION INTRAVENOUS at 04:53

## 2023-12-04 RX ADMIN — Medication 10 ML: at 04:54

## 2023-12-04 RX ADMIN — OLANZAPINE 5 MG: 10 INJECTION, POWDER, FOR SOLUTION INTRAMUSCULAR at 14:27

## 2023-12-04 NOTE — PROGRESS NOTES
"[facility] Clinical Pharmacy Services: Enoxaparin Consult    Tyron Hunt has a pharmacy consult to dose prophylactic enoxaparin per Julia Roy APRN 's request.     Indication: VTE Prophylaxis  Home Anticoagulation: None     Relevant clinical data and objective history reviewed:  78 y.o. male 167.6 cm (65.98\") 70.3 kg (155 lb)   Body mass index is 25.03 kg/m².   Results from last 7 days   Lab Units 12/03/23  2214   PLATELETS 10*3/mm3 115*     Estimated Creatinine Clearance: 53.2 mL/min (by C-G formula based on SCr of 1.12 mg/dL).    Assessment/Plan    Will start patient on 40mg subcutaneous every 24 hours, adjusted for renal function. Consult order will be discontinued but pharmacy will continue to follow.     Regulo Morel [facility]  Clinical Pharmacist    "

## 2023-12-04 NOTE — PLAN OF CARE
Goal Outcome Evaluation:  Plan of Care Reviewed With: patient           Outcome Evaluation: Patient is a 78 y.o male who presented to [facility] with AMS. Patient with a recent fall while at home. Patient is COVID (+). Patients wife present at bedside providing PLOF as patient AO to self only. Patient lives at home with his wife. Patient is independent at baseline with ADLs and does not use an AD. Upon arrival patient pleasantly confused but pulling at lines and O2. Patient able to follow some commands this date. Patient completed all bed mobility with CGA. Patient performed STS from EOB with CGA. Patient ambulated 20ft in room with rwx and Yenifer. Gait slow with forward flexed posture. Patient required assistance with rwx guidance to navigate obstacles. Increased time and cues to have patient safely return to bed. Patient may continue to benefit from skilled PT intervention to address deficits in functional mobility and maximize safety and independence. Patient limited largely by cognitive status at this time. Acute PT will continue to monitor.      Anticipated Discharge Disposition (PT): skilled nursing facility, home with 24/7 care, home with home health (Pending progress)

## 2023-12-04 NOTE — ED PROVIDER NOTES
EMERGENCY DEPARTMENT ENCOUNTER    Room Number:  36/36  PCP: Tyron Hunt MD  Historian: EMS, wife, son      HPI:  Chief Complaint: Fever, altered mental status  A complete HPI/ROS/PMH/PSH/SH/FH are unobtainable due to: Altered mental status  Context: Tyron Hunt is a 78 y.o. male who presents to the ED for evaluation of fever and altered mental status.  Most of the history is obtained from patient's wife at the bedside and his son via telephone.  His wife recently had COVID-19 and then the patient started developing fever.  His wife states that they have both been taking ivermectin and azithromycin.  Patient does have a history of Alzheimer's dementia and has been prone to developing encephalopathy when he has a febrile illness.  He has had only mild symptoms for the last several days but the last 24 hours he has become acutely confused.  His wife states that he got out of bed a couple of times and fallen and EMS was called to help him back in the bed.  He has not gotten to the point where she cannot care for him at home like this.  Son states that he administered Zyprexa 5 mg p.o. to him earlier today due to his agitation.  Patient is not reportedly on any narcotic medications.  He does have dementia but is actually pretty high functioning gentleman at baseline.  He is a full code.    Wife and son state that patient is not to receive remdesivir under any circumstance.            PAST MEDICAL HISTORY  Active Ambulatory Problems     Diagnosis Date Noted    Hypertension 09/12/2016    GERD (gastroesophageal reflux disease) 09/12/2016    Hyperlipidemia 09/12/2016    Anxiety 09/12/2016    Benign prostatic hyperplasia with lower urinary tract symptoms 07/31/2017    Impaired fasting glucose 09/17/2018    Recurrent major depressive disorder, in full remission 09/26/2018    Cervical disc disease 08/17/2021    Cervical spinal stenosis 08/17/2021    Foraminal stenosis of cervical region 08/17/2021    Cervical  radiculitis 08/17/2021    Altered mental status 01/30/2022    Encephalopathy due to COVID-19 virus 01/30/2022    Age-related physical debility 02/01/2022    Cytokine release syndrome, grade 1 02/11/2022    Dyspepsia 05/05/2022     Resolved Ambulatory Problems     Diagnosis Date Noted    MILLY (acute kidney injury) 02/07/2022     Past Medical History:   Diagnosis Date    COVID-19 01/29/2022    Diverticulitis     Gastritis     Gout     H/O complete eye exam 06/2017    Impaired fasting blood sugar     Kidney stones     Memory loss     Sleep apnea          PAST SURGICAL HISTORY  Past Surgical History:   Procedure Laterality Date    CERVICAL EPIDURAL N/A 08/27/2021    Procedure: CERVICAL EPIDURAL;  Surgeon: Nataliia Avilez MD;  Location: [facility];  Service: Pain Management;  Laterality: N/A;    CERVICAL EPIDURAL N/A 10/18/2021    Procedure: CERVICAL EPIDURAL 7-1;  Surgeon: Nataliia Avilez MD;  Location: [facility];  Service: Pain Management;  Laterality: N/A;    COLONOSCOPY      date randalow-Gregorio GRIFFIN    CYST REMOVAL      CYSTOSCOPY W/ LITHOLAPAXY / EHL      TONSILLECTOMY  1949    UPPER GASTROINTESTINAL ENDOSCOPY  10/05/2021    Gastritis   Arlet GRIFFIN         FAMILY HISTORY  Family History   Problem Relation Age of Onset    Arthritis Mother     Heart disease Mother     Thyroid disease Mother     Hypertension Brother     Obesity Brother     Pancreatic cancer Maternal Aunt     Dementia Maternal Grandmother          SOCIAL HISTORY  Social History     Socioeconomic History    Marital status:    Tobacco Use    Smoking status: Never    Smokeless tobacco: Never   Vaping Use    Vaping Use: Never used   Substance and Sexual Activity    Alcohol use: Never    Drug use: Never    Sexual activity: Yes     Partners: Male         ALLERGIES  Sulfa antibiotics        REVIEW OF SYSTEMS  Review of Systems   Unable to complete reliable review of systems due to patient's altered mental status      PHYSICAL EXAM  ED Triage Vitals  [12/03/23 2050]   Temp Heart Rate Resp BP SpO2   (!) 101 °F (38.3 °C) 92 18 149/89 99 %      Temp src Heart Rate Source Patient Position BP Location FiO2 (%)   Tympanic Monitor Sitting Right arm --       Physical Exam      GENERAL: Patient is ill-appearing, he is not responding to me and is nonverbal.  He is drooling.  HENT: nares patent, no meningismus  EYES: no scleral icterus, pupils pinpoint bilaterally  CV: regular rhythm, normal rate  RESPIRATORY: normal effort, diminished breath sounds at lung bases bilaterally, upper airway congestion  ABDOMEN: soft, nondistended, nonrigid  MUSCULOSKELETAL: no deformity  NEURO: Somnolent, withdraws to painful stimuli, nonverbal, unable to follow commands or move extremities purposefully  SKIN: warm, dry, no rash    Vital signs and nursing notes reviewed.          LAB RESULTS  Recent Results (from the past 24 hour(s))   Respiratory Panel PCR w/COVID-19(SARS-CoV-2) GISELL/AMAYA/YU/PAD/COR/MADALYN In-House, NP Swab in UTM/VTM, 2 HR TAT - Swab, Nasopharynx    Collection Time: 12/03/23  9:45 PM    Specimen: Nasopharynx; Swab   Result Value Ref Range    ADENOVIRUS, PCR Not Detected Not Detected    Coronavirus 229E Not Detected Not Detected    Coronavirus HKU1 Not Detected Not Detected    Coronavirus NL63 Not Detected Not Detected    Coronavirus OC43 Not Detected Not Detected    COVID19 Detected (C) Not Detected - Ref. Range    Human Metapneumovirus Not Detected Not Detected    Human Rhinovirus/Enterovirus Not Detected Not Detected    Influenza A PCR Not Detected Not Detected    Influenza B PCR Not Detected Not Detected    Parainfluenza Virus 1 Not Detected Not Detected    Parainfluenza Virus 2 Not Detected Not Detected    Parainfluenza Virus 3 Not Detected Not Detected    Parainfluenza Virus 4 Not Detected Not Detected    RSV, PCR Not Detected Not Detected    Bordetella pertussis pcr Not Detected Not Detected    Bordetella parapertussis PCR Not Detected Not Detected    Chlamydophila  pneumoniae PCR Not Detected Not Detected    Mycoplasma pneumo by PCR Not Detected Not Detected   Comprehensive Metabolic Panel    Collection Time: 12/03/23 10:14 PM    Specimen: Blood   Result Value Ref Range    Glucose 101 (H) 65 - 99 mg/dL    BUN 19 8 - 23 mg/dL    Creatinine 1.12 0.76 - 1.27 mg/dL    Sodium 141 136 - 145 mmol/L    Potassium 4.2 3.5 - 5.2 mmol/L    Chloride 103 98 - 107 mmol/L    CO2 27.0 22.0 - 29.0 mmol/L    Calcium 9.6 8.6 - 10.5 mg/dL    Total Protein 6.9 6.0 - 8.5 g/dL    Albumin 4.7 3.5 - 5.2 g/dL    ALT (SGPT) 19 1 - 41 U/L    AST (SGOT) 25 1 - 40 U/L    Alkaline Phosphatase 68 39 - 117 U/L    Total Bilirubin 0.6 0.0 - 1.2 mg/dL    Globulin 2.2 gm/dL    A/G Ratio 2.1 g/dL    BUN/Creatinine Ratio 17.0 7.0 - 25.0    Anion Gap 11.0 5.0 - 15.0 mmol/L    eGFR 66.8 >60.0 mL/min/1.73   BNP    Collection Time: 12/03/23 10:14 PM    Specimen: Blood   Result Value Ref Range    proBNP 656.0 0.0 - 1,800.0 pg/mL   High Sensitivity Troponin T    Collection Time: 12/03/23 10:14 PM    Specimen: Blood   Result Value Ref Range    HS Troponin T 23 (H) <22 ng/L   Lactic Acid, Plasma    Collection Time: 12/03/23 10:14 PM    Specimen: Blood   Result Value Ref Range    Lactate 1.6 0.5 - 2.0 mmol/L   Procalcitonin    Collection Time: 12/03/23 10:14 PM    Specimen: Blood   Result Value Ref Range    Procalcitonin 0.11 0.00 - 0.25 ng/mL   CBC Auto Differential    Collection Time: 12/03/23 10:14 PM    Specimen: Blood   Result Value Ref Range    WBC 6.68 3.40 - 10.80 10*3/mm3    RBC 4.59 4.14 - 5.80 10*6/mm3    Hemoglobin 14.9 13.0 - 17.7 g/dL    Hematocrit 44.4 37.5 - 51.0 %    MCV 96.7 79.0 - 97.0 fL    MCH 32.5 26.6 - 33.0 pg    MCHC 33.6 31.5 - 35.7 g/dL    RDW 12.2 (L) 12.3 - 15.4 %    RDW-SD 43.6 37.0 - 54.0 fl    MPV 11.0 6.0 - 12.0 fL    Platelets 115 (L) 140 - 450 10*3/mm3    Neutrophil % 74.0 42.7 - 76.0 %    Lymphocyte % 14.8 (L) 19.6 - 45.3 %    Monocyte % 10.6 5.0 - 12.0 %    Eosinophil % 0.0 (L) 0.3 - 6.2  %    Basophil % 0.3 0.0 - 1.5 %    Neutrophils, Absolute 4.94 1.70 - 7.00 10*3/mm3    Lymphocytes, Absolute 0.99 0.70 - 3.10 10*3/mm3    Monocytes, Absolute 0.71 0.10 - 0.90 10*3/mm3    Eosinophils, Absolute 0.00 0.00 - 0.40 10*3/mm3    Basophils, Absolute 0.02 0.00 - 0.20 10*3/mm3   Urinalysis With Microscopic If Indicated (No Culture) - Straight Cath    Collection Time: 12/03/23 11:09 PM    Specimen: Straight Cath; Urine   Result Value Ref Range    Color, UA Yellow Yellow, Straw    Appearance, UA Clear Clear    pH, UA 5.5 5.0 - 8.0    Specific Gravity, UA 1.024 1.005 - 1.030    Glucose, UA Negative Negative    Ketones, UA 15 mg/dL (1+) (A) Negative    Bilirubin, UA Negative Negative    Blood, UA Moderate (2+) (A) Negative    Protein, UA 30 mg/dL (1+) (A) Negative    Leuk Esterase, UA Negative Negative    Nitrite, UA Negative Negative    Urobilinogen, UA 1.0 E.U./dL 0.2 - 1.0 E.U./dL   Urinalysis, Microscopic Only - Straight Cath    Collection Time: 12/03/23 11:09 PM    Specimen: Straight Cath; Urine   Result Value Ref Range    RBC, UA 11-20 (A) None Seen, 0-2 /HPF    WBC, UA 3-5 (A) None Seen, 0-2 /HPF    Bacteria, UA None Seen None Seen /HPF    Squamous Epithelial Cells, UA 0-2 None Seen, 0-2 /HPF    Hyaline Casts, UA None Seen None Seen /LPF    Methodology Manual Light Microscopy        Ordered the above labs and reviewed the results.        RADIOLOGY  CT Head Without Contrast    Result Date: 12/3/2023  Patient: BART BENSON  Time Out: 23:32 Exam(s): CT HEAD Without Contrast EXAM:   CT Head Without Intravenous Contrast CLINICAL HISTORY:     ams. TECHNIQUE:   Axial computed tomography images of the head brain without intravenous contrast.  CTDI is 55.7 mGy and DLP is 959 mGy-cm.  This CT exam was performed according to the principle of ALARA (As Low As Reasonably Achievable) by using one or more of the following dose reduction techniques: automated exposure control, adjustment of the mA and or kV according to  patient size, and or use of iterative reconstruction technique. COMPARISON:   No relevant prior studies available. FINDINGS:   Brain:  No intracranial hemorrhage.  No significant mass effect.  No evidence for cortical infarct.  Prominent parenchymal involutional changes noted diffusely.  Periventricular and subcortical deep white matter hypodense changes noted.   Ventricles:  Unremarkable.  No ventriculomegaly.   Bones joints:  Prominent arthroscopic calcification of the cavernous and supraclinoid internal carotid arteries and distal vertebral arteries.  No acute fracture.   Soft tissues:  Unremarkable.   Sinuses:  Unremarkable as visualized.  No acute sinusitis.   Mastoid air cells:  Unremarkable as visualized.  No mastoid effusion. IMPRESSION:       No acute intracranial process identified.  Incidental chronic parenchymal involutional changes and microvascular changes, as noted above.     Electronically signed by Dwight Avalos MD on 12-03-23 at 2332    XR Chest 1 View    Result Date: 12/3/2023   XR CHEST 1 VW-  HISTORY: Fever, cough.  COMPARISON: CT chest 2/8/2022. Chest radiograph 1/29/2022.  FINDINGS:  3 views of the chest were obtained. The cardiac silhouette is normal in size. The aorta is tortuous, similar to prior. There is calcific aortic atherosclerosis. Lungs and pleural spaces are clear. There is degenerative disc disease.  This report was finalized on 12/3/2023 9:52 PM by Dr. Priscilla Black M.D on Workstation: [id number]       Ordered the above noted radiological studies. Reviewed by me in PACS.            PROCEDURES  Procedures              MEDICATIONS GIVEN IN ER  Medications   sodium chloride 0.9 % flush 10 mL (has no administration in time range)   sodium chloride 0.9 % infusion (has no administration in time range)   sodium chloride 0.9 % flush 10 mL (has no administration in time range)   sodium chloride 0.9 % flush 10 mL (has no administration in time range)   sodium chloride 0.9 % infusion 40  mL (has no administration in time range)   sennosides-docusate (PERICOLACE) 8.6-50 MG per tablet 2 tablet (has no administration in time range)     And   polyethylene glycol (MIRALAX) packet 17 g (has no administration in time range)     And   bisacodyl (DULCOLAX) EC tablet 5 mg (has no administration in time range)     And   bisacodyl (DULCOLAX) suppository 10 mg (has no administration in time range)   Pharmacy to Dose enoxaparin (LOVENOX) (has no administration in time range)   nitroglycerin (NITROSTAT) SL tablet 0.4 mg (has no administration in time range)   acetaminophen (TYLENOL) tablet 650 mg (has no administration in time range)     Or   acetaminophen (TYLENOL) 160 MG/5ML oral solution 650 mg (has no administration in time range)     Or   acetaminophen (TYLENOL) suppository 650 mg (has no administration in time range)   ondansetron (ZOFRAN) tablet 4 mg (has no administration in time range)     Or   ondansetron (ZOFRAN) injection 4 mg (has no administration in time range)   calcium carbonate (TUMS) chewable tablet 500 mg (200 mg elemental) (has no administration in time range)   nitroglycerin (NITROSTAT) SL tablet 0.4 mg (has no administration in time range)   Enoxaparin Sodium (LOVENOX) syringe 40 mg (has no administration in time range)   acetaminophen (TYLENOL) suppository 650 mg (650 mg Rectal Given 12/3/23 2236)                   MEDICAL DECISION MAKING, PROGRESS, and CONSULTS    All labs have been independently reviewed by me.  All radiology studies have been reviewed by me and I have also reviewed the radiology report.   EKG's independently viewed and interpreted by me.  Discussion below represents my analysis of pertinent findings related to patient's condition, differential diagnosis, treatment plan and final disposition.      Additional sources:  - Discussed/ obtained information from independent historians: Patient's wife and son    - External (non-ED) record review: Patient was seen in sports  medicine clinic January 9, 2023 for right hip pain.    - Chronic or social conditions impacting care: Dementia    - Shared decision making: Patient's family informed of plan for admission for further management      Orders placed during this visit:  Orders Placed This Encounter   Procedures    Respiratory Panel PCR w/COVID-19(SARS-CoV-2) GISELL/AMAYA/YU/PAD/COR/MADALYN In-House, NP Swab in UTM/VTM, 2 HR TAT - Swab, Nasopharynx    Blood Culture - Blood,    Blood Culture - Blood,    XR Chest 1 View    CT Head Without Contrast    Comprehensive Metabolic Panel    BNP    High Sensitivity Troponin T    Lactic Acid, Plasma    Procalcitonin    CBC Auto Differential    Urinalysis With Microscopic If Indicated (No Culture) - Urine, Catheter    High Sensitivity Troponin T 2Hr    Urinalysis, Microscopic Only - Urine, Clean Catch    Basic Metabolic Panel    CBC (No Diff)    High Sensitivity Troponin T    Comprehensive Metabolic Panel    C-reactive Protein    Procalcitonin    D-dimer, Quantitative    Diet: Cardiac Diets; Healthy Heart (2-3 Na+); Texture: Regular Texture (IDDSI 7); Fluid Consistency: Thin (IDDSI 0)    Pulse Oximetry, Continuous    Monitor Blood Pressure    Vital Signs    Intake & Output    Weigh Patient    Oral Care    Telemetry - Place Orders & Notify Provider of Results When Patient Experiences Acute Chest Pain, Dysrhythmia or Respiratory Distress    May Be Off Telemetry for Tests    Vital Signs Every 15 Minutes Until Stable, Then Every 4 Hours    Telemetry - Maintain IV Access    Telemetry - Place Orders & Notify Provider of Results When Patient Experiences Acute Chest Pain, Dysrhythmia or Respiratory Distress    May Be Off Telemetry for Tests    Intake & Output    Weigh Patient    Notify Provider (Specify Parameters)    Code Status and Medical Interventions:    [facility] (on-call MD unless specified) Details    Document Pulse Oximetry - On Room Air / Home O2 Level    Insert Peripheral IV    Insert Peripheral IV    Inpatient  Admission    CBC & Differential    CBC & Differential             Differential diagnosis includes but is not limited to:    Sepsis  COVID-pneumonia  Acute encephalopathy  Dementia with behavioral disturbance  Somnolence secondary to Zyprexa  Acute ischemic stroke  Traumatic intracranial hemorrhage        Independent interpretation of labs, radiology studies, and discussions with consultants:  ED Course as of 12/04/23 0058   Sun Dec 03, 2023   2118 Temp(!): 101 °F (38.3 °C) [JR]   2118 BP: 149/89 [JR]   2118 Heart Rate: 92 [JR]   2134 Patient's wife states that he is not to receive remdesivir under any circumstance, nonnegotiable. [JR]   2139 Obtain further history from patient's son, Dr. Hunt, who states that patient has been sick for a few days but really did not appear ill into the last 24 hours.  He has a history of metabolic encephalopathy when he gets a febrile illness and he seems to be exhibiting the same symptoms as his prior COVID-19 infection.  He did administer Zyprexa 5 mg p.o. to him earlier today because patient was agitated and getting out of bed frequently. [JR]   2309 Procalcitonin: 0.11 [JR]   2309 Lactate: 1.6 [JR]   2309 Chest x-ray independently interpreted in PACS.  No infiltrate.  No pleural effusion. [JR]   2334 Discussed with Dr. Wong, [facility], who agrees to admit. [JR]   2334 COVID19(!!): Detected [JR]   2334 CT brain without contrast independently interpreted in PACS and demonstrates no acute intracranial hemorrhage. [JR]   2343 Patient's son called and informed of plan to admit patient, updated on laboratory imaging studies. [JR]      ED Course User Index  [JR] Sean Reeves MD         I wore an N95 mask and gloves for this patient encounter.    DIAGNOSIS  Final diagnoses:   Acute encephalopathy   Alzheimer's dementia, unspecified dementia severity, unspecified timing of dementia onset, unspecified whether behavioral, psychotic, or mood disturbance or anxiety   COVID-19          DISPOSITION  Admit            Latest Documented Vital Signs:  As of 00:58 EST  BP- 153/84 HR- 86 Temp- (!) 101 °F (38.3 °C) (Tympanic) O2 sat- 95%              --    Please note that portions of this were completed with a voice recognition program.       Note Disclaimer: At [facility], we believe that sharing information builds trust and better relationships. You are receiving this note because you are receiving care at [facility] or recently visited. It is possible you will see health information before a provider has talked with you about it. This kind of information can be easy to misunderstand. To help you fully understand what it means for your health, we urge you to discuss this note with your provider.             Sean Reeves MD  12/04/23 0059

## 2023-12-04 NOTE — THERAPY EVALUATION
Patient Name: Tyron Hunt  : 1945    MRN: [id number]                              Today's Date: 2023       Admit Date: 12/3/2023    Visit Dx:     ICD-10-CM ICD-9-CM   1. Acute encephalopathy  G93.40 348.30   2. Alzheimer's dementia, unspecified dementia severity, unspecified timing of dementia onset, unspecified whether behavioral, psychotic, or mood disturbance or anxiety  G30.9 331.0    F02.80 294.10   3. COVID-19  U07.1 079.89     Patient Active Problem List   Diagnosis    Hypertension    GERD (gastroesophageal reflux disease)    Hyperlipidemia    Anxiety    BPH (benign prostatic hyperplasia)    Impaired fasting glucose    Recurrent major depressive disorder, in full remission    Cervical disc disease    Cervical spinal stenosis    Foraminal stenosis of cervical region    Cervical radiculitis    Altered mental status    COVID-19 virus detected    Age-related physical debility    Cytokine release syndrome, grade 1    Dyspepsia    Acute encephalopathy    Dementia without behavioral disturbance     Past Medical History:   Diagnosis Date    Anxiety     COVID-19 2022    Diverticulitis     Gastritis     GERD (gastroesophageal reflux disease)     Gout     H/O complete eye exam 2017    Hyperlipidemia     Hypertension     Impaired fasting blood sugar     Kidney stones     Memory loss     Sleep apnea      Past Surgical History:   Procedure Laterality Date    CERVICAL EPIDURAL N/A 2021    Procedure: CERVICAL EPIDURAL;  Surgeon: Nataliia Avilez MD;  [place] [facility] [place]  Service: Pain Management;  Laterality: N/A;    CERVICAL EPIDURAL N/A 10/18/2021    Procedure: CERVICAL EPIDURAL 7-1;  Surgeon: Nataliia Avilez MD;  [place] [facility] [place];  Service: Pain Management;  Laterality: N/A;    COLONOSCOPY      date [place] GRIFFIN    CYST REMOVAL      CYSTOSCOPY W/ LITHOLAPAXY / [facility]      TONSILLECTOMY      UPPER GASTROINTESTINAL ENDOSCOPY  10/05/2021    Gastritis   [place] GRIFFIN      General  Information       Row Name 12/04/23 1409          Physical Therapy Time and Intention    Document Type evaluation  -     Mode of Treatment individual therapy;physical therapy  -       Row Name 12/04/23 1409          General Information    Patient Profile Reviewed yes  -SM     Prior Level of Function independent:  -     Existing Precautions/Restrictions fall  -     Barriers to Rehab cognitive status  -       Row Name 12/04/23 1409          Living Environment    People in Home spouse  -       Row Name 12/04/23 1409          Home Main Entrance    Number of Stairs, Main Entrance none  -SM       Row Name 12/04/23 1409          Stairs Within Home, Primary    Number of Stairs, Within Home, Primary none  -SM       Row Name 12/04/23 1409          Cognition    Orientation Status (Cognition) oriented to;person  -       Row Name 12/04/23 1409          Safety Issues, Functional Mobility    Safety Issues Affecting Function (Mobility) impulsivity;insight into deficits/self-awareness;judgment;positioning of assistive device;problem-solving;safety precaution awareness;safety precautions follow-through/compliance;sequencing abilities;at risk behavior observed;ability to follow commands;awareness of need for assistance  -     Impairments Affecting Function (Mobility) balance;cognition;strength;endurance/activity tolerance  -     Cognitive Impairments, Mobility Safety/Performance attention;awareness, need for assistance;impulsivity;insight into deficits/self-awareness;judgment;problem-solving/reasoning;safety precaution awareness;safety precaution follow-through;sequencing abilities  -               User Key  (r) = Recorded By, (t) = Taken By, (c) = Cosigned By      Initials Name Provider Type     Aliyah Cummins PT Physical Therapist                   Mobility       Row Name 12/04/23 1410          Bed Mobility    Bed Mobility supine-sit;sit-supine  -     Supine-Sit [place] (Bed Mobility) contact  guard;verbal cues  -     Sit-Supine [place] (Bed Mobility) contact guard;verbal cues  -     Assistive Device (Bed Mobility) bed rails;head of bed elevated  -[facility] Name 12/04/23 1410          Sit-Stand Transfer    Sit-Stand [place] (Transfers) contact guard;verbal cues  -[facility] Name 12/04/23 1410          Gait/Stairs (Locomotion)    [place] Level (Gait) minimum assist (75% patient effort);verbal cues;nonverbal cues (demo/gesture)  -     Assistive Device (Gait) walker, front-wheeled  -     Distance in Feet (Gait) 20ft  -     Deviations/Abnormal Patterns (Gait) gait speed decreased;sheila decreased  -     Bilateral Gait Deviations forward flexed posture  -     Comment, (Gait/Stairs) Gait slow with forward flexed posture. Patient required assistance with rwx guidance to navigate obstacles.  -               User Key  (r) = Recorded By, (t) = Taken By, (c) = Cosigned By      Initials Name Provider Type     Aliyah Cummins PT Physical Therapist                   Obj/Interventions       [facility] Name 12/04/23 1412          Range of Motion Comprehensive    General Range of Motion bilateral lower extremity ROM WFL  -[facility] Name 12/04/23 1412          Strength Comprehensive (MMT)    General Manual Muscle Testing (MMT) Assessment lower extremity strength deficits identified  -     Comment, General Manual Muscle Testing (MMT) Assessment Generalized B LE weakness. Unable to formally assess MMT due to patient difficulty following commands  -[facility] Name 12/04/23 1412          Balance    Balance Assessment sitting static balance;sitting dynamic balance;sit to stand dynamic balance;standing static balance;standing dynamic balance  -     Static Sitting Balance contact guard  -     Dynamic Sitting Balance contact guard  -     Position, Sitting Balance sitting edge of bed  -     Sit to Stand Dynamic Balance contact guard;verbal cues  -     Static Standing Balance  contact guard  -SM     Dynamic Standing Balance minimal assist  -SM     Position/Device Used, Standing Balance supported;walker, front-wheeled  -SM     Balance Interventions sitting;standing;supported;sit to stand;static;dynamic  -SM               User Key  (r) = Recorded By, (t) = Taken By, (c) = Cosigned By      Initials Name Provider Type     Aliyah Cummins PT Physical Therapist                   Goals/Plan       Row Name 12/04/23 1419          Bed Mobility Goal 1 (PT)    Activity/Assistive Device (Bed Mobility Goal 1, PT) bed mobility activities, all  -SM     [place] Level/Cues Needed (Bed Mobility Goal 1, PT) supervision required  -SM     Time Frame (Bed Mobility Goal 1, PT) 2 weeks  -SM       Row Name 12/04/23 1419          Transfer Goal 1 (PT)    Activity/Assistive Device (Transfer Goal 1, PT) sit-to-stand/stand-to-sit;bed-to-chair/chair-to-bed  -SM     [place] Level/Cues Needed (Transfer Goal 1, PT) supervision required  -SM     Time Frame (Transfer Goal 1, PT) 2 weeks  -SM       Row Name 12/04/23 1419          Gait Training Goal 1 (PT)    Activity/Assistive Device (Gait Training Goal 1, PT) gait (walking locomotion)  -SM     [place] Level (Gait Training Goal 1, PT) standby assist  -SM     Distance (Gait Training Goal 1, PT) 100ft  -SM     Time Frame (Gait Training Goal 1, PT) 2 weeks  -SM               User Key  (r) = Recorded By, (t) = Taken By, (c) = Cosigned By      Initials Name Provider Type     Aliyah Cummins PT Physical Therapist                   Clinical Impression       Row Name 12/04/23 1413          Pain    Pretreatment Pain Rating 0/10 - no pain  -SM     Posttreatment Pain Rating 0/10 - no pain  -SM       Row Name 12/04/23 1413          Plan of Care Review    Plan of Care Reviewed With patient  -     Outcome Evaluation Patient is a 78 y.o male who presented to [facility] with AMS. Patient with a recent fall while at home. Patient is COVID (+). Patients wife present at  bedside providing PLOF as patient AO to self only. Patient lives at home with his wife. Patient is independent at baseline with ADLs and does not use an AD. Upon arrival patient pleasantly confused but pulling at lines and O2. Patient able to follow some commands this date. Patient completed all bed mobility with CGA. Patient performed STS from EOB with CGA. Patient ambulated 20ft in room with rwx and [place]en[place]. Gait slow with forward flexed posture. Patient required assistance with rwx guidance to navigate obstacles. Increased time and cues to have patient safely return to bed. Patient may continue to benefit from skilled PT intervention to address deficits in functional mobility and maximize safety and independence. Patient limited largely by cognitive status at this time. Acute PT will continue to monitor.  -       Row Name 12/04/23 1413          Therapy Assessment/Plan (PT)    Rehab Potential (PT) fair, will monitor progress closely  -     Criteria for Skilled Interventions Met (PT) yes  -SM     Therapy Frequency (PT) 3 times/wk  -       Row Name 12/04/23 1413          Vital Signs    Pre Patient Position Supine  -     Intra Patient Position Standing  -SM     Post Patient Position Supine  -SM       Row Name 12/04/23 1413          Positioning and Restraints    Pre-Treatment Position in bed  -SM     Post Treatment Position bed  -SM     In Bed call light within reach;encouraged to call for assist;exit alarm on;fowlers;with family/caregiver;notified [facility]  -               User Key  (r) = Recorded By, (t) = Taken By, (c) = Cosigned By      Initials Name Provider Type     Aliyah Cummins, PT Physical Therapist                   Outcome Measures       Row Name 12/04/23 1419 12/04/23 0928       How much help from another person do you currently need...    Turning from your back to your side while in flat bed without using bedrails? 4  -SM 1  -PT    Moving from lying on back to sitting on the side of a flat bed  without bedrails? 3  -SM 1  -PT    Moving to and from a bed to a chair (including a wheelchair)? 3  -SM 1  -PT    Standing up from a chair using your arms (e.g., wheelchair, bedside chair)? 3  -SM 1  -PT    Climbing 3-5 steps with a railing? 2  -SM 1  -PT    To walk in hospital room? 3  -SM 1  -PT    AM-PAC 6 Clicks Score (PT) 18  -SM 6  -PT    Highest Level of Mobility Goal 6 --> Walk 10 steps or more  -SM 2 --> Bed activities/dependent transfer  -PT      Row Name 12/04/23 0436          How much help from another person do you currently need...    Turning from your back to your side while in flat bed without using bedrails? 2  -BM     Moving from lying on back to sitting on the side of a flat bed without bedrails? 2  -BM     Moving to and from a bed to a chair (including a wheelchair)? 2  -BM     Standing up from a chair using your arms (e.g., wheelchair, bedside chair)? 1  -BM     Climbing 3-5 steps with a railing? 1  -BM     To walk in hospital room? 1  -BM     AM-PAC 6 Clicks Score (PT) 9  -BM     Highest Level of Mobility Goal 3 --> Sit at edge of bed  -BM       Row Name 12/04/23 1419          Functional Assessment    Outcome Measure Options AM-PAC 6 Clicks Basic Mobility (PT)  -               User Key  (r) = Recorded By, (t) = Taken By, (c) = Cosigned By      Initials Name Provider Type    PT Nargis Juarez RN Registered Nurse     Dianna Reed RN Registered Nurse     Aliyah Cummins, LENNY Physical Therapist                                 Physical Therapy Education       Title: PT OT SLP Therapies (In Progress)       Topic: Physical Therapy (In Progress)       Point: Mobility training (In Progress)       Learning Progress Summary             Patient Acceptance, E, NR by  at 12/4/2023 1419                         Point: Home exercise program (In Progress)       Learning Progress Summary             Patient Acceptance, E, NR by  at 12/4/2023 1419                         Point: Body mechanics  (In Progress)       Learning Progress Summary             Patient Acceptance, E, NR by  at 12/4/2023 1419                         Point: Precautions (In Progress)       Learning Progress Summary             Patient Acceptance, E, NR by  at 12/4/2023 1419                                         User Key       Initials Effective Dates Name Provider Type Discipline     05/02/22 -  Aliyah Cummins, PT Physical Therapist PT                  PT Recommendation and Plan     Plan of Care Reviewed With: patient  Outcome Evaluation: Patient is a 78 y.o male who presented to [facility] with AMS. Patient with a recent fall while at home. Patient is COVID (+). Patients wife present at bedside providing PLOF as patient AO to self only. Patient lives at home with his wife. Patient is independent at baseline with ADLs and does not use an AD. Upon arrival patient pleasantly confused but pulling at lines and O2. Patient able to follow some commands this date. Patient completed all bed mobility with CGA. Patient performed STS from EOB with CGA. Patient ambulated 20ft in room with rwx and [place]. Gait slow with forward flexed posture. Patient required assistance with rwx guidance to navigate obstacles. Increased time and cues to have patient safely return to bed. Patient may continue to benefit from skilled PT intervention to address deficits in functional mobility and maximize safety and independence. Patient limited largely by cognitive status at this time. Acute PT will continue to monitor.     Time Calculation:         PT Charges       Row Name 12/04/23 1420             Time Calculation    Start Time 1335  -      Stop Time 1401  -      Time Calculation (min) 26 min  -      PT Received On 12/04/23  -      PT - Next Appointment 12/06/23  -      PT Goal Re-Cert Due Date 12/18/23  -         Time Calculation- PT    Total Timed Code Minutes- PT 18 minute(s)  -         Timed Charges    68260 - PT Therapeutic Activity Minutes  18  -SM         Total Minutes    Timed Charges Total Minutes 18  -SM       Total Minutes 18  -SM                User Key  (r) = Recorded By, (t) = Taken By, (c) = Cosigned By      Initials Name Provider Type    Aliyah Petty PT Physical Therapist                  Therapy Charges for Today       Code Description Service Date Service Provider Modifiers Qty    38094478612  PT THERAPEUTIC ACT EA 15 MIN 12/4/2023 Aliyah Cummins, PT GP 1    [id number] HC PT EVAL MOD COMPLEXITY 3 12/4/2023 Aliyah Cummins, PT GP 1            PT G-Codes  Outcome Measure Options: AM-PAC 6 Clicks Basic Mobility (PT)  AM-PAC 6 Clicks Score (PT): 18  PT Discharge Summary  Anticipated Discharge Disposition (PT): skilled nursing facility, home with 24/7 care, home with home health (Pending progress)    Aliyah Cummins PT  12/4/2023

## 2023-12-04 NOTE — H&P
Patient Name:  Tyron Hunt  YOB: 1945  MRN:  [id number]  Admit Date:  12/3/2023  Patient Care Team:  Tyron Hunt MD as PCP - General  Tyron Hunt MD as PCP - Family Medicine  Paulino Taylor MD as Consulting Physician (Urology)      Subjective   History Present Illness     Chief Complaint   Patient presents with    Weakness - Generalized       Mr. Hunt is a 78 y.o. male non-smoker with a history of alzheimer's dementia, GERD, HTN, HLD that presents to [facility] complaining of fever, AMS. Wife tested positive for Covid recently. Both wife and patient were given azithromycin and ivermectin. He began having ill symptoms 1-2 days ago. He sustained fall yesterday requiring EMS to assist him up. Son is a physician. It is reported that pt is prone to becoming encephalopathic w/febrile illnesses. He was given po zyprexa at home for agitation. He has been mostly lethargic since admitted, not alert enough to take anything po. He is requiring low flow O2 1L w/sats 95%, tends to be mouth breather.  Wife and son do not consent for pt to receive remdesivir. Tmax since admitted 101.1. BC collected. Procal and lactate are wnl. No leukocytosis. RVP + Covid 19. Electrolytes are unremarkable. UA w/3-5 WBC, no bacteria or nitrites. HS trop 23-->22-->26. CTH without acute findings. CXR reported as lungs and pleural spaces clear.     Review of Systems   Unable to perform ROS: Mental status change        Personal History     Past Medical History:   Diagnosis Date    Anxiety     COVID-19 01/29/2022    Diverticulitis     Gastritis     GERD (gastroesophageal reflux disease)     Gout     H/O complete eye exam 06/2017    Hyperlipidemia     Hypertension     Impaired fasting blood sugar     Kidney stones     Memory loss     Sleep apnea      Past Surgical History:   Procedure Laterality Date    CERVICAL EPIDURAL N/A 08/27/2021    Procedure: CERVICAL EPIDURAL;  Surgeon: Nataliia Avilez MD;   Location: [facility];  Service: Pain Management;  Laterality: N/A;    CERVICAL EPIDURAL N/A 10/18/2021    Procedure: CERVICAL EPIDURAL 7-1;  Surgeon: Nataliia Avilez MD;  Location: [facility];  Service: Pain Management;  Laterality: N/A;    COLONOSCOPY      date unknow-Gregorio GRIFFIN    CYST REMOVAL      CYSTOSCOPY W/ LITHOLAPAXY / EHL      TONSILLECTOMY  1949    UPPER GASTROINTESTINAL ENDOSCOPY  10/05/2021    Gastritis   Arlet GRIFFIN     Family History   Problem Relation Age of Onset    Arthritis Mother     Heart disease Mother     Thyroid disease Mother     Hypertension Brother     Obesity Brother     Pancreatic cancer Maternal Aunt     Dementia Maternal Grandmother      Social History     Tobacco Use    Smoking status: Never    Smokeless tobacco: Never   Vaping Use    Vaping Use: Never used   Substance Use Topics    Alcohol use: Never    Drug use: Never     No current facility-administered medications on file prior to encounter.     Current Outpatient Medications on File Prior to Encounter   Medication Sig Dispense Refill    Cholecalciferol (VITAMIN D3 PO) Take 125 mcg by mouth Daily. 5000 IU      coenzyme Q10 100 MG capsule Take 2 capsules by mouth Daily.      lansoprazole (PREVACID) 30 MG capsule Take 1 capsule by mouth 2 (Two) Times a Day. 180 capsule 3    losartan (Cozaar) 25 MG tablet Take 1 tablet by mouth Daily. 90 tablet 3    memantine (NAMENDA) 10 MG tablet Take 1 tablet by mouth 2 (Two) Times a Day. 180 tablet 3    montelukast (Singulair) 10 MG tablet Take 1 tablet by mouth Every Night for 14 days. 14 tablet 0    sucralfate (CARAFATE) 1 g tablet Take 1 tablet by mouth 4 (Four) Times a Day. 360 tablet 3    tamsulosin (FLOMAX) 0.4 MG capsule 24 hr capsule Take 1 capsule by mouth 2 (Two) Times a Day. 180 capsule 3    [DISCONTINUED] azithromycin (Zithromax Z-Hernando) 250 MG tablet Take 2 tablets the first day, then 1 tablet daily for 4 days. 6 tablet 0    aluminum hydroxide-mag carbonate (GAVISCON EXTRA RELIEF)  160-105 MG chewable tablet chewable tablet Chew As Needed.      azithromycin (ZITHROMAX) 250 MG tablet Take 1 tablet by mouth Daily. Take 2 tablets first day and then 1 tablet daily for 4 days       Allergies   Allergen Reactions    Sulfa Antibiotics        Objective    Objective     Vital Signs  Temp:  [97.4 °F (36.3 °C)-101.1 °F (38.4 °C)] 98.9 °F (37.2 °C)  Heart Rate:  [68-94] 88  Resp:  [18-20] 18  BP: (105-153)/(63-95) 135/63  SpO2:  [90 %-99 %] 96 %  on  Flow (L/min):  [2] 2;   Device (Oxygen Therapy): room air  Body mass index is 23.67 kg/m².    Physical Exam  Vitals and nursing note reviewed.   Constitutional:       General: He is not in acute distress.     Appearance: He is ill-appearing. He is not toxic-appearing.      Comments: Acutely ill appearing  Mild flushing of face   HENT:      Head: Normocephalic.      Mouth/Throat:      Mouth: Mucous membranes are dry.      Comments: Pooled secretions pharynx  Eyes:      Conjunctiva/sclera: Conjunctivae normal.   Cardiovascular:      Rate and Rhythm: Normal rate and regular rhythm.   Pulmonary:      Effort: Pulmonary effort is normal. No respiratory distress.      Breath sounds: No wheezing or rales.      Comments: O2 1L NC  Abdominal:      General: Bowel sounds are normal.      Palpations: Abdomen is soft.   Musculoskeletal:      Cervical back: Neck supple.      Right lower leg: No edema.      Left lower leg: No edema.   Skin:     General: Skin is warm and dry.   Neurological:      Mental Status: He is lethargic.   Psychiatric:         Behavior: Behavior is not agitated.         Results Review:  I reviewed the patient's new clinical results.  I reviewed the patient's new imaging results and agree with the interpretation.  I reviewed the patient's other test results and agree with the interpretation  I personally viewed and interpreted the patient's EKG/Telemetry data    Lab Results (last 24 hours)       Procedure Component Value Units Date/Time    Respiratory  Panel PCR w/COVID-19(SARS-CoV-2) GISELL/AMAYA/YU/PAD/COR/MADALYN In-House, NP Swab in UTM/VTM, 2 HR TAT - Swab, Nasopharynx [[id number]]  (Abnormal) Collected: 12/03/23 2145    Specimen: Swab from Nasopharynx Updated: 12/03/23 2328     ADENOVIRUS, PCR Not Detected     Coronavirus 229E Not Detected     Coronavirus HKU1 Not Detected     Coronavirus NL63 Not Detected     Coronavirus OC43 Not Detected     COVID19 Detected     Human Metapneumovirus Not Detected     Human Rhinovirus/Enterovirus Not Detected     Influenza A PCR Not Detected     Influenza B PCR Not Detected     Parainfluenza Virus 1 Not Detected     Parainfluenza Virus 2 Not Detected     Parainfluenza Virus 3 Not Detected     Parainfluenza Virus 4 Not Detected     RSV, PCR Not Detected     Bordetella pertussis pcr Not Detected     Bordetella parapertussis PCR Not Detected     Chlamydophila pneumoniae PCR Not Detected     Mycoplasma pneumo by PCR Not Detected    Narrative:      In the setting of a positive respiratory panel with a viral infection PLUS a negative procalcitonin without other underlying concern for bacterial infection, consider observing off antibiotics or discontinuation of antibiotics and continue supportive care. If the respiratory panel is positive for atypical bacterial infection (Bordetella pertussis, Chlamydophila pneumoniae, or Mycoplasma pneumoniae), consider antibiotic de-escalation to target atypical bacterial infection.    CBC & Differential [[id number]]  (Abnormal) Collected: 12/03/23 2214    Specimen: Blood Updated: 12/03/23 2235    Narrative:      The following orders were created for panel order CBC & Differential.  Procedure                               Abnormality         Status                     ---------                               -----------         ------                     CBC Auto Differential[[id number]]        Abnormal            Final result                 Please view results for these tests on the individual orders.     Comprehensive Metabolic Panel [[id number]]  (Abnormal) Collected: 12/03/23 2214    Specimen: Blood Updated: 12/03/23 2251     Glucose 101 mg/dL      BUN 19 mg/dL      Creatinine 1.12 mg/dL      Sodium 141 mmol/L      Potassium 4.2 mmol/L      Comment: Slight hemolysis detected by analyzer. Result may be falsely elevated.        Chloride 103 mmol/L      CO2 27.0 mmol/L      Calcium 9.6 mg/dL      Total Protein 6.9 g/dL      Albumin 4.7 g/dL      ALT (SGPT) 19 U/L      AST (SGOT) 25 U/L      Comment: Slight hemolysis detected by analyzer. Result may be falsely elevated.        Alkaline Phosphatase 68 U/L      Total Bilirubin 0.6 mg/dL      Globulin 2.2 gm/dL      A/G Ratio 2.1 g/dL      BUN/Creatinine Ratio 17.0     Anion Gap 11.0 mmol/L      eGFR 66.8 mL/min/1.73     Narrative:      GFR Normal >60  Chronic Kidney Disease <60  Kidney Failure <15    The GFR formula is only valid for adults with stable renal function between ages 18 and 70.    BNP [[id number]]  (Normal) Collected: 12/03/23 2214    Specimen: Blood Updated: 12/03/23 2256     proBNP 656.0 pg/mL     Narrative:      This assay is used as an aid in the diagnosis of individuals suspected of having heart failure. It can be used as an aid in the diagnosis of acute decompensated heart failure (ADHF) in patients presenting with signs and symptoms of ADHF to the emergency department (ED). In addition, NT-proBNP of <300 pg/mL indicates ADHF is not likely.    Age Range Result Interpretation  NT-proBNP Concentration (pg/mL:      <50             Positive            >450                   Gray                 300-450                    Negative             <300    50-75           Positive            >900                  Gray                300-900                  Negative            <300      >75             Positive            >1800                  Gray                300-1800                  Negative            <300    High Sensitivity Troponin T [[id number]]   "(Abnormal) Collected: 12/03/23 2214    Specimen: Blood Updated: 12/03/23 2256     HS Troponin T 23 ng/L     Narrative:      High Sensitive Troponin T Reference Range:  <14.0 ng/L- Negative Female for AMI  <22.0 ng/L- Negative Male for AMI  >=14 - Abnormal Female indicating possible myocardial injury.  >=22 - Abnormal Male indicating possible myocardial injury.   Clinicians would have to utilize clinical acumen, EKG, Troponin, and serial changes to determine if it is an Acute Myocardial Infarction or myocardial injury due to an underlying chronic condition.         Blood Culture - Blood, Arm, Right [[id number]] Collected: 12/03/23 2214    Specimen: Blood from Arm, Right Updated: 12/03/23 2220    Lactic Acid, Plasma [[id number]]  (Normal) Collected: 12/03/23 2214    Specimen: Blood Updated: 12/03/23 2246     Lactate 1.6 mmol/L     Procalcitonin [[id number]]  (Normal) Collected: 12/03/23 2214    Specimen: Blood Updated: 12/03/23 2256     Procalcitonin 0.11 ng/mL     Narrative:      As a Marker for Sepsis (Non-Neonates):    1. <0.5 ng/mL represents a low risk of severe sepsis and/or septic shock.  2. >2 ng/mL represents a high risk of severe sepsis and/or septic shock.    As a Marker for Lower Respiratory Tract Infections that require antibiotic therapy:    PCT on Admission    Antibiotic Therapy       6-12 Hrs later    >0.5                Strongly Recommended  >0.25 - <0.5        Recommended   0.1 - 0.25          Discouraged              Remeasure/reassess PCT  <0.1                Strongly Discouraged     Remeasure/reassess PCT    As 28 day mortality risk marker: \"Change in Procalcitonin Result\" (>80% or <=80%) if Day 0 (or Day 1) and Day 4 values are available. Refer to http://www.[employer]-pct-calculator.com    Change in PCT <=80%  A decrease of PCT levels below or equal to 80% defines a positive change in PCT test result representing a higher risk for 28-day all-cause mortality of patients diagnosed with severe sepsis for " septic shock.    Change in PCT >80%  A decrease of PCT levels of more than 80% defines a negative change in PCT result representing a lower risk for 28-day all-cause mortality of patients diagnosed with severe sepsis or septic shock.       CBC Auto Differential [[id number]]  (Abnormal) Collected: 12/03/23 2214    Specimen: Blood Updated: 12/03/23 2235     WBC 6.68 10*3/mm3      RBC 4.59 10*6/mm3      Hemoglobin 14.9 g/dL      Hematocrit 44.4 %      MCV 96.7 fL      MCH 32.5 pg      MCHC 33.6 g/dL      RDW 12.2 %      RDW-SD 43.6 fl      MPV 11.0 fL      Platelets 115 10*3/mm3      Neutrophil % 74.0 %      Lymphocyte % 14.8 %      Monocyte % 10.6 %      Eosinophil % 0.0 %      Basophil % 0.3 %      Neutrophils, Absolute 4.94 10*3/mm3      Lymphocytes, Absolute 0.99 10*3/mm3      Monocytes, Absolute 0.71 10*3/mm3      Eosinophils, Absolute 0.00 10*3/mm3      Basophils, Absolute 0.02 10*3/mm3     Blood Culture - Blood, Arm, Left [[id number]] Collected: 12/03/23 2308    Specimen: Blood from Arm, Left Updated: 12/03/23 2319    Urinalysis With Microscopic If Indicated (No Culture) - Straight Cath [[id number]]  (Abnormal) Collected: 12/03/23 2309    Specimen: Urine from Straight Cath Updated: 12/03/23 2325     Color, UA Yellow     Appearance, UA Clear     pH, UA 5.5     Specific Gravity, UA 1.024     Glucose, UA Negative     Ketones, UA 15 mg/dL (1+)     Bilirubin, UA Negative     Blood, UA Moderate (2+)     Protein, UA 30 mg/dL (1+)     Leuk Esterase, UA Negative     Nitrite, UA Negative     Urobilinogen, UA 1.0 E.U./dL    Urinalysis, Microscopic Only - Straight Cath [[id number]]  (Abnormal) Collected: 12/03/23 2309    Specimen: Urine from Straight Cath Updated: 12/03/23 2339     RBC, UA 11-20 /HPF      WBC, UA 3-5 /HPF      Bacteria, UA None Seen /HPF      Squamous Epithelial Cells, UA 0-2 /HPF      Hyaline Casts, UA None Seen /LPF      Methodology Manual Light Microscopy    High Sensitivity Troponin T 2Hr [[id number]]   (Abnormal) Collected: 12/04/23 0116    Specimen: Blood Updated: 12/04/23 0153     HS Troponin T 22 ng/L      Troponin T Delta -1 ng/L     Narrative:      High Sensitive Troponin T Reference Range:  <14.0 ng/L- Negative Female for AMI  <22.0 ng/L- Negative Male for AMI  >=14 - Abnormal Female indicating possible myocardial injury.  >=22 - Abnormal Male indicating possible myocardial injury.   Clinicians would have to utilize clinical acumen, EKG, Troponin, and serial changes to determine if it is an Acute Myocardial Infarction or myocardial injury due to an underlying chronic condition.         Basic Metabolic Panel [[id number]]  (Normal) Collected: 12/04/23 0642    Specimen: Blood Updated: 12/04/23 0749     Glucose 90 mg/dL      BUN 22 mg/dL      Creatinine 1.01 mg/dL      Sodium 141 mmol/L      Potassium 3.5 mmol/L      Chloride 105 mmol/L      CO2 24.4 mmol/L      Calcium 8.7 mg/dL      BUN/Creatinine Ratio 21.8     Anion Gap 11.6 mmol/L      eGFR 75.7 mL/min/1.73     Narrative:      GFR Normal >60  Chronic Kidney Disease <60  Kidney Failure <15    The GFR formula is only valid for adults with stable renal function between ages 18 and 70.    CBC (No Diff) [[id number]]  (Abnormal) Collected: 12/04/23 0642    Specimen: Blood Updated: 12/04/23 0725     WBC 6.87 10*3/mm3      RBC 3.81 10*6/mm3      Hemoglobin 12.5 g/dL      Hematocrit 37.2 %      MCV 97.6 fL      MCH 32.8 pg      MCHC 33.6 g/dL      RDW 12.2 %      RDW-SD 44.1 fl      MPV 10.9 fL      Platelets 106 10*3/mm3     High Sensitivity Troponin T [[id number]]  (Abnormal) Collected: 12/04/23 0642    Specimen: Blood Updated: 12/04/23 0742     HS Troponin T 26 ng/L     Narrative:      High Sensitive Troponin T Reference Range:  <14.0 ng/L- Negative Female for AMI  <22.0 ng/L- Negative Male for AMI  >=14 - Abnormal Female indicating possible myocardial injury.  >=22 - Abnormal Male indicating possible myocardial injury.   Clinicians would have to utilize clinical  acumen, EKG, Troponin, and serial changes to determine if it is an Acute Myocardial Infarction or myocardial injury due to an underlying chronic condition.                 Imaging Results (Last 24 Hours)       Procedure Component Value Units Date/Time    CT Head Without Contrast [[id number]] Collected: 12/03/23 2333     Updated: 12/03/23 2333    Narrative:        Patient: BART BENSON  Time Out: 23:32  Exam(s): CT HEAD Without Contrast     EXAM:    CT Head Without Intravenous Contrast    CLINICAL HISTORY:      ams.    TECHNIQUE:    Axial computed tomography images of the head brain without intravenous   contrast.  CTDI is 55.7 mGy and DLP is 959 mGy-cm.  This CT exam was   performed according to the principle of ALARA (As Low As Reasonably   Achievable) by using one or more of the following dose reduction   techniques: automated exposure control, adjustment of the mA and or kV   according to patient size, and or use of iterative reconstruction   technique.    COMPARISON:    No relevant prior studies available.    FINDINGS:    Brain:  No intracranial hemorrhage.  No significant mass effect.  No   evidence for cortical infarct.  Prominent parenchymal involutional   changes noted diffusely.  Periventricular and subcortical deep white   matter hypodense changes noted.    Ventricles:  Unremarkable.  No ventriculomegaly.    Bones joints:  Prominent arthroscopic calcification of the cavernous   and supraclinoid internal carotid arteries and distal vertebral arteries.    No acute fracture.    Soft tissues:  Unremarkable.    Sinuses:  Unremarkable as visualized.  No acute sinusitis.    Mastoid air cells:  Unremarkable as visualized.  No mastoid effusion.    IMPRESSION:         No acute intracranial process identified.  Incidental chronic   parenchymal involutional changes and microvascular changes, as noted   above.      Impression:          Electronically signed by Dwight Avalos MD on 12-03-23 at 2332    XR Chest 1 View  [[id number]] Collected: 12/03/23 2150     Updated: 12/03/23 2155    Narrative:       XR CHEST 1 VW-     HISTORY: Fever, cough.     COMPARISON: CT chest 2/8/2022. Chest radiograph 1/29/2022.     FINDINGS:    3 views of the chest were obtained. The cardiac silhouette is normal in  size. The aorta is tortuous, similar to prior. There is calcific aortic  atherosclerosis. Lungs and pleural spaces are clear. There is  degenerative disc disease.     This report was finalized on 12/3/2023 9:52 PM by Dr. Priscilla Black M.D  on Workstation: [id number]                   SCANNED - TELEMETRY     Final Result           Assessment/Plan     Active Hospital Problems    Diagnosis  POA    **Acute encephalopathy [G93.40]  Yes    Dementia without behavioral disturbance [F03.90]  Yes    COVID-19 virus detected [U07.1]  Yes    BPH (benign prostatic hyperplasia) [N40.0]  Yes    Hypertension [I10]  Yes    GERD (gastroesophageal reflux disease) [K21.9]  Yes      Resolved Hospital Problems   No resolved problems to display.       Mr. Hunt is a 78 y.o.  male non-smoker with a history of alzheimer's dementia, GERD, HTN, HLD who is admitted for acute encephalopathy due to Covid 19 virus    Acute encephalopathy due to covid 19 virus:  -CTH negative for acute findings. CXR without pneumonia. Procal, lactate, WBC wnl. Requiring O2 1L NC w/sats 95%. Monitoring off antibiotics. Follow cultures. Tmax 101. Family does not consent to remdesivir but agree with steroids if unable to wean O2. Monitor Covid labs. NPO until more alert; SLP to evaluate. OT/PT eval & tx    Dementia:  -Functional at baseline per wife. Has memory issues. No longer drives. Does not require assistive device for mobility    BPH:  -flomax    HTN:  -BP acceptable acutely. Continue ARB    GERD:  -PPI      I discussed the patient's findings and my recommendations with patient, spouse, and nursing staff.    VTE Prophylaxis - Pharmacy to dose Lovenox.  Code Status - Full code.        ERIK Gordon  [facility]  12/04/23  10:06 EST

## 2023-12-04 NOTE — PLAN OF CARE
Goal Outcome Evaluation:  Plan of Care Reviewed With: patient      Patient admitted this morning with Covid.Placed on 2L of oxygen.Patient with productive cough, suctioned as needed.Patient unable to follow directions at this time.Responding to voice and pain.Medicated for fever this shift.Failed bedside swallow zeina.SLP consult.IVFs infusing.Wife at bedside.NSR

## 2023-12-04 NOTE — NURSING NOTE
Pt starting to become more awake. He is not agitated but continues to try to get OOB, pulling at monitor, IV, and taking gown off. This RN contacted son. He stated he would like to try Zyprexa first but understands if we need to put him in restraints. MD ordered Zyprexa 5mg and this was administered at 1427.    1508: NA stated that pt has continued to try getting OOB, pulling off heart monitor and IV. For pts safety we have placed him in restraints as of 1511. Dr. Gutierrez gave a verbal order for this.

## 2023-12-04 NOTE — PLAN OF CARE
Goal Outcome Evaluation:           Progress: no change  Outcome Evaluation: Pt AOx1. On RA. NSR on monitor. This am was very lethargic but followed commands. Now awake and trying to get OOB , trying to remove IV and heart monitor. IM zyprexa given per MAR. Restraints have been applied for pt safety so he does not fall. IVFs continued. Pt still unsafe to swallow at this time he is struggling to handle his own secretions. Speech will reeval tomorrow. Encouraged to cough- yanker has been used for sectretions throughout the day. Wife was at bedside but went home this afternoon. Pt not in acute distress. On IV steriods for COVID. Plan of care is ongoing

## 2023-12-04 NOTE — ED TRIAGE NOTES
PT arrived via EMS with complaints of weakness. PT wife was recently diagnosed with covid and the PT has developed a fever and cough. PT has not answered any questions for EMS or triage and PT wife answered all the questions for EMS on scene. PT wife is on the way.

## 2023-12-04 NOTE — PLAN OF CARE
Goal Outcome Evaluation:              Outcome Evaluation: Orders received. Awaiting appropriateness for PO. Discussed briefly with RN.    Addendum 1052: Clinical swallow eval not yet appropriate, will check back next date.

## 2023-12-05 ENCOUNTER — APPOINTMENT (OUTPATIENT)
Dept: CARDIOLOGY | Facility: HOSPITAL | Age: 78
End: 2023-12-05
Payer: MEDICARE

## 2023-12-05 PROBLEM — E87.0 HYPERNATREMIA: Status: ACTIVE | Noted: 2023-12-05

## 2023-12-05 LAB
ALBUMIN SERPL-MCNC: 3.6 G/DL (ref 3.5–5.2)
ALBUMIN/GLOB SERPL: 1.9 G/DL
ALP SERPL-CCNC: 44 U/L (ref 39–117)
ALT SERPL W P-5'-P-CCNC: 16 U/L (ref 1–41)
ANION GAP SERPL CALCULATED.3IONS-SCNC: 10 MMOL/L (ref 5–15)
ARTERIAL PATENCY WRIST A: ABNORMAL
AST SERPL-CCNC: 25 U/L (ref 1–40)
ATMOSPHERIC PRESS: 745.7 MMHG
BASE EXCESS BLDA CALC-SCNC: -1.3 MMOL/L (ref 0–2)
BASOPHILS # BLD AUTO: 0.01 10*3/MM3 (ref 0–0.2)
BASOPHILS NFR BLD AUTO: 0.1 % (ref 0–1.5)
BDY SITE: ABNORMAL
BH CV ECHO MEAS - AI P1/2T: 902.1 MSEC
BH CV ECHO MEAS - AO MAX PG: 7 MMHG
BH CV ECHO MEAS - AO MEAN PG: 4 MMHG
BH CV ECHO MEAS - AO V2 MAX: 132 CM/SEC
BH CV ECHO MEAS - AO V2 VTI: 34.1 CM
BH CV ECHO MEAS - EDV(MOD-SP2): 122 ML
BH CV ECHO MEAS - EDV(MOD-SP4): 138 ML
BH CV ECHO MEAS - EF(MOD-BP): 27.6 %
BH CV ECHO MEAS - EF(MOD-SP2): 23.8 %
BH CV ECHO MEAS - EF(MOD-SP4): 25.4 %
BH CV ECHO MEAS - ESV(MOD-SP2): 93 ML
BH CV ECHO MEAS - ESV(MOD-SP4): 103 ML
BH CV ECHO MEAS - LAT PEAK E' VEL: 7.7 CM/SEC
BH CV ECHO MEAS - LV DIASTOLIC VOL/BSA (35-75): 79.2 CM2
BH CV ECHO MEAS - LV MAX PG: 3.1 MMHG
BH CV ECHO MEAS - LV MEAN PG: 1 MMHG
BH CV ECHO MEAS - LV SYSTOLIC VOL/BSA (12-30): 59.1 CM2
BH CV ECHO MEAS - LV V1 MAX: 87.4 CM/SEC
BH CV ECHO MEAS - LV V1 VTI: 22 CM
BH CV ECHO MEAS - MED PEAK E' VEL: 4.6 CM/SEC
BH CV ECHO MEAS - SI(MOD-SP2): 16.6 ML/M2
BH CV ECHO MEAS - SI(MOD-SP4): 20.1 ML/M2
BH CV ECHO MEAS - SV(MOD-SP2): 29 ML
BH CV ECHO MEAS - SV(MOD-SP4): 35 ML
BH CV LOWER VASCULAR LEFT COMMON FEMORAL AUGMENT: NORMAL
BH CV LOWER VASCULAR LEFT COMMON FEMORAL COMPETENT: NORMAL
BH CV LOWER VASCULAR LEFT COMMON FEMORAL COMPRESS: NORMAL
BH CV LOWER VASCULAR LEFT COMMON FEMORAL PHASIC: NORMAL
BH CV LOWER VASCULAR LEFT COMMON FEMORAL SPONT: NORMAL
BH CV LOWER VASCULAR LEFT DISTAL FEMORAL COMPRESS: NORMAL
BH CV LOWER VASCULAR LEFT GASTRONEMIUS COMPRESS: NORMAL
BH CV LOWER VASCULAR LEFT GREATER SAPH AK COMPRESS: NORMAL
BH CV LOWER VASCULAR LEFT GREATER SAPH BK COMPRESS: NORMAL
BH CV LOWER VASCULAR LEFT LESSER SAPH COMPRESS: NORMAL
BH CV LOWER VASCULAR LEFT MID FEMORAL AUGMENT: NORMAL
BH CV LOWER VASCULAR LEFT MID FEMORAL COMPETENT: NORMAL
BH CV LOWER VASCULAR LEFT MID FEMORAL COMPRESS: NORMAL
BH CV LOWER VASCULAR LEFT MID FEMORAL PHASIC: NORMAL
BH CV LOWER VASCULAR LEFT MID FEMORAL SPONT: NORMAL
BH CV LOWER VASCULAR LEFT PERONEAL COMPRESS: NORMAL
BH CV LOWER VASCULAR LEFT POPLITEAL AUGMENT: NORMAL
BH CV LOWER VASCULAR LEFT POPLITEAL COMPETENT: NORMAL
BH CV LOWER VASCULAR LEFT POPLITEAL COMPRESS: NORMAL
BH CV LOWER VASCULAR LEFT POPLITEAL PHASIC: NORMAL
BH CV LOWER VASCULAR LEFT POPLITEAL SPONT: NORMAL
BH CV LOWER VASCULAR LEFT POSTERIOR TIBIAL COMPRESS: NORMAL
BH CV LOWER VASCULAR LEFT PROFUNDA FEMORAL COMPRESS: NORMAL
BH CV LOWER VASCULAR LEFT PROXIMAL FEMORAL COMPRESS: NORMAL
BH CV LOWER VASCULAR LEFT SAPHENOFEMORAL JUNCTION COMPRESS: NORMAL
BH CV LOWER VASCULAR LEFT SOLEAL COMPRESS: NORMAL
BH CV LOWER VASCULAR RIGHT COMMON FEMORAL AUGMENT: NORMAL
BH CV LOWER VASCULAR RIGHT COMMON FEMORAL COMPETENT: NORMAL
BH CV LOWER VASCULAR RIGHT COMMON FEMORAL COMPRESS: NORMAL
BH CV LOWER VASCULAR RIGHT COMMON FEMORAL PHASIC: NORMAL
BH CV LOWER VASCULAR RIGHT COMMON FEMORAL SPONT: NORMAL
BH CV LOWER VASCULAR RIGHT DISTAL FEMORAL COMPRESS: NORMAL
BH CV LOWER VASCULAR RIGHT GASTRONEMIUS COMPRESS: NORMAL
BH CV LOWER VASCULAR RIGHT GREATER SAPH AK COMPRESS: NORMAL
BH CV LOWER VASCULAR RIGHT GREATER SAPH BK COMPRESS: NORMAL
BH CV LOWER VASCULAR RIGHT LESSER SAPH COMPRESS: NORMAL
BH CV LOWER VASCULAR RIGHT MID FEMORAL AUGMENT: NORMAL
BH CV LOWER VASCULAR RIGHT MID FEMORAL COMPETENT: NORMAL
BH CV LOWER VASCULAR RIGHT MID FEMORAL COMPRESS: NORMAL
BH CV LOWER VASCULAR RIGHT MID FEMORAL PHASIC: NORMAL
BH CV LOWER VASCULAR RIGHT MID FEMORAL SPONT: NORMAL
BH CV LOWER VASCULAR RIGHT PERONEAL COMPRESS: NORMAL
BH CV LOWER VASCULAR RIGHT POPLITEAL AUGMENT: NORMAL
BH CV LOWER VASCULAR RIGHT POPLITEAL COMPETENT: NORMAL
BH CV LOWER VASCULAR RIGHT POPLITEAL COMPRESS: NORMAL
BH CV LOWER VASCULAR RIGHT POPLITEAL PHASIC: NORMAL
BH CV LOWER VASCULAR RIGHT POPLITEAL SPONT: NORMAL
BH CV LOWER VASCULAR RIGHT POSTERIOR TIBIAL COMPRESS: NORMAL
BH CV LOWER VASCULAR RIGHT PROFUNDA FEMORAL COMPRESS: NORMAL
BH CV LOWER VASCULAR RIGHT PROXIMAL FEMORAL COMPRESS: NORMAL
BH CV LOWER VASCULAR RIGHT SAPHENOFEMORAL JUNCTION COMPRESS: NORMAL
BH CV LOWER VASCULAR RIGHT SOLEAL COMPRESS: NORMAL
BILIRUB SERPL-MCNC: 0.4 MG/DL (ref 0–1.2)
BUN SERPL-MCNC: 22 MG/DL (ref 8–23)
BUN/CREAT SERPL: 22.4 (ref 7–25)
CA-I BLDA-SCNC: 1.21 MMOL/L (ref 2.2–2.55)
CALCIUM SPEC-SCNC: 8.5 MG/DL (ref 8.6–10.5)
CHLORIDE SERPL-SCNC: 115 MMOL/L (ref 98–107)
CO2 SERPL-SCNC: 23 MMOL/L (ref 22–29)
CREAT SERPL-MCNC: 0.98 MG/DL (ref 0.76–1.27)
CRP SERPL-MCNC: 14.95 MG/DL (ref 0–0.5)
D DIMER PPP FEU-MCNC: 0.84 MCGFEU/ML (ref 0–0.79)
D-LACTATE SERPL-SCNC: 1 MMOL/L (ref 0.5–2)
D-LACTATE SERPL-SCNC: 1.4 MMOL/L (ref 0.5–2)
DEPRECATED RDW RBC AUTO: 44.4 FL (ref 37–54)
EGFRCR SERPLBLD CKD-EPI 2021: 78.4 ML/MIN/1.73
EOSINOPHIL # BLD AUTO: 0 10*3/MM3 (ref 0–0.4)
EOSINOPHIL NFR BLD AUTO: 0 % (ref 0.3–6.2)
ERYTHROCYTE [DISTWIDTH] IN BLOOD BY AUTOMATED COUNT: 12.2 % (ref 12.3–15.4)
GAS FLOW AIRWAY: 3 LPM
GEN 5 2HR TROPONIN T REFLEX: 18 NG/L
GLOBULIN UR ELPH-MCNC: 1.9 GM/DL
GLUCOSE BLDC GLUCOMTR-MCNC: 137 MG/DL (ref 70–130)
GLUCOSE BLDC GLUCOMTR-MCNC: 138 MG/DL (ref 70–130)
GLUCOSE SERPL-MCNC: 109 MG/DL (ref 65–99)
HCO3 BLDA-SCNC: 22.2 MMOL/L (ref 22–28)
HCT VFR BLD AUTO: 35.5 % (ref 37.5–51)
HEMODILUTION: NO
HGB BLD-MCNC: 12 G/DL (ref 13–17.7)
LYMPHOCYTES # BLD AUTO: 0.94 10*3/MM3 (ref 0.7–3.1)
LYMPHOCYTES NFR BLD AUTO: 13.1 % (ref 19.6–45.3)
MAGNESIUM SERPL-MCNC: 2.2 MG/DL (ref 1.6–2.4)
MCH RBC QN AUTO: 33 PG (ref 26.6–33)
MCHC RBC AUTO-ENTMCNC: 33.8 G/DL (ref 31.5–35.7)
MCV RBC AUTO: 97.5 FL (ref 79–97)
MODALITY: ABNORMAL
MONOCYTES # BLD AUTO: 0.62 10*3/MM3 (ref 0.1–0.9)
MONOCYTES NFR BLD AUTO: 8.7 % (ref 5–12)
NEUTROPHILS NFR BLD AUTO: 5.55 10*3/MM3 (ref 1.7–7)
NEUTROPHILS NFR BLD AUTO: 77.7 % (ref 42.7–76)
PCO2 BLDA: 32.7 MM HG (ref 35–45)
PH BLDA: 7.44 PH UNITS (ref 7.35–7.45)
PLATELET # BLD AUTO: 102 10*3/MM3 (ref 140–450)
PMV BLD AUTO: 11.4 FL (ref 6–12)
PO2 BLDA: 112.1 MM HG (ref 80–100)
POTASSIUM BLDA-SCNC: 3.9 MMOL/L (ref 3.5–5.2)
POTASSIUM SERPL-SCNC: 3.8 MMOL/L (ref 3.5–5.2)
POTASSIUM SERPL-SCNC: 4.1 MMOL/L (ref 3.5–5.2)
PROCALCITONIN SERPL-MCNC: 0.64 NG/ML (ref 0–0.25)
PROT SERPL-MCNC: 5.5 G/DL (ref 6–8.5)
QT INTERVAL: 456 MS
QT INTERVAL: 500 MS
QTC INTERVAL: 386 MS
QTC INTERVAL: 423 MS
RBC # BLD AUTO: 3.64 10*6/MM3 (ref 4.14–5.8)
SAO2 % BLDCOA: 98.6 % (ref 92–98.5)
SODIUM BLD-SCNC: 145 MMOL/L (ref 136–145)
SODIUM SERPL-SCNC: 148 MMOL/L (ref 136–145)
TOTAL RATE: 16 BREATHS/MINUTE
TROPONIN T DELTA: -3 NG/L
TROPONIN T SERPL HS-MCNC: 21 NG/L
WBC NRBC COR # BLD AUTO: 7.15 10*3/MM3 (ref 3.4–10.8)

## 2023-12-05 PROCEDURE — 25010000002 DEXAMETHASONE PER 1 MG: Performed by: INTERNAL MEDICINE

## 2023-12-05 PROCEDURE — 0 DEXTROSE 5 % SOLUTION: Performed by: INTERNAL MEDICINE

## 2023-12-05 PROCEDURE — 25510000001 PERFLUTREN (DEFINITY) 8.476 MG IN SODIUM CHLORIDE (PF) 0.9 % 10 ML INJECTION: Performed by: INTERNAL MEDICINE

## 2023-12-05 PROCEDURE — 36415 COLL VENOUS BLD VENIPUNCTURE: CPT | Performed by: NURSE PRACTITIONER

## 2023-12-05 PROCEDURE — 85379 FIBRIN DEGRADATION QUANT: CPT | Performed by: NURSE PRACTITIONER

## 2023-12-05 PROCEDURE — 83605 ASSAY OF LACTIC ACID: CPT | Performed by: INTERNAL MEDICINE

## 2023-12-05 PROCEDURE — 86140 C-REACTIVE PROTEIN: CPT | Performed by: NURSE PRACTITIONER

## 2023-12-05 PROCEDURE — 84132 ASSAY OF SERUM POTASSIUM: CPT | Performed by: INTERNAL MEDICINE

## 2023-12-05 PROCEDURE — 93970 EXTREMITY STUDY: CPT

## 2023-12-05 PROCEDURE — 93321 DOPPLER ECHO F-UP/LMTD STD: CPT

## 2023-12-05 PROCEDURE — 83735 ASSAY OF MAGNESIUM: CPT | Performed by: INTERNAL MEDICINE

## 2023-12-05 PROCEDURE — 85025 COMPLETE CBC W/AUTO DIFF WBC: CPT | Performed by: NURSE PRACTITIONER

## 2023-12-05 PROCEDURE — 99222 1ST HOSP IP/OBS MODERATE 55: CPT | Performed by: INTERNAL MEDICINE

## 2023-12-05 PROCEDURE — 93010 ELECTROCARDIOGRAM REPORT: CPT | Performed by: INTERNAL MEDICINE

## 2023-12-05 PROCEDURE — 94664 DEMO&/EVAL PT USE INHALER: CPT

## 2023-12-05 PROCEDURE — 93308 TTE F-UP OR LMTD: CPT

## 2023-12-05 PROCEDURE — 93325 DOPPLER ECHO COLOR FLOW MAPG: CPT | Performed by: INTERNAL MEDICINE

## 2023-12-05 PROCEDURE — 25010000002 ENOXAPARIN PER 10 MG: Performed by: NURSE PRACTITIONER

## 2023-12-05 PROCEDURE — 25810000003 SODIUM CHLORIDE 0.9 % SOLUTION: Performed by: EMERGENCY MEDICINE

## 2023-12-05 PROCEDURE — 84145 PROCALCITONIN (PCT): CPT | Performed by: NURSE PRACTITIONER

## 2023-12-05 PROCEDURE — 84484 ASSAY OF TROPONIN QUANT: CPT | Performed by: INTERNAL MEDICINE

## 2023-12-05 PROCEDURE — 93308 TTE F-UP OR LMTD: CPT | Performed by: INTERNAL MEDICINE

## 2023-12-05 PROCEDURE — 93005 ELECTROCARDIOGRAM TRACING: CPT | Performed by: INTERNAL MEDICINE

## 2023-12-05 PROCEDURE — 92610 EVALUATE SWALLOWING FUNCTION: CPT

## 2023-12-05 PROCEDURE — 82803 BLOOD GASES ANY COMBINATION: CPT

## 2023-12-05 PROCEDURE — 94799 UNLISTED PULMONARY SVC/PX: CPT

## 2023-12-05 PROCEDURE — 93321 DOPPLER ECHO F-UP/LMTD STD: CPT | Performed by: INTERNAL MEDICINE

## 2023-12-05 PROCEDURE — 36600 WITHDRAWAL OF ARTERIAL BLOOD: CPT

## 2023-12-05 PROCEDURE — 82948 REAGENT STRIP/BLOOD GLUCOSE: CPT

## 2023-12-05 PROCEDURE — 25010000002 CEFTRIAXONE PER 250 MG: Performed by: INTERNAL MEDICINE

## 2023-12-05 PROCEDURE — 93325 DOPPLER ECHO COLOR FLOW MAPG: CPT

## 2023-12-05 PROCEDURE — 80053 COMPREHEN METABOLIC PANEL: CPT | Performed by: NURSE PRACTITIONER

## 2023-12-05 RX ORDER — DEXTROSE MONOHYDRATE 50 MG/ML
75 INJECTION, SOLUTION INTRAVENOUS CONTINUOUS
Status: DISCONTINUED | OUTPATIENT
Start: 2023-12-05 | End: 2023-12-07

## 2023-12-05 RX ADMIN — CEFTRIAXONE SODIUM 1000 MG: 1 INJECTION, POWDER, FOR SOLUTION INTRAMUSCULAR; INTRAVENOUS at 11:37

## 2023-12-05 RX ADMIN — ENALAPRILAT 1.25 MG: 1.25 INJECTION INTRAVENOUS at 04:40

## 2023-12-05 RX ADMIN — ENOXAPARIN SODIUM 40 MG: 100 INJECTION SUBCUTANEOUS at 09:55

## 2023-12-05 RX ADMIN — DEXAMETHASONE SODIUM PHOSPHATE 6 MG: 10 INJECTION INTRAMUSCULAR; INTRAVENOUS at 09:55

## 2023-12-05 RX ADMIN — ENALAPRILAT 1.25 MG: 1.25 INJECTION INTRAVENOUS at 09:55

## 2023-12-05 RX ADMIN — DEXTROSE MONOHYDRATE 100 ML/HR: 50 INJECTION, SOLUTION INTRAVENOUS at 11:37

## 2023-12-05 RX ADMIN — SODIUM CHLORIDE 125 ML/HR: 9 INJECTION, SOLUTION INTRAVENOUS at 06:26

## 2023-12-05 RX ADMIN — ENALAPRILAT 1.25 MG: 1.25 INJECTION INTRAVENOUS at 17:05

## 2023-12-05 RX ADMIN — Medication 10 ML: at 20:34

## 2023-12-05 RX ADMIN — Medication 10 ML: at 09:59

## 2023-12-05 RX ADMIN — PERFLUTREN 2 ML: 6.52 INJECTION, SUSPENSION INTRAVENOUS at 16:26

## 2023-12-05 RX ADMIN — FAMOTIDINE 20 MG: 10 INJECTION INTRAVENOUS at 09:59

## 2023-12-05 RX ADMIN — ENALAPRILAT 1.25 MG: 1.25 INJECTION INTRAVENOUS at 21:02

## 2023-12-05 RX ADMIN — FAMOTIDINE 20 MG: 10 INJECTION INTRAVENOUS at 20:34

## 2023-12-05 NOTE — CONSULTS
"  Pulmonary Consultation     Patient Name: Tyron Hunt  Age/Sex: 78 y.o. 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  Over the hospital course patient was having abnormal EKG with some skipped heartbeat and was seen by cardiology, he has Mobitz type II and at some point he had almost short interval of third-degree AV block so he was labeled as intermittent high degree AV block and was transferred to the CCU for further care  Patient had slightly elevated high-sensitivity troponin on initial presentation but did drop back to normal on serial testing.  Patient was having only transient hypoxemia is in currently on room air  Patient had a Tmax of 101.1 but has been afebrile for last 24 hours  Currently normotensive, but with his confusion he is unable to give me any details about his history.  Confused, restless,    Pulmonary Functions Testing Results:    No results found for: \"FEV1\", \"FVC\", \"SSX7GNR\", \"TLC\", \"DLCO\"    Review of Systems:   Review " of Systems   Unable to perform ROS: Dementia         Past Medical History:  Past Medical History:   Diagnosis Date    Anxiety     COVID-19 01/29/2022    Diverticulitis     Gastritis     GERD (gastroesophageal reflux disease)     Gout     H/O complete eye exam 06/2017    Hyperlipidemia     Hypertension     Impaired fasting blood sugar     Kidney stones     Memory loss     Sleep apnea        Past Surgical History:   Procedure Laterality Date    CERVICAL EPIDURAL N/A 08/27/2021    Procedure: CERVICAL EPIDURAL;  Surgeon: Nataliia Avilez MD;  Location: [facility];  Service: Pain Management;  Laterality: N/A;    CERVICAL EPIDURAL N/A 10/18/2021    Procedure: CERVICAL EPIDURAL 7-1;  Surgeon: Nataliia Avilez MD;  Location: [facility];  Service: Pain Management;  Laterality: N/A;    COLONOSCOPY      date randalow-Gregorio GRIFFIN    CYST REMOVAL      CYSTOSCOPY W/ LITHOLAPAXY / [facility]      TONSILLECTOMY  1949    UPPER GASTROINTESTINAL ENDOSCOPY  10/05/2021    Gastritis   Nice MD       Home Medications:   Medications Prior to Admission   Medication Sig Dispense Refill Last Dose    Cholecalciferol (VITAMIN D3 PO) Take 125 mcg by mouth Daily. 5000 IU       coenzyme Q10 100 MG capsule Take 2 capsules by mouth Daily.       lansoprazole (PREVACID) 30 MG capsule Take 1 capsule by mouth 2 (Two) Times a Day. 180 capsule 3     losartan (Cozaar) 25 MG tablet Take 1 tablet by mouth Daily. 90 tablet 3     memantine (NAMENDA) 10 MG tablet Take 1 tablet by mouth 2 (Two) Times a Day. 180 tablet 3     montelukast (Singulair) 10 MG tablet Take 1 tablet by mouth Every Night for 14 days. 14 tablet 0     sucralfate (CARAFATE) 1 g tablet Take 1 tablet by mouth 4 (Four) Times a Day. 360 tablet 3     tamsulosin (FLOMAX) 0.4 MG capsule 24 hr capsule Take 1 capsule by mouth 2 (Two) Times a Day. 180 capsule 3     aluminum hydroxide-mag carbonate (GAVISCON EXTRA RELIEF) 160-105 MG chewable tablet chewable tablet Chew As Needed.       azithromycin (ZITHROMAX)  250 MG tablet Take 1 tablet by mouth Daily. Take 2 tablets first day and then 1 tablet daily for 4 days          Inpatient Medications:  Scheduled Meds:cefTRIAXone, 1,000 mg, Intravenous, Q24H  dexAMETHasone, 6 mg, Intravenous, Daily  enalaprilat, 1.25 mg, Intravenous, Q6H  enoxaparin, 40 mg, Subcutaneous, Daily  famotidine, 20 mg, Intravenous, Q12H  senna-docusate sodium, 2 tablet, Oral, BID  sodium chloride, 10 mL, Intravenous, Q12H      Continuous Infusions:dextrose, 100 mL/hr, Last Rate: 100 mL/hr (12/05/23 1137)      PRN Meds:.  acetaminophen **OR** acetaminophen **OR** acetaminophen    senna-docusate sodium **AND** polyethylene glycol **AND** bisacodyl **AND** bisacodyl    calcium carbonate    influenza vaccine    nitroglycerin    nitroglycerin    OLANZapine    ondansetron **OR** ondansetron    [COMPLETED] Insert Peripheral IV **AND** sodium chloride    sodium chloride    sodium chloride    Allergies:  Allergies   Allergen Reactions    Sulfa Antibiotics        Past Social History:  Social History     Socioeconomic History    Marital status:    Tobacco Use    Smoking status: Never    Smokeless tobacco: Never   Vaping Use    Vaping Use: Never used   Substance and Sexual Activity    Alcohol use: Never    Drug use: Never    Sexual activity: Defer     Partners: Female       Past Family History:  Family History   Problem Relation Age of Onset    Arthritis Mother     Heart disease Mother     Thyroid disease Mother     Hypertension Brother     Obesity Brother     Pancreatic cancer Maternal Aunt     Dementia Maternal Grandmother            Objective:   Temp:  [98.1 °F (36.7 °C)-98.6 °F (37 °C)] 98.2 °F (36.8 °C)  Heart Rate:  [38-83] 49  Resp:  [18-22] 22  BP: (142-171)/() 142/80  SpO2:  [95 %-100 %] 100 %  on  Flow (L/min):  [2] 2 Device (Oxygen Therapy): nasal cannula   No intake or output data in the 24 hours ending 12/05/23 1534  Body mass index is 23.67 kg/m².      12/03/23 2050 12/04/23  4419    Weight: 70.3 kg (155 lb) 66.5 kg (146 lb 9.7 oz)     Weight change:     Physical Exam:   Physical Exam   General:  Diffuse, restless, in no significant distress, tremulous, chronically sick looking                   Head:    Normocephalic, atraumatic. External ears and nose are normal   Eyes:          Conjunctivae and sclerae normal, no icterus, PERRLA, no  discharge   Throat:   No oral lesions, no thrush, oral mucosa moist.    Neck:   Supple, trachea midline. No JVD, no cervical or supraclavicular lymphadenopathy    Lungs:     Normal chest on inspection, CTAB, no wheezes. No rhonchi. No crackles. Respirations regular, even and unlabored.      Heart:     the heart rate is controlled but the rhythm is irregular.  No murmurs, gallops, or rubs noted.   Abdomen:     Soft, nontender, nondistended, positive bowel sounds. No hepatosplenomegaly    Extremities:   No clubbing, cyanosis, or edema.     Pulses:   Pulses palpable and equal bilaterally.    Skin:   No bleeding or rash. No bumps, good turgor pressure    Neuro: Generalized stiffness.  Moves all extremities well. Strength 5/5 and symmetrical, limited exam because of the encephalopathy    Psychiatric: Confused, difficult to comprehend speech     Lab Review:   Results from last 7 days   Lab Units 12/05/23  1506 12/05/23  0744 12/04/23  0642 12/03/23  2214   SODIUM mmol/L  --  148* 141 141   POTASSIUM mmol/L 4.1 3.8 3.5 4.2   CHLORIDE mmol/L  --  115* 105 103   CO2 mmol/L  --  23.0 24.4 27.0   BUN mg/dL  --  22 22 19   CREATININE mg/dL  --  0.98 1.01 1.12   GLUCOSE mg/dL  --  109* 90 101*   CALCIUM mg/dL  --  8.5* 8.7 9.6   AST (SGOT) U/L  --  25  --  25   ALT (SGPT) U/L  --  16  --  19   ALBUMIN g/dL  --  3.6  --  4.7     Results from last 7 days   Lab Units 12/05/23  1506 12/04/23  0642 12/04/23  0116 12/03/23  2214   HSTROP T ng/L 21 26* 22* 23*     Results from last 7 days   Lab Units 12/05/23  0744 12/04/23  0642 12/03/23  2214   WBC 10*3/mm3 7.15 6.87 6.68  "  HEMOGLOBIN g/dL 12.0* 12.5* 14.9   HEMATOCRIT % 35.5* 37.2* 44.4   PLATELETS 10*3/mm3 102* 106* 115*   MCV fL 97.5* 97.6* 96.7   MCH pg 33.0 32.8 32.5   MCHC g/dL 33.8 33.6 33.6   RDW % 12.2* 12.2* 12.2*   RDW-SD fl 44.4 44.1 43.6   MPV fL 11.4 10.9 11.0   NEUTROPHIL % % 77.7*  --  74.0   LYMPHOCYTE % % 13.1*  --  14.8*   MONOCYTES % % 8.7  --  10.6   EOSINOPHIL % % 0.0*  --  0.0*   BASOPHIL % % 0.1  --  0.3   NEUTROS ABS 10*3/mm3 5.55  --  4.94   LYMPHS ABS 10*3/mm3 0.94  --  0.99   MONOS ABS 10*3/mm3 0.62  --  0.71   EOS ABS 10*3/mm3 0.00  --  0.00   BASOS ABS 10*3/mm3 0.01  --  0.02         Results from last 7 days   Lab Units 12/05/23  1506   MAGNESIUM mg/dL 2.2           Invalid input(s): \"LDLCALC\"  Results from last 7 days   Lab Units 12/05/23  0744 12/03/23  2214   PROBNP pg/mL  --  656.0   D DIMER QUANT MCGFEU/mL 0.84*  --          Results from last 7 days   Lab Units 12/05/23  1434   PH, ARTERIAL pH units 7.441   PCO2, ARTERIAL mm Hg 32.7*   PO2 ART mm Hg 112.1*   HCO3 ART mmol/L 22.2     Results from last 7 days   Lab Units 12/05/23  1434 12/05/23  1100 12/05/23  0744 12/03/23  2214   PROCALCITONIN ng/mL  --   --  0.64* 0.11   LACTATE mmol/L 1.0 1.4  --  1.6     Results from last 7 days   Lab Units 12/05/23  0744   CRP mg/dL 14.95*         Results from last 7 days   Lab Units 12/03/23  2308 12/03/23  2214   BLOODCX  No growth at 24 hours No growth at 24 hours     Results from last 7 days   Lab Units 12/03/23  2309   NITRITE UA  Negative   WBC UA /HPF 3-5*   BACTERIA UA /HPF None Seen   SQUAM EPITHEL UA /HPF 0-2     Results from last 7 days   Lab Units 12/03/23  2145   COVID19  Detected*   ADENOVIRUS DETECTION BY PCR  Not Detected   CORONAVIRUS 229E  Not Detected   CORONAVIRUS HKU1  Not Detected   CORONAVIRUS NL63  Not Detected   CORONAVIRUS OC43  Not Detected   HUMAN METAPNEUMOVIRUS  Not Detected   HUMAN RHINOVIRUS/ENTEROVIRUS  Not Detected   INFLUENZA B PCR  Not Detected   PARAINFLUENZA 1  Not Detected "   PARAINFLUENZA VIRUS 2  Not Detected   PARAINFLUENZA VIRUS 3  Not Detected   PARAINFLUENZA VIRUS 4  Not Detected   BORDETELLA PERTUSSIS PCR  Not Detected   BORDETELLA PARAPERTUSSIS PCR  Not Detected   CHLAMYDOPHILA PNEUMONIAE PCR  Not Detected   MYCOPLAMA PNEUMO PCR  Not Detected   RSV, PCR  Not Detected             Imaging:  Imaging Results (Most Recent)       Procedure Component Value Units Date/Time    CT Head Without Contrast [[id number]] Collected: 12/03/23 2333     Updated: 12/03/23 2333    Narrative:        Patient: BART BENSON  Time Out: 23:32  Exam(s): CT HEAD Without Contrast     EXAM:    CT Head Without Intravenous Contrast    CLINICAL HISTORY:      ams.    TECHNIQUE:    Axial computed tomography images of the head brain without intravenous   contrast.  CTDI is 55.7 mGy and DLP is 959 mGy-cm.  This CT exam was   performed according to the principle of ALARA (As Low As Reasonably   Achievable) by using one or more of the following dose reduction   techniques: automated exposure control, adjustment of the mA and or kV   according to patient size, and or use of iterative reconstruction   technique.    COMPARISON:    No relevant prior studies available.    FINDINGS:    Brain:  No intracranial hemorrhage.  No significant mass effect.  No   evidence for cortical infarct.  Prominent parenchymal involutional   changes noted diffusely.  Periventricular and subcortical deep white   matter hypodense changes noted.    Ventricles:  Unremarkable.  No ventriculomegaly.    Bones joints:  Prominent arthroscopic calcification of the cavernous   and supraclinoid internal carotid arteries and distal vertebral arteries.    No acute fracture.    Soft tissues:  Unremarkable.    Sinuses:  Unremarkable as visualized.  No acute sinusitis.    Mastoid air cells:  Unremarkable as visualized.  No mastoid effusion.    IMPRESSION:         No acute intracranial process identified.  Incidental chronic   parenchymal involutional changes  and microvascular changes, as noted   above.      Impression:          Electronically signed by Dwight Avalos MD on 12-03-23 at 2332    XR Chest 1 View [[id number]] Collected: 12/03/23 2150     Updated: 12/03/23 2155    Narrative:       XR CHEST 1 VW-     HISTORY: Fever, cough.     COMPARISON: CT chest 2/8/2022. Chest radiograph 1/29/2022.     FINDINGS:    3 views of the chest were obtained. The cardiac silhouette is normal in  size. The aorta is tortuous, similar to prior. There is calcific aortic  atherosclerosis. Lungs and pleural spaces are clear. There is  degenerative disc disease.     This report was finalized on 12/3/2023 9:52 PM by Dr. Priscilla Black M.D  on Workstation: [id number]               I personally viewed and interpreted the patient's imaging studies.    Assessment:   Intermittent high degree AV block for transfer to the CICU per cardiology  COVID-19 infection with no significant hypoxemia  Essential hypertension  Dementia of the Alzheimer's type with acute on chronic encephalopathy with metabolic component  Esophageal reflux disease  Hypernatremia  Plan:     Patient has positive COVID-19 on presentation, urinalysis was normal, CRP was elevated 14.9 which need to be followed.  ABG showed adequate oxygenation and ventilation, white count showed no leukocytosis, D-dimer was slightly positive which is not unusual for COVID, the follow-up on high since her troponin came down to normal and the chemistry showed mild hyponatremia with otherwise normal renal function and normal liver enzymes.  Lactic acid was normal  From the COVID standpoint the patient has no infiltrate on the chest x-ray, he had only transient hypoxemia and we can discontinue the Decadron  Family did not consent for the remdesivir and it was not administered.  Little to add from the pulmonary standpoint patient has no hemodynamic issues otherwise we will defer the rest of the recommendation to the cardiology team.  The hyponatremia is  very mild and at this point of no clinical impact but it will be monitored          Thank you for allowing me to participate in the care of Tyron Hunt. Feel free to contact me directly with any further questions or concerns.    Andrea Ng MD  [facility]   12/05/23  15:34 EST    Dictated utilizing Dragon dictation

## 2023-12-05 NOTE — CODE DOCUMENTATION
"Patient Name:  Tyron Hunt  YOB: 1945  MRN:  [id number]  Admit Date:  12/3/2023    Visit Diagnoses:     ICD-10-CM ICD-9-CM   1. Acute encephalopathy  G93.40 348.30   2. Alzheimer's dementia, unspecified dementia severity, unspecified timing of dementia onset, unspecified whether behavioral, psychotic, or mood disturbance or anxiety  G30.9 331.0    F02.80 294.10   3. COVID-19  U07.1 079.89       Reason For Rapid:   bradycardia; hypoxia; mental status change    RN Communicated With:  Dr. Gutierrez ([facility]); Dr. Sandy came to bedside    Rapid Outcome:  plan to transfer to ICU bed as soon as one becomes available    Communication From Rapid Team:   Arrived to find pt alert, confused (increased lethargy from this AM). Sinus ash to junctional rate 40-60s w/ PVCs. During rapid noted pt dropping QRS's and having intermittant heart block. Dr. Esparza notified, who ordered stat cardiology consult and reminded us to follow ACLS protocols for symptomatic bradycardia. Dr. Sandy came to bedside to assess from cardiology. Asked for STAT echocardiogram and transfer to ICU bed. Currently waiting on transfer/clean of an available ICU bed. Critical care RNs to stay at bedside until arrival to ICU bed.  Hunt (son) notified of planned transfer and bradycardia. He asks us to keep him updated on dad's status.      Most Recent Vital Signs  Temp:  [98.1 °F (36.7 °C)-98.6 °F (37 °C)] 98.2 °F (36.8 °C)  Heart Rate:  [38-83] 49  Resp:  [18-22] 22  BP: (142-171)/() 142/80  SpO2:  [95 %-100 %] 100 %  on  Flow (L/min):  [2] 2;   Device (Oxygen Therapy): nasal cannula    Labs:  Results from last 7 days   Lab Units 12/03/23  2145   COVID19  Detected*     Glucose   Date/Time Value Ref Range Status   12/05/2023 1433 138 (H) 70 - 130 mg/dL Final     No results found for: \"SITE\", \"ALLENTEST\", \"PHART\", \"RWS0OGC\", \"PO2ART\", \"MPR5SUW\", \"BASEEXCESS\", \"T3DSTXMN\", \"HGBBG\", \"HCTABG\", \"OXYHEMOGLOBI\", \"METHHGBN\", " "\"CARBOXYHGB\", \"CO2CT\", \"BAROMETRIC\", \"MODALITY\", \"FIO2\"  Results from last 7 days   Lab Units 12/05/23  0744 12/04/23  0642 12/03/23  2214   WBC 10*3/mm3 7.15 6.87 6.68   HEMOGLOBIN g/dL 12.0* 12.5* 14.9   PLATELETS 10*3/mm3 102* 106* 115*     Results from last 7 days   Lab Units 12/05/23  0744 12/04/23  0642 12/03/23  2214   SODIUM mmol/L 148* 141 141   POTASSIUM mmol/L 3.8 3.5 4.2   CHLORIDE mmol/L 115* 105 103   CO2 mmol/L 23.0 24.4 27.0   BUN mg/dL 22 22 19   CREATININE mg/dL 0.98 1.01 1.12   GLUCOSE mg/dL 109* 90 101*   ALBUMIN g/dL 3.6  --  4.7   BILIRUBIN mg/dL 0.4  --  0.6   ALK PHOS U/L 44  --  68   AST (SGOT) U/L 25  --  25   ALT (SGPT) U/L 16  --  19   Estimated Creatinine Clearance: 57.5 mL/min (by C-G formula based on SCr of 0.98 mg/dL).  Results from last 7 days   Lab Units 12/05/23  0744 12/04/23  0642 12/04/23  0116 12/03/23  2214   HSTROP T ng/L  --  26* 22* 23*   PROBNP pg/mL  --   --   --  656.0   D DIMER QUANT MCGFEU/mL 0.84*  --   --   --      Results from last 7 days   Lab Units 12/05/23  1434 12/05/23  1100 12/05/23  0744 12/03/23  2214   PROCALCITONIN ng/mL  --   --  0.64* 0.11   LACTATE mmol/L 1.0 1.4  --  1.6     No results found for: \"STREPPNEUAG\", \"LEGANTIGENUR\"  Results from last 7 days   Lab Units 12/03/23  2308   BLOODCX  No growth at 24 hours     Results from last 7 days   Lab Units 12/03/23  2145   ADENOVIRUS DETECTION BY PCR  Not Detected   CORONAVIRUS 229E  Not Detected   CORONAVIRUS HKU1  Not Detected   CORONAVIRUS NL63  Not Detected   CORONAVIRUS OC43  Not Detected   HUMAN METAPNEUMOVIRUS  Not Detected   HUMAN RHINOVIRUS/ENTEROVIRUS  Not Detected   INFLUENZA B PCR  Not Detected   PARAINFLUENZA 1  Not Detected   PARAINFLUENZA VIRUS 2  Not Detected   PARAINFLUENZA VIRUS 3  Not Detected   PARAINFLUENZA VIRUS 4  Not Detected   BORDETELLA PERTUSSIS PCR  Not Detected   CHLAMYDOPHILA PNEUMONIAE PCR  Not Detected   MYCOPLAMA PNEUMO PCR  Not Detected   INFLUENZA A PCR  Not Detected   RSV, " PCR  Not Detected       NIH Stroke Scale:  n/a                                                         Please refer to full rapid documentation on summary page under Index / Code Timeline

## 2023-12-05 NOTE — THERAPY EVALUATION
Acute Care - Speech Language Pathology   Swallow Initial Evaluation [facility]     Patient Name: Tyron Hunt  : 1945  MRN: [id number]  Today's Date: 2023               Admit Date: 12/3/2023    Visit Dx:     ICD-10-CM ICD-9-CM   1. Acute encephalopathy  G93.40 348.30   2. Alzheimer's dementia, unspecified dementia severity, unspecified timing of dementia onset, unspecified whether behavioral, psychotic, or mood disturbance or anxiety  G30.9 331.0    F02.80 294.10   3. COVID-19  U07.1 079.89     Patient Active Problem List   Diagnosis    Hypertension    GERD (gastroesophageal reflux disease)    Hyperlipidemia    Anxiety    BPH (benign prostatic hyperplasia)    Impaired fasting glucose    Recurrent major depressive disorder, in full remission    Cervical disc disease    Cervical spinal stenosis    Foraminal stenosis of cervical region    Cervical radiculitis    Altered mental status    COVID-19 virus detected    Age-related physical debility    Cytokine release syndrome, grade 1    Dyspepsia    Acute encephalopathy    Dementia without behavioral disturbance    Hypernatremia     Past Medical History:   Diagnosis Date    Anxiety     COVID-19 2022    Diverticulitis     Gastritis     GERD (gastroesophageal reflux disease)     Gout     H/O complete eye exam 2017    Hyperlipidemia     Hypertension     Impaired fasting blood sugar     Kidney stones     Memory loss     Sleep apnea      Past Surgical History:   Procedure Laterality Date    CERVICAL EPIDURAL N/A 2021    Procedure: CERVICAL EPIDURAL;  Surgeon: Nataliia Avilez MD;  Location: [facility] [place] MAIN OR;  Service: Pain Management;  Laterality: N/A;    CERVICAL EPIDURAL N/A 10/18/2021    Procedure: CERVICAL EPIDURAL 7-1;  Surgeon: Nataliia Avilez MD;  Location: [facility] [place] MAIN OR;  Service: Pain Management;  Laterality: N/A;    COLONOSCOPY      date Triston GRIFFIN    CYST REMOVAL      CYSTOSCOPY W/ LITHOLAPAXY / EHL      TONSILLECTOMY       UPPER GASTROINTESTINAL ENDOSCOPY  10/05/2021    Gastritis   Arlet GRIFFIN       SLP Recommendation and Plan  SLP Swallowing Diagnosis: suspected pharyngeal dysphagia (12/05/23 1300)  SLP Diet Recommendation: NPO (12/05/23 1300)     SLP Rec. for Method of Medication Administration: meds via alternate route (12/05/23 1300)     Monitor for Signs of Aspiration: notify SLP if any concerns (12/05/23 1300)  Recommended Diagnostics: reassess via clinical swallow evaluation (12/05/23 1300)  Swallow Criteria for Skilled Therapeutic Interventions Met: demonstrates skilled criteria (12/05/23 1300)     Rehab Potential/Prognosis, Swallowing: good, to achieve stated therapy goals (12/05/23 1300)  Therapy Frequency (Swallow): PRN (12/05/23 1300)  Predicted Duration Therapy Intervention (Days): until discharge (12/05/23 1300)  Oral Care Recommendations: Oral Care BID/PRN (12/05/23 1300)                                      Oral Care Recommendations: Oral Care BID/PRN (12/05/23 1300)    Outcome Evaluation: Clinical swallow evaluation completed. Patient repositioned upright in bed. Wife at bedside during portion of evaluation. Patient with tangential speech unrelated to questions asked. MOD-MAX cues required for patient to manipulate x1/2 ice chips. x2/2 throat clears exhibitied following ice chip trials. No overt s/s of aspiration with puree trial. No further trials due to continuous tangential speech. Suspect mental status is greatly impacting safe swallow function. Recommend continue NPO. Meds via alternate route. Speech to follow for re-eval.       SWALLOW EVALUATION (last 72 hours)       SLP Adult Swallow Evaluation       Row Name 12/05/23 1300                   Rehab Evaluation    Document Type evaluation  -CR        Subjective Information no complaints  -CR        Patient Observations alert;cooperative  -CR        Patient Effort fair  -CR        Symptoms Noted During/After Treatment none  -CR           General Information    Patient  Profile Reviewed yes  -CR        Pertinent History Of Current Problem 77 y/o male admitted with COVID19 and acute encephalopathy. PMH includes Alzheimers dementia and GERD. CXR indicate clear lungs.  -CR        Current Method of Nutrition NPO  -CR        Precautions/Limitations, Vision WFL;for purposes of eval  -CR        Precautions/Limitations, Hearing difficult to assess  -CR        Prior Level of Function-Swallowing no diet consistency restrictions  per wife  -CR        Plans/Goals Discussed with spouse/S.O.;agreed upon  -CR        Barriers to Rehab cognitive status  -CR           Pain    Additional Documentation Pain Scale: Numbers Pre/Post-Treatment (Group)  -CR           Pain Scale: Numbers Pre/Post-Treatment    Pre/Posttreatment Pain Comment Patient did not verbalize pain  -CR           Clinical Swallow Eval    Clinical Swallow Evaluation Summary Clinical swallow evaluation completed. Patient repositioned upright in bed. Wife at bedside during portion of evaluation. Patient with tangential speech unrelated to questions asked. MOD-MAX cues required for patient to manipulate x1/2 ice chips. x2/2 throat clears exhibitied following ice chip trials. No overt s/s of aspiration with puree trial. No further trials due to continuous tangential speech. Suspect mental status is greatly impacting safe swallow function. Recommend continue NPO. Meds via alternate route. Speech to follow for re-eval.  -CR           SLP Evaluation Clinical Impression    SLP Swallowing Diagnosis suspected pharyngeal dysphagia  -CR        Functional Impact risk of aspiration/pneumonia  -CR        Rehab Potential/Prognosis, Swallowing good, to achieve stated therapy goals  -CR        Swallow Criteria for Skilled Therapeutic Interventions Met demonstrates skilled criteria  -CR           Recommendations    Therapy Frequency (Swallow) PRN  -CR        Predicted Duration Therapy Intervention (Days) until discharge  -CR        SLP Diet  Recommendation NPO  -CR        Recommended Diagnostics reassess via clinical swallow evaluation  -CR        Oral Care Recommendations Oral Care BID/PRN  -CR        SLP Rec. for Method of Medication Administration meds via alternate route  -CR        Monitor for Signs of Aspiration notify SLP if any concerns  -CR           Swallow Goals (SLP)    Swallow LTGs Patient will demonstrate functional swallow for  -CR           (LTG) Patient will demonstrate functional swallow for    Diet Texture (Demonstrate functional swallow) mechanical ground textures  -CR        Liquid viscosity (Demonstrate functional swallow) thin liquids  -CR        [place] (Demonstrate functional swallow) independently (over 90% accuracy)  -CR        Time Frame (Demonstrate functional swallow) by discharge  -CR        Progress/Outcomes (Demonstrate functional swallow) new goal  -CR                  User Key  (r) = Recorded By, (t) = Taken By, (c) = Cosigned By      Initials Name Effective Dates    Melanie Phelan SLP 08/28/23 -                     EDUCATION  The patient has been educated in the following areas:   Dysphagia (Swallowing Impairment).        SLP GOALS       Row Name 12/05/23 1300             (LTG) Patient will demonstrate functional swallow for    Diet Texture (Demonstrate functional swallow) mechanical ground textures  -CR      Liquid viscosity (Demonstrate functional swallow) thin liquids  -CR      [place] (Demonstrate functional swallow) independently (over 90% accuracy)  -CR      Time Frame (Demonstrate functional swallow) by discharge  -CR      Progress/Outcomes (Demonstrate functional swallow) new goal  -CR                User Key  (r) = Recorded By, (t) = Taken By, (c) = Cosigned By      Initials Name Provider Type    Melanie Phelan SLP Speech and Language Pathologist                       Time Calculation:    Time Calculation- SLP       Row Name 12/05/23 1338             Time Calculation- SLP    SLP  Start Time 0735  -CR      SLP Received On 12/05/23  -CR         Untimed Charges    02412-TO Eval Oral Pharyng Swallow Minutes 60  -CR         Total Minutes    Untimed Charges Total Minutes 60  -CR       Total Minutes 60  -CR                User Key  (r) = Recorded By, (t) = Taken By, (c) = Cosigned By      Initials Name Provider Type    Melanie Phelan SLP Speech and Language Pathologist                    Therapy Charges for Today       Code Description Service Date Service Provider Modifiers Qty    [id number]  ST EVAL ORAL PHARYNG SWALLOW 4 12/5/2023 Melanie Whitaker SLP GN 1                 GRECIA Guzman  12/5/2023

## 2023-12-05 NOTE — PLAN OF CARE
Goal Outcome Evaluation:              Outcome Evaluation: Clinical swallow evaluation completed. Patient repositioned upright in bed. Wife at bedside during portion of evaluation. Patient with tangential speech unrelated to questions asked. MOD-MAX cues required for patient to manipulate x1/2 ice chips. x2/2 throat clears exhibitied following ice chip trials. No overt s/s of aspiration with puree trial. No further trials due to continuous tangential speech. Suspect mental status is greatly impacting safe swallow function. Recommend continue NPO. Meds via alternate route. Speech to follow for re-eval.

## 2023-12-05 NOTE — PLAN OF CARE
Goal Outcome Evaluation:  Plan of Care Reviewed With: patient         Patient remains confused this shift.Restless at times.Attempting to get out of bed when awake.Still unsafe to swallow at this time.SLP reevaluate today.Suctioned as needed.No Zyprexa given.Continues with IVFs.Wrist restraints.Sinus ash on the monitor.Plan of care ongoing.

## 2023-12-05 NOTE — CONSULTS
Date of Hospital Visit: 12/3/2023  Date of consult: 2023  Encounter Provider: Hector Gan MD  Place of Service: [facility] CARDIOLOGY  Patient Name: Tyron Hunt  :1945  Referral Provider: Dwight Wong MD    Chief complaint: Generalized body weakness    Reason for consult: Bradycardia    History of Present Illness  Mr. Hunt is a 78 years old gentleman admitted on 2023 with diagnosis of COVID-19 infection after presenting with symptoms of generalized body weakness/fall, altered mental status.  Past medical history significant for hypertension, hyperlipidemia, chronic left bundle branch block, and Alzheimer's dementia.  At baseline reasonably active and walks regularly.  This afternoon he was noted to be bradycardic with heart rate in the 40s and later rhythm strip and EKG showing intermittent high degree AV block with ventricular escape rhythm along with mental status worsening lethargic.  Rapid response was called and during my initial evaluation he is back in sinus bradycardia.  His blood pressure is holding all the time with systolic in the 130s/140s.  He denies any chest pain or change in his breathing since after bradycardia.  No prior known other cardiovascular illness.    He was treated with azithromycin and ivermectin as an outpatient.  Currently receiving steroid and supportive care  Past Medical History:   Diagnosis Date    Anxiety     COVID-19 2022    Diverticulitis     Gastritis     GERD (gastroesophageal reflux disease)     Gout     H/O complete eye exam 2017    Hyperlipidemia     Hypertension     Impaired fasting blood sugar     Kidney stones     Memory loss     Sleep apnea        Past Surgical History:   Procedure Laterality Date    CERVICAL EPIDURAL N/A 2021    Procedure: CERVICAL EPIDURAL;  Surgeon: Nataliia Avilez MD;  [place] [facility] [place];  Service: Pain Management;  Laterality: N/A;    CERVICAL EPIDURAL N/A  10/18/2021    Procedure: CERVICAL EPIDURAL 7-1;  Surgeon: Nataliia Avilez MD;  Location: [facility] [place] OR;  Service: Pain Management;  Laterality: N/A;    COLONOSCOPY      date unknow-Gregorio GRIFFIN    CYST REMOVAL      CYSTOSCOPY W/ LITHOLAPAXY / EHL      TONSILLECTOMY  1949    UPPER GASTROINTESTINAL ENDOSCOPY  10/05/2021    Gastritis   Arlet GRIFFIN       Medications Prior to Admission   Medication Sig Dispense Refill Last Dose    Cholecalciferol (VITAMIN D3 PO) Take 125 mcg by mouth Daily. 5000 IU       coenzyme Q10 100 MG capsule Take 2 capsules by mouth Daily.       lansoprazole (PREVACID) 30 MG capsule Take 1 capsule by mouth 2 (Two) Times a Day. 180 capsule 3     losartan (Cozaar) 25 MG tablet Take 1 tablet by mouth Daily. 90 tablet 3     memantine (NAMENDA) 10 MG tablet Take 1 tablet by mouth 2 (Two) Times a Day. 180 tablet 3     montelukast (Singulair) 10 MG tablet Take 1 tablet by mouth Every Night for 14 days. 14 tablet 0     sucralfate (CARAFATE) 1 g tablet Take 1 tablet by mouth 4 (Four) Times a Day. 360 tablet 3     tamsulosin (FLOMAX) 0.4 MG capsule 24 hr capsule Take 1 capsule by mouth 2 (Two) Times a Day. 180 capsule 3     aluminum hydroxide-mag carbonate (GAVISCON EXTRA RELIEF) 160-105 MG chewable tablet chewable tablet Chew As Needed.       azithromycin (ZITHROMAX) 250 MG tablet Take 1 tablet by mouth Daily. Take 2 tablets first day and then 1 tablet daily for 4 days          Current Meds  Scheduled Meds:cefTRIAXone, 1,000 mg, Intravenous, Q24H  dexAMETHasone, 6 mg, Intravenous, Daily  enalaprilat, 1.25 mg, Intravenous, Q6H  enoxaparin, 40 mg, Subcutaneous, Daily  famotidine, 20 mg, Intravenous, Q12H  senna-docusate sodium, 2 tablet, Oral, BID  sodium chloride, 10 mL, Intravenous, Q12H      Continuous Infusions:dextrose, 100 mL/hr, Last Rate: 100 mL/hr (12/05/23 1137)      PRN Meds:.  acetaminophen **OR** acetaminophen **OR** acetaminophen    senna-docusate sodium **AND** polyethylene glycol **AND** bisacodyl  "**AND** bisacodyl    calcium carbonate    influenza vaccine    nitroglycerin    nitroglycerin    OLANZapine    ondansetron **OR** ondansetron    [COMPLETED] Insert Peripheral IV **AND** sodium chloride    sodium chloride    sodium chloride    Allergies as of 12/03/2023 - Reviewed 12/03/2023   Allergen Reaction Noted    Sulfa antibiotics  02/09/2016       Social History     Socioeconomic History    Marital status:    Tobacco Use    Smoking status: Never    Smokeless tobacco: Never   Vaping Use    Vaping Use: Never used   Substance and Sexual Activity    Alcohol use: Never    Drug use: Never    Sexual activity: Defer     Partners: Female       Family History   Problem Relation Age of Onset    Arthritis Mother     Heart disease Mother     Thyroid disease Mother     Hypertension Brother     Obesity Brother     Pancreatic cancer Maternal Aunt     Dementia Maternal Grandmother        REVIEW OF SYSTEMS:   All systems reviewed and pertinent positives include in HPI otherwise negative review of systems.       Objective:   Temp:  [98.1 °F (36.7 °C)-98.6 °F (37 °C)] 98.2 °F (36.8 °C)  Heart Rate:  [38-83] 49  Resp:  [18-22] 22  BP: (142-171)/() 142/80  Body mass index is 23.67 kg/m².  Flowsheet Rows      Flowsheet Row First Filed Value   Admission Height 167.6 cm (65.98\") Documented at 12/03/2023 2050   Admission Weight 70.3 kg (155 lb) Documented at 12/03/2023 2050          Vitals:    12/05/23 1502   BP: 142/80   Pulse: (!) 49   Resp:    Temp:    SpO2: 100%       General Appearance:    Alert, cooperative, in no acute distress   Head:    Normocephalic, without obvious abnormality, atraumatic   Eyes:            Lids and lashes normal, conjunctivae and sclerae normal, no   icterus, no pallor, corneas clear, PERRLA   Ears:    Ears appear intact with no abnormalities noted   Throat:   No oral lesions, no thrush, oral mucosa moist   Neck:   No adenopathy, supple, trachea midline, no thyromegaly, no   carotid bruit, no " JVD   Back:     No kyphosis present, no scoliosis present, no skin lesions, erythema or scars, no tenderness to percussion or palpation, range of motion normal   Lungs:     Clear to auscultation,respirations regular, even and unlabored    Heart:    Regular rhythm and normal rate, normal S1 and S2, no murmur, no gallop, no rub, no click   Chest Wall:    No abnormalities observed   Abdomen:     Normal bowel sounds, no masses, no organomegaly, soft nontender, nondistended, no guarding, no rebound  tenderness   Extremities:   Moves all extremities well, no edema, no cyanosis, no redness   Pulses:   Pulses palpable and equal bilaterally. Normal radial, carotid, femoral, dorsalis pedis and posterior tibial pulses bilaterally. Normal abdominal aorta   Skin:  Neurology:   Psychiatric:   No bleeding, bruising or rash   Normal speech and cranial nerve exam, no focal deficit   Alert and oriented x 3, normal mood and affect             Review of Data:      Results from last 7 days   Lab Units 12/05/23  0744   SODIUM mmol/L 148*   POTASSIUM mmol/L 3.8   CHLORIDE mmol/L 115*   CO2 mmol/L 23.0   BUN mg/dL 22   CREATININE mg/dL 0.98   CALCIUM mg/dL 8.5*   BILIRUBIN mg/dL 0.4   ALK PHOS U/L 44   ALT (SGPT) U/L 16   AST (SGOT) U/L 25   GLUCOSE mg/dL 109*     Results from last 7 days   Lab Units 12/04/23  0642 12/04/23  0116 12/03/23  2214   HSTROP T ng/L 26* 22* 23*     @LABRCNTbnp@  Results from last 7 days   Lab Units 12/05/23  0744 12/04/23  0642 12/03/23  2214   WBC 10*3/mm3 7.15 6.87 6.68   HEMOGLOBIN g/dL 12.0* 12.5* 14.9   HEMATOCRIT % 35.5* 37.2* 44.4   PLATELETS 10*3/mm3 102* 106* 115*             @LABRCNTIP(chol,trig,hdl,ldl)    I personally viewed and interpreted the patient's EKG/Telemetry data  )   Acute encephalopathy    Hypertension    GERD (gastroesophageal reflux disease)    BPH (benign prostatic hyperplasia)    COVID-19 virus detected    Dementia without behavioral disturbance    Hypernatremia        Assessment and  Plan:    Mr. Hunt admitted with COVID-19 infection after presenting with generalized body weakness/fall/worsening mental status-underlying times dementia, hypertension, hyperlipidemia.  Mild hypernatremia noted on BMP this morning.  Noted to be in intermittent high degree AV block this afternoon.  Baseline is sinus with left bundle branch block.  Not on any medication that slows heart rate.  Blood pressure is holding.  At time of exam mental status improved and feels better and back in sinus bradycardia.    Intermittent high degree AV block  COVID-19 infection  Essential hypertension  Alzheimer's dementia  GERD/PUD  Patient being transferred to the unit.  Get stat echocardiogram.  Continue telemetry monitoring.    Discussed patient with nursing staff and Dr. Hunt(his son)     Hector Gan MD  12/05/23  15:15 EST.      Time spent in reviewing chart, discussion and examination:

## 2023-12-05 NOTE — PLAN OF CARE
Goal Outcome Evaluation:            Pt arrived to cicu around 1600, confused, on RA, HR varies from 40's-90's, no intervention per cardiology. BP sustaining.

## 2023-12-05 NOTE — PROGRESS NOTES
Name: Tyron Hunt ADMIT: 12/3/2023   : 1945  PCP: Tyron Hunt MD    MRN: [id number] LOS: 2 days   AGE/SEX: 78 y.o. male  ROOM: [facility]/     Subjective   Subjective   Most of the history is from the nurse and the wife who is at bedside.  Patient looks better.  Decreased gurgling in the upper respiratory tract.  Positive occasional dry cough.  No chest pain.  No shortness of breath.  No wheezing.  No fever or chills.  No abdominal pain.  No nausea or vomiting.  Positive urine output without hematuria.       Objective   Objective   Vital Signs  Temp:  [98.1 °F (36.7 °C)-98.8 °F (37.1 °C)] 98.2 °F (36.8 °C)  Heart Rate:  [49-83] 57  Resp:  [18] 18  BP: (137-162)/(59-86) 157/59  SpO2:  [95 %-100 %] 99 %  on   ;   Device (Oxygen Therapy): room air  No intake or output data in the 24 hours ending 23 1024  Body mass index is 23.67 kg/m².      23  0435   Weight: 70.3 kg (155 lb) 66.5 kg (146 lb 9.7 oz)     Physical Exam  General.  Elderly gentleman.  He appears to be alert and oriented x 1 only in no apparent pain/distress/diaphoresis   Eyes.  No pallor or jaundice.  Pupils equal round and reactive.  Intact extraocular musculature.    Oral cavity.   dry mucous membrane with no lesions.  Neck.  Supple.  No JVD.  No lymphadenopathy or thyromegaly.  Cardio vascular.  Regular rate and rhythm.  No murmurs appreciated.  Chest.  Poor bilateral air entry with no added sounds.  Abdomen.  Soft lax.  No tenderness.  No organomegaly.  No guarding or rebound.  Extremities.  No clubbing/cyanosis/edema.  CNS.  No acute focal neurological deficits.  Results Review:      Results from last 7 days   Lab Units 23  0744 23  0642 23  5854   SODIUM mmol/L 148* 141 141   POTASSIUM mmol/L 3.8 3.5 4.2   CHLORIDE mmol/L 115* 105 103   CO2 mmol/L 23.0 24.4 27.0   BUN mg/dL 22 22 19   CREATININE mg/dL 0.98 1.01 1.12   GLUCOSE mg/dL 109* 90 101*   CALCIUM mg/dL 8.5* 8.7 9.6   AST (SGOT) U/L 25  " --  25   ALT (SGPT) U/L 16  --  19     Estimated Creatinine Clearance: 57.5 mL/min (by C-G formula based on SCr of 0.98 mg/dL).          Results from last 7 days   Lab Units 12/04/23  0642 12/04/23  0116 12/03/23 2214   HSTROP T ng/L 26* 22* 23*     Results from last 7 days   Lab Units 12/03/23 2214   PROBNP pg/mL 656.0                   Invalid input(s): \"LDLCALC\"  Results from last 7 days   Lab Units 12/05/23  0744 12/04/23  0642 12/03/23 2214   WBC 10*3/mm3 7.15 6.87 6.68   HEMOGLOBIN g/dL 12.0* 12.5* 14.9   HEMATOCRIT % 35.5* 37.2* 44.4   PLATELETS 10*3/mm3 102* 106* 115*   MCV fL 97.5* 97.6* 96.7   MCH pg 33.0 32.8 32.5   MCHC g/dL 33.8 33.6 33.6   RDW % 12.2* 12.2* 12.2*   RDW-SD fl 44.4 44.1 43.6   MPV fL 11.4 10.9 11.0   NEUTROPHIL % % 77.7*  --  74.0   LYMPHOCYTE % % 13.1*  --  14.8*   MONOCYTES % % 8.7  --  10.6   EOSINOPHIL % % 0.0*  --  0.0*   BASOPHIL % % 0.1  --  0.3   NEUTROS ABS 10*3/mm3 5.55  --  4.94   LYMPHS ABS 10*3/mm3 0.94  --  0.99   MONOS ABS 10*3/mm3 0.62  --  0.71   EOS ABS 10*3/mm3 0.00  --  0.00   BASOS ABS 10*3/mm3 0.01  --  0.02             Results from last 7 days   Lab Units 12/05/23  0744 12/03/23 2214   PROCALCITONIN ng/mL 0.64* 0.11   LACTATE mmol/L  --  1.6     Results from last 7 days   Lab Units 12/05/23 0744   CRP mg/dL 14.95*         Results from last 7 days   Lab Units 12/03/23  2308 12/03/23  2214   BLOODCX  No growth at 24 hours No growth at 24 hours     Results from last 7 days   Lab Units 12/03/23  2145   ADENOVIRUS DETECTION BY PCR  Not Detected   CORONAVIRUS 229E  Not Detected   CORONAVIRUS HKU1  Not Detected   CORONAVIRUS NL63  Not Detected   CORONAVIRUS OC43  Not Detected   HUMAN METAPNEUMOVIRUS  Not Detected   HUMAN RHINOVIRUS/ENTEROVIRUS  Not Detected   INFLUENZA B PCR  Not Detected   PARAINFLUENZA 1  Not Detected   PARAINFLUENZA VIRUS 2  Not Detected   PARAINFLUENZA VIRUS 3  Not Detected   PARAINFLUENZA VIRUS 4  Not Detected   BORDETELLA PERTUSSIS PCR  Not " Detected   CHLAMYDOPHILA PNEUMONIAE PCR  Not Detected   MYCOPLAMA PNEUMO PCR  Not Detected   INFLUENZA A PCR  Not Detected   RSV, PCR  Not Detected     Results from last 7 days   Lab Units 12/03/23  2309   NITRITE UA  Negative   WBC UA /HPF 3-5*   BACTERIA UA /HPF None Seen   SQUAM EPITHEL UA /HPF 0-2           Imaging:  Imaging Results (Last 24 Hours)       ** No results found for the last 24 hours. **               I reviewed the patient's new clinical results / labs / tests / procedures      Assessment/Plan     Active Hospital Problems    Diagnosis  POA    **Acute encephalopathy [G93.40]  Yes    Hypernatremia [E87.0]  No    Dementia without behavioral disturbance [F03.90]  Yes    COVID-19 virus detected [U07.1]  Yes    BPH (benign prostatic hyperplasia) [N40.0]  Yes    Hypertension [I10]  Yes    GERD (gastroesophageal reflux disease) [K21.9]  Yes      Resolved Hospital Problems   No resolved problems to display.       1.  Metabolic encephalopathy secondary to COVID-19 infection in a patient with a history of dementia.  CT scan of the brain without contrast was without acute disease.  CNS examination without acute focal deficits.  Plan as needed Zyprexa holding Namenda at this time as he is n.p.o. (look below).  Normal recent TSH/folate/B12. clinically improving.    2.  COVID-19 infection/positive D-dimer/elevated procalcitonin.  Positive for transient hypoxemia.  Chest x-ray without infiltrate.  Continue IV steroids.  Son has declined treatment with remdesivir or Paxlovid.  Will check CTA of the chest and lower extremity venous ultrasound secondary to positive D-dimer.  CRP is increasing.  Procalcitonin follow-up is elevated.  Will initiate empiric Rocephin for secondary bacterial infection.  No leukocytosis.  Improved temperature curve.  Negative blood cultures till now.  Monitor CBC/CMP/CRP/D-dimer.  Lactic acid was normal.    3.  Thrombocytopenia/new onset anemia.  Thrombocytopenia mostly secondary to COVID  infection.  No active bleed.  Stable platelets.  Will closely monitor.  Workup suggestive of anemia of chronic disease and iron deficiency.  Slowly declining hemoglobin.  Closely monitor CBC.  5.  Hypernatremia.  Will change IV fluid to dextrose water.  4.  History of benign prostatic hypertrophy and nephrolithiasis.  UA without UTI but positive blood.  Will monitor input and outputs.  5.  Hypertension.  Hold Cozaar at this time as he is n.p.o. good control of blood pressure on IV Vasotec.  No evidence of angina hypertensive heart failure.  6.  Peptic ulcer disease/GERD.  Holding p.o. Protonix and sucralfate at this time as he is n.p.o. on IV Pepcid.    7.  High risk aspiration.  N.p.o. awaiting speech evaluation.    8.  VTE prophylaxis.  Lovenox.      Discussed my findings and plan of treatment with the patient/nurses at multidisciplinary rounds/patient's son who is a physician/patient wife.  Disposition.  To be determined based on clinical course.          Ramila Gutierrez MD  [facility]  12/05/23  10:24 EST

## 2023-12-06 PROBLEM — I44.30 AV BLOCK: Status: ACTIVE | Noted: 2023-12-06

## 2023-12-06 PROBLEM — E87.0 HYPERNATREMIA: Status: RESOLVED | Noted: 2023-12-05 | Resolved: 2023-12-06

## 2023-12-06 PROBLEM — R00.1 BRADYCARDIA: Status: ACTIVE | Noted: 2023-12-06

## 2023-12-06 LAB
ALBUMIN SERPL-MCNC: 3.7 G/DL (ref 3.5–5.2)
ALBUMIN/GLOB SERPL: 1.7 G/DL
ALP SERPL-CCNC: 49 U/L (ref 39–117)
ALT SERPL W P-5'-P-CCNC: 17 U/L (ref 1–41)
ANION GAP SERPL CALCULATED.3IONS-SCNC: 12 MMOL/L (ref 5–15)
AST SERPL-CCNC: 30 U/L (ref 1–40)
BASOPHILS # BLD AUTO: 0.01 10*3/MM3 (ref 0–0.2)
BASOPHILS NFR BLD AUTO: 0.1 % (ref 0–1.5)
BILIRUB SERPL-MCNC: 0.5 MG/DL (ref 0–1.2)
BUN SERPL-MCNC: 19 MG/DL (ref 8–23)
BUN/CREAT SERPL: 21.8 (ref 7–25)
CALCIUM SPEC-SCNC: 8.7 MG/DL (ref 8.6–10.5)
CHLORIDE SERPL-SCNC: 107 MMOL/L (ref 98–107)
CO2 SERPL-SCNC: 22 MMOL/L (ref 22–29)
CREAT SERPL-MCNC: 0.87 MG/DL (ref 0.76–1.27)
CRP SERPL-MCNC: 7.58 MG/DL (ref 0–0.5)
DEPRECATED RDW RBC AUTO: 40.8 FL (ref 37–54)
EGFRCR SERPLBLD CKD-EPI 2021: 87.8 ML/MIN/1.73
EOSINOPHIL # BLD AUTO: 0 10*3/MM3 (ref 0–0.4)
EOSINOPHIL NFR BLD AUTO: 0 % (ref 0.3–6.2)
ERYTHROCYTE [DISTWIDTH] IN BLOOD BY AUTOMATED COUNT: 11.8 % (ref 12.3–15.4)
GLOBULIN UR ELPH-MCNC: 2.2 GM/DL
GLUCOSE SERPL-MCNC: 122 MG/DL (ref 65–99)
HCT VFR BLD AUTO: 40.2 % (ref 37.5–51)
HGB BLD-MCNC: 13.5 G/DL (ref 13–17.7)
LYMPHOCYTES # BLD AUTO: 0.96 10*3/MM3 (ref 0.7–3.1)
LYMPHOCYTES NFR BLD AUTO: 12.4 % (ref 19.6–45.3)
MCH RBC QN AUTO: 31.3 PG (ref 26.6–33)
MCHC RBC AUTO-ENTMCNC: 33.6 G/DL (ref 31.5–35.7)
MCV RBC AUTO: 93.3 FL (ref 79–97)
MONOCYTES # BLD AUTO: 0.63 10*3/MM3 (ref 0.1–0.9)
MONOCYTES NFR BLD AUTO: 8.1 % (ref 5–12)
NEUTROPHILS NFR BLD AUTO: 6.11 10*3/MM3 (ref 1.7–7)
NEUTROPHILS NFR BLD AUTO: 78.9 % (ref 42.7–76)
PLATELET # BLD AUTO: 108 10*3/MM3 (ref 140–450)
PMV BLD AUTO: 11.4 FL (ref 6–12)
POTASSIUM SERPL-SCNC: 3.4 MMOL/L (ref 3.5–5.2)
POTASSIUM SERPL-SCNC: 3.9 MMOL/L (ref 3.5–5.2)
PROT SERPL-MCNC: 5.9 G/DL (ref 6–8.5)
RBC # BLD AUTO: 4.31 10*6/MM3 (ref 4.14–5.8)
SODIUM SERPL-SCNC: 141 MMOL/L (ref 136–145)
WBC NRBC COR # BLD AUTO: 7.75 10*3/MM3 (ref 3.4–10.8)

## 2023-12-06 PROCEDURE — 86140 C-REACTIVE PROTEIN: CPT | Performed by: NURSE PRACTITIONER

## 2023-12-06 PROCEDURE — 97110 THERAPEUTIC EXERCISES: CPT

## 2023-12-06 PROCEDURE — 80053 COMPREHEN METABOLIC PANEL: CPT | Performed by: NURSE PRACTITIONER

## 2023-12-06 PROCEDURE — 25010000002 POTASSIUM CHLORIDE 10 MEQ/100ML SOLUTION: Performed by: INTERNAL MEDICINE

## 2023-12-06 PROCEDURE — 25010000002 ENOXAPARIN PER 10 MG: Performed by: NURSE PRACTITIONER

## 2023-12-06 PROCEDURE — 25010000002 HYDRALAZINE PER 20 MG: Performed by: INTERNAL MEDICINE

## 2023-12-06 PROCEDURE — 25010000002 OLANZAPINE 10 MG RECONSTITUTED SOLUTION: Performed by: INTERNAL MEDICINE

## 2023-12-06 PROCEDURE — 99232 SBSQ HOSP IP/OBS MODERATE 35: CPT | Performed by: INTERNAL MEDICINE

## 2023-12-06 PROCEDURE — 84132 ASSAY OF SERUM POTASSIUM: CPT | Performed by: INTERNAL MEDICINE

## 2023-12-06 PROCEDURE — 85025 COMPLETE CBC W/AUTO DIFF WBC: CPT | Performed by: NURSE PRACTITIONER

## 2023-12-06 PROCEDURE — 25010000002 CEFTRIAXONE PER 250 MG: Performed by: INTERNAL MEDICINE

## 2023-12-06 PROCEDURE — 0 DEXTROSE 5 % SOLUTION: Performed by: INTERNAL MEDICINE

## 2023-12-06 RX ORDER — POTASSIUM CHLORIDE 7.45 MG/ML
10 INJECTION INTRAVENOUS
Status: COMPLETED | OUTPATIENT
Start: 2023-12-06 | End: 2023-12-06

## 2023-12-06 RX ORDER — AMLODIPINE BESYLATE 5 MG/1
5 TABLET ORAL
Status: DISCONTINUED | OUTPATIENT
Start: 2023-12-06 | End: 2023-12-08

## 2023-12-06 RX ORDER — ENALAPRILAT 1.25 MG/ML
2.5 INJECTION INTRAVENOUS EVERY 6 HOURS
Status: DISCONTINUED | OUTPATIENT
Start: 2023-12-06 | End: 2023-12-07

## 2023-12-06 RX ORDER — HYDRALAZINE HYDROCHLORIDE 20 MG/ML
10 INJECTION INTRAMUSCULAR; INTRAVENOUS ONCE
Status: COMPLETED | OUTPATIENT
Start: 2023-12-06 | End: 2023-12-06

## 2023-12-06 RX ADMIN — CEFTRIAXONE SODIUM 1000 MG: 1 INJECTION, POWDER, FOR SOLUTION INTRAMUSCULAR; INTRAVENOUS at 11:53

## 2023-12-06 RX ADMIN — POTASSIUM CHLORIDE 10 MEQ: 7.46 INJECTION, SOLUTION INTRAVENOUS at 10:23

## 2023-12-06 RX ADMIN — FAMOTIDINE 20 MG: 10 INJECTION INTRAVENOUS at 09:18

## 2023-12-06 RX ADMIN — ENALAPRILAT 1.25 MG: 1.25 INJECTION INTRAVENOUS at 03:09

## 2023-12-06 RX ADMIN — ENALAPRILAT 2.5 MG: 2.5 INJECTION INTRAVENOUS at 20:02

## 2023-12-06 RX ADMIN — ENOXAPARIN SODIUM 40 MG: 100 INJECTION SUBCUTANEOUS at 09:18

## 2023-12-06 RX ADMIN — POTASSIUM CHLORIDE 10 MEQ: 7.46 INJECTION, SOLUTION INTRAVENOUS at 11:53

## 2023-12-06 RX ADMIN — ENALAPRILAT 1.25 MG: 1.25 INJECTION INTRAVENOUS at 09:18

## 2023-12-06 RX ADMIN — FAMOTIDINE 20 MG: 10 INJECTION INTRAVENOUS at 20:05

## 2023-12-06 RX ADMIN — Medication 10 ML: at 20:05

## 2023-12-06 RX ADMIN — OLANZAPINE 5 MG: 10 INJECTION, POWDER, FOR SOLUTION INTRAMUSCULAR at 20:04

## 2023-12-06 RX ADMIN — DEXTROSE MONOHYDRATE 100 ML/HR: 50 INJECTION, SOLUTION INTRAVENOUS at 03:09

## 2023-12-06 RX ADMIN — ENALAPRILAT 1.25 MG: 1.25 INJECTION INTRAVENOUS at 15:23

## 2023-12-06 RX ADMIN — HYDRALAZINE HYDROCHLORIDE 10 MG: 20 INJECTION, SOLUTION INTRAMUSCULAR; INTRAVENOUS at 01:54

## 2023-12-06 RX ADMIN — POTASSIUM CHLORIDE 10 MEQ: 7.46 INJECTION, SOLUTION INTRAVENOUS at 06:27

## 2023-12-06 RX ADMIN — POTASSIUM CHLORIDE 10 MEQ: 7.46 INJECTION, SOLUTION INTRAVENOUS at 09:18

## 2023-12-06 NOTE — PROGRESS NOTES
"  PROGRESS NOTE  Patient Name: Tyron Hunt  Age/Sex: 78 y.o. male  : 1945  MRN: [id number]    Date of Admission: 12/3/2023  Date of Encounter Visit: 23   LOS: 3 days   Patient Care Team:  Tyron Hunt MD as PCP - General  Tyron Hunt MD as PCP - Family Medicine  Brandon, Paulino Biswas MD as Consulting Physician (Urology)    Chief Complaint: COVID infection, high-grade AV block    Hospital course: Patient is doing better, he is hemodynamically stable, he is not on any pressors, he did not require any pacing  He is on room air  He is still restless and requiring restraints  His procalcitonin was slightly positive so he was started on antibiotic currently on Rocephin        REVIEW OF SYSTEMS:   CONSTITUTIONAL: no fever or chills  Otherwise limited system review given his confusion    Ventilator/Non-Invasive Ventilation Settings (From admission, onward)      None              Vital Signs  Temp:  [96 °F (35.6 °C)-97.7 °F (36.5 °C)] 96 °F (35.6 °C)  Heart Rate:  [] 56  BP: (128-216)/() 128/109  SpO2:  [83 %-100 %] 98 %  on    Device (Oxygen Therapy): room air  No intake or output data in the 24 hours ending 23 1637  Flowsheet Rows      Flowsheet Row First Filed Value   Admission Height 167.6 cm (65.98\") Documented at 2023   Admission Weight 70.3 kg (155 lb) Documented at 2023          Body mass index is 23.67 kg/m².      23  0435 23  1624   Weight: 70.3 kg (155 lb) 66.5 kg (146 lb 9.7 oz) 66 kg (145 lb 8.1 oz)       Physical Exam:  GEN:  No acute distress, confused, restless at time cooperative, well developed   EYES:   Sclerae clear. No icterus. PERRL. Normal EOM  ENT:   External ears/nose normal, no oral lesions, no thrush, mucous membranes moist  NECK:  Supple, midline trachea, no JVD  LUNGS: Normal chest on inspection, CTAB, no wheezes. No rhonchi. No crackles. Respirations regular, even and unlabored.   CV:  Regular rhythm and " rate. Normal S1/S2. No murmurs, gallops, or rubs noted.  ABD:  Soft, nontender and nondistended. Normal bowel sounds. No guarding  EXT:  Moves all extremities well. No cyanosis. No redness. No edema.   Skin: Dry, intact, no bleeding    Results Review:    Results From Last 14 Days   Lab Units 12/06/23  0446 12/05/23  1434 12/05/23  1100 12/05/23  0744 12/04/23  1615 12/03/23  2214   CRP mg/dL 7.58*  --   --  14.95*  --   --    D DIMER QUANT MCGFEU/mL  --   --   --  0.84*  --   --    FERRITIN ng/mL  --   --   --   --  453.00*  --    LACTATE mmol/L  --  1.0 1.4  --   --  1.6   LDH U/L  --   --   --   --  217  --      Results from last 7 days   Lab Units 12/06/23  0446 12/05/23  1506 12/05/23  0744 12/04/23  0642 12/03/23  2214   SODIUM mmol/L 141  --  148* 141 141   POTASSIUM mmol/L 3.4* 4.1 3.8 3.5 4.2   CHLORIDE mmol/L 107  --  115* 105 103   CO2 mmol/L 22.0  --  23.0 24.4 27.0   BUN mg/dL 19  --  22 22 19   CREATININE mg/dL 0.87  --  0.98 1.01 1.12   CALCIUM mg/dL 8.7  --  8.5* 8.7 9.6   AST (SGOT) U/L 30  --  25  --  25   ALT (SGPT) U/L 17  --  16  --  19   ANION GAP mmol/L 12.0  --  10.0 11.6 11.0   ALBUMIN g/dL 3.7  --  3.6  --  4.7     Results from last 7 days   Lab Units 12/05/23  1756 12/05/23  1506 12/05/23  0744 12/04/23  0642   HSTROP T ng/L 18 21  --  26*   D DIMER QUANT MCGFEU/mL  --   --  0.84*  --          Results from last 7 days   Lab Units 12/03/23  2214   PROBNP pg/mL 656.0     Results from last 7 days   Lab Units 12/06/23  0446 12/05/23  0744 12/04/23  0642 12/03/23  2214   WBC 10*3/mm3 7.75 7.15 6.87 6.68   HEMOGLOBIN g/dL 13.5 12.0* 12.5* 14.9   HEMATOCRIT % 40.2 35.5* 37.2* 44.4   PLATELETS 10*3/mm3 108* 102* 106* 115*   MCV fL 93.3 97.5* 97.6* 96.7   NEUTROPHIL % % 78.9* 77.7*  --  74.0   LYMPHOCYTE % % 12.4* 13.1*  --  14.8*   MONOCYTES % % 8.1 8.7  --  10.6   EOSINOPHIL % % 0.0* 0.0*  --  0.0*   BASOPHIL % % 0.1 0.1  --  0.3         Results from last 7 days   Lab Units 12/05/23  1506  "  MAGNESIUM mg/dL 2.2           Invalid input(s): \"LDLCALC\"  Results from last 7 days   Lab Units 12/05/23  1434   PH, ARTERIAL pH units 7.441   PCO2, ARTERIAL mm Hg 32.7*   PO2 ART mm Hg 112.1*   HCO3 ART mmol/L 22.2         Glucose   Date/Time Value Ref Range Status   12/05/2023 1533 137 (H) 70 - 130 mg/dL Final   12/05/2023 1433 138 (H) 70 - 130 mg/dL Final     Results from last 7 days   Lab Units 12/05/23  1434 12/05/23  1100 12/05/23  0744 12/03/23  2214   PROCALCITONIN ng/mL  --   --  0.64* 0.11   LACTATE mmol/L 1.0 1.4  --  1.6     Results from last 7 days   Lab Units 12/03/23  2308 12/03/23  2214   BLOODCX  No growth at 2 days No growth at 2 days     Results from last 7 days   Lab Units 12/03/23  2309   NITRITE UA  Negative   WBC UA /HPF 3-5*   BACTERIA UA /HPF None Seen   SQUAM EPITHEL UA /HPF 0-2     Results from last 7 days   Lab Units 12/03/23  2145   COVID19  Detected*   ADENOVIRUS DETECTION BY PCR  Not Detected   CORONAVIRUS 229E  Not Detected   CORONAVIRUS HKU1  Not Detected   CORONAVIRUS NL63  Not Detected   CORONAVIRUS OC43  Not Detected   HUMAN METAPNEUMOVIRUS  Not Detected   HUMAN RHINOVIRUS/ENTEROVIRUS  Not Detected   INFLUENZA B PCR  Not Detected   PARAINFLUENZA 1  Not Detected   PARAINFLUENZA VIRUS 2  Not Detected   PARAINFLUENZA VIRUS 3  Not Detected   PARAINFLUENZA VIRUS 4  Not Detected   BORDETELLA PERTUSSIS PCR  Not Detected   BORDETELLA PARAPERTUSSIS PCR  Not Detected   CHLAMYDOPHILA PNEUMONIAE PCR  Not Detected   MYCOPLAMA PNEUMO PCR  Not Detected   RSV, PCR  Not Detected               Imaging:   Imaging Results (All)       Procedure Component Value Units Date/Time         I reviewed the patient's new clinical results.  I personally viewed and interpreted the patient's imaging results:        Medication Review:   amLODIPine, 5 mg, Oral, Q24H  cefTRIAXone, 1,000 mg, Intravenous, Q24H  enalaprilat, 1.25 mg, Intravenous, Q6H  enoxaparin, 40 mg, Subcutaneous, Daily  famotidine, 20 mg, " Intravenous, Q12H  senna-docusate sodium, 2 tablet, Oral, BID  sodium chloride, 10 mL, Intravenous, Q12H        dextrose, 75 mL/hr, Last Rate: 75 mL/hr (12/06/23 1334)        ASSESSMENT:   COVID-19 infection with no hypoxemia  Metabolic encephalopathy on top of pre-existing dementia  High-grade variable AV block, cleared for transfer out by cardiology    PLAN:  Patient is hemodynamically stable, confusion is a challenge  His procalcitonin was slightly elevated so patient was started on Rocephin and he is okay to finish short 5 days course of antibiotics  He is on room air and is cleared to transfer out of the intensive care unit  Patient is followed by internal medicine team and cardiology, we will follow as needed    Discussed with nursing staff and CICU rounding team  Labs/Notes/films were independently reviewed and pertinent results are summarized above  The copied texts in this note were reviewed and they are accurate as of 12/06/23    Disposition: Stepdown unit    Andrea Ng MD  12/06/23  16:37 EST          Dictated utilizing Dragon dictation

## 2023-12-06 NOTE — PLAN OF CARE
Goal Outcome Evaluation:              Outcome Evaluation: Attempted re-evaluation of swallow function. Unable to increase alertness despite MAX audiory/visual/tactile stimulation; patient snoring with eyes open. Wet breathing noted. Continue NPO. Consider alternate means of nutrition. Meds via alternate route. Seech to follow for re-eval as appropriate.

## 2023-12-06 NOTE — THERAPY TREATMENT NOTE
Patient Name: Tyron Hunt  : 1945    MRN: [id number]                              Today's Date: 2023       Admit Date: 12/3/2023    Visit Dx:     ICD-10-CM ICD-9-CM   1. Acute encephalopathy  G93.40 348.30   2. Alzheimer's dementia, unspecified dementia severity, unspecified timing of dementia onset, unspecified whether behavioral, psychotic, or mood disturbance or anxiety  G30.9 331.0    F02.80 294.10   3. COVID-19  U07.1 079.89     Patient Active Problem List   Diagnosis    Hypertension    GERD (gastroesophageal reflux disease)    Hyperlipidemia    Anxiety    BPH (benign prostatic hyperplasia)    Impaired fasting glucose    Recurrent major depressive disorder, in full remission    Cervical disc disease    Cervical spinal stenosis    Foraminal stenosis of cervical region    Cervical radiculitis    Altered mental status    COVID-19 virus detected    Age-related physical debility    Cytokine release syndrome, grade 1    Dyspepsia    Acute encephalopathy    Dementia without behavioral disturbance    Hypernatremia     Past Medical History:   Diagnosis Date    Anxiety     COVID-19 2022    Diverticulitis     Gastritis     GERD (gastroesophageal reflux disease)     Gout     H/O complete eye exam 2017    Hyperlipidemia     Hypertension     Impaired fasting blood sugar     Kidney stones     Memory loss     Sleep apnea      Past Surgical History:   Procedure Laterality Date    CERVICAL EPIDURAL N/A 2021    Procedure: CERVICAL EPIDURAL;  Surgeon: Nataliia Avilez MD;  [place] [facility] [place];  Service: Pain Management;  Laterality: N/A;    CERVICAL EPIDURAL N/A 10/18/2021    Procedure: CERVICAL EPIDURAL 7-1;  Surgeon: Nataliia Avilez MD;  Location: [facility] [place] [facility] [place];  Service: Pain Management;  Laterality: N/A;    COLONOSCOPY      date randalow-Gregorio GRIFFIN    CYST REMOVAL      CYSTOSCOPY W/ LITHOLAPAXY / [facility]      TONSILLECTOMY      UPPER GASTROINTESTINAL ENDOSCOPY  10/05/2021    Gastritis   Arlet GRIFFIN       General Information       Row Name 12/06/23 1509          Physical Therapy Time and Intention    Document Type therapy note (daily note);re-evaluation  -PC     Mode of Treatment physical therapy  -PC       Row Name 12/06/23 1509          General Information    Existing Precautions/Restrictions fall  -PC       Row Name 12/06/23 1509          Cognition    Orientation Status (Cognition) disoriented to;person;place;situation;time  -PC       Row Name 12/06/23 1509          Safety Issues, Functional Mobility    Safety Issues Affecting Function (Mobility) ability to follow commands;at risk behavior observed;awareness of need for assistance;impulsivity;insight into deficits/self-awareness;judgment;problem-solving;safety precautions follow-through/compliance  -PC     Impairments Affecting Function (Mobility) balance;cognition;endurance/activity tolerance;strength  -PC               User Key  (r) = Recorded By, (t) = Taken By, (c) = Cosigned By      Initials Name Provider Type    PC Catie Bansal, PT Physical Therapist                   Mobility       Row Name 12/06/23 1510          Bed Mobility    Supine-Sit [place] (Bed Mobility) moderate assist (50% patient effort)  -PC     Sit-Supine [place] (Bed Mobility) maximum assist (25% patient effort)  -PC     Comment, (Bed Mobility) pt very confused, not following commands, pt assisted to sitting edge of bed, remains very confused, not safe to attempt further mobility, pt pulling at lines, for safety reasons, pt was assisted max a to return to supine and B wrist restraints reapplied  -PC               User Key  (r) = Recorded By, (t) = Taken By, (c) = Cosigned By      Initials Name Provider Type    PC Catie Bansal, PT Physical Therapist                   Obj/Interventions       Row Name 12/06/23 1512          Balance    Static Standing Balance contact guard;minimal assist  -PC     Comment, Balance while seated, worked on sitting tolerance, reorientation,  balance  -PC               User Key  (r) = Recorded By, (t) = Taken By, (c) = Cosigned By      Initials Name Provider Type    PC Catie Bansal, PT Physical Therapist                   Goals/Plan    No documentation.                  Clinical Impression       Row Name 12/06/23 1512          Pain    Pretreatment Pain Rating 0/10 - no pain  -PC     Pre/Posttreatment Pain Comment no outward appearance of pain  -PC       Row Name 12/06/23 1512          Plan of Care Review    Plan of Care Reviewed With patient  -PC     Progress declining  -PC     Outcome Evaluation Pt very confused, not following commands, able to sit edge of bed with assist, but not able to attempt further activity due to safety reasons, impaired judgement, impulsivity. PT will cont to follow, hopefully pt will show some improvement in ability to follow commands so PT can cont to progress with mobility  -PC       Row Name 12/06/23 1512          Positioning and Restraints    Pre-Treatment Position in bed  -PC     Post Treatment Position bed  -PC     In Bed supine;call light within reach;encouraged to call for assist;exit alarm on  -PC     Restraints reapplied:;soft limb  -PC               User Key  (r) = Recorded By, (t) = Taken By, (c) = Cosigned By      Initials Name Provider Type    PC Catie Bansal, PT Physical Therapist                   Outcome Measures       Row Name 12/06/23 1515          How much help from another person do you currently need...    Turning from your back to your side while in flat bed without using bedrails? 3  -PC     Moving from lying on back to sitting on the side of a flat bed without bedrails? 3  -PC     Moving to and from a bed to a chair (including a wheelchair)? 2  -PC     Standing up from a chair using your arms (e.g., wheelchair, bedside chair)? 2  -PC     Climbing 3-5 steps with a railing? 1  -PC     To walk in hospital room? 2  -PC     AM-PAC 6 Clicks Score (PT) 13  -PC     Highest Level of Mobility Goal 4 -->  Transfer to chair/commode  -PC               User Key  (r) = Recorded By, (t) = Taken By, (c) = Cosigned By      Initials Name Provider Type    PC Catie Bansal, PT Physical Therapist                                 Physical Therapy Education       Title: PT OT SLP Therapies (In Progress)       Topic: Physical Therapy (In Progress)       Point: Mobility training (In Progress)       Learning Progress Summary             Patient Nonacceptance, E,D, NL by  at 12/6/2023 1515    Nonacceptance, E, NL by  at 12/5/2023 1826    Comment: pt confused    Acceptance, E,TB, VU by PT at 12/4/2023 1559    Acceptance, E, NR by  at 12/4/2023 1419                         Point: Home exercise program (In Progress)       Learning Progress Summary             Patient Nonacceptance, E,D, NL by PC at 12/6/2023 1515    Nonacceptance, E, NL by  at 12/5/2023 1826    Comment: pt confused    Acceptance, E,TB, VU by PT at 12/4/2023 1559    Acceptance, E, NR by  at 12/4/2023 1419                         Point: Body mechanics (In Progress)       Learning Progress Summary             Patient Nonacceptance, E,D, NL by  at 12/6/2023 1515    Nonacceptance, E, NL by  at 12/5/2023 1826    Comment: pt confused    Acceptance, E,TB, VU by PT at 12/4/2023 1559    Acceptance, E, NR by  at 12/4/2023 1419                         Point: Precautions (In Progress)       Learning Progress Summary             Patient Nonacceptance, E,D, NL by  at 12/6/2023 1515    Nonacceptance, E, NL by  at 12/5/2023 1826    Comment: pt confused    Acceptance, E,TB, VU by PT at 12/4/2023 1559    Acceptance, E, NR by  at 12/4/2023 1419                                         User Key       Initials Effective Dates Name Provider Type Discipline    PC 06/16/21 -  Catie Bansal, PT Physical Therapist PT    PT 06/16/21 -  Nargis Juarez RN Registered Nurse Nurse     12/20/21 -  Thang Gutierrez RN Registered Nurse Nurse     05/02/22 -  Aliyah Cummins  PT Physical Therapist PT                  PT Recommendation and Plan     Plan of Care Reviewed With: patient  Progress: declining  Outcome Evaluation: Pt very confused, not following commands, able to sit edge of bed with assist, but not able to attempt further activity due to safety reasons, impaired judgement, impulsivity. PT will cont to follow, hopefully pt will show some improvement in ability to follow commands so PT can cont to progress with mobility     Time Calculation:         PT Charges       Row Name 12/06/23 1516             Time Calculation    Start Time 1429  -PC      Stop Time 1457  -PC      Time Calculation (min) 28 min  -PC      PT Received On 12/06/23  -PC      PT - Next Appointment 12/08/23  -PC                User Key  (r) = Recorded By, (t) = Taken By, (c) = Cosigned By      Initials Name Provider Type    PC Catie Bansal PT Physical Therapist                  Therapy Charges for Today       Code Description Service Date Service Provider Modifiers Qty    00695924222 HC PT THER PROC EA 15 MIN 12/6/2023 Catie Bansal PT GP 2            PT G-Codes  Outcome Measure Options: AM-PAC 6 Clicks Basic Mobility (PT)  AM-PAC 6 Clicks Score (PT): 13  PT Discharge Summary  Anticipated Discharge Disposition (PT): skilled nursing facility    Catie Bansal PT  12/6/2023

## 2023-12-06 NOTE — PROGRESS NOTES
Name: Tyron Hunt ADMIT: 12/3/2023   : 1945  PCP: Tyron Hunt MD    MRN: [id number] LOS: 3 days   AGE/SEX: 78 y.o. male  ROOM: [facility]     Subjective   Subjective   Yesterday's events noted.  Developed significant bradycardia and AV block and was transferred to CCU for monitoring.  Now bradycardia but hemodynamically stable.  No obvious chest pain or shortness of breath.  Occasional gurgling in the upper respiratory tract.  Positive occasional dry cough.  No chest pain.  No shortness of breath.  No wheezing.  No fever or chills.  No abdominal pain.  No nausea or vomiting.  Positive urine output without hematuria.  Now out of the ICU.       Objective   Objective   Vital Signs  Temp:  [96 °F (35.6 °C)-97.7 °F (36.5 °C)] 96 °F (35.6 °C)  Heart Rate:  [] 85  BP: (128-216)/() 138/108  SpO2:  [93 %-98 %] 96 %  on   ;   Device (Oxygen Therapy): room air  No intake or output data in the 24 hours ending 23  Body mass index is 23.67 kg/m².      23  2050 23  0435 23  1624   Weight: 70.3 kg (155 lb) 66.5 kg (146 lb 9.7 oz) 66 kg (145 lb 8.1 oz)     Physical Exam  General.  Elderly gentleman.  He appears to be alert and oriented x 1 only in no apparent pain/distress/diaphoresis   Eyes.  No pallor or jaundice.  Pupils equal round and reactive.  Intact extraocular musculature.    Oral cavity.   dry mucous membrane with no lesions.  Neck.  Supple.  No JVD.  No lymphadenopathy or thyromegaly.  Cardio vascular.  Regular rate and rhythm.  Bradycardia no murmurs appreciated.  Chest.  Poor bilateral air entry with no added sounds.  Abdomen.  Soft lax.  No tenderness.  No organomegaly.  No guarding or rebound.  Extremities.  No clubbing/cyanosis/edema.  CNS.  No acute focal neurological deficits.    Results Review:      Results from last 7 days   Lab Units 23  1621 23  0446 23  1506 23  0744 23  0642 23  2214   SODIUM mmol/L  --  141  --  148*  "141 141   POTASSIUM mmol/L 3.9 3.4* 4.1 3.8 3.5 4.2   CHLORIDE mmol/L  --  107  --  115* 105 103   CO2 mmol/L  --  22.0  --  23.0 24.4 27.0   BUN mg/dL  --  19  --  22 22 19   CREATININE mg/dL  --  0.87  --  0.98 1.01 1.12   GLUCOSE mg/dL  --  122*  --  109* 90 101*   CALCIUM mg/dL  --  8.7  --  8.5* 8.7 9.6   AST (SGOT) U/L  --  30  --  25  --  25   ALT (SGPT) U/L  --  17  --  16  --  19     Estimated Creatinine Clearance: 64.3 mL/min (by C-G formula based on SCr of 0.87 mg/dL).      Results from last 7 days   Lab Units 12/05/23  1533 12/05/23  1433   GLUCOSE mg/dL 137* 138*     Results from last 7 days   Lab Units 12/05/23  1756 12/05/23  1506 12/04/23  0642 12/04/23  0116 12/03/23  2214   HSTROP T ng/L 18 21 26* 22* 23*     Results from last 7 days   Lab Units 12/03/23  2214   PROBNP pg/mL 656.0         Results from last 7 days   Lab Units 12/05/23  1506   MAGNESIUM mg/dL 2.2           Invalid input(s): \"LDLCALC\"  Results from last 7 days   Lab Units 12/06/23  0446 12/05/23  0744 12/04/23  0642 12/03/23  2214   WBC 10*3/mm3 7.75 7.15 6.87 6.68   HEMOGLOBIN g/dL 13.5 12.0* 12.5* 14.9   HEMATOCRIT % 40.2 35.5* 37.2* 44.4   PLATELETS 10*3/mm3 108* 102* 106* 115*   MCV fL 93.3 97.5* 97.6* 96.7   MCH pg 31.3 33.0 32.8 32.5   MCHC g/dL 33.6 33.8 33.6 33.6   RDW % 11.8* 12.2* 12.2* 12.2*   RDW-SD fl 40.8 44.4 44.1 43.6   MPV fL 11.4 11.4 10.9 11.0   NEUTROPHIL % % 78.9* 77.7*  --  74.0   LYMPHOCYTE % % 12.4* 13.1*  --  14.8*   MONOCYTES % % 8.1 8.7  --  10.6   EOSINOPHIL % % 0.0* 0.0*  --  0.0*   BASOPHIL % % 0.1 0.1  --  0.3   NEUTROS ABS 10*3/mm3 6.11 5.55  --  4.94   LYMPHS ABS 10*3/mm3 0.96 0.94  --  0.99   MONOS ABS 10*3/mm3 0.63 0.62  --  0.71   EOS ABS 10*3/mm3 0.00 0.00  --  0.00   BASOS ABS 10*3/mm3 0.01 0.01  --  0.02         Results from last 7 days   Lab Units 12/05/23  1434   PH, ARTERIAL pH units 7.441   PO2 ART mm Hg 112.1*   PCO2, ARTERIAL mm Hg 32.7*   HCO3 ART mmol/L 22.2     Results from last 7 days "   Lab Units 12/05/23  1434 12/05/23  1100 12/05/23  0744 12/03/23  2214   PROCALCITONIN ng/mL  --   --  0.64* 0.11   LACTATE mmol/L 1.0 1.4  --  1.6     Results from last 7 days   Lab Units 12/06/23  0446 12/05/23  0744   CRP mg/dL 7.58* 14.95*         Results from last 7 days   Lab Units 12/03/23  2308 12/03/23  2214   BLOODCX  No growth at 2 days No growth at 2 days     Results from last 7 days   Lab Units 12/03/23  2145   ADENOVIRUS DETECTION BY PCR  Not Detected   CORONAVIRUS 229E  Not Detected   CORONAVIRUS HKU1  Not Detected   CORONAVIRUS NL63  Not Detected   CORONAVIRUS OC43  Not Detected   HUMAN METAPNEUMOVIRUS  Not Detected   HUMAN RHINOVIRUS/ENTEROVIRUS  Not Detected   INFLUENZA B PCR  Not Detected   PARAINFLUENZA 1  Not Detected   PARAINFLUENZA VIRUS 2  Not Detected   PARAINFLUENZA VIRUS 3  Not Detected   PARAINFLUENZA VIRUS 4  Not Detected   BORDETELLA PERTUSSIS PCR  Not Detected   CHLAMYDOPHILA PNEUMONIAE PCR  Not Detected   MYCOPLAMA PNEUMO PCR  Not Detected   INFLUENZA A PCR  Not Detected   RSV, PCR  Not Detected     Results from last 7 days   Lab Units 12/03/23  2309   NITRITE UA  Negative   WBC UA /HPF 3-5*   BACTERIA UA /HPF None Seen   SQUAM EPITHEL UA /HPF 0-2           Imaging:  Imaging Results (Last 24 Hours)       ** No results found for the last 24 hours. **               I reviewed the patient's new clinical results / labs / tests / procedures      Assessment/Plan     Active Hospital Problems    Diagnosis  POA    **Acute encephalopathy [G93.40]  Yes    AV block [I44.30]  No    Bradycardia [R00.1]  No    Dementia without behavioral disturbance [F03.90]  Yes    COVID-19 virus detected [U07.1]  Yes    BPH (benign prostatic hyperplasia) [N40.0]  Yes    Hypertension [I10]  Yes    GERD (gastroesophageal reflux disease) [K21.9]  Yes      Resolved Hospital Problems    Diagnosis Date Resolved POA    Hypernatremia [E87.0] 12/06/2023 No       1.  Metabolic encephalopathy secondary to COVID-19 infection  in a patient with a history of dementia.  CT scan of the brain without contrast was without acute disease.  CNS examination without acute focal deficits.  Plan as needed Zyprexa holding Namenda at this time as he is n.p.o. (look below).  Normal recent TSH/folate/B12. clinically improving.    2.  COVID-19 infection/positive D-dimer/elevated procalcitonin.  Positive for transient hypoxemia.  Chest x-ray without infiltrate.  IV steroids stopped by pulmonary.  Son has declined treatment with remdesivir or Paxlovid.  Positive D-dimer.  Negative lower extremity venous ultrasound.  Awaiting CTA of the chest.  CRP starting to decline..  Procalcitonin follow-up is elevated.  Continue IV Levaquin times total of 5 days.  No leukocytosis.  Resolved fever..  Negative blood cultures till now.  Monitor CBC/CMP/CRP/D-dimer.  Lactic acid was normal.    3.  Thrombocytopenia/new onset anemia.  Thrombocytopenia mostly secondary to COVID infection.  No active bleed.  Stable platelets.  Will closely monitor.  Workup suggestive of anemia of chronic disease and iron deficiency.  Hemoglobin stable.  Closely monitor CBC.  5.  Hypernatremia.  Resolved with D5W.  Mostly secondary to hypovolemia.    4.  History of benign prostatic hypertrophy and nephrolithiasis.  UA without UTI but positive blood.  Will monitor input and outputs.  5.  Hypertension.  Trending up and suboptimal right now.  Cardiology started Norvasc.  Patient still n.p.o. and I will increase Vasotec..  No evidence of angina hypertensive heart failure.  6.  Peptic ulcer disease/GERD.  Holding p.o. Protonix and sucralfate at this time as he is n.p.o. on IV Pepcid.    7.  High risk aspiration.  Speech recommend continuation of n.p.o.  Core track will be inserted.    8.  VTE prophylaxis.  Lovenox.      Discussed my findings and plan of treatment with the patient son who is a physician/patient wife.  Disposition.  To be determined based on clinical course.          Ramila Gutierrez,  MD [facility]  12/06/23  18:04 EST         Review of Systems

## 2023-12-06 NOTE — PLAN OF CARE
Goal Outcome Evaluation:  Plan of Care Reviewed With: spouse, family           Outcome Evaluation: Pt remains confused, unable to follow commands. Heart rate stable throughout the shift. Dietician and RNs attempted to place cortrak for meds and nutrtion, pt did not tolerate, will reevaluate tomorrow. Son and wife updated with patient status and room change.

## 2023-12-06 NOTE — PROGRESS NOTES
"CC: Sinus bradycardia    Interval History: No new acute events overnight      Vital Signs  Temp:  [97.7 °F (36.5 °C)-98.2 °F (36.8 °C)] 97.7 °F (36.5 °C)  Heart Rate:  [] 73  Resp:  [18-22] 22  BP: (138-216)/() 176/93  No intake or output data in the 24 hours ending 12/06/23 0859  Flowsheet Rows      Flowsheet Row First Filed Value   Admission Height 167.6 cm (65.98\") Documented at 12/03/2023 2050   Admission Weight 70.3 kg (155 lb) Documented at 12/03/2023 2050            PHYSICAL EXAM:  General: No acute distress  Resp:NL Rate, symmetric chest expansion,unlabored, clear  CV:NL rate and rhythm, NL PMI, NL S1 and S2, no Murmur, no gallop, no rub, No JVD.   ABD:Nl sounds, no masses or tenderness, nondistended, no guarding or rebound  Neuro: Disoriented   Extr:Normal pedal pulses, No edema or cyanosis, moves all extremities      Results Review:    Results from last 7 days   Lab Units 12/06/23  0446   SODIUM mmol/L 141   POTASSIUM mmol/L 3.4*   CHLORIDE mmol/L 107   CO2 mmol/L 22.0   BUN mg/dL 19   CREATININE mg/dL 0.87   GLUCOSE mg/dL 122*   CALCIUM mg/dL 8.7     Results from last 7 days   Lab Units 12/05/23  1756 12/05/23  1506 12/04/23  0642   HSTROP T ng/L 18 21 26*     Results from last 7 days   Lab Units 12/06/23  0446   WBC 10*3/mm3 7.75   HEMOGLOBIN g/dL 13.5   HEMATOCRIT % 40.2   PLATELETS 10*3/mm3 108*             Results from last 7 days   Lab Units 12/05/23  1506   MAGNESIUM mg/dL 2.2         I reviewed the patient's new clinical results.  I personally viewed and interpreted the patient's EKG/Telemetry data        Medication Review:   Meds reviewed    dextrose, 100 mL/hr, Last Rate: 100 mL/hr (12/06/23 0309)        Assessment/Plan    Mr. Hunt admitted with COVID-19 infection after presenting with generalized body weakness/fall/worsening mental status-underlying times dementia, hypertension, hyperlipidemia.  Mild hypernatremia noted on BMP this morning.  Noted to be in intermittent high degree " AV block this afternoon.  Baseline is sinus with left bundle branch block.  Not on any medication that slows heart rate.  Blood pressure is holding.  At time of exam mental status improved and feels better and back in sinus bradycardia.     Intermittent high degree AV block- Echo with normal EF, no pericardial effusion   COVID-19 infection  Essential hypertension- BP running high. Likely steroid contributing   Alzheimer's dementia/wheeze metabolic encephalopathy and delirium  GERD/PUD    Heart rate mostly in the 40s-sinus bradycardia with left bundle branch block that picks up quickly with movement in bed.  Blood pressure consistently running on the higher side.  Starting amlodipine this morning.  Unremarkable echocardiogram.  He looked more restless and confused this morning  Hemodynamically stable to be on telemetry floor.  Discussed patient with Dr. Hernandez Gan MD  12/06/23  08:59 EST

## 2023-12-06 NOTE — PLAN OF CARE
Goal Outcome Evaluation:  Plan of Care Reviewed With: patient        Progress: no change     Pt remains in CICU, drowsy and confused not following commands. Still bradycardic as low as 37 and went back into the 50s 60s but asymptomatic with it. Cardiology paged and updated no new order continue to monitor. Recived an order for one time dose hydralazine 10mg from cardiology for elevated SBP in the 190s, was effective in bringing it down. Pt had 1 moderate BM and large amount of UOP in the brief, VSS.

## 2023-12-06 NOTE — CONSULTS
"Nutrition Services    Patient Name:  Tyron Hunt  YOB: 1945  MRN: [id number]  Admit Date:  12/3/2023    Assessment Date:  12/06/23    Summary: Nutrition consult for cortrak placement and TF assessment    Admitted with acute encephalopathy.  Weakness, fever, AMS, fall.  Drowsy, confused, not following commands.  +COVID 19.  SLP re-attempted evaluation today, failed.    This RD attempted placement of cortrak multiple times.  Patient not protecting airway.  Unable to safely get cortrak in to correct position.    RD to continue to follow closely.  TF recommendations listed below.    CLINICAL NUTRITION ASSESSMENT      Reason for Assessment Cortrak Placement, Physician Consult, TF Assessment      Diagnosis/Problem   Acute encephalopathy, +COVID 19   Medical/Surgical History Past Medical History:   Diagnosis Date    Anxiety     COVID-19 01/29/2022    Diverticulitis     Gastritis     GERD (gastroesophageal reflux disease)     Gout     H/O complete eye exam 06/2017    Hyperlipidemia     Hypertension     Impaired fasting blood sugar     Kidney stones     Memory loss     Sleep apnea        Past Surgical History:   Procedure Laterality Date    CERVICAL EPIDURAL N/A 08/27/2021    Procedure: CERVICAL EPIDURAL;  Surgeon: Nataliia Avilez MD;  Location: [facility];  Service: Pain Management;  Laterality: N/A;    CERVICAL EPIDURAL N/A 10/18/2021    Procedure: CERVICAL EPIDURAL 7-1;  Surgeon: Nataliia Avilez MD;  Location: [facility];  Service: Pain Management;  Laterality: N/A;    COLONOSCOPY      date randalowDevin GRIFFIN    CYST REMOVAL      CYSTOSCOPY W/ LITHOLAPAXY / EHL      TONSILLECTOMY  1949    UPPER GASTROINTESTINAL ENDOSCOPY  10/05/2021    Gastritis   Arlet GRIFFIN        Anthropometrics        Current Height  Current Weight  BMI kg/m2 Height: 167 cm (65.75\")  Weight: 66 kg (145 lb 8.1 oz) (12/05/23 1624)  Body mass index is 23.67 kg/m².   Adjusted BMI (if applicable)    BMI Category Normal/Healthy (18.4 - " 24.9)   Ideal Body Weight (IBW) 141 lb (64.1 kg)   Usual Body Weight (UBW) 145-157 lb   Weight Trend Loss   Weight History Wt Readings from Last 30 Encounters:   12/05/23 1624 66 kg (145 lb 8.1 oz)   12/04/23 0435 66.5 kg (146 lb 9.7 oz)   12/03/23 2050 70.3 kg (155 lb)   07/14/23 1415 71.2 kg (157 lb)   04/10/23 1118 66.7 kg (147 lb)   01/09/23 1148 68.9 kg (152 lb)   12/12/22 1004 69.4 kg (153 lb)   12/05/22 1104 69.4 kg (153 lb)   10/25/22 1308 68.5 kg (151 lb)   10/10/22 0903 68.5 kg (151 lb)   07/12/22 1319 71.7 kg (158 lb)   06/14/22 1114 70.9 kg (156 lb 6.4 oz)   05/24/22 1301 59 kg (130 lb)   05/05/22 1037 66 kg (145 lb 6.4 oz)   04/15/22 1431 70.4 kg (155 lb 3.2 oz)   03/25/22 0956 66.6 kg (146 lb 12.8 oz)   02/23/22 0955 68.9 kg (152 lb)   02/22/22 1449 68.2 kg (150 lb 4 oz)   02/16/22 1007 68 kg (150 lb)   02/12/22 1300 75.3 kg (166 lb)   02/08/22 0510 65 kg (143 lb 3.2 oz)   02/07/22 0400 64.5 kg (142 lb 4.8 oz)   02/05/22 0500 65.2 kg (143 lb 11.2 oz)   02/01/22 0755 67.6 kg (149 lb 1.6 oz)   01/31/22 0515 67.6 kg (149 lb 1.6 oz)   01/30/22 0323 70.1 kg (154 lb 8 oz)   11/30/21 0910 74 kg (163 lb 3.2 oz)   10/18/21 1122 72.6 kg (160 lb)   09/09/21 1028 73.9 kg (163 lb)   08/27/21 1043 72.6 kg (160 lb)   08/23/21 1131 69.4 kg (153 lb)   08/17/21 0855 74.1 kg (163 lb 6.4 oz)   06/07/21 1522 75.3 kg (166 lb)   05/03/21 1251 75.8 kg (167 lb)   12/16/20 0809 76.2 kg (168 lb)   11/04/20 1407 77.6 kg (171 lb)   10/28/19 1016 78.9 kg (174 lb)   09/26/18 1546 79.4 kg (175 lb)      --  Estimated/Assessed Needs        Current Weight  Weight: 66 kg (145 lb 8.1 oz) (12/05/23 1624)       Energy Requirements    Weight for Calculation 145 lb (66 kg)   Method for Estimation  25 kcal/kg, 30 kcal/kg   EST Needs (kcal/day) 7325-1090       Protein Requirements    Weight for Calculation 145 lb (66 kg)   EST Protein Needs (g/kg) 1.0 - 1.2 gm/kg   EST Daily Needs (g/day) 66-79       Fluid Requirements     Method for Estimation  1 mL/kcal    EST Needs (mL/day)      Labs       Pertinent Labs    Results from last 7 days   Lab Units 12/06/23  1621 12/06/23  0446 12/05/23  1506 12/05/23  0744 12/04/23  0642 12/03/23  2214   SODIUM mmol/L  --  141  --  148* 141 141   POTASSIUM mmol/L 3.9 3.4* 4.1 3.8 3.5 4.2   CHLORIDE mmol/L  --  107  --  115* 105 103   CO2 mmol/L  --  22.0  --  23.0 24.4 27.0   BUN mg/dL  --  19  --  22 22 19   CREATININE mg/dL  --  0.87  --  0.98 1.01 1.12   CALCIUM mg/dL  --  8.7  --  8.5* 8.7 9.6   BILIRUBIN mg/dL  --  0.5  --  0.4  --  0.6   ALK PHOS U/L  --  49  --  44  --  68   ALT (SGPT) U/L  --  17  --  16  --  19   AST (SGOT) U/L  --  30  --  25  --  25   GLUCOSE mg/dL  --  122*  --  109* 90 101*     Results from last 7 days   Lab Units 12/06/23  0446 12/05/23  1506   MAGNESIUM mg/dL  --  2.2   HEMOGLOBIN g/dL 13.5  --    HEMATOCRIT % 40.2  --    WBC 10*3/mm3 7.75  --    ALBUMIN g/dL 3.7  --      Results from last 7 days   Lab Units 12/06/23  0446 12/05/23  0744 12/04/23  0642 12/03/23  2214   PLATELETS 10*3/mm3 108* 102* 106* 115*     COVID19   Date Value Ref Range Status   12/03/2023 Detected (C) Not Detected - Ref. Range Final     Lab Results   Component Value Date    HGBA1C 5.80 (H) 09/21/2021          Medications           Scheduled Medications amLODIPine, 5 mg, Oral, Q24H  cefTRIAXone, 1,000 mg, Intravenous, Q24H  enalaprilat, 1.25 mg, Intravenous, Q6H  enoxaparin, 40 mg, Subcutaneous, Daily  famotidine, 20 mg, Intravenous, Q12H  senna-docusate sodium, 2 tablet, Oral, BID  sodium chloride, 10 mL, Intravenous, Q12H       Infusions dextrose, 75 mL/hr, Last Rate: 75 mL/hr (12/06/23 1334)       PRN Medications   acetaminophen **OR** acetaminophen **OR** acetaminophen    senna-docusate sodium **AND** polyethylene glycol **AND** bisacodyl **AND** bisacodyl    calcium carbonate    influenza vaccine    nitroglycerin    nitroglycerin    OLANZapine    ondansetron **OR** ondansetron    Potassium Replacement - Follow Nurse  / BPA Driven Protocol    [COMPLETED] Insert Peripheral IV **AND** sodium chloride    sodium chloride    sodium chloride     Physical Findings          General Findings confused, disoriented, room air   Oral/Mouth Cavity WDL   Edema  no edema   Gastrointestinal last bowel movement: 12/6   Skin  bruising   Tubes/Drains/Lines none   NFPE Other: severe orbital, temporal   --  Current Nutrition Orders & Evaluation of Intake       Oral Nutrition     Food Allergies NKFA   Current PO Diet NPO Diet NPO Type: Strict NPO   Supplement n/a   PO Evaluation     % PO Intake N/a    Factors Affecting Intake: altered mental status, swallow impairment   --  PES STATEMENT / NUTRITION DIAGNOSIS      Nutrition Dx Problem  Problem: Inadequate Oral Intake  Etiology: Medical Diagnosis - acute encephalopathy and Functional Diagnosis - dysphagia    Signs/Symptoms: NPO and Report/Observation     NUTRITION INTERVENTION / PLAN OF CARE      Intervention Goal(s) Maintain nutrition status, Reduce/improve symptoms, Meet estimated needs, Disease management/therapy, Initiate TF/PN, and Maintain weight         RD Intervention/Action Await initiation of EN/PN, Continue to monitor, Care plan reviewed, and Recommend/order: EN   --      Prescription/Orders:       PO Diet       Supplements       Enteral Nutrition    Enteral Prescription:     Enteral Route tube placement pending    TF Delivery Method Continuous    Enteral Product Fibersource HN    Modular None    Propofol Rate/Kcal     TF Start Rate  20 mL/hr (advance by 10 mL q 6 hrs)    TF Goal Rate  60 mL/hr    Free Water Flush 30 mL Q 4 hr    Provision at Goal:          Calories 1728 kcal, meets 100% needs         Protein  78 gm protein, meets 100% needs         Fluid (mL) 1166 mL free water + 180 mL in flushes         Parenteral Nutrition    New Prescription Ordered? No, recommended   --      Monitor/Evaluation Per protocol, I&O, Pertinent labs, EN delivery/tolerance, Weight, GI status, Symptoms,  POC/GOC, Swallow function   Discharge Plan/Needs Pending clinical course   --    RD to follow per protocol.      Electronically signed by:  Dulce Sam RD  12/06/23 16:52 EST

## 2023-12-06 NOTE — PLAN OF CARE
Goal Outcome Evaluation:  Plan of Care Reviewed With: patient        Progress: declining  Outcome Evaluation: Pt very confused, not following commands, able to sit edge of bed with assist, but not able to attempt further activity due to safety reasons, impaired judgement, impulsivity. PT will cont to follow, hopefully pt will show some improvement in ability to follow commands so PT can cont to progress with mobility      Anticipated Discharge Disposition (PT): skilled nursing facility

## 2023-12-07 ENCOUNTER — APPOINTMENT (OUTPATIENT)
Dept: CT IMAGING | Facility: HOSPITAL | Age: 78
End: 2023-12-07
Payer: MEDICARE

## 2023-12-07 LAB
ALBUMIN SERPL-MCNC: 3.8 G/DL (ref 3.5–5.2)
ALBUMIN/GLOB SERPL: 1.7 G/DL
ALP SERPL-CCNC: 50 U/L (ref 39–117)
ALT SERPL W P-5'-P-CCNC: 21 U/L (ref 1–41)
ANION GAP SERPL CALCULATED.3IONS-SCNC: 12 MMOL/L (ref 5–15)
AST SERPL-CCNC: 30 U/L (ref 1–40)
BASOPHILS # BLD AUTO: 0.01 10*3/MM3 (ref 0–0.2)
BASOPHILS NFR BLD AUTO: 0.1 % (ref 0–1.5)
BILIRUB SERPL-MCNC: 0.9 MG/DL (ref 0–1.2)
BUN SERPL-MCNC: 16 MG/DL (ref 8–23)
BUN/CREAT SERPL: 15.4 (ref 7–25)
CALCIUM SPEC-SCNC: 9 MG/DL (ref 8.6–10.5)
CHLORIDE SERPL-SCNC: 107 MMOL/L (ref 98–107)
CO2 SERPL-SCNC: 24 MMOL/L (ref 22–29)
CREAT SERPL-MCNC: 1.04 MG/DL (ref 0.76–1.27)
CRP SERPL-MCNC: 5.12 MG/DL (ref 0–0.5)
D DIMER PPP FEU-MCNC: 3.11 MCGFEU/ML (ref 0–0.79)
DEPRECATED RDW RBC AUTO: 42 FL (ref 37–54)
EGFRCR SERPLBLD CKD-EPI 2021: 73 ML/MIN/1.73
EOSINOPHIL # BLD AUTO: 0.03 10*3/MM3 (ref 0–0.4)
EOSINOPHIL NFR BLD AUTO: 0.4 % (ref 0.3–6.2)
ERYTHROCYTE [DISTWIDTH] IN BLOOD BY AUTOMATED COUNT: 12.1 % (ref 12.3–15.4)
GLOBULIN UR ELPH-MCNC: 2.3 GM/DL
GLUCOSE SERPL-MCNC: 106 MG/DL (ref 65–99)
HCT VFR BLD AUTO: 41.2 % (ref 37.5–51)
HGB BLD-MCNC: 14 G/DL (ref 13–17.7)
IMM GRANULOCYTES # BLD AUTO: 0.03 10*3/MM3 (ref 0–0.05)
IMM GRANULOCYTES NFR BLD AUTO: 0.4 % (ref 0–0.5)
LYMPHOCYTES # BLD AUTO: 1.32 10*3/MM3 (ref 0.7–3.1)
LYMPHOCYTES NFR BLD AUTO: 19 % (ref 19.6–45.3)
MCH RBC QN AUTO: 32.2 PG (ref 26.6–33)
MCHC RBC AUTO-ENTMCNC: 34 G/DL (ref 31.5–35.7)
MCV RBC AUTO: 94.7 FL (ref 79–97)
MONOCYTES # BLD AUTO: 0.77 10*3/MM3 (ref 0.1–0.9)
MONOCYTES NFR BLD AUTO: 11.1 % (ref 5–12)
NEUTROPHILS NFR BLD AUTO: 4.79 10*3/MM3 (ref 1.7–7)
NEUTROPHILS NFR BLD AUTO: 69 % (ref 42.7–76)
NRBC BLD AUTO-RTO: 0 /100 WBC (ref 0–0.2)
PLATELET # BLD AUTO: 117 10*3/MM3 (ref 140–450)
PMV BLD AUTO: 11.8 FL (ref 6–12)
POTASSIUM SERPL-SCNC: 3.7 MMOL/L (ref 3.5–5.2)
PROCALCITONIN SERPL-MCNC: 0.29 NG/ML (ref 0–0.25)
PROT SERPL-MCNC: 6.1 G/DL (ref 6–8.5)
RBC # BLD AUTO: 4.35 10*6/MM3 (ref 4.14–5.8)
SODIUM SERPL-SCNC: 143 MMOL/L (ref 136–145)
WBC NRBC COR # BLD AUTO: 6.95 10*3/MM3 (ref 3.4–10.8)

## 2023-12-07 PROCEDURE — 36415 COLL VENOUS BLD VENIPUNCTURE: CPT | Performed by: NURSE PRACTITIONER

## 2023-12-07 PROCEDURE — 85025 COMPLETE CBC W/AUTO DIFF WBC: CPT | Performed by: INTERNAL MEDICINE

## 2023-12-07 PROCEDURE — 84145 PROCALCITONIN (PCT): CPT | Performed by: NURSE PRACTITIONER

## 2023-12-07 PROCEDURE — 85379 FIBRIN DEGRADATION QUANT: CPT | Performed by: NURSE PRACTITIONER

## 2023-12-07 PROCEDURE — 80053 COMPREHEN METABOLIC PANEL: CPT | Performed by: INTERNAL MEDICINE

## 2023-12-07 PROCEDURE — 25010000002 ENOXAPARIN PER 10 MG: Performed by: INTERNAL MEDICINE

## 2023-12-07 PROCEDURE — 25510000001 IOPAMIDOL PER 1 ML: Performed by: INTERNAL MEDICINE

## 2023-12-07 PROCEDURE — 3E0G76Z INTRODUCTION OF NUTRITIONAL SUBSTANCE INTO UPPER GI, VIA NATURAL OR ARTIFICIAL OPENING: ICD-10-PCS | Performed by: INTERNAL MEDICINE

## 2023-12-07 PROCEDURE — 25010000002 CEFTRIAXONE PER 250 MG: Performed by: INTERNAL MEDICINE

## 2023-12-07 PROCEDURE — 71275 CT ANGIOGRAPHY CHEST: CPT

## 2023-12-07 PROCEDURE — 0 DEXTROSE 5 % SOLUTION: Performed by: INTERNAL MEDICINE

## 2023-12-07 PROCEDURE — 86140 C-REACTIVE PROTEIN: CPT | Performed by: NURSE PRACTITIONER

## 2023-12-07 RX ORDER — LOSARTAN POTASSIUM 25 MG/1
25 TABLET ORAL
Status: DISCONTINUED | OUTPATIENT
Start: 2023-12-07 | End: 2023-12-08

## 2023-12-07 RX ORDER — MEMANTINE HYDROCHLORIDE 10 MG/1
10 TABLET ORAL EVERY 12 HOURS SCHEDULED
Status: DISCONTINUED | OUTPATIENT
Start: 2023-12-07 | End: 2023-12-08

## 2023-12-07 RX ORDER — MONTELUKAST SODIUM 10 MG/1
10 TABLET ORAL NIGHTLY
Status: DISCONTINUED | OUTPATIENT
Start: 2023-12-07 | End: 2023-12-08

## 2023-12-07 RX ORDER — TAMSULOSIN HYDROCHLORIDE 0.4 MG/1
0.4 CAPSULE ORAL 2 TIMES DAILY
Status: DISCONTINUED | OUTPATIENT
Start: 2023-12-07 | End: 2023-12-08

## 2023-12-07 RX ADMIN — ENOXAPARIN SODIUM 40 MG: 100 INJECTION SUBCUTANEOUS at 08:52

## 2023-12-07 RX ADMIN — TAMSULOSIN HYDROCHLORIDE 0.4 MG: 0.4 CAPSULE ORAL at 20:19

## 2023-12-07 RX ADMIN — DOCUSATE SODIUM 50MG AND SENNOSIDES 8.6MG 2 TABLET: 8.6; 5 TABLET, FILM COATED ORAL at 20:19

## 2023-12-07 RX ADMIN — ENALAPRILAT 2.5 MG: 2.5 INJECTION INTRAVENOUS at 16:56

## 2023-12-07 RX ADMIN — IOPAMIDOL 95 ML: 755 INJECTION, SOLUTION INTRAVENOUS at 16:01

## 2023-12-07 RX ADMIN — FAMOTIDINE 20 MG: 10 INJECTION INTRAVENOUS at 22:36

## 2023-12-07 RX ADMIN — ENALAPRILAT 2.5 MG: 2.5 INJECTION INTRAVENOUS at 03:37

## 2023-12-07 RX ADMIN — ENALAPRILAT 2.5 MG: 2.5 INJECTION INTRAVENOUS at 08:52

## 2023-12-07 RX ADMIN — Medication 10 ML: at 22:36

## 2023-12-07 RX ADMIN — MEMANTINE HYDROCHLORIDE 10 MG: 10 TABLET, FILM COATED ORAL at 20:19

## 2023-12-07 RX ADMIN — Medication 10 ML: at 08:51

## 2023-12-07 RX ADMIN — FAMOTIDINE 20 MG: 10 INJECTION INTRAVENOUS at 08:57

## 2023-12-07 RX ADMIN — LOSARTAN POTASSIUM 25 MG: 25 TABLET, FILM COATED ORAL at 20:20

## 2023-12-07 RX ADMIN — DEXTROSE MONOHYDRATE 75 ML/HR: 50 INJECTION, SOLUTION INTRAVENOUS at 03:39

## 2023-12-07 RX ADMIN — CEFTRIAXONE SODIUM 1000 MG: 1 INJECTION, POWDER, FOR SOLUTION INTRAMUSCULAR; INTRAVENOUS at 11:27

## 2023-12-07 RX ADMIN — MONTELUKAST SODIUM 10 MG: 10 TABLET, FILM COATED ORAL at 20:19

## 2023-12-07 NOTE — PLAN OF CARE
Eyes open to voice, not following commands. Speech is garbled. Covid isolation. Assist with turns. Brief in place, wrap. Bilateral wrist restraints. Oral care completed. NPO. IVF infusing. Vasotec given Q6 IV. Pending chest CT. Sepsis +, LHA aware. Scattered bruising BUE.       Problem: Restraint, Nonviolent  Goal: Absence of Harm or Injury  Intervention: Implement Least Restrictive Safety Strategies  Recent Flowsheet Documentation  Taken 12/7/2023 0400 by Elham Steele RN  Medical Device Protection: IV pole/bag removed from visual field  Less Restrictive Alternative:   bed alarm in use   calming techniques promoted  De-Escalation Techniques:   quiet time facilitated   stimulation decreased  Diversional Activities: television  Taken 12/7/2023 0200 by Elham Steele RN  Medical Device Protection: IV pole/bag removed from visual field  Less Restrictive Alternative:   bed alarm in use   calming techniques promoted  De-Escalation Techniques:   stimulation decreased   increased round frequency  Diversional Activities: television  Taken 12/7/2023 0000 by Elham Steele RN  Medical Device Protection: IV pole/bag removed from visual field  Less Restrictive Alternative:   bed alarm in use   emotional support provided  De-Escalation Techniques:   quiet time facilitated   stimulation decreased  Diversional Activities: television  Taken 12/6/2023 2358 by Elham Steele RN  Diversional Activities: television  Taken 12/6/2023 2200 by Elham Steele RN  Medical Device Protection: IV pole/bag removed from visual field  Less Restrictive Alternative:   bed alarm in use   emotional support provided   therapeutic music  De-Escalation Techniques:   medication administered   reoriented   quiet time facilitated  Diversional Activities: television  Taken 12/6/2023 2000 by Elham Steele RN  Medical Device Protection: IV pole/bag removed from visual field  Less Restrictive Alternative:   bed alarm in use   calming techniques  promoted   emotional support provided  De-Escalation Techniques:   diversional activity encouraged   stimulation decreased  Diversional Activities: television  Intervention: Protect Dignity, Rights, and Personal Wellbeing  Recent Flowsheet Documentation  Taken 12/6/2023 2358 by Elham Steele RN  Trust Relationship/Rapport:   care explained   choices provided   questions encouraged   reassurance provided  Taken 12/6/2023 2000 by Elham Steele RN  Trust Relationship/Rapport:   care explained   choices provided   emotional support provided  Intervention: Protect Skin and Joint Integrity  Recent Flowsheet Documentation  Taken 12/7/2023 0348 by Elham Steele RN  Body Position:   turned   right  Taken 12/7/2023 0200 by Elham Steele RN  Body Position:   supine, legs elevated   upper extremity elevated  Taken 12/6/2023 2358 by Elham Steele RN  Body Position:   right   turned  Taken 12/6/2023 2201 by Elham Steele RN  Body Position: supine, legs elevated  Taken 12/6/2023 2101 by Elham Steele RN  Body Position:   turned   upper extremity elevated   legs elevated  Taken 12/6/2023 2000 by Elham Steele RN  Body Position: sitting up in bed  Taken 12/6/2023 1957 by Elham Steele RN  Body Position: sitting up in bed         Problem: Adult Inpatient Plan of Care  Goal: Absence of Hospital-Acquired Illness or Injury  Intervention: Prevent Skin Injury  Recent Flowsheet Documentation  Taken 12/7/2023 0348 by Elham Steele RN  Body Position:   turned   right  Taken 12/7/2023 0200 by Elham Steele RN  Body Position:   supine, legs elevated   upper extremity elevated  Taken 12/6/2023 2358 by Elham Steele RN  Body Position:   right   turned  Skin Protection:   adhesive use limited   incontinence pads utilized  Taken 12/6/2023 2201 by Elham Steele RN  Body Position: supine, legs elevated  Taken 12/6/2023 2101 by Elham Steele RN  Body Position:   turned   upper extremity elevated   legs  elevated  Taken 12/6/2023 2000 by Elham Steele, RN  Body Position: sitting up in bed  Skin Protection: adhesive use limited  Taken 12/6/2023 1957 by Elham Steele, RN  Body Position: sitting up in bed   Goal Outcome Evaluation:

## 2023-12-07 NOTE — PROGRESS NOTES
Name: Tyron Hunt ADMIT: 12/3/2023   : 1945  PCP: Tyron Hunt MD    MRN: [id number] LOS: 4 days   AGE/SEX: 78 y.o. male  ROOM: [facility]     Subjective   Subjective   No change in the clinical situation.  No more bradycardia episodes.  No obvious chest pain or shortness of breath.  Positive gurgling in the upper respiratory tract.  No cough..  No chest pain.  No shortness of breath.  No wheezing.  No fever or chills.  No abdominal pain.  No nausea or vomiting.  Positive urine output without hematuria.  Still unable to take p.o.  Continues with confusion.  Moving all extremities.  No meaningful speech.   Objective   Objective   Vital Signs  Temp:  [97.5 °F (36.4 °C)-98.6 °F (37 °C)] 98.6 °F (37 °C)  Heart Rate:  [51-84] 84  Resp:  [18-22] 18  BP: (134-161)/() 134/108  SpO2:  [91 %-100 %] 94 %  on   ;   Device (Oxygen Therapy): room air  No intake or output data in the 24 hours ending 23  Body mass index is 23.67 kg/m².      23  0435 23  1624   Weight: 70.3 kg (155 lb) 66.5 kg (146 lb 9.7 oz) 66 kg (145 lb 8.1 oz)     Physical Exam  General.  Elderly gentleman.  He appears to be alert and oriented x 0 today.  No apparent pain/distress/diaphoresis   Eyes.  No pallor or jaundice.  Pupils equal round and reactive.  Intact extraocular musculature.    Oral cavity.   dry mucous membrane with no lesions.  Nose.  Core track tube on the right nostril.  Neck.  Supple.  No JVD.  No lymphadenopathy or thyromegaly.  Cardio vascular.  Regular rate and rhythm..  No murmurs appreciated.  Chest.  Poor bilateral air entry with no bilateral rhonchi and occasional coarse secretions crackles.  Abdomen.  Soft lax.  No tenderness.  No organomegaly.  No guarding or rebound.  Extremities.  No clubbing/cyanosis/edema.  CNS.  No acute focal neurological deficits.    Results Review:      Results from last 7 days   Lab Units 23  0623 23  1621 23  0446 23  1506  "12/05/23  0744 12/04/23  0642 12/03/23  2214   SODIUM mmol/L 143  --  141  --  148* 141 141   POTASSIUM mmol/L 3.7 3.9 3.4* 4.1 3.8 3.5 4.2   CHLORIDE mmol/L 107  --  107  --  115* 105 103   CO2 mmol/L 24.0  --  22.0  --  23.0 24.4 27.0   BUN mg/dL 16  --  19  --  22 22 19   CREATININE mg/dL 1.04  --  0.87  --  0.98 1.01 1.12   GLUCOSE mg/dL 106*  --  122*  --  109* 90 101*   CALCIUM mg/dL 9.0  --  8.7  --  8.5* 8.7 9.6   AST (SGOT) U/L 30  --  30  --  25  --  25   ALT (SGPT) U/L 21  --  17  --  16  --  19     Estimated Creatinine Clearance: 53.8 mL/min (by C-G formula based on SCr of 1.04 mg/dL).      Results from last 7 days   Lab Units 12/05/23  1533 12/05/23  1433   GLUCOSE mg/dL 137* 138*     Results from last 7 days   Lab Units 12/05/23  1756 12/05/23  1506 12/04/23  0642 12/04/23  0116 12/03/23  2214   HSTROP T ng/L 18 21 26* 22* 23*     Results from last 7 days   Lab Units 12/03/23  2214   PROBNP pg/mL 656.0         Results from last 7 days   Lab Units 12/05/23  1506   MAGNESIUM mg/dL 2.2           Invalid input(s): \"LDLCALC\"  Results from last 7 days   Lab Units 12/07/23  0623 12/06/23  0446 12/05/23  0744 12/04/23  0642 12/03/23  2214   WBC 10*3/mm3 6.95 7.75 7.15 6.87 6.68   HEMOGLOBIN g/dL 14.0 13.5 12.0* 12.5* 14.9   HEMATOCRIT % 41.2 40.2 35.5* 37.2* 44.4   PLATELETS 10*3/mm3 117* 108* 102* 106* 115*   MCV fL 94.7 93.3 97.5* 97.6* 96.7   MCH pg 32.2 31.3 33.0 32.8 32.5   MCHC g/dL 34.0 33.6 33.8 33.6 33.6   RDW % 12.1* 11.8* 12.2* 12.2* 12.2*   RDW-SD fl 42.0 40.8 44.4 44.1 43.6   MPV fL 11.8 11.4 11.4 10.9 11.0   NEUTROPHIL % % 69.0 78.9* 77.7*  --  74.0   LYMPHOCYTE % % 19.0* 12.4* 13.1*  --  14.8*   MONOCYTES % % 11.1 8.1 8.7  --  10.6   EOSINOPHIL % % 0.4 0.0* 0.0*  --  0.0*   BASOPHIL % % 0.1 0.1 0.1  --  0.3   IMM GRAN % % 0.4  --   --   --   --    NEUTROS ABS 10*3/mm3 4.79 6.11 5.55  --  4.94   LYMPHS ABS 10*3/mm3 1.32 0.96 0.94  --  0.99   MONOS ABS 10*3/mm3 0.77 0.63 0.62  --  0.71   EOS ABS " 10*3/mm3 0.03 0.00 0.00  --  0.00   BASOS ABS 10*3/mm3 0.01 0.01 0.01  --  0.02   IMMATURE GRANS (ABS) 10*3/mm3 0.03  --   --   --   --    NRBC /100 WBC 0.0  --   --   --   --          Results from last 7 days   Lab Units 12/05/23  1434   PH, ARTERIAL pH units 7.441   PO2 ART mm Hg 112.1*   PCO2, ARTERIAL mm Hg 32.7*   HCO3 ART mmol/L 22.2     Results from last 7 days   Lab Units 12/07/23  0623 12/05/23  1434 12/05/23  1100 12/05/23  0744 12/03/23  2214   PROCALCITONIN ng/mL 0.29*  --   --  0.64* 0.11   LACTATE mmol/L  --  1.0 1.4  --  1.6     Results from last 7 days   Lab Units 12/07/23  0623 12/06/23  0446 12/05/23  0744   CRP mg/dL 5.12* 7.58* 14.95*         Results from last 7 days   Lab Units 12/03/23  2308 12/03/23  2214   BLOODCX  No growth at 3 days No growth at 3 days     Results from last 7 days   Lab Units 12/03/23  2145   ADENOVIRUS DETECTION BY PCR  Not Detected   CORONAVIRUS 229E  Not Detected   CORONAVIRUS HKU1  Not Detected   CORONAVIRUS NL63  Not Detected   CORONAVIRUS OC43  Not Detected   HUMAN METAPNEUMOVIRUS  Not Detected   HUMAN RHINOVIRUS/ENTEROVIRUS  Not Detected   INFLUENZA B PCR  Not Detected   PARAINFLUENZA 1  Not Detected   PARAINFLUENZA VIRUS 2  Not Detected   PARAINFLUENZA VIRUS 3  Not Detected   PARAINFLUENZA VIRUS 4  Not Detected   BORDETELLA PERTUSSIS PCR  Not Detected   CHLAMYDOPHILA PNEUMONIAE PCR  Not Detected   MYCOPLAMA PNEUMO PCR  Not Detected   INFLUENZA A PCR  Not Detected   RSV, PCR  Not Detected     Results from last 7 days   Lab Units 12/03/23  2309   NITRITE UA  Negative   WBC UA /HPF 3-5*   BACTERIA UA /HPF None Seen   SQUAM EPITHEL UA /HPF 0-2           Imaging:  Imaging Results (Last 24 Hours)       Procedure Component Value Units Date/Time    CT Angiogram Chest [[id number]] Collected: 12/07/23 1711     Updated: 12/07/23 1711    Narrative:      CT ANGIOGRAM OF THE CHEST. MULTIPLE CORONAL, SAGITTAL, AND 3-D  RECONSTRUCTIONS.     HISTORY: COVID-19 positive. Elevated  D-dimer.     TECHNIQUE: Radiation dose reduction techniques were utilized, including  automated exposure control and exposure modulation based on body size.   CT angiogram of the chest was performed following the administration of  IV contrast. Coronal, sagittal, and 3-D reconstruction images were  obtained.     COMPARISON: AP chest 12/03/2023, CT chest 02/08/2022     FINDINGS: There is no intrapulmonary arterial filling defect to diagnose  a pulmonary embolus. There are several small mediastinal lymph nodes  which are most likely reactive. There is no axillary charisma enlargement.  The ascending thoracic aorta measures 3.6 cm diameter. Atherosclerotic  calcifications are present and there are coronary arterial  calcifications. Feeding tube is present.     No endotracheal or central endobronchial lesion is demonstrated. There  is bilateral lower lobe bronchial wall thickening and there are  bilateral lower lobe patchy ground-glass opacities. There is more dense  consolidation within the posteromedial aspects of both lower lobes,  greater on the left and there is material filling peripheral lower lobe  bronchi consistent with mucous plugging or infection. There is no  pneumothorax. No pleural effusion or pericardial effusion.     There is multilevel bridging endplate spur formation in the thoracic  spine compatible with DISH. Arthritic changes are present at both  shoulders and there are chronic osteochondral bodies within the right  subcoracoid recess.     Imaging through the upper abdomen demonstrates 2 mm and smaller upper  pole renal stones. There is an anterior right upper pole renal  low-density lesion that is most likely a cyst and measures 2.5 cm. Left  upper pole renal low-density lesion is also most likely a cyst and  measures 1.2 cm.       Impression:      1. No evidence for pulmonary thromboembolic disease.  2.. Mild bilateral lower lobe ground-glass infiltrates as well as more  dense areas of consolidation  and suspected infiltrate within the  posteromedial lower lobes, greater on the left associated with material  filling peripheral lower lobe bronchi and bronchial wall thickening.  3. Multiple small mediastinal lymph nodes are most likely reactive.  4. Atherosclerotic disease with mild coronary arterial calcifications.  5. Osteoarthritis at both shoulders, greater on the right with chronic  osteochondral bodies right subcoracoid recess.  6. Tiny bilateral upper pole renal stones. Bilateral upper pole renal  low-density lesions are most likely cysts.                     I reviewed the patient's new clinical results / labs / tests / procedures      Assessment/Plan     Active Hospital Problems    Diagnosis  POA    **Acute encephalopathy [G93.40]  Yes    AV block [I44.30]  No    Bradycardia [R00.1]  No    Dementia without behavioral disturbance [F03.90]  Yes    COVID-19 virus detected [U07.1]  Yes    BPH (benign prostatic hyperplasia) [N40.0]  Yes    Hypertension [I10]  Yes    GERD (gastroesophageal reflux disease) [K21.9]  Yes      Resolved Hospital Problems    Diagnosis Date Resolved POA    Hypernatremia [E87.0] 12/06/2023 No       1.  Metabolic encephalopathy secondary to COVID-19 infection in a patient with a history of dementia.  CT scan of the brain without contrast was without acute disease.  CNS examination without acute focal deficits.  Continue as needed Zyprexa/PI and restraints.  Resume Namenda now that the patient has a core track normal recent TSH/folate/B12.  No meaningful change in the mentation over the last 2 days after initial improvement.  2.  COVID-19 infection/positive D-dimer/elevated procalcitonin/bilateral pneumonia.  Positive  transient hypoxemia.  Chest x-ray without infiltrate.  CTA of the chest revealed no PE but positive bilateral infiltrates.  IV steroids stopped by pulmonary.  Son has declined treatment with remdesivir or Paxlovid.    Negative lower extremity venous ultrasound.   CRP  starting to decline..  Procalcitonin follow-up is elevated.  Continue IV Rocephin times total of 5 days per pulmonary recommendations..  No leukocytosis.  Resolved fever..  Negative blood cultures till now.  Monitor CBC/CMP/CRP/D-dimer.  Lactic acid was normal.    3.  Thrombocytopenia/new onset anemia.  Thrombocytopenia mostly secondary to COVID infection and is improving..  No active bleed.  Hemoglobin is stable.  Continue to monitor CBC.  Anemia workup suggestive of anemia of chronic disease and iron deficiency.    5.  Hypernatremia.  Resolved with D5W.  Was mostly secondary to hypovolemia.    4.  History of benign prostatic hypertrophy and nephrolithiasis.  UA without UTI but positive blood.  Will restart Flomax now that the patient has a core track   5.  Hypertension.  Currently on IV Vasotec.  Will speech back to Norvasc/Cozaar and monitor.  No clinical evidence of angina hypertensive heart failure.  6.  Peptic ulcer disease/GERD.  Holding p.o. Protonix and sucralfate at this time as he is n.p.o. on IV Pepcid.    7.  High risk aspiration.  Speech recommend continuation of n.p.o. core track inserted and NG tube feeds are initiated.    8.  VTE prophylaxis.  Lovenox.      Disposition.  To be determined based on clinical course.          Ramila Gutierrez MD  [facility]  12/07/23  17:14 EST

## 2023-12-07 NOTE — PROGRESS NOTES
"Nutrition Services    Patient Name:  Tyron Hunt  YOB: 1945  MRN: [id number]  Admit Date:  12/3/2023    Assessment Date:  12/07/23    Summary: Verbal consult to attempt Cortrak placement again:  Spoke with RN this am and MD wants to re-attempt cortrak placement with light sedation. Pt given Zyprexa. Placed cortrak at bedside and bridled in place @ 105 cm (ND).   Meds: D5W@75ml/hr  Labs reviewed.    Recommend:  Start TF's of Fibersource HN @ 20 ml/hr and increase 20 ml q 8 hrs as tolerates to goal rate of 60 ml/hr with 30 ml q 4 hrs free water flushes.     RD to continue to follow closely.    CLINICAL NUTRITION ASSESSMENT      Reason for Assessment Cortrak Placement, Physician Consult, TF Assessment      Diagnosis/Problem   Acute encephalopathy, +COVID 19         Anthropometrics        Current Height  Current Weight  BMI kg/m2 Height: 167 cm (65.75\")  Weight: 66 kg (145 lb 8.1 oz) (12/05/23 1624)  Body mass index is 23.67 kg/m².   Adjusted BMI (if applicable)    BMI Category Normal/Healthy (18.4 - 24.9)   Ideal Body Weight (IBW) 141 lb (64.1 kg)   Usual Body Weight (UBW) 145-157 lb   Weight Trend Loss   Weight History Wt Readings from Last 30 Encounters:   12/05/23 1624 66 kg (145 lb 8.1 oz)   12/04/23 0435 66.5 kg (146 lb 9.7 oz)   12/03/23 2050 70.3 kg (155 lb)   07/14/23 1415 71.2 kg (157 lb)   04/10/23 1118 66.7 kg (147 lb)   01/09/23 1148 68.9 kg (152 lb)   12/12/22 1004 69.4 kg (153 lb)   12/05/22 1104 69.4 kg (153 lb)   10/25/22 1308 68.5 kg (151 lb)   10/10/22 0903 68.5 kg (151 lb)   07/12/22 1319 71.7 kg (158 lb)   06/14/22 1114 70.9 kg (156 lb 6.4 oz)   05/24/22 1301 59 kg (130 lb)   05/05/22 1037 66 kg (145 lb 6.4 oz)   04/15/22 1431 70.4 kg (155 lb 3.2 oz)   03/25/22 0956 66.6 kg (146 lb 12.8 oz)   02/23/22 0955 68.9 kg (152 lb)   02/22/22 1449 68.2 kg (150 lb 4 oz)   02/16/22 1007 68 kg (150 lb)   02/12/22 1300 75.3 kg (166 lb)   02/08/22 0510 65 kg (143 lb 3.2 oz)   02/07/22 0400 64.5 " kg (142 lb 4.8 oz)   02/05/22 0500 65.2 kg (143 lb 11.2 oz)   02/01/22 0755 67.6 kg (149 lb 1.6 oz)   01/31/22 0515 67.6 kg (149 lb 1.6 oz)   01/30/22 0323 70.1 kg (154 lb 8 oz)   11/30/21 0910 74 kg (163 lb 3.2 oz)   10/18/21 1122 72.6 kg (160 lb)   09/09/21 1028 73.9 kg (163 lb)   08/27/21 1043 72.6 kg (160 lb)   08/23/21 1131 69.4 kg (153 lb)   08/17/21 0855 74.1 kg (163 lb 6.4 oz)   06/07/21 1522 75.3 kg (166 lb)   05/03/21 1251 75.8 kg (167 lb)   12/16/20 0809 76.2 kg (168 lb)   11/04/20 1407 77.6 kg (171 lb)   10/28/19 1016 78.9 kg (174 lb)   09/26/18 1546 79.4 kg (175 lb)      --  Estimated/Assessed Needs        Current Weight  Weight: 66 kg (145 lb 8.1 oz) (12/05/23 1624)       Energy Requirements    Weight for Calculation 145 lb (66 kg)   Method for Estimation  25 kcal/kg, 30 kcal/kg   EST Needs (kcal/day) 0124-9526       Protein Requirements    Weight for Calculation 145 lb (66 kg)   EST Protein Needs (g/kg) 1.0 - 1.2 gm/kg   EST Daily Needs (g/day) 66-79       Fluid Requirements     Method for Estimation 1 mL/kcal    EST Needs (mL/day)      Labs       Pertinent Labs    Results from last 7 days   Lab Units 12/07/23  0623 12/06/23  1621 12/06/23  0446 12/05/23  1506 12/05/23  0744   SODIUM mmol/L 143  --  141  --  148*   POTASSIUM mmol/L 3.7 3.9 3.4*   < > 3.8   CHLORIDE mmol/L 107  --  107  --  115*   CO2 mmol/L 24.0  --  22.0  --  23.0   BUN mg/dL 16  --  19  --  22   CREATININE mg/dL 1.04  --  0.87  --  0.98   CALCIUM mg/dL 9.0  --  8.7  --  8.5*   BILIRUBIN mg/dL 0.9  --  0.5  --  0.4   ALK PHOS U/L 50  --  49  --  44   ALT (SGPT) U/L 21  --  17  --  16   AST (SGOT) U/L 30  --  30  --  25   GLUCOSE mg/dL 106*  --  122*  --  109*    < > = values in this interval not displayed.     Results from last 7 days   Lab Units 12/07/23  0623 12/06/23  0446 12/05/23  1506   MAGNESIUM mg/dL  --   --  2.2   HEMOGLOBIN g/dL 14.0   < >  --    HEMATOCRIT % 41.2   < >  --    WBC 10*3/mm3 6.95   < >  --    ALBUMIN g/dL  3.8   < >  --     < > = values in this interval not displayed.     Results from last 7 days   Lab Units 12/07/23  0623 12/06/23  0446 12/05/23  0744 12/04/23  0642 12/03/23  2214   PLATELETS 10*3/mm3 117* 108* 102* 106* 115*     COVID19   Date Value Ref Range Status   12/03/2023 Detected (C) Not Detected - Ref. Range Final     Lab Results   Component Value Date    HGBA1C 5.80 (H) 09/21/2021          Medications           Scheduled Medications amLODIPine, 5 mg, Oral, Q24H  cefTRIAXone, 1,000 mg, Intravenous, Q24H  enalaprilat, 2.5 mg, Intravenous, Q6H  enoxaparin, 40 mg, Subcutaneous, Daily  famotidine, 20 mg, Intravenous, Q12H  senna-docusate sodium, 2 tablet, Oral, BID  sodium chloride, 10 mL, Intravenous, Q12H       Infusions dextrose, 75 mL/hr, Last Rate: 75 mL/hr (12/07/23 0339)       PRN Medications   acetaminophen **OR** acetaminophen **OR** acetaminophen    senna-docusate sodium **AND** polyethylene glycol **AND** bisacodyl **AND** bisacodyl    calcium carbonate    influenza vaccine    nitroglycerin    nitroglycerin    OLANZapine    ondansetron **OR** ondansetron    Potassium Replacement - Follow Nurse / BPA Driven Protocol    [COMPLETED] Insert Peripheral IV **AND** sodium chloride    sodium chloride    sodium chloride     Physical Findings          General Findings confused, disoriented, room air   Oral/Mouth Cavity WDL   Edema  no edema   Gastrointestinal last bowel movement: 12/6   Skin  bruising   Tubes/Drains/Lines Cortrak, ND tube, bridle in place   NFPE Other: severe orbital, temporal   --  Current Nutrition Orders & Evaluation of Intake       Oral Nutrition     Food Allergies NKFA   Current PO Diet NPO Diet NPO Type: Strict NPO   Supplement n/a   PO Evaluation     % PO Intake N/a    Factors Affecting Intake: altered mental status, swallow impairment   --  PES STATEMENT / NUTRITION DIAGNOSIS      Nutrition Dx Problem  Problem: Inadequate Oral Intake  Etiology: Medical Diagnosis - acute encephalopathy  and Functional Diagnosis - dysphagia    Signs/Symptoms: NPO and Report/Observation     NUTRITION INTERVENTION / PLAN OF CARE      Intervention Goal(s) Maintain nutrition status, Reduce/improve symptoms, Meet estimated needs, Disease management/therapy, Initiate TF/PN, and Maintain weight         RD Intervention/Action Await initiation of EN/PN, Continue to monitor, Care plan reviewed, and Recommend/order: EN   --      Prescription/Orders:       PO Diet       Supplements       Enteral Nutrition    Enteral Prescription:     Enteral Route tube placement pending    TF Delivery Method Continuous    Enteral Product Fibersource HN    Modular None    Propofol Rate/Kcal     TF Start Rate  20 mL/hr (advance by 10 mL q 6 hrs)    TF Goal Rate  60 mL/hr    Free Water Flush 30 mL Q 4 hr    Provision at Goal:          Calories 1728 kcal, meets 100% needs         Protein  78 gm protein, meets 100% needs         Fluid (mL) 1166 mL free water + 180 mL in flushes         Parenteral Nutrition    New Prescription Ordered? No, recommended   --      Monitor/Evaluation Per protocol, I&O, Pertinent labs, EN delivery/tolerance, Weight, GI status, Symptoms, POC/GOC, Swallow function   Discharge Plan/Needs Pending clinical course   --    RD to follow per protocol.      Electronically signed by:  Madelaine Valdes RD  12/07/23 15:10 EST

## 2023-12-07 NOTE — PROGRESS NOTES
No significant new acute events overnight.  Mr. Briceño continues to be confused and unable to be given oral medications.  On IV enalaprilat  Heart rate within range and no significant heart block present.  Continue current care.  Cardiology will follow patient as needed  Follow-up in cardiology clinic 1 month after discharge.    I have discussed patient with his nurse.

## 2023-12-07 NOTE — PLAN OF CARE
Goal Outcome Evaluation:      VSS. Cortrak was placed this afternoon and feeding has been started. Patient has had no acute issues this shift.     Problem: Restraint, Nonviolent  Goal: Absence of Harm or Injury  Outcome: Ongoing, Not Progressing  Intervention: Implement Least Restrictive Safety Strategies  Recent Flowsheet Documentation  Taken 12/7/2023 1600 by Rosibel Barrera RN  Medical Device Protection:   IV pole/bag removed from visual field   tubing secured  Less Restrictive Alternative:   bed alarm in use   coping techniques promoted   emotional support provided  De-Escalation Techniques:   stimulation decreased   increased round frequency  Diversional Activities: television  Taken 12/7/2023 1532 by Rosibel Barrera RN  Medical Device Protection:   IV pole/bag removed from visual field   tubing secured  Less Restrictive Alternative:   bed alarm in use   coping techniques promoted   emotional support provided  De-Escalation Techniques: stimulation decreased  Diversional Activities: television  Taken 12/7/2023 1400 by Rosibel Barrera RN  Medical Device Protection:   IV pole/bag removed from visual field   tubing secured  Less Restrictive Alternative:   bed alarm in use   coping techniques promoted   emotional support provided  De-Escalation Techniques: stimulation decreased  Diversional Activities: television  Taken 12/7/2023 1200 by Rosibel Barrera RN  Medical Device Protection:   IV pole/bag removed from visual field   tubing secured  Less Restrictive Alternative:   bed alarm in use   coping techniques promoted   emotional support provided  De-Escalation Techniques: stimulation decreased  Diversional Activities: television  Taken 12/7/2023 1000 by Rosibel Barrera RN  Medical Device Protection:   tubing secured   IV pole/bag removed from visual field  Less Restrictive Alternative:   bed alarm in use   coping techniques promoted   emotional support provided  De-Escalation Techniques: stimulation  decreased  Diversional Activities: television  Taken 12/7/2023 0800 by Rosibel Barrera RN  Medical Device Protection:   tubing secured   IV pole/bag removed from visual field  Less Restrictive Alternative:   bed alarm in use   coping techniques promoted   emotional support provided  De-Escalation Techniques:   stimulation decreased   increased round frequency   reoriented  Diversional Activities: television  Intervention: Protect Dignity, Rights, and Personal Wellbeing  Recent Flowsheet Documentation  Taken 12/7/2023 1400 by Rosibel Barrera RN  Trust Relationship/Rapport:   care explained   emotional support provided   reassurance provided  Taken 12/7/2023 0800 by Rosibel Barrera RN  Trust Relationship/Rapport:   care explained   reassurance provided  Intervention: Protect Skin and Joint Integrity  Recent Flowsheet Documentation  Taken 12/7/2023 1600 by Rosibel Barrera RN  Body Position: supine, legs elevated  Taken 12/7/2023 1200 by Rosibel Barrera RN  Body Position:   turned   left  Taken 12/7/2023 1000 by Rosibel Barrera RN  Body Position:   supine   legs elevated   upper extremity elevated  Taken 12/7/2023 0800 by Rosibel Barrera RN  Body Position:   supine   upper extremity elevated

## 2023-12-07 NOTE — CASE MANAGEMENT/SOCIAL WORK
Discharge Planning Assessment  [facility]     Patient Name: Tyron Hunt  MRN: [id number]  Today's Date: 12/7/2023    Admit Date: 12/3/2023    Plan: TBD   Discharge Needs Assessment       Row Name 12/07/23 1451       Living Environment    People in Home spouse    Current Living Arrangements home    Potentially Unsafe Housing Conditions none    Primary Care Provided by spouse/significant other    Provides Primary Care For no one, unable/limited ability to care for self    Family Caregiver if Needed spouse;child(salvador), adult    Quality of Family Relationships helpful;involved;supportive       Transition Planning    Patient/Family Anticipates Transition to inpatient rehabilitation facility;home with help/services    Patient/Family Anticipated Services at Transition rehabilitation services;home health care    Transportation Anticipated family or friend will provide       Discharge Needs Assessment    Readmission Within the Last 30 Days no previous admission in last 30 days    Equipment Currently Used at Home none    Concerns to be Addressed discharge planning    Provided Post Acute Provider List? Yes    Post Acute Provider List Nursing Home    Provided Post Acute Provider Quality & Resource List? Yes    Post Acute Provider Quality and Resource List Nursing Home    Delivered To Support Person    Method of Delivery In person                   Discharge Plan       Row Name 12/07/23 1453       Plan    Plan TBD    Plan Comments S/W pt's spouse Julia outside pt's room - she was getting ready to leave.  Facesheet info confirmed.  Pt and Julia live in a house and pt does not use any home DME. He is usually able to ambulate without difficulty.  Julia assists pt at home with managing his meds, ADLs, meals and transport. No hx of HH or SNF.   CCP discussed DC options including HH and SNF.  Unsure of DC needs at this time.  In case SNF is needed, pt will need precert.  Facility list given and discussed CMS compare info on  Medicare.gov.  PT eval is pending.  CCP will follow and assist w/ appropriate DC plans as needed. ............Sophie ANDRE/ PASCUAL                  Continued Care and Services - Admitted Since 12/3/2023    Coordination has not been started for this encounter.       Expected Discharge Date and Time       Expected Discharge Date Expected Discharge Time    Dec 8, 2023            Demographic Summary       Row Name 12/07/23 1450       General Information    Admission Type inpatient    Arrived From home    Required Notices Provided Important Message from Medicare    Referral Source admission list    Reason for Consult discharge planning    Preferred Language English                   Functional Status       Row Name 12/07/23 1451       Functional Status    Usual Activity Tolerance good       Functional Status, IADL    Medications assistive person    Meal Preparation assistive person    Housekeeping assistive person    Laundry assistive person    Shopping assistive person       Employment/    Employment Status retired                          Sophie Campbell RN

## 2023-12-08 LAB
ALBUMIN SERPL-MCNC: 3.6 G/DL (ref 3.5–5.2)
ALBUMIN/GLOB SERPL: 1.5 G/DL
ALP SERPL-CCNC: 58 U/L (ref 39–117)
ALT SERPL W P-5'-P-CCNC: 27 U/L (ref 1–41)
ANION GAP SERPL CALCULATED.3IONS-SCNC: 12 MMOL/L (ref 5–15)
AST SERPL-CCNC: 31 U/L (ref 1–40)
BACTERIA SPEC AEROBE CULT: NORMAL
BACTERIA SPEC AEROBE CULT: NORMAL
BASOPHILS # BLD AUTO: 0.02 10*3/MM3 (ref 0–0.2)
BASOPHILS NFR BLD AUTO: 0.3 % (ref 0–1.5)
BILIRUB SERPL-MCNC: 0.8 MG/DL (ref 0–1.2)
BUN SERPL-MCNC: 16 MG/DL (ref 8–23)
BUN/CREAT SERPL: 15.8 (ref 7–25)
CALCIUM SPEC-SCNC: 8.9 MG/DL (ref 8.6–10.5)
CHLORIDE SERPL-SCNC: 106 MMOL/L (ref 98–107)
CO2 SERPL-SCNC: 24 MMOL/L (ref 22–29)
CREAT SERPL-MCNC: 1.01 MG/DL (ref 0.76–1.27)
DEPRECATED RDW RBC AUTO: 40.7 FL (ref 37–54)
EGFRCR SERPLBLD CKD-EPI 2021: 75.7 ML/MIN/1.73
EOSINOPHIL # BLD AUTO: 0.09 10*3/MM3 (ref 0–0.4)
EOSINOPHIL NFR BLD AUTO: 1.3 % (ref 0.3–6.2)
ERYTHROCYTE [DISTWIDTH] IN BLOOD BY AUTOMATED COUNT: 12 % (ref 12.3–15.4)
GLOBULIN UR ELPH-MCNC: 2.4 GM/DL
GLUCOSE SERPL-MCNC: 126 MG/DL (ref 65–99)
HCT VFR BLD AUTO: 38.5 % (ref 37.5–51)
HGB BLD-MCNC: 13.2 G/DL (ref 13–17.7)
LYMPHOCYTES # BLD AUTO: 1.32 10*3/MM3 (ref 0.7–3.1)
LYMPHOCYTES NFR BLD AUTO: 19 % (ref 19.6–45.3)
MCH RBC QN AUTO: 32.1 PG (ref 26.6–33)
MCHC RBC AUTO-ENTMCNC: 34.3 G/DL (ref 31.5–35.7)
MCV RBC AUTO: 93.7 FL (ref 79–97)
MONOCYTES # BLD AUTO: 0.92 10*3/MM3 (ref 0.1–0.9)
MONOCYTES NFR BLD AUTO: 13.3 % (ref 5–12)
NEUTROPHILS NFR BLD AUTO: 4.55 10*3/MM3 (ref 1.7–7)
NEUTROPHILS NFR BLD AUTO: 65.5 % (ref 42.7–76)
PLATELET # BLD AUTO: 126 10*3/MM3 (ref 140–450)
PMV BLD AUTO: 11.6 FL (ref 6–12)
POTASSIUM SERPL-SCNC: 3.1 MMOL/L (ref 3.5–5.2)
POTASSIUM SERPL-SCNC: 4.1 MMOL/L (ref 3.5–5.2)
PROT SERPL-MCNC: 6 G/DL (ref 6–8.5)
RBC # BLD AUTO: 4.11 10*6/MM3 (ref 4.14–5.8)
SODIUM SERPL-SCNC: 142 MMOL/L (ref 136–145)
WBC NRBC COR # BLD AUTO: 6.94 10*3/MM3 (ref 3.4–10.8)

## 2023-12-08 PROCEDURE — 84132 ASSAY OF SERUM POTASSIUM: CPT | Performed by: STUDENT IN AN ORGANIZED HEALTH CARE EDUCATION/TRAINING PROGRAM

## 2023-12-08 PROCEDURE — 25010000002 CEFTRIAXONE PER 250 MG: Performed by: INTERNAL MEDICINE

## 2023-12-08 PROCEDURE — 25010000002 OLANZAPINE 10 MG RECONSTITUTED SOLUTION: Performed by: INTERNAL MEDICINE

## 2023-12-08 PROCEDURE — 85025 COMPLETE CBC W/AUTO DIFF WBC: CPT | Performed by: INTERNAL MEDICINE

## 2023-12-08 PROCEDURE — 25010000002 ENOXAPARIN PER 10 MG: Performed by: INTERNAL MEDICINE

## 2023-12-08 PROCEDURE — 80053 COMPREHEN METABOLIC PANEL: CPT | Performed by: INTERNAL MEDICINE

## 2023-12-08 RX ORDER — TERAZOSIN 1 MG/1
1 CAPSULE ORAL EVERY 12 HOURS SCHEDULED
Status: DISCONTINUED | OUTPATIENT
Start: 2023-12-08 | End: 2023-12-14

## 2023-12-08 RX ORDER — MONTELUKAST SODIUM 10 MG/1
10 TABLET ORAL NIGHTLY
Status: DISCONTINUED | OUTPATIENT
Start: 2023-12-08 | End: 2023-12-14

## 2023-12-08 RX ORDER — BISACODYL 10 MG
10 SUPPOSITORY, RECTAL RECTAL DAILY PRN
Status: DISCONTINUED | OUTPATIENT
Start: 2023-12-08 | End: 2023-12-14

## 2023-12-08 RX ORDER — LOSARTAN POTASSIUM 25 MG/1
25 TABLET ORAL
Status: DISCONTINUED | OUTPATIENT
Start: 2023-12-08 | End: 2023-12-14

## 2023-12-08 RX ORDER — ONDANSETRON 2 MG/ML
4 INJECTION INTRAMUSCULAR; INTRAVENOUS EVERY 6 HOURS PRN
Status: DISCONTINUED | OUTPATIENT
Start: 2023-12-08 | End: 2023-12-14

## 2023-12-08 RX ORDER — POLYETHYLENE GLYCOL 3350 17 G/17G
17 POWDER, FOR SOLUTION ORAL DAILY PRN
Status: DISCONTINUED | OUTPATIENT
Start: 2023-12-08 | End: 2023-12-14

## 2023-12-08 RX ORDER — ACETAMINOPHEN 325 MG/1
650 TABLET ORAL EVERY 4 HOURS PRN
Status: DISCONTINUED | OUTPATIENT
Start: 2023-12-08 | End: 2023-12-14

## 2023-12-08 RX ORDER — AMOXICILLIN 250 MG
2 CAPSULE ORAL 2 TIMES DAILY
Status: DISCONTINUED | OUTPATIENT
Start: 2023-12-08 | End: 2023-12-14

## 2023-12-08 RX ORDER — ACETAMINOPHEN 160 MG/5ML
650 SOLUTION ORAL EVERY 4 HOURS PRN
Status: DISCONTINUED | OUTPATIENT
Start: 2023-12-08 | End: 2023-12-14

## 2023-12-08 RX ORDER — AMLODIPINE BESYLATE 5 MG/1
5 TABLET ORAL
Status: DISCONTINUED | OUTPATIENT
Start: 2023-12-08 | End: 2023-12-14

## 2023-12-08 RX ORDER — MEMANTINE HYDROCHLORIDE 10 MG/1
10 TABLET ORAL EVERY 12 HOURS SCHEDULED
Status: DISCONTINUED | OUTPATIENT
Start: 2023-12-08 | End: 2023-12-14

## 2023-12-08 RX ORDER — OLANZAPINE 10 MG/2ML
2.5 INJECTION, POWDER, FOR SOLUTION INTRAMUSCULAR EVERY 8 HOURS PRN
Status: DISCONTINUED | OUTPATIENT
Start: 2023-12-08 | End: 2023-12-15 | Stop reason: HOSPADM

## 2023-12-08 RX ORDER — ACETAMINOPHEN 650 MG/1
650 SUPPOSITORY RECTAL EVERY 4 HOURS PRN
Status: DISCONTINUED | OUTPATIENT
Start: 2023-12-08 | End: 2023-12-14

## 2023-12-08 RX ORDER — BISACODYL 5 MG/1
5 TABLET, DELAYED RELEASE ORAL DAILY PRN
Status: DISCONTINUED | OUTPATIENT
Start: 2023-12-08 | End: 2023-12-14

## 2023-12-08 RX ORDER — POTASSIUM CHLORIDE 1.5 G/1.58G
40 POWDER, FOR SOLUTION ORAL EVERY 4 HOURS
Status: COMPLETED | OUTPATIENT
Start: 2023-12-08 | End: 2023-12-08

## 2023-12-08 RX ORDER — CALCIUM CARBONATE 500 MG/1
2 TABLET, CHEWABLE ORAL 2 TIMES DAILY PRN
Status: DISCONTINUED | OUTPATIENT
Start: 2023-12-08 | End: 2023-12-14

## 2023-12-08 RX ORDER — ONDANSETRON 4 MG/1
4 TABLET, FILM COATED ORAL EVERY 6 HOURS PRN
Status: DISCONTINUED | OUTPATIENT
Start: 2023-12-08 | End: 2023-12-14

## 2023-12-08 RX ADMIN — MEMANTINE HYDROCHLORIDE 10 MG: 10 TABLET, FILM COATED ORAL at 09:11

## 2023-12-08 RX ADMIN — Medication 10 ML: at 20:55

## 2023-12-08 RX ADMIN — AMLODIPINE BESYLATE 5 MG: 5 TABLET ORAL at 09:11

## 2023-12-08 RX ADMIN — POTASSIUM CHLORIDE 40 MEQ: 1.5 POWDER, FOR SOLUTION ORAL at 11:39

## 2023-12-08 RX ADMIN — Medication 10 ML: at 09:29

## 2023-12-08 RX ADMIN — ENOXAPARIN SODIUM 40 MG: 100 INJECTION SUBCUTANEOUS at 09:10

## 2023-12-08 RX ADMIN — MONTELUKAST SODIUM 10 MG: 10 TABLET, FILM COATED ORAL at 20:55

## 2023-12-08 RX ADMIN — FAMOTIDINE 20 MG: 10 INJECTION INTRAVENOUS at 20:55

## 2023-12-08 RX ADMIN — POTASSIUM CHLORIDE 40 MEQ: 1.5 POWDER, FOR SOLUTION ORAL at 16:01

## 2023-12-08 RX ADMIN — OLANZAPINE 5 MG: 10 INJECTION, POWDER, FOR SOLUTION INTRAMUSCULAR at 00:59

## 2023-12-08 RX ADMIN — LOSARTAN POTASSIUM 25 MG: 25 TABLET, FILM COATED ORAL at 09:11

## 2023-12-08 RX ADMIN — CEFTRIAXONE SODIUM 1000 MG: 1 INJECTION, POWDER, FOR SOLUTION INTRAMUSCULAR; INTRAVENOUS at 11:39

## 2023-12-08 RX ADMIN — MEMANTINE HYDROCHLORIDE 10 MG: 10 TABLET, FILM COATED ORAL at 20:55

## 2023-12-08 RX ADMIN — TERAZOSIN HYDROCHLORIDE 1 MG: 1 CAPSULE ORAL at 10:19

## 2023-12-08 RX ADMIN — SENNOSIDES AND DOCUSATE SODIUM 2 TABLET: 50; 8.6 TABLET ORAL at 20:55

## 2023-12-08 RX ADMIN — POTASSIUM CHLORIDE 40 MEQ: 1.5 POWDER, FOR SOLUTION ORAL at 09:11

## 2023-12-08 RX ADMIN — FAMOTIDINE 20 MG: 10 INJECTION INTRAVENOUS at 09:18

## 2023-12-08 RX ADMIN — TERAZOSIN HYDROCHLORIDE 1 MG: 1 CAPSULE ORAL at 20:55

## 2023-12-08 NOTE — PROGRESS NOTES
Name: Tyron Hunt ADMIT: 12/3/2023   : 1945  PCP: Tyron Hunt MD    MRN: [id number] LOS: 5 days   AGE/SEX: 78 y.o. male  ROOM: [facility]     Subjective   Subjective   Resting in bed, patient is encephalopathic. He moans/ withdraws his legs/arms  when touched. He is in restraints. Cortrak placed/stated on TF.        Objective   Objective   Vital Signs  Temp:  [97.7 °F (36.5 °C)-101.7 °F (38.7 °C)] 97.7 °F (36.5 °C)  Heart Rate:  [70-86] 70  Resp:  [18-22] 18  BP: (105-168)/() 105/60  SpO2:  [90 %-95 %] 95 %  on   ;   Device (Oxygen Therapy): room air  Body mass index is 23.67 kg/m².  Physical Exam  Constitutional:       General: He is not in acute distress.     Appearance: He is ill-appearing. He is not toxic-appearing.      Comments: Encephalopathic   HENT:      Head: Normocephalic and atraumatic.      Nose:      Comments: Cortrak in place     Mouth/Throat:      Mouth: Mucous membranes are dry.      Pharynx: Oropharynx is clear.   Eyes:      Comments: Miosis   Cardiovascular:      Rate and Rhythm: Normal rate and regular rhythm.      Heart sounds: No murmur heard.  Pulmonary:      Effort: Pulmonary effort is normal. No respiratory distress.      Breath sounds: Normal breath sounds.      Comments: Upper respiratory congestion  Abdominal:      General: Bowel sounds are normal.      Palpations: Abdomen is soft.      Tenderness: There is no abdominal tenderness.   Musculoskeletal:         General: No tenderness.      Right lower leg: No edema.      Left lower leg: No edema.   Skin:     General: Skin is warm and dry.   Neurological:      Motor: Weakness (generalized) present.      Comments: Moves all 4 extremities equally   Psychiatric:      Comments: Unable to assess         Results Review     I reviewed the patient's new clinical results.  Results from last 7 days   Lab Units 23  0645 23  0623 23  0446 23  0744   WBC 10*3/mm3 6.94 6.95 7.75 7.15   HEMOGLOBIN g/dL 13.2 14.0  13.5 12.0*   PLATELETS 10*3/mm3 126* 117* 108* 102*     Results from last 7 days   Lab Units 12/08/23  0645 12/07/23  0623 12/06/23  1621 12/06/23  0446 12/05/23  1506 12/05/23  0744   SODIUM mmol/L 142 143  --  141  --  148*   POTASSIUM mmol/L 3.1* 3.7 3.9 3.4*   < > 3.8   CHLORIDE mmol/L 106 107  --  107  --  115*   CO2 mmol/L 24.0 24.0  --  22.0  --  23.0   BUN mg/dL 16 16  --  19  --  22   CREATININE mg/dL 1.01 1.04  --  0.87  --  0.98   GLUCOSE mg/dL 126* 106*  --  122*  --  109*    < > = values in this interval not displayed.   Estimated Creatinine Clearance: 55.4 mL/min (by C-G formula based on SCr of 1.01 mg/dL).  Results from last 7 days   Lab Units 12/08/23  0645 12/07/23  0623 12/06/23  0446 12/05/23  0744   ALBUMIN g/dL 3.6 3.8 3.7 3.6   BILIRUBIN mg/dL 0.8 0.9 0.5 0.4   ALK PHOS U/L 58 50 49 44   AST (SGOT) U/L 31 30 30 25   ALT (SGPT) U/L 27 21 17 16     Results from last 7 days   Lab Units 12/08/23  0645 12/07/23  0623 12/06/23  0446 12/05/23  1506 12/05/23  0744   CALCIUM mg/dL 8.9 9.0 8.7  --  8.5*   ALBUMIN g/dL 3.6 3.8 3.7  --  3.6   MAGNESIUM mg/dL  --   --   --  2.2  --      Results from last 7 days   Lab Units 12/07/23  0623 12/05/23  1434 12/05/23  1100 12/05/23  0744 12/03/23  2214   PROCALCITONIN ng/mL 0.29*  --   --  0.64* 0.11   LACTATE mmol/L  --  1.0 1.4  --  1.6     COVID19   Date Value Ref Range Status   12/03/2023 Detected (C) Not Detected - Ref. Range Final     SARS-CoV-2, ISIAH   Date Value Ref Range Status   07/18/2022 Not Detected Not Detected Final     Comment:     This nucleic acid amplification test was developed and its performance  characteristics determined by [facility]. Nucleic acid  amplification tests include RT-PCR and TMA. This test has not been  FDA cleared or approved. This test has been authorized by FDA under  an Emergency Use Authorization (EUA). This test is only authorized  for the duration of time the declaration that circumstances exist  justifying the  authorization of the emergency use of in vitro  diagnostic tests for detection of SARS-CoV-2 virus and/or diagnosis  of COVID-19 infection under section 564(b)(1) of the Act, 21 U.S.C.  360bbb-3(b) (1), unless the authorization is terminated or revoked  sooner.  When diagnostic testing is negative, the possibility of a false  negative result should be considered in the context of a patient's  recent exposures and the presence of clinical signs and symptoms  consistent with COVID-19. An individual without symptoms of COVID-19  and who is not shedding SARS-CoV-2 virus would expect to have a  negative (not detected) result in this assay.       Glucose   Date/Time Value Ref Range Status   12/05/2023 1533 137 (H) 70 - 130 mg/dL Final   12/05/2023 1433 138 (H) 70 - 130 mg/dL Final       CT Angiogram Chest  Narrative: CT ANGIOGRAM OF THE CHEST. MULTIPLE CORONAL, SAGITTAL, AND 3-D  RECONSTRUCTIONS.     HISTORY: COVID-19 positive. Elevated D-dimer.     TECHNIQUE: Radiation dose reduction techniques were utilized, including  automated exposure control and exposure modulation based on body size.   CT angiogram of the chest was performed following the administration of  IV contrast. Coronal, sagittal, and 3-D reconstruction images were  obtained.     COMPARISON: AP chest 12/03/2023, CT chest 02/08/2022     FINDINGS: There is no intrapulmonary arterial filling defect to diagnose  a pulmonary embolus. There are several small mediastinal lymph nodes  which are most likely reactive. There is no axillary charisma enlargement.  The ascending thoracic aorta measures 3.6 cm diameter. Atherosclerotic  calcifications are present and there are coronary arterial  calcifications. Feeding tube is present.     No endotracheal or central endobronchial lesion is demonstrated. There  is bilateral lower lobe bronchial wall thickening and there are  bilateral lower lobe patchy ground-glass opacities. There is more dense  consolidation within the  posteromedial aspects of both lower lobes,  greater on the left and there is material filling peripheral lower lobe  bronchi consistent with mucous plugging or infection. There is no  pneumothorax. No pleural effusion or pericardial effusion.     There is multilevel bridging endplate spur formation in the thoracic  spine compatible with DISH. Arthritic changes are present at both  shoulders and there are chronic osteochondral bodies within the right  subcoracoid recess.     Imaging through the upper abdomen demonstrates 2 mm and smaller upper  pole renal stones. There is an anterior right upper pole renal  low-density lesion that is most likely a cyst and measures 2.5 cm. Left  upper pole renal low-density lesion is also most likely a cyst and  measures 1.2 cm.     Impression: 1. No evidence for pulmonary thromboembolic disease.  2.. Mild bilateral lower lobe ground-glass infiltrates as well as more  dense areas of consolidation and suspected infiltrate within the  posteromedial lower lobes, greater on the left associated with material  filling peripheral lower lobe bronchi and bronchial wall thickening.  3. Multiple small mediastinal lymph nodes are most likely reactive.  4. Atherosclerotic disease with mild coronary arterial calcifications.  5. Osteoarthritis at both shoulders, greater on the right where there  are chronic osteochondral bodies within the right subcoracoid recess.  6. Tiny bilateral upper pole renal stones. Bilateral upper pole renal  low-density lesions are most likely cysts.        This report was finalized on 12/7/2023 5:36 PM by Dr. Douglas Negro M.D on Workstation: [id number]       Scheduled Medications  amLODIPine, 5 mg, Nasogastric, Q24H  cefTRIAXone, 1,000 mg, Intravenous, Q24H  enoxaparin, 40 mg, Subcutaneous, Daily  famotidine, 20 mg, Intravenous, Q12H  losartan, 25 mg, Nasogastric, Q24H  memantine, 10 mg, Nasogastric, Q12H  montelukast, 10 mg, Nasogastric, Nightly  potassium chloride,  40 mEq, Oral, Q4H  senna-docusate sodium, 2 tablet, Nasogastric, BID  sodium chloride, 10 mL, Intravenous, Q12H  terazosin, 1 mg, Nasogastric, Q12H    Infusions   Diet  NPO Diet NPO Type: Strict NPO         I have personally reviewed:  [x]  Laboratory   [x]  Microbiology   [x]  Radiology   [x]  EKG/Telemetry   []  Cardiology/Vascular   []  Pathology   [x]  Records    Assessment/Plan     Active Hospital Problems    Diagnosis  POA    **Acute encephalopathy [G93.40]  Yes    AV block [I44.30]  No    Bradycardia [R00.1]  No    Dementia without behavioral disturbance [F03.90]  Yes    COVID-19 virus detected [U07.1]  Yes    BPH (benign prostatic hyperplasia) [N40.0]  Yes    Hypertension [I10]  Yes    GERD (gastroesophageal reflux disease) [K21.9]  Yes      Resolved Hospital Problems    Diagnosis Date Resolved POA    Hypernatremia [E87.0] 12/06/2023 No       78 y.o. male admitted with Acute encephalopathy.    Metabolic encephalopathy  COVID-19 infection with bilateral pneumonia  Dementia  -Metabolic encephalopathy suspected secondary to hospital induced delirium/COVID delirium superimposed on chronic dementia  - CT head negative, CTA chest negative for PE  - Continue namenda; RN informs the 2x that he received Zyprexa he is sedated afterwards for a prolonged period of time, will cut back to 2.5mg zyprexa (miosis possibly attributed to zyprexa?- will d/w pharmacy)  - continue rocephin for possible superimposed bacterial pneumonia    Thrombocytopenia  -Likely secondary to acute COVID infection  -Plt 126    Intermittent high degree AV block  HTN  - cards consulted, briefly in ICU  - echo w/ normal EF, no effusion  - continue losartan and amlodipine per cardiology    Hypernatremia  - resolved with D5w  - now on TF w/ free water    BPH  - continue terazosin    GERD  - Pepcid    Lovenox 40 mg SC daily for DVT prophylaxis.  Full code.  Discussed with nursing staff and CCP.  Anticipate discharge  tbd  timing yet to be  determined.      Moira Valles MD  [facility]  12/08/23  13:57 EST    I wore protective equipment throughout this patient encounter including gloves.  Hand hygiene was performed before donning protective equipment and after removal when leaving the room.    Expected Discharge Date and Time       Expected Discharge Date Expected Discharge Time    Dec 12, 2023              Copied text in this note has been reviewed and is accurate as of 12/08/23.

## 2023-12-08 NOTE — PLAN OF CARE
Problem: Restraint, Nonviolent  Goal: Absence of Harm or Injury  12/8/2023 1827 by Audrey Adkins, RN  Outcome: Ongoing, Progressing  12/8/2023 1827 by Audrey Adkins, RN  Outcome: Ongoing, Progressing   Goal Outcome Evaluation:           Progress: improving

## 2023-12-08 NOTE — PROGRESS NOTES
"Nutrition Services    Patient Name:  Tyron Hunt  YOB: 1945  MRN: [id number]  Admit Date:  12/3/2023    Assessment Date:  12/08/23  Summary: TF F/up  Current TF regimen:  Labs: Glu 126, K 3.1, Platelets 126  Last BM: 12/6    TF follow up completed. Pt is in restraints d/t his altered mental status. Still NPO. Tolerating TF at goal with no issues.     REC:  Continue Fibersource HN at goal (60 mL/hr) with 30 ml q 4 hrs free water flushes  Monitor and replace electrolytes PRN   Advance diet as tolerated and once medically appropriate per MD     RD to continue to follow closely.      CLINICAL NUTRITION ASSESSMENT      Reason for Assessment Follow up protocol      Diagnosis/Problem   Acute encephalopathy, +COVID 19         Anthropometrics        Current Height  Current Weight  BMI kg/m2 Height: 167 cm (65.75\")  Weight: 66 kg (145 lb 8.1 oz) (12/05/23 1624)  Body mass index is 23.67 kg/m².   Adjusted BMI (if applicable)    BMI Category Normal/Healthy (18.4 - 24.9)   Ideal Body Weight (IBW) 141 lb (64.1 kg)   Usual Body Weight (UBW) 145-157 lb   Weight Trend Loss   Weight History Wt Readings from Last 30 Encounters:   12/05/23 1624 66 kg (145 lb 8.1 oz)   12/04/23 0435 66.5 kg (146 lb 9.7 oz)   12/03/23 2050 70.3 kg (155 lb)   07/14/23 1415 71.2 kg (157 lb)   04/10/23 1118 66.7 kg (147 lb)   01/09/23 1148 68.9 kg (152 lb)   12/12/22 1004 69.4 kg (153 lb)   12/05/22 1104 69.4 kg (153 lb)   10/25/22 1308 68.5 kg (151 lb)   10/10/22 0903 68.5 kg (151 lb)   07/12/22 1319 71.7 kg (158 lb)   06/14/22 1114 70.9 kg (156 lb 6.4 oz)   05/24/22 1301 59 kg (130 lb)   05/05/22 1037 66 kg (145 lb 6.4 oz)   04/15/22 1431 70.4 kg (155 lb 3.2 oz)   03/25/22 0956 66.6 kg (146 lb 12.8 oz)   02/23/22 0955 68.9 kg (152 lb)   02/22/22 1449 68.2 kg (150 lb 4 oz)   02/16/22 1007 68 kg (150 lb)   02/12/22 1300 75.3 kg (166 lb)   02/08/22 0510 65 kg (143 lb 3.2 oz)   02/07/22 0400 64.5 kg (142 lb 4.8 oz)   02/05/22 0500 65.2 kg " (143 lb 11.2 oz)   02/01/22 0755 67.6 kg (149 lb 1.6 oz)   01/31/22 0515 67.6 kg (149 lb 1.6 oz)   01/30/22 0323 70.1 kg (154 lb 8 oz)   11/30/21 0910 74 kg (163 lb 3.2 oz)   10/18/21 1122 72.6 kg (160 lb)   09/09/21 1028 73.9 kg (163 lb)   08/27/21 1043 72.6 kg (160 lb)   08/23/21 1131 69.4 kg (153 lb)   08/17/21 0855 74.1 kg (163 lb 6.4 oz)   06/07/21 1522 75.3 kg (166 lb)   05/03/21 1251 75.8 kg (167 lb)   12/16/20 0809 76.2 kg (168 lb)   11/04/20 1407 77.6 kg (171 lb)   10/28/19 1016 78.9 kg (174 lb)   09/26/18 1546 79.4 kg (175 lb)      --  Estimated/Assessed Needs        Current Weight  Weight: 66 kg (145 lb 8.1 oz) (12/05/23 1624)       Energy Requirements    Weight for Calculation 145 lb (66 kg)   Method for Estimation  25 kcal/kg, 30 kcal/kg   EST Needs (kcal/day) 2400-8932       Protein Requirements    Weight for Calculation 145 lb (66 kg)   EST Protein Needs (g/kg) 1.0 - 1.2 gm/kg   EST Daily Needs (g/day) 66-79       Fluid Requirements     Method for Estimation 1 mL/kcal    EST Needs (mL/day)      Labs       Pertinent Labs    Results from last 7 days   Lab Units 12/08/23  0645 12/07/23  0623 12/06/23  1621 12/06/23  0446   SODIUM mmol/L 142 143  --  141   POTASSIUM mmol/L 3.1* 3.7 3.9 3.4*   CHLORIDE mmol/L 106 107  --  107   CO2 mmol/L 24.0 24.0  --  22.0   BUN mg/dL 16 16  --  19   CREATININE mg/dL 1.01 1.04  --  0.87   CALCIUM mg/dL 8.9 9.0  --  8.7   BILIRUBIN mg/dL 0.8 0.9  --  0.5   ALK PHOS U/L 58 50  --  49   ALT (SGPT) U/L 27 21  --  17   AST (SGOT) U/L 31 30  --  30   GLUCOSE mg/dL 126* 106*  --  122*     Results from last 7 days   Lab Units 12/08/23  0645 12/06/23 0446 12/05/23  1506   MAGNESIUM mg/dL  --   --  2.2   HEMOGLOBIN g/dL 13.2   < >  --    HEMATOCRIT % 38.5   < >  --    WBC 10*3/mm3 6.94   < >  --    ALBUMIN g/dL 3.6   < >  --     < > = values in this interval not displayed.     Results from last 7 days   Lab Units 12/08/23  0645 12/07/23 0623 12/06/23 0446 12/05/23  0744  12/04/23  0642   PLATELETS 10*3/mm3 126* 117* 108* 102* 106*     COVID19   Date Value Ref Range Status   12/03/2023 Detected (C) Not Detected - Ref. Range Final     Lab Results   Component Value Date    HGBA1C 5.80 (H) 09/21/2021          Medications           Scheduled Medications amLODIPine, 5 mg, Nasogastric, Q24H  cefTRIAXone, 1,000 mg, Intravenous, Q24H  enoxaparin, 40 mg, Subcutaneous, Daily  famotidine, 20 mg, Intravenous, Q12H  losartan, 25 mg, Nasogastric, Q24H  memantine, 10 mg, Nasogastric, Q12H  montelukast, 10 mg, Nasogastric, Nightly  potassium chloride, 40 mEq, Oral, Q4H  senna-docusate sodium, 2 tablet, Nasogastric, BID  sodium chloride, 10 mL, Intravenous, Q12H  terazosin, 1 mg, Nasogastric, Q12H       Infusions       PRN Medications   acetaminophen **OR** acetaminophen **OR** acetaminophen    senna-docusate sodium **AND** polyethylene glycol **AND** bisacodyl **AND** bisacodyl    calcium carbonate    influenza vaccine    nitroglycerin    nitroglycerin    OLANZapine    ondansetron **OR** ondansetron    Potassium Replacement - Follow Nurse / BPA Driven Protocol    [COMPLETED] Insert Peripheral IV **AND** sodium chloride    sodium chloride    sodium chloride     Physical Findings          General Findings confused, disoriented, room air   Oral/Mouth Cavity WDL   Edema  upper extremity, 2+ (mild)   Gastrointestinal last bowel movement: 12/6   Skin  bruising, pale   Tubes/Drains/Lines Cortrak, ND tube, bridle in place   NFPE Other: severe orbital, temporal     Current Nutrition Orders & Evaluation of Intake       Oral Nutrition     Current PO Diet NPO Diet NPO Type: Strict NPO   Supplement n/a   PO Evaluation     Trending % PO Intake NPO    Factors Affecting Intake  altered mental status, swallow impairment      Enteral Nutrition     Enteral Route ND    TF Delivery Method Continuous    Propofol Rate/Kcal     Current TF/Rate (mL) Fibersource HN @ 60 mL/hr    TF Goal Rate (mL) 60 mL/hr     Current Water  Flush (mL) 30 Q 4 hr    Modular None    TF Residual  other: not assessed     TF Tolerance tolerating    TF Observation Verified correct TF and water flush infusing per orders     PES STATEMENT / NUTRITION DIAGNOSIS      Nutrition Dx Problem  Problem: Inadequate Oral Intake  Etiology: Medical Diagnosis - acute encephalopathy and Functional Diagnosis - dysphagia    Signs/Symptoms: NPO and Report/Observation     NUTRITION INTERVENTION / PLAN OF CARE      Intervention Goal(s) Maintain nutrition status, Reduce/improve symptoms, Meet estimated needs, Disease management/therapy, Maintain TF/PN, Transition TF to PO, Maintain weight, and No significant weight loss         RD Intervention/Action Await initiation/advancement of PO diet, Continue to monitor, and Care plan reviewed   --      Prescription/Orders:       PO Diet ADAT      Supplements       Enteral Nutrition    Enteral Prescription:     Enteral Route ND    TF Delivery Method Continuous    Enteral Product Fibersource HN    Modular None    Propofol Rate/Kcal     TF Start Rate  20 mL/hr (advance by 10 mL q 6 hrs)    TF Goal Rate  60 mL/hr    Free Water Flush 30 mL Q 4 hr    Provision at Goal:          Calories 1728 kcal, meets 100% needs         Protein  78 gm protein, meets 100% needs         Fluid (mL) 1166 mL free water + 180 mL in flushes         Parenteral Nutrition    New Prescription Ordered? Continue same per protocol, No changes at this time   --      Monitor/Evaluation Per protocol, I&O, Pertinent labs, EN delivery/tolerance, Weight, GI status, Symptoms, POC/GOC, Swallow function   Discharge Plan/Needs Pending clinical course   --    RD to follow per protocol.      Electronically signed by:  Enriqueta Mckeon Dietitian Intern   12/08/23 14:34 EST

## 2023-12-08 NOTE — PLAN OF CARE
Goal Outcome Evaluation:  Plan of Care Reviewed With: patient, spouse        Progress: improving  Outcome Evaluation: VSS stable.  Patient is tolerating his tube feeding, which is up to 60 ml/hr. No acute issues this shift.  Patient is in restraints due to wanting to pulling out his tubes and IVs.

## 2023-12-09 LAB
ALBUMIN SERPL-MCNC: 3.2 G/DL (ref 3.5–5.2)
ALBUMIN/GLOB SERPL: 1.3 G/DL
ALP SERPL-CCNC: 58 U/L (ref 39–117)
ALT SERPL W P-5'-P-CCNC: 41 U/L (ref 1–41)
ANION GAP SERPL CALCULATED.3IONS-SCNC: 11.4 MMOL/L (ref 5–15)
AST SERPL-CCNC: 38 U/L (ref 1–40)
BASOPHILS # BLD AUTO: 0.02 10*3/MM3 (ref 0–0.2)
BASOPHILS NFR BLD AUTO: 0.4 % (ref 0–1.5)
BILIRUB SERPL-MCNC: 0.4 MG/DL (ref 0–1.2)
BUN SERPL-MCNC: 21 MG/DL (ref 8–23)
BUN/CREAT SERPL: 25.9 (ref 7–25)
CALCIUM SPEC-SCNC: 8.5 MG/DL (ref 8.6–10.5)
CHLORIDE SERPL-SCNC: 111 MMOL/L (ref 98–107)
CO2 SERPL-SCNC: 22.6 MMOL/L (ref 22–29)
CREAT SERPL-MCNC: 0.81 MG/DL (ref 0.76–1.27)
DEPRECATED RDW RBC AUTO: 39.3 FL (ref 37–54)
EGFRCR SERPLBLD CKD-EPI 2021: 89.7 ML/MIN/1.73
EOSINOPHIL # BLD AUTO: 0.21 10*3/MM3 (ref 0–0.4)
EOSINOPHIL NFR BLD AUTO: 4.4 % (ref 0.3–6.2)
ERYTHROCYTE [DISTWIDTH] IN BLOOD BY AUTOMATED COUNT: 11.9 % (ref 12.3–15.4)
GLOBULIN UR ELPH-MCNC: 2.5 GM/DL
GLUCOSE SERPL-MCNC: 127 MG/DL (ref 65–99)
HCT VFR BLD AUTO: 36.7 % (ref 37.5–51)
HGB BLD-MCNC: 12.2 G/DL (ref 13–17.7)
IMM GRANULOCYTES # BLD AUTO: 0.06 10*3/MM3 (ref 0–0.05)
IMM GRANULOCYTES NFR BLD AUTO: 1.2 % (ref 0–0.5)
LYMPHOCYTES # BLD AUTO: 1.06 10*3/MM3 (ref 0.7–3.1)
LYMPHOCYTES NFR BLD AUTO: 22 % (ref 19.6–45.3)
MAGNESIUM SERPL-MCNC: 2.3 MG/DL (ref 1.6–2.4)
MCH RBC QN AUTO: 30.4 PG (ref 26.6–33)
MCHC RBC AUTO-ENTMCNC: 33.2 G/DL (ref 31.5–35.7)
MCV RBC AUTO: 91.5 FL (ref 79–97)
MONOCYTES # BLD AUTO: 0.55 10*3/MM3 (ref 0.1–0.9)
MONOCYTES NFR BLD AUTO: 11.4 % (ref 5–12)
NEUTROPHILS NFR BLD AUTO: 2.92 10*3/MM3 (ref 1.7–7)
NEUTROPHILS NFR BLD AUTO: 60.6 % (ref 42.7–76)
NRBC BLD AUTO-RTO: 0 /100 WBC (ref 0–0.2)
PHOSPHATE SERPL-MCNC: 3.2 MG/DL (ref 2.5–4.5)
PLATELET # BLD AUTO: 145 10*3/MM3 (ref 140–450)
PMV BLD AUTO: 12.2 FL (ref 6–12)
POTASSIUM SERPL-SCNC: 4 MMOL/L (ref 3.5–5.2)
PROT SERPL-MCNC: 5.7 G/DL (ref 6–8.5)
RBC # BLD AUTO: 4.01 10*6/MM3 (ref 4.14–5.8)
SODIUM SERPL-SCNC: 145 MMOL/L (ref 136–145)
WBC NRBC COR # BLD AUTO: 4.82 10*3/MM3 (ref 3.4–10.8)

## 2023-12-09 PROCEDURE — 25010000002 CEFTRIAXONE PER 250 MG: Performed by: INTERNAL MEDICINE

## 2023-12-09 PROCEDURE — 84100 ASSAY OF PHOSPHORUS: CPT | Performed by: STUDENT IN AN ORGANIZED HEALTH CARE EDUCATION/TRAINING PROGRAM

## 2023-12-09 PROCEDURE — 80053 COMPREHEN METABOLIC PANEL: CPT | Performed by: INTERNAL MEDICINE

## 2023-12-09 PROCEDURE — 85025 COMPLETE CBC W/AUTO DIFF WBC: CPT | Performed by: INTERNAL MEDICINE

## 2023-12-09 PROCEDURE — 25010000002 ENOXAPARIN PER 10 MG: Performed by: INTERNAL MEDICINE

## 2023-12-09 PROCEDURE — 83735 ASSAY OF MAGNESIUM: CPT | Performed by: STUDENT IN AN ORGANIZED HEALTH CARE EDUCATION/TRAINING PROGRAM

## 2023-12-09 RX ORDER — GUAIFENESIN 600 MG/1
1200 TABLET, EXTENDED RELEASE ORAL EVERY 12 HOURS SCHEDULED
Status: DISCONTINUED | OUTPATIENT
Start: 2023-12-09 | End: 2023-12-09

## 2023-12-09 RX ORDER — GUAIFENESIN 200 MG/10ML
400 LIQUID ORAL EVERY 8 HOURS
Status: DISCONTINUED | OUTPATIENT
Start: 2023-12-09 | End: 2023-12-15 | Stop reason: HOSPADM

## 2023-12-09 RX ADMIN — FAMOTIDINE 20 MG: 10 INJECTION INTRAVENOUS at 12:41

## 2023-12-09 RX ADMIN — Medication 10 ML: at 09:00

## 2023-12-09 RX ADMIN — SENNOSIDES AND DOCUSATE SODIUM 2 TABLET: 50; 8.6 TABLET ORAL at 22:06

## 2023-12-09 RX ADMIN — MEMANTINE HYDROCHLORIDE 10 MG: 10 TABLET, FILM COATED ORAL at 22:06

## 2023-12-09 RX ADMIN — MONTELUKAST SODIUM 10 MG: 10 TABLET, FILM COATED ORAL at 22:06

## 2023-12-09 RX ADMIN — Medication 10 ML: at 22:06

## 2023-12-09 RX ADMIN — ENOXAPARIN SODIUM 40 MG: 100 INJECTION SUBCUTANEOUS at 09:00

## 2023-12-09 RX ADMIN — GUAIFENESIN 400 MG: 200 SOLUTION ORAL at 22:06

## 2023-12-09 RX ADMIN — LOSARTAN POTASSIUM 25 MG: 25 TABLET, FILM COATED ORAL at 09:00

## 2023-12-09 RX ADMIN — CEFTRIAXONE SODIUM 1000 MG: 1 INJECTION, POWDER, FOR SOLUTION INTRAMUSCULAR; INTRAVENOUS at 11:43

## 2023-12-09 RX ADMIN — FAMOTIDINE 20 MG: 10 INJECTION INTRAVENOUS at 22:06

## 2023-12-09 RX ADMIN — SENNOSIDES AND DOCUSATE SODIUM 2 TABLET: 50; 8.6 TABLET ORAL at 09:00

## 2023-12-09 RX ADMIN — MEMANTINE HYDROCHLORIDE 10 MG: 10 TABLET, FILM COATED ORAL at 09:00

## 2023-12-09 RX ADMIN — TERAZOSIN HYDROCHLORIDE 1 MG: 1 CAPSULE ORAL at 22:07

## 2023-12-09 RX ADMIN — AMLODIPINE BESYLATE 5 MG: 5 TABLET ORAL at 09:00

## 2023-12-09 RX ADMIN — TERAZOSIN HYDROCHLORIDE 1 MG: 1 CAPSULE ORAL at 09:00

## 2023-12-09 NOTE — PROGRESS NOTES
Name: Tyron Hunt ADMIT: 12/3/2023   : 1945  PCP: Tyron Hunt MD    MRN: [id number] LOS: 6 days   AGE/SEX: 78 y.o. male  ROOM: [facility]     Subjective   Subjective   Resting in bed, patient improved today.  He remains confused but is able to speak in some complete sentences.  Voice is soft.  Follows commands.       Objective   Objective   Vital Signs  Temp:  [97.3 °F (36.3 °C)-98.9 °F (37.2 °C)] 98.9 °F (37.2 °C)  Heart Rate:  [67-80] 67  Resp:  [18] 18  BP: (105-131)/(60-96) 131/77  SpO2:  [94 %-98 %] 96 %  on   ;   Device (Oxygen Therapy): room air  Body mass index is 23.67 kg/m².  Physical Exam  Constitutional:       General: He is not in acute distress.     Appearance: He is ill-appearing. He is not toxic-appearing.      Comments: Encephalopathic but improving today   HENT:      Head: Normocephalic and atraumatic.      Nose:      Comments: Cortrak in place     Mouth/Throat:      Mouth: Mucous membranes are moist.      Pharynx: Oropharynx is clear.   Cardiovascular:      Rate and Rhythm: Normal rate and regular rhythm.      Heart sounds: No murmur heard.  Pulmonary:      Effort: Pulmonary effort is normal. No respiratory distress.      Breath sounds: Normal breath sounds.      Comments: Upper respiratory congestion  Abdominal:      General: Bowel sounds are normal.      Palpations: Abdomen is soft.      Tenderness: There is no abdominal tenderness.   Musculoskeletal:         General: No tenderness.      Right lower leg: No edema.      Left lower leg: No edema.   Skin:     General: Skin is warm and dry.   Neurological:      Motor: Weakness (generalized) present.      Comments: Moves all 4 extremities equally  Follows commands/gives me a thumbs up with each hand when instructed   Psychiatric:      Comments: Unable to assess         Results Review     I reviewed the patient's new clinical results.  Results from last 7 days   Lab Units 23  0542 23  0645 23  0623 23  0446   WBC  10*3/mm3 4.82 6.94 6.95 7.75   HEMOGLOBIN g/dL 12.2* 13.2 14.0 13.5   PLATELETS 10*3/mm3 145 126* 117* 108*     Results from last 7 days   Lab Units 12/09/23 0542 12/08/23 2039 12/08/23 0645 12/07/23 0623 12/06/23  1621 12/06/23  0446   SODIUM mmol/L 145  --  142 143  --  141   POTASSIUM mmol/L 4.0 4.1 3.1* 3.7   < > 3.4*   CHLORIDE mmol/L 111*  --  106 107  --  107   CO2 mmol/L 22.6  --  24.0 24.0  --  22.0   BUN mg/dL 21  --  16 16  --  19   CREATININE mg/dL 0.81  --  1.01 1.04  --  0.87   GLUCOSE mg/dL 127*  --  126* 106*  --  122*    < > = values in this interval not displayed.   Estimated Creatinine Clearance: 69 mL/min (by C-G formula based on SCr of 0.81 mg/dL).  Results from last 7 days   Lab Units 12/09/23 0542 12/08/23 0645 12/07/23 0623 12/06/23  0446   ALBUMIN g/dL 3.2* 3.6 3.8 3.7   BILIRUBIN mg/dL 0.4 0.8 0.9 0.5   ALK PHOS U/L 58 58 50 49   AST (SGOT) U/L 38 31 30 30   ALT (SGPT) U/L 41 27 21 17     Results from last 7 days   Lab Units 12/09/23 0542 12/08/23 0645 12/07/23 0623 12/06/23  0446 12/05/23  1506   CALCIUM mg/dL 8.5* 8.9 9.0 8.7  --    ALBUMIN g/dL 3.2* 3.6 3.8 3.7  --    MAGNESIUM mg/dL 2.3  --   --   --  2.2   PHOSPHORUS mg/dL 3.2  --   --   --   --      Results from last 7 days   Lab Units 12/07/23  0623 12/05/23  1434 12/05/23  1100 12/05/23  0744 12/03/23  2214   PROCALCITONIN ng/mL 0.29*  --   --  0.64* 0.11   LACTATE mmol/L  --  1.0 1.4  --  1.6     COVID19   Date Value Ref Range Status   12/03/2023 Detected (C) Not Detected - Ref. Range Final     SARS-CoV-2, ISIAH   Date Value Ref Range Status   07/18/2022 Not Detected Not Detected Final     Comment:     This nucleic acid amplification test was developed and its performance  characteristics determined by [employer]. Nucleic acid  amplification tests include RT-PCR and TMA. This test has not been  FDA cleared or approved. This test has been authorized by FDA under  an Emergency Use Authorization (EUA). This test is  "only authorized  for the duration of time the declaration that circumstances exist  justifying the authorization of the emergency use of in vitro  diagnostic tests for detection of SARS-CoV-2 virus and/or diagnosis  of COVID-19 infection under section 564(b)(1) of the Act, 21 U.S.C.  360bbb-3(b) (1), unless the authorization is terminated or revoked  sooner.  When diagnostic testing is negative, the possibility of a false  negative result should be considered in the context of a patient's  recent exposures and the presence of clinical signs and symptoms  consistent with COVID-19. An individual without symptoms of COVID-19  and who is not shedding SARS-CoV-2 virus would expect to have a  negative (not detected) result in this assay.       No results found for: \"HGBA1C\", \"POCGLU\"      CT Angiogram Chest  Narrative: CT ANGIOGRAM OF THE CHEST. MULTIPLE CORONAL, SAGITTAL, AND 3-D  RECONSTRUCTIONS.     HISTORY: COVID-19 positive. Elevated D-dimer.     TECHNIQUE: Radiation dose reduction techniques were utilized, including  automated exposure control and exposure modulation based on body size.   CT angiogram of the chest was performed following the administration of  IV contrast. Coronal, sagittal, and 3-D reconstruction images were  obtained.     COMPARISON: AP chest 12/03/2023, CT chest 02/08/2022     FINDINGS: There is no intrapulmonary arterial filling defect to diagnose  a pulmonary embolus. There are several small mediastinal lymph nodes  which are most likely reactive. There is no axillary charisma enlargement.  The ascending thoracic aorta measures 3.6 cm diameter. Atherosclerotic  calcifications are present and there are coronary arterial  calcifications. Feeding tube is present.     No endotracheal or central endobronchial lesion is demonstrated. There  is bilateral lower lobe bronchial wall thickening and there are  bilateral lower lobe patchy ground-glass opacities. There is more dense  consolidation within the " posteromedial aspects of both lower lobes,  greater on the left and there is material filling peripheral lower lobe  bronchi consistent with mucous plugging or infection. There is no  pneumothorax. No pleural effusion or pericardial effusion.     There is multilevel bridging endplate spur formation in the thoracic  spine compatible with DISH. Arthritic changes are present at both  shoulders and there are chronic osteochondral bodies within the right  subcoracoid recess.     Imaging through the upper abdomen demonstrates 2 mm and smaller upper  pole renal stones. There is an anterior right upper pole renal  low-density lesion that is most likely a cyst and measures 2.5 cm. Left  upper pole renal low-density lesion is also most likely a cyst and  measures 1.2 cm.     Impression: 1. No evidence for pulmonary thromboembolic disease.  2.. Mild bilateral lower lobe ground-glass infiltrates as well as more  dense areas of consolidation and suspected infiltrate within the  posteromedial lower lobes, greater on the left associated with material  filling peripheral lower lobe bronchi and bronchial wall thickening.  3. Multiple small mediastinal lymph nodes are most likely reactive.  4. Atherosclerotic disease with mild coronary arterial calcifications.  5. Osteoarthritis at both shoulders, greater on the right where there  are chronic osteochondral bodies within the right subcoracoid recess.  6. Tiny bilateral upper pole renal stones. Bilateral upper pole renal  low-density lesions are most likely cysts.        This report was finalized on 12/7/2023 5:36 PM by Dr. Douglas Negro M.D on Workstation: [id number]       Scheduled Medications  amLODIPine, 5 mg, Nasogastric, Q24H  cefTRIAXone, 1,000 mg, Intravenous, Q24H  enoxaparin, 40 mg, Subcutaneous, Daily  famotidine, 20 mg, Intravenous, Q12H  guaifenesin, 400 mg, Oral, Q8H  losartan, 25 mg, Nasogastric, Q24H  memantine, 10 mg, Nasogastric, Q12H  montelukast, 10 mg,  Nasogastric, Nightly  senna-docusate sodium, 2 tablet, Nasogastric, BID  sodium chloride, 10 mL, Intravenous, Q12H  terazosin, 1 mg, Nasogastric, Q12H    Infusions   Diet  NPO Diet NPO Type: Strict NPO         I have personally reviewed:  [x]  Laboratory   [x]  Microbiology   [x]  Radiology   [x]  EKG/Telemetry   []  Cardiology/Vascular   []  Pathology   [x]  Records    Assessment/Plan     Active Hospital Problems    Diagnosis  POA    **Acute encephalopathy [G93.40]  Yes    AV block [I44.30]  No    Bradycardia [R00.1]  No    Dementia without behavioral disturbance [F03.90]  Yes    COVID-19 virus detected [U07.1]  Yes    BPH (benign prostatic hyperplasia) [N40.0]  Yes    Hypertension [I10]  Yes    GERD (gastroesophageal reflux disease) [K21.9]  Yes      Resolved Hospital Problems    Diagnosis Date Resolved POA    Hypernatremia [E87.0] 12/06/2023 No       78 y.o. male admitted with Acute encephalopathy.    Metabolic encephalopathy  COVID-19 infection with bilateral pneumonia  Dementia  -Metabolic encephalopathy suspected secondary to hospital induced delirium/COVID delirium superimposed on chronic dementia  - CT head negative, CTA chest negative for PE  - Continue namenda  - zyprexa rduced to 2.5 mg- questionable over-sedation with 5mg  - continue rocephin for possible superimposed bacterial pneumonia    Thrombocytopenia- resolved  -Likely secondary to acute COVID infection  -Plt 145    Intermittent high degree AV block  HTN  - cards consulted, briefly in ICU  - echo w/ normal EF, no effusion  - continue losartan and amlodipine per cardiology    Hypernatremia  - resolved with D5w- stopped  - now on TF w/ free water    BPH  - continue terazosin    GERD  - Pepcid    Lovenox 40 mg SC daily for DVT prophylaxis.  Full code.  Discussed with patient and nursing staff.  Anticipate discharge  tbd  timing yet to be determined.      Moira Valles MD  [facility]  12/09/23  10:15 EST    I wore  protective equipment throughout this patient encounter including gloves.  Hand hygiene was performed before donning protective equipment and after removal when leaving the room.    Expected Discharge Date and Time       Expected Discharge Date Expected Discharge Time    Dec 12, 2023              Copied text in this note has been reviewed and is accurate as of 12/09/23.

## 2023-12-09 NOTE — PLAN OF CARE
Goal Outcome Evaluation:   VSS. Tolerating tube feeding well. Remains in restraints r/t pulling at lines and attempting to remove them.  Alert to self.

## 2023-12-09 NOTE — PLAN OF CARE
Goal Outcome Evaluation:  Plan of Care Reviewed With: patient, spouse        Progress: improving          Patient is pleasantly confused and is not showing any aggression. Patients speech is less garbled but still illogical. Patient is still in wrist restraints. Tube feed is going at 60ml/hr with 30ml Q4 flushes in which patient has tolerated well on this shift. Patient vital signs has remained stable on this shift.

## 2023-12-10 LAB
ANION GAP SERPL CALCULATED.3IONS-SCNC: 9 MMOL/L (ref 5–15)
BASOPHILS # BLD AUTO: 0.02 10*3/MM3 (ref 0–0.2)
BASOPHILS NFR BLD AUTO: 0.4 % (ref 0–1.5)
BUN SERPL-MCNC: 22 MG/DL (ref 8–23)
BUN/CREAT SERPL: 23.9 (ref 7–25)
CALCIUM SPEC-SCNC: 8.6 MG/DL (ref 8.6–10.5)
CHLORIDE SERPL-SCNC: 111 MMOL/L (ref 98–107)
CO2 SERPL-SCNC: 24 MMOL/L (ref 22–29)
CREAT SERPL-MCNC: 0.92 MG/DL (ref 0.76–1.27)
DEPRECATED RDW RBC AUTO: 42 FL (ref 37–54)
EGFRCR SERPLBLD CKD-EPI 2021: 84.6 ML/MIN/1.73
EOSINOPHIL # BLD AUTO: 0.24 10*3/MM3 (ref 0–0.4)
EOSINOPHIL NFR BLD AUTO: 4.6 % (ref 0.3–6.2)
ERYTHROCYTE [DISTWIDTH] IN BLOOD BY AUTOMATED COUNT: 12.2 % (ref 12.3–15.4)
GLUCOSE SERPL-MCNC: 128 MG/DL (ref 65–99)
HCT VFR BLD AUTO: 36.1 % (ref 37.5–51)
HGB BLD-MCNC: 12.3 G/DL (ref 13–17.7)
IMM GRANULOCYTES # BLD AUTO: 0.07 10*3/MM3 (ref 0–0.05)
IMM GRANULOCYTES NFR BLD AUTO: 1.3 % (ref 0–0.5)
LYMPHOCYTES # BLD AUTO: 1.15 10*3/MM3 (ref 0.7–3.1)
LYMPHOCYTES NFR BLD AUTO: 21.9 % (ref 19.6–45.3)
MAGNESIUM SERPL-MCNC: 2.3 MG/DL (ref 1.6–2.4)
MCH RBC QN AUTO: 32.4 PG (ref 26.6–33)
MCHC RBC AUTO-ENTMCNC: 34.1 G/DL (ref 31.5–35.7)
MCV RBC AUTO: 95 FL (ref 79–97)
MONOCYTES # BLD AUTO: 0.6 10*3/MM3 (ref 0.1–0.9)
MONOCYTES NFR BLD AUTO: 11.4 % (ref 5–12)
NEUTROPHILS NFR BLD AUTO: 3.18 10*3/MM3 (ref 1.7–7)
NEUTROPHILS NFR BLD AUTO: 60.4 % (ref 42.7–76)
NRBC BLD AUTO-RTO: 0 /100 WBC (ref 0–0.2)
PHOSPHATE SERPL-MCNC: 4 MG/DL (ref 2.5–4.5)
PLATELET # BLD AUTO: 160 10*3/MM3 (ref 140–450)
PMV BLD AUTO: 11.8 FL (ref 6–12)
POTASSIUM SERPL-SCNC: 4.2 MMOL/L (ref 3.5–5.2)
RBC # BLD AUTO: 3.8 10*6/MM3 (ref 4.14–5.8)
SODIUM SERPL-SCNC: 144 MMOL/L (ref 136–145)
WBC NRBC COR # BLD AUTO: 5.26 10*3/MM3 (ref 3.4–10.8)

## 2023-12-10 PROCEDURE — 84100 ASSAY OF PHOSPHORUS: CPT | Performed by: STUDENT IN AN ORGANIZED HEALTH CARE EDUCATION/TRAINING PROGRAM

## 2023-12-10 PROCEDURE — 25810000003 SODIUM CHLORIDE 0.9 % SOLUTION: Performed by: STUDENT IN AN ORGANIZED HEALTH CARE EDUCATION/TRAINING PROGRAM

## 2023-12-10 PROCEDURE — 85025 COMPLETE CBC W/AUTO DIFF WBC: CPT | Performed by: STUDENT IN AN ORGANIZED HEALTH CARE EDUCATION/TRAINING PROGRAM

## 2023-12-10 PROCEDURE — 80048 BASIC METABOLIC PNL TOTAL CA: CPT | Performed by: STUDENT IN AN ORGANIZED HEALTH CARE EDUCATION/TRAINING PROGRAM

## 2023-12-10 PROCEDURE — 25010000002 ENOXAPARIN PER 10 MG: Performed by: INTERNAL MEDICINE

## 2023-12-10 PROCEDURE — 83735 ASSAY OF MAGNESIUM: CPT | Performed by: STUDENT IN AN ORGANIZED HEALTH CARE EDUCATION/TRAINING PROGRAM

## 2023-12-10 RX ADMIN — ENOXAPARIN SODIUM 40 MG: 100 INJECTION SUBCUTANEOUS at 08:58

## 2023-12-10 RX ADMIN — GUAIFENESIN 400 MG: 200 SOLUTION ORAL at 23:18

## 2023-12-10 RX ADMIN — Medication 10 ML: at 08:59

## 2023-12-10 RX ADMIN — TERAZOSIN HYDROCHLORIDE 1 MG: 1 CAPSULE ORAL at 08:58

## 2023-12-10 RX ADMIN — GUAIFENESIN 400 MG: 200 SOLUTION ORAL at 13:59

## 2023-12-10 RX ADMIN — SODIUM CHLORIDE 1000 ML: 9 INJECTION, SOLUTION INTRAVENOUS at 10:56

## 2023-12-10 RX ADMIN — MEMANTINE HYDROCHLORIDE 10 MG: 10 TABLET, FILM COATED ORAL at 23:23

## 2023-12-10 RX ADMIN — LOSARTAN POTASSIUM 25 MG: 25 TABLET, FILM COATED ORAL at 08:58

## 2023-12-10 RX ADMIN — FAMOTIDINE 20 MG: 10 INJECTION INTRAVENOUS at 08:58

## 2023-12-10 RX ADMIN — TERAZOSIN HYDROCHLORIDE 1 MG: 1 CAPSULE ORAL at 23:18

## 2023-12-10 RX ADMIN — AMLODIPINE BESYLATE 5 MG: 5 TABLET ORAL at 08:58

## 2023-12-10 RX ADMIN — MONTELUKAST SODIUM 10 MG: 10 TABLET, FILM COATED ORAL at 23:22

## 2023-12-10 RX ADMIN — MEMANTINE HYDROCHLORIDE 10 MG: 10 TABLET, FILM COATED ORAL at 08:58

## 2023-12-10 NOTE — PROGRESS NOTES
Name: Tyron Hunt ADMIT: 12/3/2023   : 1945  PCP: Tyron Hunt MD    MRN: [id number] LOS: 7 days   AGE/SEX: 78 y.o. male  ROOM: [facility]     Subjective   Subjective   Resting in bed, patient awake and follows commands but still encephalopathic.  He says some words but is unable to have a conversation.       Objective   Objective   Vital Signs  Temp:  [97.9 °F (36.6 °C)-99 °F (37.2 °C)] 98.1 °F (36.7 °C)  Heart Rate:  [69-76] 75  Resp:  [16-18] 16  BP: (110-139)/(69-84) 126/84  SpO2:  [96 %-99 %] 97 %  on   ;   Device (Oxygen Therapy): room air  Body mass index is 23.67 kg/m².  Physical Exam  Constitutional:       General: He is not in acute distress.     Appearance: He is ill-appearing. He is not toxic-appearing.      Comments: Encephalopathic, improved from a few days ago but no change from yesterday   HENT:      Head: Normocephalic and atraumatic.      Nose:      Comments: Cortrak in place     Mouth/Throat:      Mouth: Mucous membranes are dry.      Pharynx: Oropharynx is clear.   Cardiovascular:      Rate and Rhythm: Normal rate and regular rhythm.      Heart sounds: No murmur heard.  Pulmonary:      Effort: Pulmonary effort is normal. No respiratory distress.      Breath sounds: Normal breath sounds.      Comments: Upper respiratory congestion  Abdominal:      General: Bowel sounds are normal.      Palpations: Abdomen is soft.      Tenderness: There is no abdominal tenderness.   Musculoskeletal:         General: No tenderness.      Right lower leg: No edema.      Left lower leg: No edema.   Skin:     General: Skin is warm and dry.   Neurological:      Motor: Weakness (generalized) present.      Comments: Moves all 4 extremities equally  Follows commands/gives me a thumbs up with each hand when instructed   Psychiatric:      Comments: Unable to assess         Results Review     I reviewed the patient's new clinical results.  Results from last 7 days   Lab Units 12/10/23  0729 23  0589  12/08/23 0645 12/07/23 0623   WBC 10*3/mm3 5.26 4.82 6.94 6.95   HEMOGLOBIN g/dL 12.3* 12.2* 13.2 14.0   PLATELETS 10*3/mm3 160 145 126* 117*     Results from last 7 days   Lab Units 12/10/23  0729 12/09/23  0542 12/08/23 2039 12/08/23 0645 12/07/23 0623   SODIUM mmol/L 144 145  --  142 143   POTASSIUM mmol/L 4.2 4.0 4.1 3.1* 3.7   CHLORIDE mmol/L 111* 111*  --  106 107   CO2 mmol/L 24.0 22.6  --  24.0 24.0   BUN mg/dL 22 21  --  16 16   CREATININE mg/dL 0.92 0.81  --  1.01 1.04   GLUCOSE mg/dL 128* 127*  --  126* 106*   Estimated Creatinine Clearance: 60.8 mL/min (by C-G formula based on SCr of 0.92 mg/dL).  Results from last 7 days   Lab Units 12/09/23 0542 12/08/23 0645 12/07/23 0623 12/06/23  0446   ALBUMIN g/dL 3.2* 3.6 3.8 3.7   BILIRUBIN mg/dL 0.4 0.8 0.9 0.5   ALK PHOS U/L 58 58 50 49   AST (SGOT) U/L 38 31 30 30   ALT (SGPT) U/L 41 27 21 17     Results from last 7 days   Lab Units 12/10/23  0729 12/09/23  0542 12/08/23 0645 12/07/23 0623 12/06/23 0446 12/05/23  1506   CALCIUM mg/dL 8.6 8.5* 8.9 9.0 8.7  --    ALBUMIN g/dL  --  3.2* 3.6 3.8 3.7  --    MAGNESIUM mg/dL 2.3 2.3  --   --   --  2.2   PHOSPHORUS mg/dL 4.0 3.2  --   --   --   --      Results from last 7 days   Lab Units 12/07/23 0623 12/05/23  1434 12/05/23  1100 12/05/23  0744 12/03/23  2214   PROCALCITONIN ng/mL 0.29*  --   --  0.64* 0.11   LACTATE mmol/L  --  1.0 1.4  --  1.6     COVID19   Date Value Ref Range Status   12/03/2023 Detected (C) Not Detected - Ref. Range Final     SARS-CoV-2, ISIAH   Date Value Ref Range Status   07/18/2022 Not Detected Not Detected Final     Comment:     This nucleic acid amplification test was developed and its performance  characteristics determined by [facility]. Nucleic acid  amplification tests include RT-PCR and TMA. This test has not been  FDA cleared or approved. This test has been authorized by FDA under  an Emergency Use Authorization (EUA). This test is only authorized  for the  "duration of time the declaration that circumstances exist  justifying the authorization of the emergency use of in vitro  diagnostic tests for detection of SARS-CoV-2 virus and/or diagnosis  of COVID-19 infection under section 564(b)(1) of the Act, 21 U.S.C.  360bbb-3(b) (1), unless the authorization is terminated or revoked  sooner.  When diagnostic testing is negative, the possibility of a false  negative result should be considered in the context of a patient's  recent exposures and the presence of clinical signs and symptoms  consistent with COVID-19. An individual without symptoms of COVID-19  and who is not shedding SARS-CoV-2 virus would expect to have a  negative (not detected) result in this assay.       No results found for: \"HGBA1C\", \"POCGLU\"      CT Angiogram Chest  Narrative: CT ANGIOGRAM OF THE CHEST. MULTIPLE CORONAL, SAGITTAL, AND 3-D  RECONSTRUCTIONS.     HISTORY: COVID-19 positive. Elevated D-dimer.     TECHNIQUE: Radiation dose reduction techniques were utilized, including  automated exposure control and exposure modulation based on body size.   CT angiogram of the chest was performed following the administration of  IV contrast. Coronal, sagittal, and 3-D reconstruction images were  obtained.     COMPARISON: AP chest 12/03/2023, CT chest 02/08/2022     FINDINGS: There is no intrapulmonary arterial filling defect to diagnose  a pulmonary embolus. There are several small mediastinal lymph nodes  which are most likely reactive. There is no axillary charisma enlargement.  The ascending thoracic aorta measures 3.6 cm diameter. Atherosclerotic  calcifications are present and there are coronary arterial  calcifications. Feeding tube is present.     No endotracheal or central endobronchial lesion is demonstrated. There  is bilateral lower lobe bronchial wall thickening and there are  bilateral lower lobe patchy ground-glass opacities. There is more dense  consolidation within the posteromedial aspects of " both lower lobes,  greater on the left and there is material filling peripheral lower lobe  bronchi consistent with mucous plugging or infection. There is no  pneumothorax. No pleural effusion or pericardial effusion.     There is multilevel bridging endplate spur formation in the thoracic  spine compatible with DISH. Arthritic changes are present at both  shoulders and there are chronic osteochondral bodies within the right  subcoracoid recess.     Imaging through the upper abdomen demonstrates 2 mm and smaller upper  pole renal stones. There is an anterior right upper pole renal  low-density lesion that is most likely a cyst and measures 2.5 cm. Left  upper pole renal low-density lesion is also most likely a cyst and  measures 1.2 cm.     Impression: 1. No evidence for pulmonary thromboembolic disease.  2.. Mild bilateral lower lobe ground-glass infiltrates as well as more  dense areas of consolidation and suspected infiltrate within the  posteromedial lower lobes, greater on the left associated with material  filling peripheral lower lobe bronchi and bronchial wall thickening.  3. Multiple small mediastinal lymph nodes are most likely reactive.  4. Atherosclerotic disease with mild coronary arterial calcifications.  5. Osteoarthritis at both shoulders, greater on the right where there  are chronic osteochondral bodies within the right subcoracoid recess.  6. Tiny bilateral upper pole renal stones. Bilateral upper pole renal  low-density lesions are most likely cysts.        This report was finalized on 12/7/2023 5:36 PM by Dr. Douglas Negro M.D on Workstation: [id number]       Scheduled Medications  amLODIPine, 5 mg, Nasogastric, Q24H  enoxaparin, 40 mg, Subcutaneous, Daily  famotidine, 20 mg, Intravenous, Q12H  guaifenesin, 400 mg, Oral, Q8H  losartan, 25 mg, Nasogastric, Q24H  memantine, 10 mg, Nasogastric, Q12H  montelukast, 10 mg, Nasogastric, Nightly  senna-docusate sodium, 2 tablet, Nasogastric,  BID  sodium chloride, 10 mL, Intravenous, Q12H  terazosin, 1 mg, Nasogastric, Q12H    Infusions   Diet  NPO Diet NPO Type: Strict NPO         I have personally reviewed:  [x]  Laboratory   []  Microbiology   []  Radiology   [x]  EKG/Telemetry   []  Cardiology/Vascular   []  Pathology   []  Records    Assessment/Plan     Active Hospital Problems    Diagnosis  POA    **Acute encephalopathy [G93.40]  Yes    AV block [I44.30]  No    Bradycardia [R00.1]  No    Dementia without behavioral disturbance [F03.90]  Yes    COVID-19 virus detected [U07.1]  Yes    BPH (benign prostatic hyperplasia) [N40.0]  Yes    Hypertension [I10]  Yes    GERD (gastroesophageal reflux disease) [K21.9]  Yes      Resolved Hospital Problems    Diagnosis Date Resolved POA    Hypernatremia [E87.0] 12/06/2023 No       78 y.o. male admitted with Acute encephalopathy.    Metabolic encephalopathy  COVID-19 infection with bilateral pneumonia  Dementia  -Metabolic encephalopathy suspected secondary to hospital induced delirium/COVID delirium superimposed on chronic dementia  - CT head negative, CTA chest negative for PE  - Continue namenda  - zyprexa rduced to 2.5 mg- questionable over-sedation with 5mg  -Pulm consulted, completed course of rocephin for possible superimposed bacterial pneumonia    Thrombocytopenia- resolved  -Likely secondary to acute COVID infection  -Plt 160    Intermittent high degree AV block  HTN  - cards consulted, briefly in ICU  - echo w/ normal EF, no effusion  - continue losartan and amlodipine per cardiology    Hypernatremia  - resolved with D5w- stopped  - now on TF w/ free water    BPH  - continue terazosin    GERD  - Pepcid    Mucous membranes appear dry, will give 1 L IVF over 10 hours    Lovenox 40 mg SC daily for DVT prophylaxis.  Full code.  Discussed with nursing staff.  Anticipate discharge  tbd  timing yet to be determined.      Moira Valles MD  [facility]  12/10/23  10:32 EST    I wore  protective equipment throughout this patient encounter including gloves.  Hand hygiene was performed before donning protective equipment and after removal when leaving the room.    Expected Discharge Date and Time       Expected Discharge Date Expected Discharge Time    Dec 12, 2023              Copied text in this note has been reviewed and is accurate as of 12/10/23.

## 2023-12-10 NOTE — PLAN OF CARE
Goal Outcome Evaluation:  Plan of Care Reviewed With: patient, spouse, son        Progress: improving       Patient son came by today and wanted to make sure speech therapy try to do the swallow test with patient again so that the NG tube cane come out. Patient is pleasantly confused and vital signs has remained stable on this shift.

## 2023-12-10 NOTE — PLAN OF CARE
Goal Outcome Evaluation:  Plan of Care Reviewed With: patient      VSS. No acute changes noted this shift.  Pt remains in bilateral soft wrist restraints due to his attempts to pull out lines.

## 2023-12-11 LAB
ANION GAP SERPL CALCULATED.3IONS-SCNC: 11.5 MMOL/L (ref 5–15)
BASOPHILS # BLD AUTO: 0.03 10*3/MM3 (ref 0–0.2)
BASOPHILS NFR BLD AUTO: 0.5 % (ref 0–1.5)
BUN SERPL-MCNC: 25 MG/DL (ref 8–23)
BUN/CREAT SERPL: 28.7 (ref 7–25)
CALCIUM SPEC-SCNC: 8.5 MG/DL (ref 8.6–10.5)
CHLORIDE SERPL-SCNC: 110 MMOL/L (ref 98–107)
CO2 SERPL-SCNC: 21.5 MMOL/L (ref 22–29)
CREAT SERPL-MCNC: 0.87 MG/DL (ref 0.76–1.27)
DEPRECATED RDW RBC AUTO: 42.7 FL (ref 37–54)
EGFRCR SERPLBLD CKD-EPI 2021: 87.8 ML/MIN/1.73
EOSINOPHIL # BLD AUTO: 0.21 10*3/MM3 (ref 0–0.4)
EOSINOPHIL NFR BLD AUTO: 3.4 % (ref 0.3–6.2)
ERYTHROCYTE [DISTWIDTH] IN BLOOD BY AUTOMATED COUNT: 12 % (ref 12.3–15.4)
GLUCOSE SERPL-MCNC: 127 MG/DL (ref 65–99)
HCT VFR BLD AUTO: 36.8 % (ref 37.5–51)
HGB BLD-MCNC: 12.4 G/DL (ref 13–17.7)
IMM GRANULOCYTES # BLD AUTO: 0.08 10*3/MM3 (ref 0–0.05)
IMM GRANULOCYTES NFR BLD AUTO: 1.3 % (ref 0–0.5)
LYMPHOCYTES # BLD AUTO: 1.17 10*3/MM3 (ref 0.7–3.1)
LYMPHOCYTES NFR BLD AUTO: 19 % (ref 19.6–45.3)
MAGNESIUM SERPL-MCNC: 2.3 MG/DL (ref 1.6–2.4)
MCH RBC QN AUTO: 32.5 PG (ref 26.6–33)
MCHC RBC AUTO-ENTMCNC: 33.7 G/DL (ref 31.5–35.7)
MCV RBC AUTO: 96.6 FL (ref 79–97)
MONOCYTES # BLD AUTO: 0.79 10*3/MM3 (ref 0.1–0.9)
MONOCYTES NFR BLD AUTO: 12.8 % (ref 5–12)
NEUTROPHILS NFR BLD AUTO: 3.88 10*3/MM3 (ref 1.7–7)
NEUTROPHILS NFR BLD AUTO: 63 % (ref 42.7–76)
NRBC BLD AUTO-RTO: 0 /100 WBC (ref 0–0.2)
PHOSPHATE SERPL-MCNC: 3.7 MG/DL (ref 2.5–4.5)
PLATELET # BLD AUTO: 169 10*3/MM3 (ref 140–450)
PMV BLD AUTO: 12.2 FL (ref 6–12)
POTASSIUM SERPL-SCNC: 3.9 MMOL/L (ref 3.5–5.2)
RBC # BLD AUTO: 3.81 10*6/MM3 (ref 4.14–5.8)
SODIUM SERPL-SCNC: 143 MMOL/L (ref 136–145)
WBC NRBC COR # BLD AUTO: 6.16 10*3/MM3 (ref 3.4–10.8)

## 2023-12-11 PROCEDURE — 80048 BASIC METABOLIC PNL TOTAL CA: CPT | Performed by: STUDENT IN AN ORGANIZED HEALTH CARE EDUCATION/TRAINING PROGRAM

## 2023-12-11 PROCEDURE — 85025 COMPLETE CBC W/AUTO DIFF WBC: CPT | Performed by: STUDENT IN AN ORGANIZED HEALTH CARE EDUCATION/TRAINING PROGRAM

## 2023-12-11 PROCEDURE — 84100 ASSAY OF PHOSPHORUS: CPT | Performed by: STUDENT IN AN ORGANIZED HEALTH CARE EDUCATION/TRAINING PROGRAM

## 2023-12-11 PROCEDURE — 25010000002 ENOXAPARIN PER 10 MG: Performed by: INTERNAL MEDICINE

## 2023-12-11 PROCEDURE — 92526 ORAL FUNCTION THERAPY: CPT | Performed by: SPEECH-LANGUAGE PATHOLOGIST

## 2023-12-11 PROCEDURE — 97110 THERAPEUTIC EXERCISES: CPT

## 2023-12-11 PROCEDURE — 83735 ASSAY OF MAGNESIUM: CPT | Performed by: STUDENT IN AN ORGANIZED HEALTH CARE EDUCATION/TRAINING PROGRAM

## 2023-12-11 RX ADMIN — TERAZOSIN HYDROCHLORIDE 1 MG: 1 CAPSULE ORAL at 20:52

## 2023-12-11 RX ADMIN — SENNOSIDES AND DOCUSATE SODIUM 2 TABLET: 50; 8.6 TABLET ORAL at 20:54

## 2023-12-11 RX ADMIN — LOSARTAN POTASSIUM 25 MG: 25 TABLET, FILM COATED ORAL at 10:35

## 2023-12-11 RX ADMIN — GUAIFENESIN 400 MG: 200 SOLUTION ORAL at 03:44

## 2023-12-11 RX ADMIN — FAMOTIDINE 20 MG: 10 INJECTION INTRAVENOUS at 20:52

## 2023-12-11 RX ADMIN — Medication 10 ML: at 10:36

## 2023-12-11 RX ADMIN — FAMOTIDINE 20 MG: 10 INJECTION INTRAVENOUS at 13:25

## 2023-12-11 RX ADMIN — MONTELUKAST SODIUM 10 MG: 10 TABLET, FILM COATED ORAL at 20:52

## 2023-12-11 RX ADMIN — Medication 10 ML: at 20:54

## 2023-12-11 RX ADMIN — GUAIFENESIN 400 MG: 200 SOLUTION ORAL at 13:26

## 2023-12-11 RX ADMIN — MEMANTINE HYDROCHLORIDE 10 MG: 10 TABLET, FILM COATED ORAL at 20:53

## 2023-12-11 RX ADMIN — MEMANTINE HYDROCHLORIDE 10 MG: 10 TABLET, FILM COATED ORAL at 10:34

## 2023-12-11 RX ADMIN — ENOXAPARIN SODIUM 40 MG: 100 INJECTION SUBCUTANEOUS at 10:34

## 2023-12-11 RX ADMIN — GUAIFENESIN 400 MG: 200 SOLUTION ORAL at 18:37

## 2023-12-11 RX ADMIN — AMLODIPINE BESYLATE 5 MG: 5 TABLET ORAL at 10:34

## 2023-12-11 NOTE — PLAN OF CARE
Problem: Restraint, Nonviolent  Goal: Absence of Harm or Injury  Outcome: Ongoing, Progressing  Intervention: Implement Least Restrictive Safety Strategies  Recent Flowsheet Documentation  Taken 12/11/2023 0400 by Madelaine Watts RN  Medical Device Protection:   IV pole/bag removed from visual field   tubing secured  Less Restrictive Alternative:   bed alarm in use   emotional support provided   calming techniques promoted   environment adjusted   positive reinforcement provided   sensory stimulation limited   medication offered  De-Escalation Techniques:   appropriate behavior reinforced   diversional activity encouraged   quiet time facilitated   reoriented   stimulation decreased   verbally redirected  Diversional Activities: television  Taken 12/11/2023 0200 by Madelaine Watts RN  Medical Device Protection:   IV pole/bag removed from visual field   tubing secured  Less Restrictive Alternative:   bed alarm in use   emotional support provided   calming techniques promoted   environment adjusted   positive reinforcement provided   sensory stimulation limited   medication offered  De-Escalation Techniques:   appropriate behavior reinforced   diversional activity encouraged   increased round frequency   quiet time facilitated   stimulation decreased   reoriented   verbally redirected  Diversional Activities: television  Taken 12/11/2023 0000 by Madelaine Watst RN  Medical Device Protection:   IV pole/bag removed from visual field   tubing secured  Less Restrictive Alternative:   bed alarm in use   emotional support provided   calming techniques promoted   environment adjusted   positive reinforcement provided   sensory stimulation limited   medication offered  De-Escalation Techniques:   appropriate behavior reinforced   diversional activity encouraged   increased round frequency   quiet time facilitated   reoriented   stimulation decreased   verbally redirected  Diversional Activities: television  Taken 12/10/2023  2200 by Madelaine Watts RN  Medical Device Protection:   IV pole/bag removed from visual field   tubing secured  Less Restrictive Alternative:   bed alarm in use   emotional support provided   calming techniques promoted   environment adjusted   positive reinforcement provided   sensory stimulation limited   medication offered  De-Escalation Techniques:   appropriate behavior reinforced   diversional activity encouraged   increased round frequency   quiet time facilitated   stimulation decreased   verbally redirected  Diversional Activities: television  Taken 12/10/2023 2000 by Madelaine Watts RN  Medical Device Protection:   IV pole/bag removed from visual field   tubing secured  Less Restrictive Alternative:   bed alarm in use   emotional support provided   calming techniques promoted   environment adjusted   positive reinforcement provided   sensory stimulation limited   medication offered  De-Escalation Techniques:   appropriate behavior reinforced   diversional activity encouraged   increased round frequency   medication offered   quiet time facilitated   reoriented   stimulation decreased   verbally redirected  Diversional Activities: television  Intervention: Protect Skin and Joint Integrity  Recent Flowsheet Documentation  Taken 12/10/2023 2000 by Madelaine Watts RN  Range of Motion: ROM (range of motion) performed   Goal Outcome Evaluation:  Plan of Care Reviewed With: patient            Pt continues to be confused and restless, requires restraints to prevent pulling of lines.

## 2023-12-11 NOTE — PLAN OF CARE
Goal Outcome Evaluation:  Plan of Care Reviewed With: patient        Progress: no change   Patient still in current health status. Tube feed still ongoing via NG tube. He is disoriented, no new changes thus far.

## 2023-12-11 NOTE — PROGRESS NOTES
"    Name: Tyron Hunt ADMIT: 12/3/2023   : 1945  PCP: Tyron Hunt MD    MRN: [id number] LOS: 8 days   AGE/SEX: 78 y.o. male  ROOM: [facility]     Subjective   Subjective   Resting in bed, patient awake, says \"hello\" but otherwise rambles. He is able to identify a picture of his bulldog that is sitting on the table next to him.   Speech has seen the patient and he has been started on a diet- modified.      Objective   Objective   Vital Signs  Temp:  [98.1 °F (36.7 °C)-98.8 °F (37.1 °C)] 98.2 °F (36.8 °C)  Heart Rate:  [] 83  Resp:  [16-18] 18  BP: ()/(57-89) 129/88  SpO2:  [96 %-99 %] 99 %  on   ;   Device (Oxygen Therapy): room air  Body mass index is 23.67 kg/m².  Physical Exam  Constitutional:       General: He is not in acute distress.     Appearance: He is ill-appearing. He is not toxic-appearing.      Comments: Remains confused. But speech is clearer when he speaks.    HENT:      Head: Normocephalic and atraumatic.      Nose:      Comments: Cortrak in place     Mouth/Throat:      Mouth: Mucous membranes are moist.      Pharynx: Oropharynx is clear.   Cardiovascular:      Rate and Rhythm: Normal rate and regular rhythm.      Heart sounds: No murmur heard.  Pulmonary:      Effort: Pulmonary effort is normal. No respiratory distress.      Breath sounds: Normal breath sounds.      Comments: Upper respiratory congestion- improving  Abdominal:      General: Bowel sounds are normal.      Palpations: Abdomen is soft.      Tenderness: There is no abdominal tenderness.   Musculoskeletal:         General: No tenderness.      Right lower leg: No edema.      Left lower leg: No edema.   Skin:     General: Skin is warm and dry.   Neurological:      Motor: Weakness (generalized) present.      Comments: Moves all 4 extremities equally  Follows commands/gives me a thumbs up with each hand when instructed   Psychiatric:      Comments: Unable to assess         Results Review     I reviewed the patient's new " clinical results.  Results from last 7 days   Lab Units 12/11/23  0529 12/10/23  0729 12/09/23  0542 12/08/23  0645   WBC 10*3/mm3 6.16 5.26 4.82 6.94   HEMOGLOBIN g/dL 12.4* 12.3* 12.2* 13.2   PLATELETS 10*3/mm3 169 160 145 126*     Results from last 7 days   Lab Units 12/11/23  0529 12/10/23  0729 12/09/23  0542 12/08/23 2039 12/08/23 0645   SODIUM mmol/L 143 144 145  --  142   POTASSIUM mmol/L 3.9 4.2 4.0 4.1 3.1*   CHLORIDE mmol/L 110* 111* 111*  --  106   CO2 mmol/L 21.5* 24.0 22.6  --  24.0   BUN mg/dL 25* 22 21  --  16   CREATININE mg/dL 0.87 0.92 0.81  --  1.01   GLUCOSE mg/dL 127* 128* 127*  --  126*   Estimated Creatinine Clearance: 64.3 mL/min (by C-G formula based on SCr of 0.87 mg/dL).  Results from last 7 days   Lab Units 12/09/23 0542 12/08/23 0645 12/07/23 0623 12/06/23  0446   ALBUMIN g/dL 3.2* 3.6 3.8 3.7   BILIRUBIN mg/dL 0.4 0.8 0.9 0.5   ALK PHOS U/L 58 58 50 49   AST (SGOT) U/L 38 31 30 30   ALT (SGPT) U/L 41 27 21 17     Results from last 7 days   Lab Units 12/11/23  0529 12/10/23  0729 12/09/23  0542 12/08/23 0645 12/07/23 0623 12/06/23 0446 12/05/23  1506   CALCIUM mg/dL 8.5* 8.6 8.5* 8.9 9.0 8.7  --    ALBUMIN g/dL  --   --  3.2* 3.6 3.8 3.7  --    MAGNESIUM mg/dL 2.3 2.3 2.3  --   --   --  2.2   PHOSPHORUS mg/dL 3.7 4.0 3.2  --   --   --   --      Results from last 7 days   Lab Units 12/07/23  0623 12/05/23  1434 12/05/23  1100 12/05/23  0744   PROCALCITONIN ng/mL 0.29*  --   --  0.64*   LACTATE mmol/L  --  1.0 1.4  --      COVID19   Date Value Ref Range Status   12/03/2023 Detected (C) Not Detected - Ref. Range Final     SARS-CoV-2, ISIAH   Date Value Ref Range Status   07/18/2022 Not Detected Not Detected Final     Comment:     This nucleic acid amplification test was developed and its performance  characteristics determined by [facility]. Nucleic acid  amplification tests include RT-PCR and TMA. This test has not been  FDA cleared or approved. This test has been  "authorized by FDA under  an Emergency Use Authorization (EUA). This test is only authorized  for the duration of time the declaration that circumstances exist  justifying the authorization of the emergency use of in vitro  diagnostic tests for detection of SARS-CoV-2 virus and/or diagnosis  of COVID-19 infection under section 564(b)(1) of the Act, 21 U.S.C.  360bbb-3(b) (1), unless the authorization is terminated or revoked  sooner.  When diagnostic testing is negative, the possibility of a false  negative result should be considered in the context of a patient's  recent exposures and the presence of clinical signs and symptoms  consistent with COVID-19. An individual without symptoms of COVID-19  and who is not shedding SARS-CoV-2 virus would expect to have a  negative (not detected) result in this assay.       No results found for: \"HGBA1C\", \"POCGLU\"      CT Angiogram Chest  Narrative: CT ANGIOGRAM OF THE CHEST. MULTIPLE CORONAL, SAGITTAL, AND 3-D  RECONSTRUCTIONS.     HISTORY: COVID-19 positive. Elevated D-dimer.     TECHNIQUE: Radiation dose reduction techniques were utilized, including  automated exposure control and exposure modulation based on body size.   CT angiogram of the chest was performed following the administration of  IV contrast. Coronal, sagittal, and 3-D reconstruction images were  obtained.     COMPARISON: AP chest 12/03/2023, CT chest 02/08/2022     FINDINGS: There is no intrapulmonary arterial filling defect to diagnose  a pulmonary embolus. There are several small mediastinal lymph nodes  which are most likely reactive. There is no axillary charisma enlargement.  The ascending thoracic aorta measures 3.6 cm diameter. Atherosclerotic  calcifications are present and there are coronary arterial  calcifications. Feeding tube is present.     No endotracheal or central endobronchial lesion is demonstrated. There  is bilateral lower lobe bronchial wall thickening and there are  bilateral lower lobe " patchy ground-glass opacities. There is more dense  consolidation within the posteromedial aspects of both lower lobes,  greater on the left and there is material filling peripheral lower lobe  bronchi consistent with mucous plugging or infection. There is no  pneumothorax. No pleural effusion or pericardial effusion.     There is multilevel bridging endplate spur formation in the thoracic  spine compatible with DISH. Arthritic changes are present at both  shoulders and there are chronic osteochondral bodies within the right  subcoracoid recess.     Imaging through the upper abdomen demonstrates 2 mm and smaller upper  pole renal stones. There is an anterior right upper pole renal  low-density lesion that is most likely a cyst and measures 2.5 cm. Left  upper pole renal low-density lesion is also most likely a cyst and  measures 1.2 cm.     Impression: 1. No evidence for pulmonary thromboembolic disease.  2.. Mild bilateral lower lobe ground-glass infiltrates as well as more  dense areas of consolidation and suspected infiltrate within the  posteromedial lower lobes, greater on the left associated with material  filling peripheral lower lobe bronchi and bronchial wall thickening.  3. Multiple small mediastinal lymph nodes are most likely reactive.  4. Atherosclerotic disease with mild coronary arterial calcifications.  5. Osteoarthritis at both shoulders, greater on the right where there  are chronic osteochondral bodies within the right subcoracoid recess.  6. Tiny bilateral upper pole renal stones. Bilateral upper pole renal  low-density lesions are most likely cysts.        This report was finalized on 12/7/2023 5:36 PM by Dr. Douglas Negro M.D on Workstation: [id number]       Scheduled Medications  amLODIPine, 5 mg, Nasogastric, Q24H  enoxaparin, 40 mg, Subcutaneous, Daily  famotidine, 20 mg, Intravenous, Q12H  guaifenesin, 400 mg, Oral, Q8H  losartan, 25 mg, Nasogastric, Q24H  memantine, 10 mg, Nasogastric,  Q12H  montelukast, 10 mg, Nasogastric, Nightly  senna-docusate sodium, 2 tablet, Nasogastric, BID  sodium chloride, 10 mL, Intravenous, Q12H  terazosin, 1 mg, Nasogastric, Q12H    Infusions   Diet  Diet: Regular/House Diet; Texture: Pureed (NDD 1); Fluid Consistency: Nectar Thick         I have personally reviewed:  [x]  Laboratory   []  Microbiology   []  Radiology   [x]  EKG/Telemetry   []  Cardiology/Vascular   []  Pathology   []  Records    Assessment/Plan     Active Hospital Problems    Diagnosis  POA    **Acute encephalopathy [G93.40]  Yes    AV block [I44.30]  No    Bradycardia [R00.1]  No    Dementia without behavioral disturbance [F03.90]  Yes    COVID-19 virus detected [U07.1]  Yes    BPH (benign prostatic hyperplasia) [N40.0]  Yes    Hypertension [I10]  Yes    GERD (gastroesophageal reflux disease) [K21.9]  Yes      Resolved Hospital Problems    Diagnosis Date Resolved POA    Hypernatremia [E87.0] 12/06/2023 No       78 y.o. male admitted with Acute encephalopathy.    Metabolic encephalopathy  COVID-19 infection with bilateral pneumonia  Dementia  -Metabolic encephalopathy suspected secondary to hospital induced delirium/COVID delirium superimposed on chronic dementia  - CT head negative, CTA chest negative for PE  - Continue namenda  - zyprexa rduced to 2.5 mg- questionable over-sedation with 5mg  -Pulm consulted, completed course of rocephin for possible superimposed bacterial pneumonia  - improving- SLP evaluated and he is now on modified diet    Thrombocytopenia- resolved  -Likely secondary to acute COVID infection  -Plt 160    Intermittent high degree AV block  HTN  - cards consulted, briefly in ICU  - echo w/ normal EF, no effusion  - continue losartan and amlodipine per cardiology    Hypernatremia  - resolved with D5w- stopped  - now on TF w/ free water    BPH  - continue terazosin    GERD  - Pepcid    Lovenox 40 mg SC daily for DVT prophylaxis.  Full code.  Discussed with nursing  staff.  Anticipate discharge  tbd  timing yet to be determined.    Patient will be on day 10 tomorrow of his isolation- will see if he can come out of isolation- would likely help his encephalopathy.     Moira Valles MD  [facility]  12/11/23  13:46 EST    I wore protective equipment throughout this patient encounter including gloves.  Hand hygiene was performed before donning protective equipment and after removal when leaving the room.    Expected Discharge Date and Time       Expected Discharge Date Expected Discharge Time    Dec 12, 2023              Copied text in this note has been reviewed and is accurate as of 12/11/23.

## 2023-12-11 NOTE — PLAN OF CARE
Goal Outcome Evaluation:  Plan of Care Reviewed With: patient           Outcome Evaluation: Clinical swallow evaluation completed recommend puree and NTL,  meds whole or crushed with puree or NTL and small bites and sips.

## 2023-12-11 NOTE — THERAPY TREATMENT NOTE
Patient Name: Tyron Hunt  : 1945    MRN: [id number]                              Today's Date: 2023       Admit Date: 12/3/2023    Visit Dx:     ICD-10-CM ICD-9-CM   1. Acute encephalopathy  G93.40 348.30   2. Alzheimer's dementia, unspecified dementia severity, unspecified timing of dementia onset, unspecified whether behavioral, psychotic, or mood disturbance or anxiety  G30.9 331.0    F02.80 294.10   3. COVID-19  U07.1 079.89     Patient Active Problem List   Diagnosis    Hypertension    GERD (gastroesophageal reflux disease)    Hyperlipidemia    Anxiety    BPH (benign prostatic hyperplasia)    Impaired fasting glucose    Recurrent major depressive disorder, in full remission    Cervical disc disease    Cervical spinal stenosis    Foraminal stenosis of cervical region    Cervical radiculitis    Altered mental status    COVID-19 virus detected    Age-related physical debility    Cytokine release syndrome, grade 1    Dyspepsia    Acute encephalopathy    Dementia without behavioral disturbance    AV block    Bradycardia     Past Medical History:   Diagnosis Date    Anxiety     COVID-19 2022    Diverticulitis     Gastritis     GERD (gastroesophageal reflux disease)     Gout     H/O complete eye exam 2017    Hyperlipidemia     Hypertension     Impaired fasting blood sugar     Kidney stones     Memory loss     Sleep apnea      Past Surgical History:   Procedure Laterality Date    CERVICAL EPIDURAL N/A 2021    Procedure: CERVICAL EPIDURAL;  Surgeon: Nataliia Avilez MD;  Location: [facility];  Service: Pain Management;  Laterality: N/A;    CERVICAL EPIDURAL N/A 10/18/2021    Procedure: CERVICAL EPIDURAL 7-1;  Surgeon: Nataliia Avilez MD;  Location: [facility];  Service: Pain Management;  Laterality: N/A;    COLONOSCOPY      date Triston GRIFFIN    CYST REMOVAL      CYSTOSCOPY W/ LITHOLAPAXY / EHL      TONSILLECTOMY      UPPER GASTROINTESTINAL ENDOSCOPY  10/05/2021    Gastritis    Arlet GRIFFIN      General Information       Row Name 12/11/23 1009          Physical Therapy Time and Intention    Document Type therapy note (daily note)  -PC     Mode of Treatment physical therapy  -PC       Row Name 12/11/23 1009          General Information    Existing Precautions/Restrictions fall  -PC               User Key  (r) = Recorded By, (t) = Taken By, (c) = Cosigned By      Initials Name Provider Type    PC Catie Bansal, PT Physical Therapist                   Mobility       Row Name 12/11/23 1009          Bed Mobility    Supine-Sit [place] (Bed Mobility) moderate assist (50% patient effort)  -PC     Sit-Supine [place] (Bed Mobility) moderate assist (50% patient effort)  -PC     Comment, (Bed Mobility) moderate assist to scoot to head of bed  -PC               User Key  (r) = Recorded By, (t) = Taken By, (c) = Cosigned By      Initials Name Provider Type    PC Catie Bansal, PT Physical Therapist                   Obj/Interventions       Row Name 12/11/23 1010          Balance    Static Sitting Balance contact guard  -PC     Dynamic Sitting Balance contact guard  -PC     Position, Sitting Balance sitting edge of bed  -PC     Comment, Balance seated edge of bed, worked on sitting balance, sitting tolerance, reorientation, following commands  -PC               User Key  (r) = Recorded By, (t) = Taken By, (c) = Cosigned By      Initials Name Provider Type    PC Catie Bansal, PT Physical Therapist                   Goals/Plan    No documentation.                  Clinical Impression       Row Name 12/11/23 1011          Pain    Pre/Posttreatment Pain Comment during treatment, pt c/o pain L upper arm, when inspected, I found a blue tourniquet band still on his arm, RN notified and this was removed, pt had some pain during this, repositioned back in bed, pain improved but still painful to touch  -PC       Row Name 12/11/23 1011          Plan of Care Review    Plan of Care Reviewed With patient   -PC     Outcome Evaluation Pt pleasantly confused, able to tolerate sitting up on edge of bed, assisting some, following some commands but inconsistent, PT will cont to follow  -PC       Row Name 12/11/23 1011          Positioning and Restraints    Pre-Treatment Position in bed  -PC     Post Treatment Position bed  -PC     In Bed supine;call light within reach;encouraged to call for assist;exit alarm on  -PC               User Key  (r) = Recorded By, (t) = Taken By, (c) = Cosigned By      Initials Name Provider Type    PC Catie Bansal PT Physical Therapist                   Outcome Measures       Row Name 12/11/23 1014          How much help from another person do you currently need...    Turning from your back to your side while in flat bed without using bedrails? 2  -PC     Moving from lying on back to sitting on the side of a flat bed without bedrails? 2  -PC     Moving to and from a bed to a chair (including a wheelchair)? 2  -PC     Standing up from a chair using your arms (e.g., wheelchair, bedside chair)? 1  -PC     Climbing 3-5 steps with a railing? 1  -PC     To walk in hospital room? 1  -PC     AM-PAC 6 Clicks Score (PT) 9  -PC     Highest Level of Mobility Goal 3 --> Sit at edge of bed  -PC               User Key  (r) = Recorded By, (t) = Taken By, (c) = Cosigned By      Initials Name Provider Type    PC Catie Bansal PT Physical Therapist                                 Physical Therapy Education       Title: PT OT SLP Therapies (In Progress)       Topic: Physical Therapy (In Progress)       Point: Mobility training (In Progress)       Learning Progress Summary             Patient Acceptance, E,D, NR by PC at 12/11/2023 1014    Acceptance, E,TB, VU by LB at 12/10/2023 1852    Acceptance, E,TB, VU by LB at 12/9/2023 1751    Acceptance, E, VU by JJ at 12/8/2023 1537    Nonacceptance, E,D, NL by PC at 12/6/2023 1515    Nonacceptance, E, NL by  at 12/5/2023 1826    Comment: pt confused     Acceptance, E,TB, VU by PT at 12/4/2023 1559    Acceptance, E, NR by SM at 12/4/2023 1419   Family Acceptance, E,TB, VU by LB at 12/10/2023 1852    Acceptance, E,TB, VU by LB at 12/9/2023 1751                         Point: Home exercise program (In Progress)       Learning Progress Summary             Patient Acceptance, E,D, NR by PC at 12/11/2023 1014    Acceptance, E,TB, VU by LB at 12/10/2023 1852    Acceptance, E,TB, VU by LB at 12/9/2023 1751    Acceptance, E, VU by JJ at 12/8/2023 1537    Nonacceptance, E,D, NL by  at 12/6/2023 1515    Nonacceptance, E, NL by  at 12/5/2023 1826    Comment: pt confused    Acceptance, E,TB, VU by PT at 12/4/2023 1559    Acceptance, E, NR by SM at 12/4/2023 1419   Family Acceptance, E,TB, VU by LB at 12/10/2023 1852    Acceptance, E,TB, VU by LB at 12/9/2023 1751                         Point: Body mechanics (In Progress)       Learning Progress Summary             Patient Acceptance, E,D, NR by PC at 12/11/2023 1014    Acceptance, E,TB, VU by LB at 12/10/2023 1852    Acceptance, E,TB, VU by LB at 12/9/2023 1751    Acceptance, E, VU by JJ at 12/8/2023 1537    Nonacceptance, E,D, NL by PC at 12/6/2023 1515    Nonacceptance, E, NL by  at 12/5/2023 1826    Comment: pt confused    Acceptance, E,TB, VU by PT at 12/4/2023 1559    Acceptance, E, NR by SM at 12/4/2023 1419   Family Acceptance, E,TB, VU by LB at 12/10/2023 1852    Acceptance, E,TB, VU by LB at 12/9/2023 1751                         Point: Precautions (In Progress)       Learning Progress Summary             Patient Acceptance, E,D, NR by PC at 12/11/2023 1014    Acceptance, E,TB, VU by LB at 12/10/2023 1852    Acceptance, E,TB, VU by LB at 12/9/2023 1751    Acceptance, E, VU by JJ at 12/8/2023 1537    Nonacceptance, E,D, NL by PC at 12/6/2023 1515    Nonacceptance, E, NL by  at 12/5/2023 1826    Comment: pt confused    Acceptance, E,TB, VU by PT at 12/4/2023 1559    Acceptance, E, NR by SM at 12/4/2023 1419    Family Acceptance, E,TB, VU by  at 12/10/2023 1852    Acceptance, E,TB, VU by  at 12/9/2023 1751                                         User Key       Initials Effective Dates Name Provider Type Discipline    PC 06/16/21 -  Catie Bansal, PT Physical Therapist PT    PT 06/16/21 -  Nargis Juarez RN Registered Nurse Nurse     12/20/21 -  Thang Gutierrez, RN Registered Nurse Nurse     05/02/22 -  Aliyah Cummins PT Physical Therapist PT    JJ 11/07/23 -  Rosibel Barrera RN Registered Nurse Nurse    LB 09/15/23 -  Renay Mclain, RN Registered Nurse Nurse                  PT Recommendation and Plan     Plan of Care Reviewed With: patient  Progress: declining  Outcome Evaluation: Pt pleasantly confused, able to tolerate sitting up on edge of bed, assisting some, following some commands but inconsistent, PT will cont to follow     Time Calculation:         PT Charges       Row Name 12/11/23 1015             Time Calculation    Start Time 0845  -PC      Stop Time 0910  -PC      Time Calculation (min) 25 min  -PC      PT Received On 12/11/23  -PC      PT - Next Appointment 12/13/23  -PC                User Key  (r) = Recorded By, (t) = Taken By, (c) = Cosigned By      Initials Name Provider Type    PC Catie Bansal, PT Physical Therapist                  Therapy Charges for Today       Code Description Service Date Service Provider Modifiers Qty    83429043901 HC PT THER PROC EA 15 MIN 12/11/2023 Catie Bansal, PT GP 2            PT G-Codes  Outcome Measure Options: AM-PAC 6 Clicks Basic Mobility (PT)  AM-PAC 6 Clicks Score (PT): 9  PT Discharge Summary  Anticipated Discharge Disposition (PT): skilled nursing facility    Catie Bansal PT  12/11/2023

## 2023-12-11 NOTE — THERAPY TREATMENT NOTE
Acute Care - Speech Language Pathology   Swallow Re-Evaluation [facility]     Patient Name: Tyron Hunt  : 1945  MRN: [id number]  Today's Date: 2023               Admit Date: 12/3/2023    Visit Dx:     ICD-10-CM ICD-9-CM   1. Acute encephalopathy  G93.40 348.30   2. Alzheimer's dementia, unspecified dementia severity, unspecified timing of dementia onset, unspecified whether behavioral, psychotic, or mood disturbance or anxiety  G30.9 331.0    F02.80 294.10   3. COVID-19  U07.1 079.89     Patient Active Problem List   Diagnosis    Hypertension    GERD (gastroesophageal reflux disease)    Hyperlipidemia    Anxiety    BPH (benign prostatic hyperplasia)    Impaired fasting glucose    Recurrent major depressive disorder, in full remission    Cervical disc disease    Cervical spinal stenosis    Foraminal stenosis of cervical region    Cervical radiculitis    Altered mental status    COVID-19 virus detected    Age-related physical debility    Cytokine release syndrome, grade 1    Dyspepsia    Acute encephalopathy    Dementia without behavioral disturbance    AV block    Bradycardia     Past Medical History:   Diagnosis Date    Anxiety     COVID-19 2022    Diverticulitis     Gastritis     GERD (gastroesophageal reflux disease)     Gout     H/O complete eye exam 2017    Hyperlipidemia     Hypertension     Impaired fasting blood sugar     Kidney stones     Memory loss     Sleep apnea      Past Surgical History:   Procedure Laterality Date    CERVICAL EPIDURAL N/A 2021    Procedure: CERVICAL EPIDURAL;  Surgeon: Nataliia Avilez MD;  Location: [facility] [place] MAIN OR;  Service: Pain Management;  Laterality: N/A;    CERVICAL EPIDURAL N/A 10/18/2021    Procedure: CERVICAL EPIDURAL 7-1;  Surgeon: Nataliia Avilez MD;  Location: [facility] [place] MAIN OR;  Service: Pain Management;  Laterality: N/A;    COLONOSCOPY      date Triston GRIFFIN    CYST REMOVAL      CYSTOSCOPY W/ LITHOLAPAXY / [facility]      TONSILLECTOMY       UPPER GASTROINTESTINAL ENDOSCOPY  10/05/2021    Gastritis   Arlet GRIFFIN       SLP Recommendation and Plan  SLP Swallowing Diagnosis: oral dysphagia, suspected pharyngeal dysphagia (12/11/23 1100)  SLP Diet Recommendation: puree, nectar thick liquids (12/11/23 1100)  Recommended Precautions and Strategies: upright posture during/after eating, small bites of food and sips of liquid (12/11/23 1100)  SLP Rec. for Method of Medication Administration: meds whole, meds crushed, with puree, with thick liquids (12/11/23 1100)     Monitor for Signs of Aspiration: notify SLP if any concerns (12/11/23 1100)     Swallow Criteria for Skilled Therapeutic Interventions Met: demonstrates skilled criteria (12/11/23 1100)     Rehab Potential/Prognosis, Swallowing: good, to achieve stated therapy goals (12/11/23 1100)  Therapy Frequency (Swallow): PRN (12/11/23 1100)  Predicted Duration Therapy Intervention (Days): until discharge (12/11/23 1100)  Oral Care Recommendations: Oral Care BID/PRN (12/11/23 1100)                                      Oral Care Recommendations: Oral Care BID/PRN (12/11/23 1100)    Plan of Care Reviewed With: patient  Outcome Evaluation: Clinical swallow evaluation completed recommend puree and NTL,  meds whole or crushed with puree or NTL and small bites and sips.      SWALLOW EVALUATION (last 72 hours)       SLP Adult Swallow Evaluation       Row Name 12/11/23 1100                   Rehab Evaluation    Document Type evaluation  -KA        Subjective Information no complaints  -KA        Patient Observations alert;cooperative  -KA        Patient Effort adequate  -KA        Symptoms Noted During/After Treatment none  -KA           General Information    Patient Profile Reviewed yes  -KA        Pertinent History Of Current Problem COVID 19, encephalopathy hx of dementia  -KA        Current Method of Nutrition NPO  -KA        Precautions/Limitations, Vision WFL;for purposes of eval  -KA         Precautions/Limitations, Hearing WFL;for purposes of eval  -KA        Prior Level of Function-Swallowing no diet consistency restrictions  -KA        Plans/Goals Discussed with patient  -        Barriers to Rehab cognitive status  -KA        Patient's Goals for Discharge return to PO diet  -KA           Oral Motor Structure and Function    Dentition Assessment natural, present and adequate  -KA        Secretion Management WNL/WFL  -KA        Mucosal Quality moist, healthy  -KA           Oral Musculature and Cranial Nerve Assessment    Oral Motor General Assessment generalized oral motor weakness;other (see comments)  pt followed some oral motor commands  -KA           General Eating/Swallowing Observations    Eating/Swallowing Skills fed by SLP  -KA        Positioning During Eating upright in bed  -KA        Utensils Used spoon;cup  -KA        Consistencies Trialed ice chips;thin liquids;nectar/syrup-thick liquids;pureed  -KA        Pre SpO2 (%) 98  -KA        Post SpO2 (%) 98  -KA           Clinical Swallow Eval    Clinical Swallow Evaluation Summary Patient alert and awake and following some commands. Frequent tangential speech, some intelligible and clear and some not. Patient demonstrated no overt s/s of pen/asp with ice chips, thins via spoon, NTL via cup and puree. Patient demonstrated delayed coughing after thins via cup. Laryngeal elevation felt mostly adequate, slightly reduced upon neck palpation. Prolonged oral mantipulation with puree at times no oral residue.  -KA           SLP Evaluation Clinical Impression    SLP Swallowing Diagnosis oral dysphagia;suspected pharyngeal dysphagia  -KA        Functional Impact risk of aspiration/pneumonia  -KA        Rehab Potential/Prognosis, Swallowing good, to achieve stated therapy goals  -        Swallow Criteria for Skilled Therapeutic Interventions Met demonstrates skilled criteria  -           Recommendations    Therapy Frequency (Swallow) PRN  -KA         Predicted Duration Therapy Intervention (Days) until discharge  -TROY        SLP Diet Recommendation puree;nectar thick liquids  -KA        Recommended Precautions and Strategies upright posture during/after eating;small bites of food and sips of liquid  -KA        Oral Care Recommendations Oral Care BID/PRN  -TROY        SLP Rec. for Method of Medication Administration meds whole;meds crushed;with puree;with thick liquids  -KA        Monitor for Signs of Aspiration notify SLP if any concerns  -TROY           (LTG) Patient will demonstrate functional swallow for    Diet Texture (Demonstrate functional swallow) pureed textures  -KA        Liquid viscosity (Demonstrate functional swallow) nectar/ mildly thick liquids  -KA        Chaves (Demonstrate functional swallow) independently (over 90% accuracy)  -TROY                  User Key  (r) = Recorded By, (t) = Taken By, (c) = Cosigned By      Initials Name Effective Dates    Sunil Bonilla MA,CCC-SLP 07/11/23 -                     EDUCATION  The patient has been educated in the following areas:   Dysphagia (Swallowing Impairment).        SLP GOALS       Row Name 12/11/23 1100             (LTG) Patient will demonstrate functional swallow for    Diet Texture (Demonstrate functional swallow) pureed textures  -KA      Liquid viscosity (Demonstrate functional swallow) nectar/ mildly thick liquids  -KA      Chaves (Demonstrate functional swallow) independently (over 90% accuracy)  -TROY                User Key  (r) = Recorded By, (t) = Taken By, (c) = Cosigned By      Initials Name Provider Type    Sunil Bonilla MA,CCC-SLP Speech and Language Pathologist                       Time Calculation:    Time Calculation- SLP       Row Name 12/11/23 1136             Time Calculation- SLP    SLP Start Time 0910  -KA      SLP Received On 12/11/23  -TROY         Untimed Charges    27089-CQ Treatment Swallow Minutes 60  -KA         Total Minutes    Untimed Charges Total Minutes  60  -KA       Total Minutes 60  -KA                User Key  (r) = Recorded By, (t) = Taken By, (c) = Cosigned By      Initials Name Provider Type    Sunil Bonilla MA,CCC-SLP Speech and Language Pathologist                    Therapy Charges for Today       Code Description Service Date Service Provider Modifiers Qty    [id number]  ST TREATMENT SWALLOW 4 12/11/2023 Sunil Casper MA,CCC-SLP GN 1                 Sunil Casper MA,ASHLEY-SLP  12/11/2023

## 2023-12-11 NOTE — PLAN OF CARE
Goal Outcome Evaluation:  Plan of Care Reviewed With: patient        Progress: declining  Outcome Evaluation: Pt pleasantly confused, able to tolerate sitting up on edge of bed, assisting some, following some commands but inconsistent, PT will cont to follow      Anticipated Discharge Disposition (PT): skilled nursing facility

## 2023-12-12 LAB
ANION GAP SERPL CALCULATED.3IONS-SCNC: 9 MMOL/L (ref 5–15)
BASOPHILS # BLD AUTO: 0.03 10*3/MM3 (ref 0–0.2)
BASOPHILS NFR BLD AUTO: 0.4 % (ref 0–1.5)
BUN SERPL-MCNC: 22 MG/DL (ref 8–23)
BUN/CREAT SERPL: 23.7 (ref 7–25)
CALCIUM SPEC-SCNC: 8.6 MG/DL (ref 8.6–10.5)
CHLORIDE SERPL-SCNC: 106 MMOL/L (ref 98–107)
CO2 SERPL-SCNC: 26 MMOL/L (ref 22–29)
CREAT SERPL-MCNC: 0.93 MG/DL (ref 0.76–1.27)
DEPRECATED RDW RBC AUTO: 40.6 FL (ref 37–54)
EGFRCR SERPLBLD CKD-EPI 2021: 83.5 ML/MIN/1.73
EOSINOPHIL # BLD AUTO: 0.25 10*3/MM3 (ref 0–0.4)
EOSINOPHIL NFR BLD AUTO: 3.2 % (ref 0.3–6.2)
ERYTHROCYTE [DISTWIDTH] IN BLOOD BY AUTOMATED COUNT: 11.9 % (ref 12.3–15.4)
GLUCOSE SERPL-MCNC: 122 MG/DL (ref 65–99)
HCT VFR BLD AUTO: 35.6 % (ref 37.5–51)
HGB BLD-MCNC: 11.5 G/DL (ref 13–17.7)
IMM GRANULOCYTES # BLD AUTO: 0.07 10*3/MM3 (ref 0–0.05)
IMM GRANULOCYTES NFR BLD AUTO: 0.9 % (ref 0–0.5)
LYMPHOCYTES # BLD AUTO: 1.21 10*3/MM3 (ref 0.7–3.1)
LYMPHOCYTES NFR BLD AUTO: 15.5 % (ref 19.6–45.3)
MAGNESIUM SERPL-MCNC: 2.5 MG/DL (ref 1.6–2.4)
MCH RBC QN AUTO: 30.3 PG (ref 26.6–33)
MCHC RBC AUTO-ENTMCNC: 32.3 G/DL (ref 31.5–35.7)
MCV RBC AUTO: 93.7 FL (ref 79–97)
MONOCYTES # BLD AUTO: 0.82 10*3/MM3 (ref 0.1–0.9)
MONOCYTES NFR BLD AUTO: 10.5 % (ref 5–12)
NEUTROPHILS NFR BLD AUTO: 5.45 10*3/MM3 (ref 1.7–7)
NEUTROPHILS NFR BLD AUTO: 69.5 % (ref 42.7–76)
NRBC BLD AUTO-RTO: 0 /100 WBC (ref 0–0.2)
PHOSPHATE SERPL-MCNC: 3.3 MG/DL (ref 2.5–4.5)
PLATELET # BLD AUTO: 199 10*3/MM3 (ref 140–450)
PMV BLD AUTO: 11.8 FL (ref 6–12)
POTASSIUM SERPL-SCNC: 3.9 MMOL/L (ref 3.5–5.2)
RBC # BLD AUTO: 3.8 10*6/MM3 (ref 4.14–5.8)
SODIUM SERPL-SCNC: 141 MMOL/L (ref 136–145)
WBC NRBC COR # BLD AUTO: 7.83 10*3/MM3 (ref 3.4–10.8)

## 2023-12-12 PROCEDURE — 80048 BASIC METABOLIC PNL TOTAL CA: CPT | Performed by: STUDENT IN AN ORGANIZED HEALTH CARE EDUCATION/TRAINING PROGRAM

## 2023-12-12 PROCEDURE — 85025 COMPLETE CBC W/AUTO DIFF WBC: CPT | Performed by: STUDENT IN AN ORGANIZED HEALTH CARE EDUCATION/TRAINING PROGRAM

## 2023-12-12 PROCEDURE — 25010000002 ENOXAPARIN PER 10 MG: Performed by: INTERNAL MEDICINE

## 2023-12-12 PROCEDURE — 83735 ASSAY OF MAGNESIUM: CPT | Performed by: STUDENT IN AN ORGANIZED HEALTH CARE EDUCATION/TRAINING PROGRAM

## 2023-12-12 PROCEDURE — 84100 ASSAY OF PHOSPHORUS: CPT | Performed by: STUDENT IN AN ORGANIZED HEALTH CARE EDUCATION/TRAINING PROGRAM

## 2023-12-12 RX ORDER — FAMOTIDINE 20 MG/1
20 TABLET, FILM COATED ORAL 2 TIMES DAILY
Status: DISCONTINUED | OUTPATIENT
Start: 2023-12-13 | End: 2023-12-15 | Stop reason: HOSPADM

## 2023-12-12 RX ADMIN — AMLODIPINE BESYLATE 5 MG: 5 TABLET ORAL at 09:31

## 2023-12-12 RX ADMIN — SENNOSIDES AND DOCUSATE SODIUM 2 TABLET: 50; 8.6 TABLET ORAL at 20:47

## 2023-12-12 RX ADMIN — ENOXAPARIN SODIUM 40 MG: 100 INJECTION SUBCUTANEOUS at 09:32

## 2023-12-12 RX ADMIN — TERAZOSIN HYDROCHLORIDE 1 MG: 1 CAPSULE ORAL at 20:47

## 2023-12-12 RX ADMIN — LOSARTAN POTASSIUM 25 MG: 25 TABLET, FILM COATED ORAL at 09:31

## 2023-12-12 RX ADMIN — TERAZOSIN HYDROCHLORIDE 1 MG: 1 CAPSULE ORAL at 09:32

## 2023-12-12 RX ADMIN — GUAIFENESIN 400 MG: 200 SOLUTION ORAL at 12:01

## 2023-12-12 RX ADMIN — MEMANTINE HYDROCHLORIDE 10 MG: 10 TABLET, FILM COATED ORAL at 20:47

## 2023-12-12 RX ADMIN — SENNOSIDES AND DOCUSATE SODIUM 2 TABLET: 50; 8.6 TABLET ORAL at 09:30

## 2023-12-12 RX ADMIN — Medication 10 ML: at 20:49

## 2023-12-12 RX ADMIN — FAMOTIDINE 20 MG: 10 INJECTION INTRAVENOUS at 09:32

## 2023-12-12 RX ADMIN — MEMANTINE HYDROCHLORIDE 10 MG: 10 TABLET, FILM COATED ORAL at 09:30

## 2023-12-12 RX ADMIN — Medication 10 ML: at 09:32

## 2023-12-12 RX ADMIN — MONTELUKAST SODIUM 10 MG: 10 TABLET, FILM COATED ORAL at 20:47

## 2023-12-12 RX ADMIN — GUAIFENESIN 400 MG: 200 SOLUTION ORAL at 20:48

## 2023-12-12 NOTE — PLAN OF CARE
Problem: Restraint, Nonviolent  Goal: Absence of Harm or Injury  12/12/2023 0659 by Madelaine Watts RN  Outcome: Ongoing, Progressing  12/12/2023 0530 by Madelaine Watts RN  Outcome: Ongoing, Progressing  Intervention: Implement Least Restrictive Safety Strategies  Recent Flowsheet Documentation  Taken 12/12/2023 0600 by Madelaine Watts RN  Medical Device Protection:   IV pole/bag removed from visual field   tubing secured  Less Restrictive Alternative:   bed alarm in use   calming techniques promoted   emotional support provided   environment adjusted   familiar comfort object encouraged   positive reinforcement provided   safety enhancements provided   sensory stimulation limited  De-Escalation Techniques:   increased round frequency   diversional activity encouraged   appropriate behavior reinforced   quiet time facilitated   reoriented   stimulation decreased   verbally redirected  Diversional Activities: television  Taken 12/12/2023 0400 by Madelaine Watts RN  Medical Device Protection:   IV pole/bag removed from visual field   tubing secured  Less Restrictive Alternative:   bed alarm in use   calming techniques promoted   emotional support provided   environment adjusted   familiar comfort object encouraged   positive reinforcement provided   safety enhancements provided   sensory stimulation limited  De-Escalation Techniques:   quiet time facilitated   stimulation decreased  Diversional Activities: television  Taken 12/12/2023 0200 by Madelaine Watts RN  Medical Device Protection:   IV pole/bag removed from visual field   tubing secured  Less Restrictive Alternative:   bed alarm in use   calming techniques promoted   emotional support provided   environment adjusted   familiar comfort object encouraged   positive reinforcement provided   safety enhancements provided   sensory stimulation limited  De-Escalation Techniques:   quiet time facilitated   stimulation decreased  Diversional Activities: television  Taken  12/12/2023 0000 by Madelaine Watts RN  Medical Device Protection:   IV pole/bag removed from visual field   tubing secured  Less Restrictive Alternative:   bed alarm in use   calming techniques promoted   emotional support provided   environment adjusted   familiar comfort object encouraged   positive reinforcement provided   safety enhancements provided   sensory stimulation limited  De-Escalation Techniques:   quiet time facilitated   stimulation decreased  Diversional Activities: television  Taken 12/11/2023 2200 by Madelaine Watts RN  Medical Device Protection:   IV pole/bag removed from visual field   tubing secured  Less Restrictive Alternative:   bed alarm in use   calming techniques promoted   emotional support provided   environment adjusted   familiar comfort object encouraged   positive reinforcement provided   safety enhancements provided   sensory stimulation limited  De-Escalation Techniques:   appropriate behavior reinforced   increased round frequency   quiet time facilitated   reoriented   stimulation decreased   verbally redirected  Diversional Activities: television  Taken 12/11/2023 2000 by Madelaine Watts RN  Medical Device Protection:   IV pole/bag removed from visual field   tubing secured  Less Restrictive Alternative:   bed alarm in use   calming techniques promoted   emotional support provided   environment adjusted   familiar comfort object encouraged   positive reinforcement provided   safety enhancements provided   sensory stimulation limited  De-Escalation Techniques:   appropriate behavior reinforced   diversional activity encouraged   increased round frequency   quiet time facilitated   reoriented   stimulation decreased   verbally redirected  Diversional Activities: television  Intervention: Protect Skin and Joint Integrity  Recent Flowsheet Documentation  Taken 12/11/2023 2000 by Madelaine Watts RN  Body Position:   neutral body alignment   neutral head position   30 degrees   legs  elevated   lower extremity elevated  Range of Motion: ROM (range of motion) performed   Goal Outcome Evaluation:  Plan of Care Reviewed With: patient        Progress: no change     Pt remains confused, attempts to remove lines.

## 2023-12-12 NOTE — PROGRESS NOTES
"Nutrition Services    Patient Name:  Tyron Hunt  YOB: 1945  MRN: [id number]  Admit Date:  12/3/2023    Assessment Date:  12/12/23  Summary: TF F/up  Current TF regimen:  Labs: Glu 126, K 3.1, Platelets 126  Last BM: 12/6    TF follow up completed. Speech eval on 12/11. Rec puree w/ nectra thick liquid. Pt in bed asleep w/ family at his side at time of visit. Noted lunchtray on his side. Pt's family reported fair po intake. Ate 25% of his lunch w/ TF running at goal. Pt's family requested/asks if they could bring in his ONS. Told them we could order them if he needs it but for now since he has the TF running and started on a diet modified, just wait to see how he does first.     REC:  Continue Fibersource HN at goal (60 mL/hr) with 30 ml q 4 hrs free water flushes  Monitor and replace electrolytes PRN   Advance diet as tolerated and once medically appropriate per MD     RD to continue to follow closely.      CLINICAL NUTRITION ASSESSMENT      Reason for Assessment Follow up protocol      Diagnosis/Problem   Acute encephalopathy, +COVID 19         Anthropometrics        Current Height  Current Weight  BMI kg/m2 Height: 167 cm (65.75\")  Weight: 66 kg (145 lb 8.1 oz) (12/05/23 1624)  Body mass index is 23.67 kg/m².   Adjusted BMI (if applicable)    BMI Category Normal/Healthy (18.4 - 24.9)   Ideal Body Weight (IBW) 141 lb (64.1 kg)   Usual Body Weight (UBW) 145-157 lb   Weight Trend Loss   Weight History Wt Readings from Last 30 Encounters:   12/05/23 1624 66 kg (145 lb 8.1 oz)   12/04/23 0435 66.5 kg (146 lb 9.7 oz)   12/03/23 2050 70.3 kg (155 lb)   07/14/23 1415 71.2 kg (157 lb)   04/10/23 1118 66.7 kg (147 lb)   01/09/23 1148 68.9 kg (152 lb)   12/12/22 1004 69.4 kg (153 lb)   12/05/22 1104 69.4 kg (153 lb)   10/25/22 1308 68.5 kg (151 lb)   10/10/22 0903 68.5 kg (151 lb)   07/12/22 1319 71.7 kg (158 lb)   06/14/22 1114 70.9 kg (156 lb 6.4 oz)   05/24/22 1301 59 kg (130 lb)   05/05/22 1037 66 kg " (145 lb 6.4 oz)   04/15/22 1431 70.4 kg (155 lb 3.2 oz)   03/25/22 0956 66.6 kg (146 lb 12.8 oz)   02/23/22 0955 68.9 kg (152 lb)   02/22/22 1449 68.2 kg (150 lb 4 oz)   02/16/22 1007 68 kg (150 lb)   02/12/22 1300 75.3 kg (166 lb)   02/08/22 0510 65 kg (143 lb 3.2 oz)   02/07/22 0400 64.5 kg (142 lb 4.8 oz)   02/05/22 0500 65.2 kg (143 lb 11.2 oz)   02/01/22 0755 67.6 kg (149 lb 1.6 oz)   01/31/22 0515 67.6 kg (149 lb 1.6 oz)   01/30/22 0323 70.1 kg (154 lb 8 oz)   11/30/21 0910 74 kg (163 lb 3.2 oz)   10/18/21 1122 72.6 kg (160 lb)   09/09/21 1028 73.9 kg (163 lb)   08/27/21 1043 72.6 kg (160 lb)   08/23/21 1131 69.4 kg (153 lb)   08/17/21 0855 74.1 kg (163 lb 6.4 oz)   06/07/21 1522 75.3 kg (166 lb)   05/03/21 1251 75.8 kg (167 lb)   12/16/20 0809 76.2 kg (168 lb)   11/04/20 1407 77.6 kg (171 lb)   10/28/19 1016 78.9 kg (174 lb)   09/26/18 1546 79.4 kg (175 lb)      --  Estimated/Assessed Needs        Current Weight  Weight: 66 kg (145 lb 8.1 oz) (12/05/23 1624)       Energy Requirements    Weight for Calculation 145 lb (66 kg)   Method for Estimation  25 kcal/kg, 30 kcal/kg   EST Needs (kcal/day) 3352-7065       Protein Requirements    Weight for Calculation 145 lb (66 kg)   EST Protein Needs (g/kg) 1.0 - 1.2 gm/kg   EST Daily Needs (g/day) 66-79       Fluid Requirements     Method for Estimation 1 mL/kcal    EST Needs (mL/day)      Labs       Pertinent Labs    Results from last 7 days   Lab Units 12/12/23  0607 12/11/23  0529 12/10/23  0729 12/09/23  0542 12/08/23 2039 12/08/23 0645 12/07/23 0623   SODIUM mmol/L 141 143 144 145  --  142 143   POTASSIUM mmol/L 3.9 3.9 4.2 4.0   < > 3.1* 3.7   CHLORIDE mmol/L 106 110* 111* 111*  --  106 107   CO2 mmol/L 26.0 21.5* 24.0 22.6  --  24.0 24.0   BUN mg/dL 22 25* 22 21  --  16 16   CREATININE mg/dL 0.93 0.87 0.92 0.81  --  1.01 1.04   CALCIUM mg/dL 8.6 8.5* 8.6 8.5*  --  8.9 9.0   BILIRUBIN mg/dL  --   --   --  0.4  --  0.8 0.9   ALK PHOS U/L  --   --   --  58  --   58 50   ALT (SGPT) U/L  --   --   --  41  --  27 21   AST (SGOT) U/L  --   --   --  38  --  31 30   GLUCOSE mg/dL 122* 127* 128* 127*  --  126* 106*    < > = values in this interval not displayed.     Results from last 7 days   Lab Units 12/12/23  0607 12/11/23  0529 12/10/23  0729 12/09/23  0542   MAGNESIUM mg/dL 2.5* 2.3 2.3 2.3   PHOSPHORUS mg/dL 3.3 3.7 4.0 3.2   HEMOGLOBIN g/dL 11.5* 12.4* 12.3* 12.2*   HEMATOCRIT % 35.6* 36.8* 36.1* 36.7*   WBC 10*3/mm3 7.83 6.16 5.26 4.82   ALBUMIN g/dL  --   --   --  3.2*     Results from last 7 days   Lab Units 12/12/23 0607 12/11/23 0529 12/10/23  0729 12/09/23  0542 12/08/23  0645   PLATELETS 10*3/mm3 199 169 160 145 126*     COVID19   Date Value Ref Range Status   12/03/2023 Detected (C) Not Detected - Ref. Range Final     Lab Results   Component Value Date    HGBA1C 5.80 (H) 09/21/2021          Medications           Scheduled Medications amLODIPine, 5 mg, Nasogastric, Q24H  enoxaparin, 40 mg, Subcutaneous, Daily  [START ON 12/13/2023] famotidine, 20 mg, Oral, BID  guaifenesin, 400 mg, Oral, Q8H  losartan, 25 mg, Nasogastric, Q24H  memantine, 10 mg, Nasogastric, Q12H  montelukast, 10 mg, Nasogastric, Nightly  senna-docusate sodium, 2 tablet, Nasogastric, BID  sodium chloride, 10 mL, Intravenous, Q12H  terazosin, 1 mg, Nasogastric, Q12H       Infusions       PRN Medications   acetaminophen **OR** acetaminophen **OR** acetaminophen    senna-docusate sodium **AND** polyethylene glycol **AND** bisacodyl **AND** bisacodyl    calcium carbonate    influenza vaccine    nitroglycerin    nitroglycerin    OLANZapine    ondansetron **OR** ondansetron    Potassium Replacement - Follow Nurse / BPA Driven Protocol    [COMPLETED] Insert Peripheral IV **AND** sodium chloride    sodium chloride    sodium chloride     Physical Findings          General Findings confused, disoriented, nonverbal, room air   Oral/Mouth Cavity WDL   Edema  upper extremity, 2+ (mild)   Gastrointestinal  non-distended , last bowel movement: 12/12   Skin  bruising, pale   Tubes/Drains/Lines Cortrak, ND tube, bridle in place   NFPE Other: severe orbital, temporal     Current Nutrition Orders & Evaluation of Intake       Oral Nutrition     Current PO Diet Puree, nectar thick   Supplement n/a   PO Evaluation     Trending % PO Intake 25% lunch    Factors Affecting Intake  altered mental status, swallow impairment      Enteral Nutrition     Enteral Route ND    TF Delivery Method Continuous    Propofol Rate/Kcal     Current TF/Rate (mL) Fibersource HN @ 60 mL/hr    TF Goal Rate (mL) 60 mL/hr     Current Water Flush (mL) 30 Q 4 hr    Modular None    TF Residual  other: not assessed     TF Tolerance tolerating    TF Observation Verified correct TF and water flush infusing per orders     PES STATEMENT / NUTRITION DIAGNOSIS      Nutrition Dx Problem  Problem: Inadequate Oral Intake  Etiology: Medical Diagnosis - acute encephalopathy and Functional Diagnosis - dysphagia    Signs/Symptoms: NPO and Report/Observation     NUTRITION INTERVENTION / PLAN OF CARE      Intervention Goal(s) Maintain nutrition status, Reduce/improve symptoms, Meet estimated needs, Disease management/therapy, Tolerate PO , Increase intake, Continue positive trend, Advance diet, Transition TF to PO, Maintain weight, No significant weight loss, and PO intake goal %: 50%         RD Intervention/Action Encourage intake, Continue to monitor, and Care plan reviewed   --      Prescription/Orders:       PO Diet ADAT      Supplements       Enteral Nutrition    Enteral Prescription:     Enteral Route ND    TF Delivery Method Continuous    Enteral Product Fibersource HN    Modular None    Propofol Rate/Kcal     TF Start Rate  20 mL/hr (advance by 10 mL q 6 hrs)    TF Goal Rate  60 mL/hr    Free Water Flush 30 mL Q 4 hr    Provision at Goal:          Calories 1728 kcal, meets 100% needs         Protein  78 gm protein, meets 100% needs         Fluid (mL) 1166 mL free  water + 180 mL in flushes         Parenteral Nutrition    New Prescription Ordered? Continue same per protocol, No changes at this time   --      Monitor/Evaluation Per protocol, I&O, PO intake, Pertinent labs, EN delivery/tolerance, Weight, GI status, Symptoms, POC/GOC, Swallow function   Discharge Plan/Needs Pending clinical course   --    RD to follow per protocol.      Electronically signed by:  Rebecca Beck, Dietetic Intern   12/12/23 13:04 EST

## 2023-12-12 NOTE — PROGRESS NOTES
"DAILY PROGRESS NOTE  [facility]    Patient Identification:  Name: Tyron Hunt  Age: 78 y.o.  Sex: male  :  1945  MRN: [id number]         Primary Care Physician: Tyron Hunt MD    Subjective:  Interval History: He is very confused.  I do not think he even knows what his name is    Objective:    Scheduled Meds:amLODIPine, 5 mg, Nasogastric, Q24H  enoxaparin, 40 mg, Subcutaneous, Daily  [START ON 2023] famotidine, 20 mg, Oral, BID  guaifenesin, 400 mg, Oral, Q8H  losartan, 25 mg, Nasogastric, Q24H  memantine, 10 mg, Nasogastric, Q12H  montelukast, 10 mg, Nasogastric, Nightly  senna-docusate sodium, 2 tablet, Nasogastric, BID  sodium chloride, 10 mL, Intravenous, Q12H  terazosin, 1 mg, Nasogastric, Q12H      Continuous Infusions:     Vital signs in last 24 hours:  Temp:  [97.4 °F (36.3 °C)-99 °F (37.2 °C)] 97.4 °F (36.3 °C)  Heart Rate:  [67-86] 77  Resp:  [16-18] 18  BP: (117-146)/(61-76) 122/70    Intake/Output:    Intake/Output Summary (Last 24 hours) at 2023 1641  Last data filed at 2023 2200  Gross per 24 hour   Intake 60 ml   Output --   Net 60 ml       Exam:  /70 (BP Location: Left arm, Patient Position: Lying)   Pulse 77   Temp 97.4 °F (36.3 °C) (Oral)   Resp 18   Ht 167 cm (65.75\")   Wt 66 kg (145 lb 8.1 oz)   SpO2 96%   BMI 23.67 kg/m²     General Appearance:  Very confused, no distress   Head:    Normocephalic, without obvious abnormality, atraumatic   Eyes:       Throat:   Lips, tongue, gums normal   Neck:   Supple, symmetrical, trachea midline, no JVD   Lungs:     Clear to auscultation bilaterally, respirations unlabored   Chest Wall:    No tenderness or deformity    Heart:    Regular rate and rhythm, S1 and S2 normal, no murmur,no  Rub or gallop   Abdomen:     Soft, nontender, bowel sounds active, no masses, no organomegaly    Extremities:   Extremities normal, atraumatic, no cyanosis or edema   Pulses:      Skin:   Skin is warm and dry,  no " rashes or palpable lesions   Neurologic: Very confused with very poor memory.      Lab Results (last 72 hours)       Procedure Component Value Units Date/Time    Phosphorus [[id number]]  (Normal) Collected: 12/12/23 0607    Specimen: Blood Updated: 12/12/23 0647     Phosphorus 3.3 mg/dL     Magnesium [[id number]]  (Abnormal) Collected: 12/12/23 0607    Specimen: Blood Updated: 12/12/23 0647     Magnesium 2.5 mg/dL     Basic Metabolic Panel [[id number]]  (Abnormal) Collected: 12/12/23 0607    Specimen: Blood Updated: 12/12/23 0647     Glucose 122 mg/dL      BUN 22 mg/dL      Creatinine 0.93 mg/dL      Sodium 141 mmol/L      Potassium 3.9 mmol/L      Chloride 106 mmol/L      CO2 26.0 mmol/L      Calcium 8.6 mg/dL      BUN/Creatinine Ratio 23.7     Anion Gap 9.0 mmol/L      eGFR 83.5 mL/min/1.73     Narrative:      GFR Normal >60  Chronic Kidney Disease <60  Kidney Failure <15    The GFR formula is only valid for adults with stable renal function between ages 18 and 70.    CBC & Differential [[id number]]  (Abnormal) Collected: 12/12/23 0607    Specimen: Blood Updated: 12/12/23 0631    Narrative:      The following orders were created for panel order CBC & Differential.  Procedure                               Abnormality         Status                     ---------                               -----------         ------                     CBC Auto Differential[[id number]]        Abnormal            Final result                 Please view results for these tests on the individual orders.    CBC Auto Differential [[id number]]  (Abnormal) Collected: 12/12/23 0607    Specimen: Blood Updated: 12/12/23 0631     WBC 7.83 10*3/mm3      RBC 3.80 10*6/mm3      Hemoglobin 11.5 g/dL      Hematocrit 35.6 %      MCV 93.7 fL      MCH 30.3 pg      MCHC 32.3 g/dL      RDW 11.9 %      RDW-SD 40.6 fl      MPV 11.8 fL      Platelets 199 10*3/mm3      Neutrophil % 69.5 %      Lymphocyte % 15.5 %      Monocyte % 10.5 %      Eosinophil % 3.2 %       Basophil % 0.4 %      Immature Grans % 0.9 %      Neutrophils, Absolute 5.45 10*3/mm3      Lymphocytes, Absolute 1.21 10*3/mm3      Monocytes, Absolute 0.82 10*3/mm3      Eosinophils, Absolute 0.25 10*3/mm3      Basophils, Absolute 0.03 10*3/mm3      Immature Grans, Absolute 0.07 10*3/mm3      nRBC 0.0 /100 WBC     Magnesium [[id number]]  (Normal) Collected: 12/11/23 0529    Specimen: Blood Updated: 12/11/23 0700     Magnesium 2.3 mg/dL     Basic Metabolic Panel [[id number]]  (Abnormal) Collected: 12/11/23 0529    Specimen: Blood Updated: 12/11/23 0700     Glucose 127 mg/dL      BUN 25 mg/dL      Creatinine 0.87 mg/dL      Sodium 143 mmol/L      Potassium 3.9 mmol/L      Chloride 110 mmol/L      CO2 21.5 mmol/L      Calcium 8.5 mg/dL      BUN/Creatinine Ratio 28.7     Anion Gap 11.5 mmol/L      eGFR 87.8 mL/min/1.73     Narrative:      GFR Normal >60  Chronic Kidney Disease <60  Kidney Failure <15    The GFR formula is only valid for adults with stable renal function between ages 18 and 70.    Phosphorus [[id number]]  (Normal) Collected: 12/11/23 0529    Specimen: Blood Updated: 12/11/23 0700     Phosphorus 3.7 mg/dL     CBC & Differential [[id number]]  (Abnormal) Collected: 12/11/23 0529    Specimen: Blood Updated: 12/11/23 0643    Narrative:      The following orders were created for panel order CBC & Differential.  Procedure                               Abnormality         Status                     ---------                               -----------         ------                     CBC Auto Differential[[id number]]        Abnormal            Final result                 Please view results for these tests on the individual orders.    CBC Auto Differential [[id number]]  (Abnormal) Collected: 12/11/23 0529    Specimen: Blood Updated: 12/11/23 0643     WBC 6.16 10*3/mm3      RBC 3.81 10*6/mm3      Hemoglobin 12.4 g/dL      Hematocrit 36.8 %      MCV 96.6 fL      MCH 32.5 pg      MCHC 33.7 g/dL      RDW 12.0 %       RDW-SD 42.7 fl      MPV 12.2 fL      Platelets 169 10*3/mm3      Neutrophil % 63.0 %      Lymphocyte % 19.0 %      Monocyte % 12.8 %      Eosinophil % 3.4 %      Basophil % 0.5 %      Immature Grans % 1.3 %      Neutrophils, Absolute 3.88 10*3/mm3      Lymphocytes, Absolute 1.17 10*3/mm3      Monocytes, Absolute 0.79 10*3/mm3      Eosinophils, Absolute 0.21 10*3/mm3      Basophils, Absolute 0.03 10*3/mm3      Immature Grans, Absolute 0.08 10*3/mm3      nRBC 0.0 /100 WBC     Phosphorus [[id number]]  (Normal) Collected: 12/10/23 0729    Specimen: Blood Updated: 12/10/23 0819     Phosphorus 4.0 mg/dL     Magnesium [[id number]]  (Normal) Collected: 12/10/23 0729    Specimen: Blood Updated: 12/10/23 0813     Magnesium 2.3 mg/dL     Basic Metabolic Panel [[id number]]  (Abnormal) Collected: 12/10/23 0729    Specimen: Blood Updated: 12/10/23 0813     Glucose 128 mg/dL      BUN 22 mg/dL      Creatinine 0.92 mg/dL      Sodium 144 mmol/L      Potassium 4.2 mmol/L      Chloride 111 mmol/L      CO2 24.0 mmol/L      Calcium 8.6 mg/dL      BUN/Creatinine Ratio 23.9     Anion Gap 9.0 mmol/L      eGFR 84.6 mL/min/1.73     Narrative:      GFR Normal >60  Chronic Kidney Disease <60  Kidney Failure <15    The GFR formula is only valid for adults with stable renal function between ages 18 and 70.    CBC & Differential [[id number]]  (Abnormal) Collected: 12/10/23 0729    Specimen: Blood Updated: 12/10/23 0757    Narrative:      The following orders were created for panel order CBC & Differential.  Procedure                               Abnormality         Status                     ---------                               -----------         ------                     CBC Auto Differential[[id number]]        Abnormal            Final result                 Please view results for these tests on the individual orders.    CBC Auto Differential [[id number]]  (Abnormal) Collected: 12/10/23 0729    Specimen: Blood Updated: 12/10/23 0757     WBC  "5.26 10*3/mm3      RBC 3.80 10*6/mm3      Hemoglobin 12.3 g/dL      Hematocrit 36.1 %      MCV 95.0 fL      MCH 32.4 pg      MCHC 34.1 g/dL      RDW 12.2 %      RDW-SD 42.0 fl      MPV 11.8 fL      Platelets 160 10*3/mm3      Neutrophil % 60.4 %      Lymphocyte % 21.9 %      Monocyte % 11.4 %      Eosinophil % 4.6 %      Basophil % 0.4 %      Immature Grans % 1.3 %      Neutrophils, Absolute 3.18 10*3/mm3      Lymphocytes, Absolute 1.15 10*3/mm3      Monocytes, Absolute 0.60 10*3/mm3      Eosinophils, Absolute 0.24 10*3/mm3      Basophils, Absolute 0.02 10*3/mm3      Immature Grans, Absolute 0.07 10*3/mm3      nRBC 0.0 /100 WBC           Data Review:  Results from last 7 days   Lab Units 12/12/23  0607 12/11/23  0529 12/10/23  0729   SODIUM mmol/L 141 143 144   POTASSIUM mmol/L 3.9 3.9 4.2   CHLORIDE mmol/L 106 110* 111*   CO2 mmol/L 26.0 21.5* 24.0   BUN mg/dL 22 25* 22   CREATININE mg/dL 0.93 0.87 0.92   GLUCOSE mg/dL 122* 127* 128*   CALCIUM mg/dL 8.6 8.5* 8.6     Results from last 7 days   Lab Units 12/12/23  0607 12/11/23  0529 12/10/23  0729   WBC 10*3/mm3 7.83 6.16 5.26   HEMOGLOBIN g/dL 11.5* 12.4* 12.3*   HEMATOCRIT % 35.6* 36.8* 36.1*   PLATELETS 10*3/mm3 199 169 160             Lab Results   Lab Value Date/Time    TROPONINT 18 12/05/2023 1756    TROPONINT 21 12/05/2023 1506    TROPONINT 26 (H) 12/04/2023 0642    TROPONINT 22 (H) 12/04/2023 0116    TROPONINT 23 (H) 12/03/2023 2214    TROPONINT <0.010 01/30/2022 0606    TROPONINT <0.010 01/29/2022 2320         Results from last 7 days   Lab Units 12/09/23  0542 12/08/23  0645 12/07/23  0623   ALK PHOS U/L 58 58 50   BILIRUBIN mg/dL 0.4 0.8 0.9   ALT (SGPT) U/L 41 27 21   AST (SGOT) U/L 38 31 30             No results found for: \"POCGLU\"        Past Medical History:   Diagnosis Date    Anxiety     COVID-19 01/29/2022    Diverticulitis     Gastritis     GERD (gastroesophageal reflux disease)     Gout     H/O complete eye exam 06/2017    Hyperlipidemia     " Hypertension     Impaired fasting blood sugar     Kidney stones     Memory loss     Sleep apnea        Assessment:  Active Hospital Problems    Diagnosis  POA    **Acute encephalopathy [G93.40]  Yes    AV block [I44.30]  No    Bradycardia [R00.1]  No    Dementia without behavioral disturbance [F03.90]  Yes    COVID-19 virus detected [U07.1]  Yes    BPH (benign prostatic hyperplasia) [N40.0]  Yes    Hypertension [I10]  Yes    GERD (gastroesophageal reflux disease) [K21.9]  Yes      Resolved Hospital Problems    Diagnosis Date Resolved POA    Hypernatremia [E87.0] 12/06/2023 No       Plan:  Continue with current medications and supportive care.  DC planning.  Likely will need skilled nursing unit for rehab and probably long-term care.  Follow-up labs.    Zeyad Mcfarland MD  12/12/2023  16:41 EST

## 2023-12-12 NOTE — PLAN OF CARE
Goal Outcome Evaluation:  Plan of Care Reviewed With: patient        Progress: no change     No acute change noted this shift.  Pt remains in bilateral soft wrist restraints due to attempts to pull lines.

## 2023-12-12 NOTE — PLAN OF CARE
Goal Outcome Evaluation:    Problem: Restraint, Nonviolent  Goal: Absence of Harm or Injury  Outcome: Ongoing, Progressing  Intervention: Implement Least Restrictive Safety Strategies  Recent Flowsheet Documentation  Taken 12/12/2023 1800 by Bairon Guallpa RN  Medical Device Protection:   tubing secured   IV pole/bag removed from visual field  Less Restrictive Alternative:   bed alarm in use   calming techniques promoted   emotional support provided   environment adjusted   familiar comfort object encouraged   positive reinforcement provided   safety enhancements provided   sensory stimulation limited  De-Escalation Techniques:   reoriented   appropriate behavior reinforced   increased round frequency  Diversional Activities: music  Taken 12/12/2023 1600 by Bairon Guallpa RN  Medical Device Protection:   tubing secured   IV pole/bag removed from visual field  Less Restrictive Alternative:   bed alarm in use   calming techniques promoted   emotional support provided   environment adjusted   familiar comfort object encouraged   positive reinforcement provided   safety enhancements provided   sensory stimulation limited  De-Escalation Techniques:   appropriate behavior reinforced   reoriented   quiet time facilitated  Diversional Activities: music  Taken 12/12/2023 1400 by Bairon Guallpa RN  Medical Device Protection: IV pole/bag removed from visual field  Less Restrictive Alternative:   bed alarm in use   calming techniques promoted   emotional support provided   environment adjusted   familiar comfort object encouraged   positive reinforcement provided   safety enhancements provided   sensory stimulation limited  De-Escalation Techniques:   appropriate behavior reinforced   quiet time facilitated   reoriented   increased round frequency  Diversional Activities: television  Taken 12/12/2023 1245 by Bairon Guallpa RN  Medical Device Protection: IV pole/bag removed from visual field  Taken 12/12/2023 1200 by  Bairon Guallpa RN  Medical Device Protection: IV pole/bag removed from visual field  Less Restrictive Alternative:   bed alarm in use   calming techniques promoted   emotional support provided   environment adjusted   familiar comfort object encouraged   positive reinforcement provided   safety enhancements provided   sensory stimulation limited  De-Escalation Techniques:   reoriented   quiet time facilitated   appropriate behavior reinforced  Diversional Activities: television  Taken 12/12/2023 1114 by Bairon Guallpa RN  Medical Device Protection: IV pole/bag removed from visual field  Less Restrictive Alternative:   bed alarm in use   calming techniques promoted   emotional support provided   environment adjusted   familiar comfort object encouraged   positive reinforcement provided   safety enhancements provided   sensory stimulation limited  De-Escalation Techniques:   reoriented   quiet time facilitated   family involvement requested   appropriate behavior reinforced  Diversional Activities: television  Taken 12/12/2023 1000 by Bairon Guallpa RN  Medical Device Protection: IV pole/bag removed from visual field  Less Restrictive Alternative:   bed alarm in use   calming techniques promoted   emotional support provided   environment adjusted   familiar comfort object encouraged   positive reinforcement provided   safety enhancements provided   sensory stimulation limited  De-Escalation Techniques:   quiet time facilitated   reoriented   appropriate behavior reinforced   increased round frequency  Diversional Activities: television  Taken 12/12/2023 0800 by Bairon Guallpa RN  Medical Device Protection: IV pole/bag removed from visual field  Less Restrictive Alternative:   bed alarm in use   calming techniques promoted   emotional support provided   environment adjusted   familiar comfort object encouraged   positive reinforcement provided   safety enhancements provided   sensory stimulation  limited  De-Escalation Techniques:   appropriate behavior reinforced   increased round frequency   reoriented  Diversional Activities: television  Intervention: Protect Dignity, Rights, and Personal Wellbeing  Recent Flowsheet Documentation  Taken 12/12/2023 0800 by Bairon Guallpa RN  Trust Relationship/Rapport: care explained  Intervention: Protect Skin and Joint Integrity  Recent Flowsheet Documentation  Taken 12/12/2023 1800 by Bairon Guallpa RN  Body Position:   turned   right  Taken 12/12/2023 1400 by Bairon Guallpa RN  Body Position: weight shifting  Taken 12/12/2023 1245 by Bairon Guallpa RN  Body Position:   tilted   left  Range of Motion: active ROM (range of motion) encouraged  Taken 12/12/2023 1000 by Bairon Guallpa RN  Body Position: weight shifting  Taken 12/12/2023 0800 by Bairon Guallpa RN  Body Position: sitting up in bed  Range of Motion: active ROM (range of motion) encouraged   Plan of Care Reviewed With: patient confused, room air. On bilateral wrist restraints, no injury noted, tube feed at 60 ml/hr goal.

## 2023-12-12 NOTE — CASE MANAGEMENT/SOCIAL WORK
Continued Stay Note  [facility]     Patient Name: Tyron Hunt  MRN: [id number]  Today's Date: 12/12/2023    Admit Date: 12/3/2023    Plan: TBD   Discharge Plan       Row Name 12/12/23 1505       Plan    Plan TBD    Plan Comments [facility] attempted to call pt's spouse Julia today to discuss DC planning - HIPAA compliant VM message left.  Await callback .........Sophie ANDRE/ PASCUAL                   Discharge Codes    No documentation.                 Expected Discharge Date and Time       Expected Discharge Date Expected Discharge Time    Dec 14, 2023               Sophie Campbell RN

## 2023-12-13 LAB
ANION GAP SERPL CALCULATED.3IONS-SCNC: 9.3 MMOL/L (ref 5–15)
BASOPHILS # BLD AUTO: 0.04 10*3/MM3 (ref 0–0.2)
BASOPHILS NFR BLD AUTO: 0.4 % (ref 0–1.5)
BUN SERPL-MCNC: 19 MG/DL (ref 8–23)
BUN/CREAT SERPL: 24.1 (ref 7–25)
CALCIUM SPEC-SCNC: 8.6 MG/DL (ref 8.6–10.5)
CHLORIDE SERPL-SCNC: 104 MMOL/L (ref 98–107)
CO2 SERPL-SCNC: 24.7 MMOL/L (ref 22–29)
CREAT SERPL-MCNC: 0.79 MG/DL (ref 0.76–1.27)
DEPRECATED RDW RBC AUTO: 42.6 FL (ref 37–54)
EGFRCR SERPLBLD CKD-EPI 2021: 90.4 ML/MIN/1.73
EOSINOPHIL # BLD AUTO: 0.19 10*3/MM3 (ref 0–0.4)
EOSINOPHIL NFR BLD AUTO: 1.8 % (ref 0.3–6.2)
ERYTHROCYTE [DISTWIDTH] IN BLOOD BY AUTOMATED COUNT: 12.1 % (ref 12.3–15.4)
GLUCOSE SERPL-MCNC: 132 MG/DL (ref 65–99)
HCT VFR BLD AUTO: 36.5 % (ref 37.5–51)
HGB BLD-MCNC: 12.3 G/DL (ref 13–17.7)
IMM GRANULOCYTES # BLD AUTO: 0.08 10*3/MM3 (ref 0–0.05)
IMM GRANULOCYTES NFR BLD AUTO: 0.7 % (ref 0–0.5)
LYMPHOCYTES # BLD AUTO: 1.2 10*3/MM3 (ref 0.7–3.1)
LYMPHOCYTES NFR BLD AUTO: 11.2 % (ref 19.6–45.3)
MAGNESIUM SERPL-MCNC: 2.3 MG/DL (ref 1.6–2.4)
MCH RBC QN AUTO: 32.6 PG (ref 26.6–33)
MCHC RBC AUTO-ENTMCNC: 33.7 G/DL (ref 31.5–35.7)
MCV RBC AUTO: 96.8 FL (ref 79–97)
MONOCYTES # BLD AUTO: 0.85 10*3/MM3 (ref 0.1–0.9)
MONOCYTES NFR BLD AUTO: 7.9 % (ref 5–12)
NEUTROPHILS NFR BLD AUTO: 78 % (ref 42.7–76)
NEUTROPHILS NFR BLD AUTO: 8.35 10*3/MM3 (ref 1.7–7)
NRBC BLD AUTO-RTO: 0 /100 WBC (ref 0–0.2)
PHOSPHATE SERPL-MCNC: 3.6 MG/DL (ref 2.5–4.5)
PLATELET # BLD AUTO: 190 10*3/MM3 (ref 140–450)
PMV BLD AUTO: 12.1 FL (ref 6–12)
POTASSIUM SERPL-SCNC: 3.9 MMOL/L (ref 3.5–5.2)
RBC # BLD AUTO: 3.77 10*6/MM3 (ref 4.14–5.8)
SODIUM SERPL-SCNC: 138 MMOL/L (ref 136–145)
WBC NRBC COR # BLD AUTO: 10.71 10*3/MM3 (ref 3.4–10.8)

## 2023-12-13 PROCEDURE — 83735 ASSAY OF MAGNESIUM: CPT | Performed by: STUDENT IN AN ORGANIZED HEALTH CARE EDUCATION/TRAINING PROGRAM

## 2023-12-13 PROCEDURE — 84100 ASSAY OF PHOSPHORUS: CPT | Performed by: STUDENT IN AN ORGANIZED HEALTH CARE EDUCATION/TRAINING PROGRAM

## 2023-12-13 PROCEDURE — 80048 BASIC METABOLIC PNL TOTAL CA: CPT | Performed by: STUDENT IN AN ORGANIZED HEALTH CARE EDUCATION/TRAINING PROGRAM

## 2023-12-13 PROCEDURE — 85025 COMPLETE CBC W/AUTO DIFF WBC: CPT | Performed by: STUDENT IN AN ORGANIZED HEALTH CARE EDUCATION/TRAINING PROGRAM

## 2023-12-13 PROCEDURE — 25010000002 ENOXAPARIN PER 10 MG: Performed by: INTERNAL MEDICINE

## 2023-12-13 RX ADMIN — MONTELUKAST SODIUM 10 MG: 10 TABLET, FILM COATED ORAL at 20:48

## 2023-12-13 RX ADMIN — SENNOSIDES AND DOCUSATE SODIUM 2 TABLET: 50; 8.6 TABLET ORAL at 09:24

## 2023-12-13 RX ADMIN — Medication 10 ML: at 09:24

## 2023-12-13 RX ADMIN — GUAIFENESIN 400 MG: 200 SOLUTION ORAL at 03:32

## 2023-12-13 RX ADMIN — ENOXAPARIN SODIUM 40 MG: 100 INJECTION SUBCUTANEOUS at 09:22

## 2023-12-13 RX ADMIN — TERAZOSIN HYDROCHLORIDE 1 MG: 1 CAPSULE ORAL at 09:24

## 2023-12-13 RX ADMIN — GUAIFENESIN 400 MG: 200 SOLUTION ORAL at 11:21

## 2023-12-13 RX ADMIN — AMLODIPINE BESYLATE 5 MG: 5 TABLET ORAL at 09:23

## 2023-12-13 RX ADMIN — GUAIFENESIN 400 MG: 200 SOLUTION ORAL at 20:48

## 2023-12-13 RX ADMIN — MEMANTINE HYDROCHLORIDE 10 MG: 10 TABLET, FILM COATED ORAL at 09:23

## 2023-12-13 RX ADMIN — Medication 10 ML: at 20:49

## 2023-12-13 RX ADMIN — FAMOTIDINE 20 MG: 20 TABLET ORAL at 09:23

## 2023-12-13 RX ADMIN — FAMOTIDINE 20 MG: 20 TABLET ORAL at 20:48

## 2023-12-13 RX ADMIN — LOSARTAN POTASSIUM 25 MG: 25 TABLET, FILM COATED ORAL at 09:23

## 2023-12-13 RX ADMIN — MEMANTINE HYDROCHLORIDE 10 MG: 10 TABLET, FILM COATED ORAL at 20:48

## 2023-12-13 RX ADMIN — SENNOSIDES AND DOCUSATE SODIUM 2 TABLET: 50; 8.6 TABLET ORAL at 20:48

## 2023-12-13 RX ADMIN — TERAZOSIN HYDROCHLORIDE 1 MG: 1 CAPSULE ORAL at 20:48

## 2023-12-13 NOTE — PROGRESS NOTES
"DAILY PROGRESS NOTE  [facility]    Patient Identification:  Name: Tyron Hunt  Age: 78 y.o.  Sex: male  :  1945  MRN: [id number]         Primary Care Physician: Tyron Hunt MD    Subjective:  Interval History: He is very confused.  Not talking today.    Objective:    Scheduled Meds:amLODIPine, 5 mg, Nasogastric, Q24H  enoxaparin, 40 mg, Subcutaneous, Daily  famotidine, 20 mg, Oral, BID  guaifenesin, 400 mg, Oral, Q8H  losartan, 25 mg, Nasogastric, Q24H  memantine, 10 mg, Nasogastric, Q12H  montelukast, 10 mg, Nasogastric, Nightly  senna-docusate sodium, 2 tablet, Nasogastric, BID  sodium chloride, 10 mL, Intravenous, Q12H  terazosin, 1 mg, Nasogastric, Q12H      Continuous Infusions:     Vital signs in last 24 hours:  Temp:  [97.4 °F (36.3 °C)-99.5 °F (37.5 °C)] 97.5 °F (36.4 °C)  Heart Rate:  [72-82] 72  Resp:  [18] 18  BP: (122-140)/(67-78) 123/67    Intake/Output:  No intake or output data in the 24 hours ending 23 1318      Exam:  /67 (BP Location: Left arm, Patient Position: Lying)   Pulse 72   Temp 97.5 °F (36.4 °C) (Oral)   Resp 18   Ht 167 cm (65.75\")   Wt 66 kg (145 lb 8.1 oz)   SpO2 98%   BMI 23.67 kg/m²     General Appearance:  Very confused, no distress   Head:    Normocephalic, without obvious abnormality, atraumatic   Eyes:       Throat:   Lips, tongue, gums normal   Neck:   Supple, symmetrical, trachea midline, no JVD   Lungs:     Clear to auscultation bilaterally, respirations unlabored   Chest Wall:    No tenderness or deformity    Heart:    Regular rate and rhythm, S1 and S2 normal, no murmur,no  Rub or gallop   Abdomen:     Soft, nontender, bowel sounds active, no masses, no organomegaly    Extremities:   Extremities normal, atraumatic, no cyanosis or edema   Pulses:      Skin:   Skin is warm and dry,  no rashes or palpable lesions   Neurologic: Very confused with very poor memory.      Lab Results (last 72 hours)       Procedure Component Value " Units Date/Time    Phosphorus [[id number]]  (Normal) Collected: 12/12/23 0607    Specimen: Blood Updated: 12/12/23 0647     Phosphorus 3.3 mg/dL     Magnesium [[id number]]  (Abnormal) Collected: 12/12/23 0607    Specimen: Blood Updated: 12/12/23 0647     Magnesium 2.5 mg/dL     Basic Metabolic Panel [[id number]]  (Abnormal) Collected: 12/12/23 0607    Specimen: Blood Updated: 12/12/23 0647     Glucose 122 mg/dL      BUN 22 mg/dL      Creatinine 0.93 mg/dL      Sodium 141 mmol/L      Potassium 3.9 mmol/L      Chloride 106 mmol/L      CO2 26.0 mmol/L      Calcium 8.6 mg/dL      BUN/Creatinine Ratio 23.7     Anion Gap 9.0 mmol/L      eGFR 83.5 mL/min/1.73     Narrative:      GFR Normal >60  Chronic Kidney Disease <60  Kidney Failure <15    The GFR formula is only valid for adults with stable renal function between ages 18 and 70.    CBC & Differential [[id number]]  (Abnormal) Collected: 12/12/23 0607    Specimen: Blood Updated: 12/12/23 0631    Narrative:      The following orders were created for panel order CBC & Differential.  Procedure                               Abnormality         Status                     ---------                               -----------         ------                     CBC Auto Differential[[id number]]        Abnormal            Final result                 Please view results for these tests on the individual orders.    CBC Auto Differential [[id number]]  (Abnormal) Collected: 12/12/23 0607    Specimen: Blood Updated: 12/12/23 0631     WBC 7.83 10*3/mm3      RBC 3.80 10*6/mm3      Hemoglobin 11.5 g/dL      Hematocrit 35.6 %      MCV 93.7 fL      MCH 30.3 pg      MCHC 32.3 g/dL      RDW 11.9 %      RDW-SD 40.6 fl      MPV 11.8 fL      Platelets 199 10*3/mm3      Neutrophil % 69.5 %      Lymphocyte % 15.5 %      Monocyte % 10.5 %      Eosinophil % 3.2 %      Basophil % 0.4 %      Immature Grans % 0.9 %      Neutrophils, Absolute 5.45 10*3/mm3      Lymphocytes, Absolute 1.21 10*3/mm3       Monocytes, Absolute 0.82 10*3/mm3      Eosinophils, Absolute 0.25 10*3/mm3      Basophils, Absolute 0.03 10*3/mm3      Immature Grans, Absolute 0.07 10*3/mm3      nRBC 0.0 /100 WBC     Magnesium [[id number]]  (Normal) Collected: 12/11/23 0529    Specimen: Blood Updated: 12/11/23 0700     Magnesium 2.3 mg/dL     Basic Metabolic Panel [[id number]]  (Abnormal) Collected: 12/11/23 0529    Specimen: Blood Updated: 12/11/23 0700     Glucose 127 mg/dL      BUN 25 mg/dL      Creatinine 0.87 mg/dL      Sodium 143 mmol/L      Potassium 3.9 mmol/L      Chloride 110 mmol/L      CO2 21.5 mmol/L      Calcium 8.5 mg/dL      BUN/Creatinine Ratio 28.7     Anion Gap 11.5 mmol/L      eGFR 87.8 mL/min/1.73     Narrative:      GFR Normal >60  Chronic Kidney Disease <60  Kidney Failure <15    The GFR formula is only valid for adults with stable renal function between ages 18 and 70.    Phosphorus [[id number]]  (Normal) Collected: 12/11/23 0529    Specimen: Blood Updated: 12/11/23 0700     Phosphorus 3.7 mg/dL     CBC & Differential [[id number]]  (Abnormal) Collected: 12/11/23 0529    Specimen: Blood Updated: 12/11/23 0643    Narrative:      The following orders were created for panel order CBC & Differential.  Procedure                               Abnormality         Status                     ---------                               -----------         ------                     CBC Auto Differential[[id number]]        Abnormal            Final result                 Please view results for these tests on the individual orders.    CBC Auto Differential [[id number]]  (Abnormal) Collected: 12/11/23 0529    Specimen: Blood Updated: 12/11/23 0643     WBC 6.16 10*3/mm3      RBC 3.81 10*6/mm3      Hemoglobin 12.4 g/dL      Hematocrit 36.8 %      MCV 96.6 fL      MCH 32.5 pg      MCHC 33.7 g/dL      RDW 12.0 %      RDW-SD 42.7 fl      MPV 12.2 fL      Platelets 169 10*3/mm3      Neutrophil % 63.0 %      Lymphocyte % 19.0 %      Monocyte % 12.8 %       Eosinophil % 3.4 %      Basophil % 0.5 %      Immature Grans % 1.3 %      Neutrophils, Absolute 3.88 10*3/mm3      Lymphocytes, Absolute 1.17 10*3/mm3      Monocytes, Absolute 0.79 10*3/mm3      Eosinophils, Absolute 0.21 10*3/mm3      Basophils, Absolute 0.03 10*3/mm3      Immature Grans, Absolute 0.08 10*3/mm3      nRBC 0.0 /100 WBC     Phosphorus [[id number]]  (Normal) Collected: 12/10/23 0729    Specimen: Blood Updated: 12/10/23 0819     Phosphorus 4.0 mg/dL     Magnesium [[id number]]  (Normal) Collected: 12/10/23 0729    Specimen: Blood Updated: 12/10/23 0813     Magnesium 2.3 mg/dL     Basic Metabolic Panel [[id number]]  (Abnormal) Collected: 12/10/23 0729    Specimen: Blood Updated: 12/10/23 0813     Glucose 128 mg/dL      BUN 22 mg/dL      Creatinine 0.92 mg/dL      Sodium 144 mmol/L      Potassium 4.2 mmol/L      Chloride 111 mmol/L      CO2 24.0 mmol/L      Calcium 8.6 mg/dL      BUN/Creatinine Ratio 23.9     Anion Gap 9.0 mmol/L      eGFR 84.6 mL/min/1.73     Narrative:      GFR Normal >60  Chronic Kidney Disease <60  Kidney Failure <15    The GFR formula is only valid for adults with stable renal function between ages 18 and 70.    CBC & Differential [[id number]]  (Abnormal) Collected: 12/10/23 0729    Specimen: Blood Updated: 12/10/23 0757    Narrative:      The following orders were created for panel order CBC & Differential.  Procedure                               Abnormality         Status                     ---------                               -----------         ------                     CBC Auto Differential[[id number]]        Abnormal            Final result                 Please view results for these tests on the individual orders.    CBC Auto Differential [[id number]]  (Abnormal) Collected: 12/10/23 0729    Specimen: Blood Updated: 12/10/23 0757     WBC 5.26 10*3/mm3      RBC 3.80 10*6/mm3      Hemoglobin 12.3 g/dL      Hematocrit 36.1 %      MCV 95.0 fL      MCH 32.4 pg      MCHC 34.1  "g/dL      RDW 12.2 %      RDW-SD 42.0 fl      MPV 11.8 fL      Platelets 160 10*3/mm3      Neutrophil % 60.4 %      Lymphocyte % 21.9 %      Monocyte % 11.4 %      Eosinophil % 4.6 %      Basophil % 0.4 %      Immature Grans % 1.3 %      Neutrophils, Absolute 3.18 10*3/mm3      Lymphocytes, Absolute 1.15 10*3/mm3      Monocytes, Absolute 0.60 10*3/mm3      Eosinophils, Absolute 0.24 10*3/mm3      Basophils, Absolute 0.02 10*3/mm3      Immature Grans, Absolute 0.07 10*3/mm3      nRBC 0.0 /100 WBC           Data Review:  Results from last 7 days   Lab Units 12/13/23  0724 12/12/23  0607 12/11/23  0529   SODIUM mmol/L 138 141 143   POTASSIUM mmol/L 3.9 3.9 3.9   CHLORIDE mmol/L 104 106 110*   CO2 mmol/L 24.7 26.0 21.5*   BUN mg/dL 19 22 25*   CREATININE mg/dL 0.79 0.93 0.87   GLUCOSE mg/dL 132* 122* 127*   CALCIUM mg/dL 8.6 8.6 8.5*     Results from last 7 days   Lab Units 12/13/23  0724 12/12/23  0607 12/11/23  0529   WBC 10*3/mm3 10.71 7.83 6.16   HEMOGLOBIN g/dL 12.3* 11.5* 12.4*   HEMATOCRIT % 36.5* 35.6* 36.8*   PLATELETS 10*3/mm3 190 199 169             Lab Results   Lab Value Date/Time    TROPONINT 18 12/05/2023 1756    TROPONINT 21 12/05/2023 1506    TROPONINT 26 (H) 12/04/2023 0642    TROPONINT 22 (H) 12/04/2023 0116    TROPONINT 23 (H) 12/03/2023 2214    TROPONINT <0.010 01/30/2022 0606    TROPONINT <0.010 01/29/2022 2320         Results from last 7 days   Lab Units 12/09/23  0542 12/08/23  0645 12/07/23  0623   ALK PHOS U/L 58 58 50   BILIRUBIN mg/dL 0.4 0.8 0.9   ALT (SGPT) U/L 41 27 21   AST (SGOT) U/L 38 31 30             No results found for: \"POCGLU\"        Past Medical History:   Diagnosis Date    Anxiety     COVID-19 01/29/2022    Diverticulitis     Gastritis     GERD (gastroesophageal reflux disease)     Gout     H/O complete eye exam 06/2017    Hyperlipidemia     Hypertension     Impaired fasting blood sugar     Kidney stones     Memory loss     Sleep apnea        Assessment:  Active Hospital " Problems    Diagnosis  POA    **Acute encephalopathy [G93.40]  Yes    AV block [I44.30]  No    Bradycardia [R00.1]  No    Dementia without behavioral disturbance [F03.90]  Yes    COVID-19 virus detected [U07.1]  Yes    BPH (benign prostatic hyperplasia) [N40.0]  Yes    Hypertension [I10]  Yes    GERD (gastroesophageal reflux disease) [K21.9]  Yes      Resolved Hospital Problems    Diagnosis Date Resolved POA    Hypernatremia [E87.0] 12/06/2023 No       Plan:  Continue with current medications and supportive care.  DC planning.  Likely will need skilled nursing unit for rehab and probably long-term care.  Follow-up labs.    Zeyad Mcfarland MD  12/13/2023  13:18 EST

## 2023-12-13 NOTE — PLAN OF CARE
Goal Outcome Evaluation:    Problem: Restraint, Nonviolent  Goal: Absence of Harm or Injury  Outcome: Ongoing, Progressing  Intervention: Implement Least Restrictive Safety Strategies  Recent Flowsheet Documentation  Taken 12/13/2023 1800 by Bairon Guallpa RN  Medical Device Protection:   IV pole/bag removed from visual field   tubing secured  Less Restrictive Alternative:   bed alarm in use   calming techniques promoted   environment adjusted   positive reinforcement provided   sensory stimulation limited  De-Escalation Techniques:   appropriate behavior reinforced   reoriented   quiet time facilitated  Diversional Activities: television  Taken 12/13/2023 1600 by Bairon Guallpa RN  Medical Device Protection:   tubing secured   IV pole/bag removed from visual field  Less Restrictive Alternative:   bed alarm in use   calming techniques promoted   environment adjusted   positive reinforcement provided   sensory stimulation limited  De-Escalation Techniques:   appropriate behavior reinforced   increased round frequency   quiet time facilitated   reoriented  Diversional Activities: television  Taken 12/13/2023 1400 by Bairon Guallpa RN  Medical Device Protection: IV pole/bag removed from visual field  Less Restrictive Alternative:   bed alarm in use   calming techniques promoted   environment adjusted   positive reinforcement provided   sensory stimulation limited  De-Escalation Techniques:   reoriented   quiet time facilitated   appropriate behavior reinforced  Diversional Activities: television  Taken 12/13/2023 1200 by Bairon Guallpa RN  Medical Device Protection: IV pole/bag removed from visual field  Less Restrictive Alternative:   bed alarm in use   calming techniques promoted   environment adjusted   positive reinforcement provided   sensory stimulation limited  De-Escalation Techniques:   reoriented   quiet time facilitated   appropriate behavior reinforced  Diversional Activities: television  Taken  12/13/2023 1145 by Bairon Guallpa RN  Medical Device Protection: IV pole/bag removed from visual field  Less Restrictive Alternative:   bed alarm in use   calming techniques promoted   environment adjusted   positive reinforcement provided   sensory stimulation limited  De-Escalation Techniques:   appropriate behavior reinforced   reoriented   quiet time facilitated  Diversional Activities: music  Taken 12/13/2023 1000 by Bairon Guallpa RN  Medical Device Protection: IV pole/bag removed from visual field  Less Restrictive Alternative:   bed alarm in use   calming techniques promoted   environment adjusted   positive reinforcement provided   sensory stimulation limited  De-Escalation Techniques:   appropriate behavior reinforced   quiet time facilitated   reoriented  Diversional Activities: television  Taken 12/13/2023 0845 by Bairon Guallpa RN  Medical Device Protection: IV pole/bag removed from visual field  Diversional Activities: television  Taken 12/13/2023 0800 by Bairon Guallpa RN  Medical Device Protection: IV pole/bag removed from visual field  Less Restrictive Alternative:   bed alarm in use   calming techniques promoted   environment adjusted   positive reinforcement provided   sensory stimulation limited  De-Escalation Techniques:   reoriented   quiet time facilitated   appropriate behavior reinforced  Diversional Activities: television  Intervention: Protect Dignity, Rights, and Personal Wellbeing  Recent Flowsheet Documentation  Taken 12/13/2023 1200 by Bairon Guallpa RN  Trust Relationship/Rapport:   care explained   questions encouraged   questions answered  Taken 12/13/2023 0845 by Bairon Guallpa RN  Trust Relationship/Rapport:   care explained   questions encouraged   questions answered  Intervention: Protect Skin and Joint Integrity  Recent Flowsheet Documentation  Taken 12/13/2023 1800 by Bairon Guallpa RN  Body Position: supine, legs elevated  Taken 12/13/2023 1600 by Saloni  JOANNE Waddell  Body Position:   turned   right   legs elevated  Taken 12/13/2023 1400 by Bairon Guallpa RN  Body Position: supine, legs elevated  Taken 12/13/2023 1200 by Bairon Guallpa RN  Body Position: supine, legs elevated  Range of Motion: active ROM (range of motion) encouraged  Taken 12/13/2023 1000 by Bairon Guallpa RN  Body Position: supine, legs elevated  Taken 12/13/2023 0845 by Bairon Guallpa RN  Body Position: position changed independently  Range of Motion: active ROM (range of motion) encouraged   Plan of Care Reviewed With: patient confused, room air, on bilateral wrist restraints, no injury noted, more alert and talkative during dinner time, ate 25% and drunk 120

## 2023-12-13 NOTE — SIGNIFICANT NOTE
12/13/23 1456   OTHER   Discipline physical therapist   Rehab Time/Intention   Session Not Performed other (see comments)  (Attempted to see for PT session this PM. Pt lethargic, has difficulty keeping eyes open and is not verbally responsive. Not appropriate for PT at this time, will plan to follow up tomorrow. Notified RN who states he is most alert around mid mornings.)   Recommendation   PT - Next Appointment 12/14/23

## 2023-12-14 LAB
ANION GAP SERPL CALCULATED.3IONS-SCNC: 10 MMOL/L (ref 5–15)
BASOPHILS # BLD AUTO: 0.03 10*3/MM3 (ref 0–0.2)
BASOPHILS NFR BLD AUTO: 0.3 % (ref 0–1.5)
BUN SERPL-MCNC: 22 MG/DL (ref 8–23)
BUN/CREAT SERPL: 22.4 (ref 7–25)
CALCIUM SPEC-SCNC: 8.5 MG/DL (ref 8.6–10.5)
CHLORIDE SERPL-SCNC: 104 MMOL/L (ref 98–107)
CO2 SERPL-SCNC: 25 MMOL/L (ref 22–29)
CREAT SERPL-MCNC: 0.98 MG/DL (ref 0.76–1.27)
DEPRECATED RDW RBC AUTO: 44.2 FL (ref 37–54)
EGFRCR SERPLBLD CKD-EPI 2021: 78.4 ML/MIN/1.73
EOSINOPHIL # BLD AUTO: 0.15 10*3/MM3 (ref 0–0.4)
EOSINOPHIL NFR BLD AUTO: 1.4 % (ref 0.3–6.2)
ERYTHROCYTE [DISTWIDTH] IN BLOOD BY AUTOMATED COUNT: 12.3 % (ref 12.3–15.4)
GLUCOSE SERPL-MCNC: 133 MG/DL (ref 65–99)
HCT VFR BLD AUTO: 35.6 % (ref 37.5–51)
HGB BLD-MCNC: 11.9 G/DL (ref 13–17.7)
IMM GRANULOCYTES # BLD AUTO: 0.06 10*3/MM3 (ref 0–0.05)
IMM GRANULOCYTES NFR BLD AUTO: 0.6 % (ref 0–0.5)
LYMPHOCYTES # BLD AUTO: 1.19 10*3/MM3 (ref 0.7–3.1)
LYMPHOCYTES NFR BLD AUTO: 11.1 % (ref 19.6–45.3)
MAGNESIUM SERPL-MCNC: 2.2 MG/DL (ref 1.6–2.4)
MCH RBC QN AUTO: 32.4 PG (ref 26.6–33)
MCHC RBC AUTO-ENTMCNC: 33.4 G/DL (ref 31.5–35.7)
MCV RBC AUTO: 97 FL (ref 79–97)
MONOCYTES # BLD AUTO: 0.94 10*3/MM3 (ref 0.1–0.9)
MONOCYTES NFR BLD AUTO: 8.8 % (ref 5–12)
NEUTROPHILS NFR BLD AUTO: 77.8 % (ref 42.7–76)
NEUTROPHILS NFR BLD AUTO: 8.32 10*3/MM3 (ref 1.7–7)
NRBC BLD AUTO-RTO: 0 /100 WBC (ref 0–0.2)
PHOSPHATE SERPL-MCNC: 3.7 MG/DL (ref 2.5–4.5)
PLATELET # BLD AUTO: 205 10*3/MM3 (ref 140–450)
PMV BLD AUTO: 11.8 FL (ref 6–12)
POTASSIUM SERPL-SCNC: 3.9 MMOL/L (ref 3.5–5.2)
RBC # BLD AUTO: 3.67 10*6/MM3 (ref 4.14–5.8)
SODIUM SERPL-SCNC: 139 MMOL/L (ref 136–145)
WBC NRBC COR # BLD AUTO: 10.69 10*3/MM3 (ref 3.4–10.8)

## 2023-12-14 PROCEDURE — 83735 ASSAY OF MAGNESIUM: CPT | Performed by: STUDENT IN AN ORGANIZED HEALTH CARE EDUCATION/TRAINING PROGRAM

## 2023-12-14 PROCEDURE — 25010000002 ENOXAPARIN PER 10 MG: Performed by: INTERNAL MEDICINE

## 2023-12-14 PROCEDURE — 85025 COMPLETE CBC W/AUTO DIFF WBC: CPT | Performed by: STUDENT IN AN ORGANIZED HEALTH CARE EDUCATION/TRAINING PROGRAM

## 2023-12-14 PROCEDURE — 97110 THERAPEUTIC EXERCISES: CPT

## 2023-12-14 PROCEDURE — 97530 THERAPEUTIC ACTIVITIES: CPT

## 2023-12-14 PROCEDURE — 80048 BASIC METABOLIC PNL TOTAL CA: CPT | Performed by: STUDENT IN AN ORGANIZED HEALTH CARE EDUCATION/TRAINING PROGRAM

## 2023-12-14 PROCEDURE — 84100 ASSAY OF PHOSPHORUS: CPT | Performed by: STUDENT IN AN ORGANIZED HEALTH CARE EDUCATION/TRAINING PROGRAM

## 2023-12-14 RX ORDER — ONDANSETRON 2 MG/ML
4 INJECTION INTRAMUSCULAR; INTRAVENOUS EVERY 6 HOURS PRN
Status: DISCONTINUED | OUTPATIENT
Start: 2023-12-14 | End: 2023-12-15 | Stop reason: HOSPADM

## 2023-12-14 RX ORDER — BISACODYL 5 MG/1
5 TABLET, DELAYED RELEASE ORAL DAILY PRN
Status: DISCONTINUED | OUTPATIENT
Start: 2023-12-14 | End: 2023-12-15 | Stop reason: HOSPADM

## 2023-12-14 RX ORDER — AMLODIPINE BESYLATE 5 MG/1
5 TABLET ORAL
Status: DISCONTINUED | OUTPATIENT
Start: 2023-12-15 | End: 2023-12-15 | Stop reason: HOSPADM

## 2023-12-14 RX ORDER — ACETAMINOPHEN 325 MG/1
650 TABLET ORAL EVERY 4 HOURS PRN
Status: DISCONTINUED | OUTPATIENT
Start: 2023-12-14 | End: 2023-12-15 | Stop reason: HOSPADM

## 2023-12-14 RX ORDER — LOSARTAN POTASSIUM 25 MG/1
25 TABLET ORAL
Status: DISCONTINUED | OUTPATIENT
Start: 2023-12-15 | End: 2023-12-15 | Stop reason: HOSPADM

## 2023-12-14 RX ORDER — ACETAMINOPHEN 160 MG/5ML
650 SOLUTION ORAL EVERY 4 HOURS PRN
Status: DISCONTINUED | OUTPATIENT
Start: 2023-12-14 | End: 2023-12-15 | Stop reason: HOSPADM

## 2023-12-14 RX ORDER — AMOXICILLIN 250 MG
2 CAPSULE ORAL 2 TIMES DAILY
Status: DISCONTINUED | OUTPATIENT
Start: 2023-12-14 | End: 2023-12-15 | Stop reason: HOSPADM

## 2023-12-14 RX ORDER — TAMSULOSIN HYDROCHLORIDE 0.4 MG/1
0.4 CAPSULE ORAL EVERY 12 HOURS
Status: DISCONTINUED | OUTPATIENT
Start: 2023-12-14 | End: 2023-12-15 | Stop reason: HOSPADM

## 2023-12-14 RX ORDER — BISACODYL 10 MG
10 SUPPOSITORY, RECTAL RECTAL DAILY PRN
Status: DISCONTINUED | OUTPATIENT
Start: 2023-12-14 | End: 2023-12-15 | Stop reason: HOSPADM

## 2023-12-14 RX ORDER — POLYETHYLENE GLYCOL 3350 17 G/17G
17 POWDER, FOR SOLUTION ORAL DAILY PRN
Status: DISCONTINUED | OUTPATIENT
Start: 2023-12-14 | End: 2023-12-15 | Stop reason: HOSPADM

## 2023-12-14 RX ORDER — MEMANTINE HYDROCHLORIDE 10 MG/1
10 TABLET ORAL EVERY 12 HOURS SCHEDULED
Status: DISCONTINUED | OUTPATIENT
Start: 2023-12-14 | End: 2023-12-15 | Stop reason: HOSPADM

## 2023-12-14 RX ORDER — ONDANSETRON 4 MG/1
4 TABLET, FILM COATED ORAL EVERY 6 HOURS PRN
Status: DISCONTINUED | OUTPATIENT
Start: 2023-12-14 | End: 2023-12-15 | Stop reason: HOSPADM

## 2023-12-14 RX ORDER — CALCIUM CARBONATE 500 MG/1
2 TABLET, CHEWABLE ORAL 2 TIMES DAILY PRN
Status: DISCONTINUED | OUTPATIENT
Start: 2023-12-14 | End: 2023-12-15 | Stop reason: HOSPADM

## 2023-12-14 RX ORDER — ACETAMINOPHEN 650 MG/1
650 SUPPOSITORY RECTAL EVERY 4 HOURS PRN
Status: DISCONTINUED | OUTPATIENT
Start: 2023-12-14 | End: 2023-12-15 | Stop reason: HOSPADM

## 2023-12-14 RX ORDER — MONTELUKAST SODIUM 10 MG/1
10 TABLET ORAL NIGHTLY
Status: DISCONTINUED | OUTPATIENT
Start: 2023-12-14 | End: 2023-12-15 | Stop reason: HOSPADM

## 2023-12-14 RX ADMIN — FAMOTIDINE 20 MG: 20 TABLET ORAL at 09:14

## 2023-12-14 RX ADMIN — GUAIFENESIN 400 MG: 200 SOLUTION ORAL at 11:45

## 2023-12-14 RX ADMIN — LOSARTAN POTASSIUM 25 MG: 25 TABLET, FILM COATED ORAL at 09:13

## 2023-12-14 RX ADMIN — AMLODIPINE BESYLATE 5 MG: 5 TABLET ORAL at 09:14

## 2023-12-14 RX ADMIN — GUAIFENESIN 400 MG: 200 SOLUTION ORAL at 02:52

## 2023-12-14 RX ADMIN — TERAZOSIN HYDROCHLORIDE 1 MG: 1 CAPSULE ORAL at 09:14

## 2023-12-14 RX ADMIN — SENNOSIDES AND DOCUSATE SODIUM 2 TABLET: 50; 8.6 TABLET ORAL at 09:14

## 2023-12-14 RX ADMIN — Medication 10 ML: at 09:14

## 2023-12-14 RX ADMIN — MEMANTINE HYDROCHLORIDE 10 MG: 10 TABLET, FILM COATED ORAL at 09:14

## 2023-12-14 RX ADMIN — GUAIFENESIN 400 MG: 200 SOLUTION ORAL at 21:03

## 2023-12-14 RX ADMIN — TAMSULOSIN HYDROCHLORIDE 0.4 MG: 0.4 CAPSULE ORAL at 21:03

## 2023-12-14 RX ADMIN — FAMOTIDINE 20 MG: 20 TABLET ORAL at 21:02

## 2023-12-14 RX ADMIN — ENOXAPARIN SODIUM 40 MG: 100 INJECTION SUBCUTANEOUS at 09:20

## 2023-12-14 RX ADMIN — MONTELUKAST SODIUM 10 MG: 10 TABLET, FILM COATED ORAL at 21:03

## 2023-12-14 RX ADMIN — MEMANTINE HYDROCHLORIDE 10 MG: 10 TABLET, FILM COATED ORAL at 21:03

## 2023-12-14 RX ADMIN — Medication 10 ML: at 21:04

## 2023-12-14 NOTE — CASE MANAGEMENT/SOCIAL WORK
Continued Stay Note  [facility]     Patient Name: Tyron Hunt  MRN: [id number]  Today's Date: 12/14/2023    Admit Date: 12/3/2023    Plan: SNF vs home with caregivers and home health   Discharge Plan       Row Name 12/14/23 0803       Plan    Plan SNF vs home with caregivers and home health    Plan Comments CCP s/w pt's son Dr Hunt to disuss DC planning.  He states that the last time pt was in the hospital, pt became confused like he is this time, but pt improved back to normal as soon as he got home.  Dr Hunt feels pt needs to get out of the hospital and Covid isolation soon.  Discussed DC options and differences between assisted living, SNF and home w/ HH.  Per son's request, referrals sent to [facility].  [facility] pending.   CCP dicussed the option of pvt pay caregivers - list of caregiver agencies placed at bedside. CCP will continue to follow and assist as needed. .........Sophie ANDRE/ PASCUAL                   Discharge Codes    No documentation.                 Expected Discharge Date and Time       Expected Discharge Date Expected Discharge Time    Dec 15, 2023               Sophie Campbell RN

## 2023-12-14 NOTE — DISCHARGE PLACEMENT REQUEST
"Bart Benson (78 y.o. Male)       Date of Birth   1945    Social Security Number       Address   [address] [facility] [address]    Home Phone   [phone]    MRN   [id number]       Zoroastrian   Judaism    Marital Status                               Admission Date   12/3/23    Admission Type   Emergency    Admitting Provider   Ramila Gutierrez MD    Attending Provider   Zeyad Mcfarland MD    Department, Room/Bed   [address], [address]/1       Discharge Date       Discharge Disposition       Discharge Destination                                 Attending Provider: Zeyad Mcfarland MD    Allergies: Sulfa Antibiotics    Isolation: Enh Drop/Con   Infection: COVID (confirmed) (12/03/23)   Code Status: CPR    Ht: 167 cm (65.75\")   Wt: 66 kg (145 lb 8.1 oz)    Admission Cmt: None   Principal Problem: Acute encephalopathy [G93.40]                   Active Insurance as of 12/3/2023       Primary Coverage       Payor Plan Insurance Group Employer/Plan Group    HUMANA MEDICARE REPLACEMENT HUMANA MEDICARE REPLACEMENT [id number]       Payor Plan Address Payor Plan Phone Number Payor Plan Fax Number Effective Dates    [address] [address] [phone]  1/1/2023 - None Entered    [facility] [id number]         Subscriber Name Subscriber Birth Date Member ID       BART BENSON 1945 [id number]                     Emergency Contacts        (Rel.) Home Phone Work Phone Mobile Phone    ZANDER BENSON (Spouse) [phone] -- --    Dr Bart Benson (Son) [phone]                "

## 2023-12-14 NOTE — PROGRESS NOTES
"DAILY PROGRESS NOTE  [facility]    Patient Identification:  Name: Tyron Hunt  Age: 78 y.o.  Sex: male  :  1945  MRN: [id number]         Primary Care Physician: Tyron Hunt MD    Subjective:  Interval History: He is very confused.  He mumbles a few words which are unintelligible.    Objective:    Scheduled Meds:amLODIPine, 5 mg, Nasogastric, Q24H  enoxaparin, 40 mg, Subcutaneous, Daily  famotidine, 20 mg, Oral, BID  guaifenesin, 400 mg, Oral, Q8H  losartan, 25 mg, Nasogastric, Q24H  memantine, 10 mg, Nasogastric, Q12H  montelukast, 10 mg, Nasogastric, Nightly  senna-docusate sodium, 2 tablet, Nasogastric, BID  sodium chloride, 10 mL, Intravenous, Q12H  terazosin, 1 mg, Nasogastric, Q12H      Continuous Infusions:     Vital signs in last 24 hours:  Temp:  [97.3 °F (36.3 °C)-98.6 °F (37 °C)] 98.6 °F (37 °C)  Heart Rate:  [75-86] 75  Resp:  [17-18] 18  BP: (120-140)/(62-78) 120/68    Intake/Output:    Intake/Output Summary (Last 24 hours) at 2023 1254  Last data filed at 2023 0820  Gross per 24 hour   Intake 140 ml   Output --   Net 140 ml         Exam:  /68 (BP Location: Left arm, Patient Position: Lying)   Pulse 75   Temp 98.6 °F (37 °C) (Oral)   Resp 18   Ht 167 cm (65.75\")   Wt 66 kg (145 lb 8.1 oz)   SpO2 98%   BMI 23.67 kg/m²     General Appearance:  Very confused, no distress   Head:    Normocephalic, without obvious abnormality, atraumatic   Eyes:       Throat:   Lips, tongue, gums normal   Neck:   Supple, symmetrical, trachea midline, no JVD   Lungs:     Clear to auscultation bilaterally, respirations unlabored   Chest Wall:    No tenderness or deformity    Heart:    Regular rate and rhythm, S1 and S2 normal, no murmur,no  Rub or gallop   Abdomen:     Soft, nontender, bowel sounds active, no masses, no organomegaly    Extremities:   Extremities normal, atraumatic, no cyanosis or edema   Pulses:      Skin:   Skin is warm and dry,  no rashes or palpable " lesions   Neurologic: Very confused with very poor memory.      Lab Results (last 72 hours)       Procedure Component Value Units Date/Time    Phosphorus [[id number]]  (Normal) Collected: 12/12/23 0607    Specimen: Blood Updated: 12/12/23 0647     Phosphorus 3.3 mg/dL     Magnesium [[id number]]  (Abnormal) Collected: 12/12/23 0607    Specimen: Blood Updated: 12/12/23 0647     Magnesium 2.5 mg/dL     Basic Metabolic Panel [[id number]]  (Abnormal) Collected: 12/12/23 0607    Specimen: Blood Updated: 12/12/23 0647     Glucose 122 mg/dL      BUN 22 mg/dL      Creatinine 0.93 mg/dL      Sodium 141 mmol/L      Potassium 3.9 mmol/L      Chloride 106 mmol/L      CO2 26.0 mmol/L      Calcium 8.6 mg/dL      BUN/Creatinine Ratio 23.7     Anion Gap 9.0 mmol/L      eGFR 83.5 mL/min/1.73     Narrative:      GFR Normal >60  Chronic Kidney Disease <60  Kidney Failure <15    The GFR formula is only valid for adults with stable renal function between ages 18 and 70.    CBC & Differential [[id number]]  (Abnormal) Collected: 12/12/23 0607    Specimen: Blood Updated: 12/12/23 0631    Narrative:      The following orders were created for panel order CBC & Differential.  Procedure                               Abnormality         Status                     ---------                               -----------         ------                     CBC Auto Differential[[id number]]        Abnormal            Final result                 Please view results for these tests on the individual orders.    CBC Auto Differential [[id number]]  (Abnormal) Collected: 12/12/23 0607    Specimen: Blood Updated: 12/12/23 0631     WBC 7.83 10*3/mm3      RBC 3.80 10*6/mm3      Hemoglobin 11.5 g/dL      Hematocrit 35.6 %      MCV 93.7 fL      MCH 30.3 pg      MCHC 32.3 g/dL      RDW 11.9 %      RDW-SD 40.6 fl      MPV 11.8 fL      Platelets 199 10*3/mm3      Neutrophil % 69.5 %      Lymphocyte % 15.5 %      Monocyte % 10.5 %      Eosinophil % 3.2 %      Basophil % 0.4  %      Immature Grans % 0.9 %      Neutrophils, Absolute 5.45 10*3/mm3      Lymphocytes, Absolute 1.21 10*3/mm3      Monocytes, Absolute 0.82 10*3/mm3      Eosinophils, Absolute 0.25 10*3/mm3      Basophils, Absolute 0.03 10*3/mm3      Immature Grans, Absolute 0.07 10*3/mm3      nRBC 0.0 /100 WBC     Magnesium [[id number]]  (Normal) Collected: 12/11/23 0529    Specimen: Blood Updated: 12/11/23 0700     Magnesium 2.3 mg/dL     Basic Metabolic Panel [[id number]]  (Abnormal) Collected: 12/11/23 0529    Specimen: Blood Updated: 12/11/23 0700     Glucose 127 mg/dL      BUN 25 mg/dL      Creatinine 0.87 mg/dL      Sodium 143 mmol/L      Potassium 3.9 mmol/L      Chloride 110 mmol/L      CO2 21.5 mmol/L      Calcium 8.5 mg/dL      BUN/Creatinine Ratio 28.7     Anion Gap 11.5 mmol/L      eGFR 87.8 mL/min/1.73     Narrative:      GFR Normal >60  Chronic Kidney Disease <60  Kidney Failure <15    The GFR formula is only valid for adults with stable renal function between ages 18 and 70.    Phosphorus [[id number]]  (Normal) Collected: 12/11/23 0529    Specimen: Blood Updated: 12/11/23 0700     Phosphorus 3.7 mg/dL     CBC & Differential [[id number]]  (Abnormal) Collected: 12/11/23 0529    Specimen: Blood Updated: 12/11/23 0643    Narrative:      The following orders were created for panel order CBC & Differential.  Procedure                               Abnormality         Status                     ---------                               -----------         ------                     CBC Auto Differential[[id number]]        Abnormal            Final result                 Please view results for these tests on the individual orders.    CBC Auto Differential [[id number]]  (Abnormal) Collected: 12/11/23 0529    Specimen: Blood Updated: 12/11/23 0643     WBC 6.16 10*3/mm3      RBC 3.81 10*6/mm3      Hemoglobin 12.4 g/dL      Hematocrit 36.8 %      MCV 96.6 fL      MCH 32.5 pg      MCHC 33.7 g/dL      RDW 12.0 %      RDW-SD 42.7 fl       MPV 12.2 fL      Platelets 169 10*3/mm3      Neutrophil % 63.0 %      Lymphocyte % 19.0 %      Monocyte % 12.8 %      Eosinophil % 3.4 %      Basophil % 0.5 %      Immature Grans % 1.3 %      Neutrophils, Absolute 3.88 10*3/mm3      Lymphocytes, Absolute 1.17 10*3/mm3      Monocytes, Absolute 0.79 10*3/mm3      Eosinophils, Absolute 0.21 10*3/mm3      Basophils, Absolute 0.03 10*3/mm3      Immature Grans, Absolute 0.08 10*3/mm3      nRBC 0.0 /100 WBC     Phosphorus [[id number]]  (Normal) Collected: 12/10/23 0729    Specimen: Blood Updated: 12/10/23 0819     Phosphorus 4.0 mg/dL     Magnesium [[id number]]  (Normal) Collected: 12/10/23 0729    Specimen: Blood Updated: 12/10/23 0813     Magnesium 2.3 mg/dL     Basic Metabolic Panel [[id number]]  (Abnormal) Collected: 12/10/23 0729    Specimen: Blood Updated: 12/10/23 0813     Glucose 128 mg/dL      BUN 22 mg/dL      Creatinine 0.92 mg/dL      Sodium 144 mmol/L      Potassium 4.2 mmol/L      Chloride 111 mmol/L      CO2 24.0 mmol/L      Calcium 8.6 mg/dL      BUN/Creatinine Ratio 23.9     Anion Gap 9.0 mmol/L      eGFR 84.6 mL/min/1.73     Narrative:      GFR Normal >60  Chronic Kidney Disease <60  Kidney Failure <15    The GFR formula is only valid for adults with stable renal function between ages 18 and 70.    CBC & Differential [[id number]]  (Abnormal) Collected: 12/10/23 0729    Specimen: Blood Updated: 12/10/23 0757    Narrative:      The following orders were created for panel order CBC & Differential.  Procedure                               Abnormality         Status                     ---------                               -----------         ------                     CBC Auto Differential[[id number]]        Abnormal            Final result                 Please view results for these tests on the individual orders.    CBC Auto Differential [[id number]]  (Abnormal) Collected: 12/10/23 0729    Specimen: Blood Updated: 12/10/23 0757     WBC 5.26 10*3/mm3      RBC  "3.80 10*6/mm3      Hemoglobin 12.3 g/dL      Hematocrit 36.1 %      MCV 95.0 fL      MCH 32.4 pg      MCHC 34.1 g/dL      RDW 12.2 %      RDW-SD 42.0 fl      MPV 11.8 fL      Platelets 160 10*3/mm3      Neutrophil % 60.4 %      Lymphocyte % 21.9 %      Monocyte % 11.4 %      Eosinophil % 4.6 %      Basophil % 0.4 %      Immature Grans % 1.3 %      Neutrophils, Absolute 3.18 10*3/mm3      Lymphocytes, Absolute 1.15 10*3/mm3      Monocytes, Absolute 0.60 10*3/mm3      Eosinophils, Absolute 0.24 10*3/mm3      Basophils, Absolute 0.02 10*3/mm3      Immature Grans, Absolute 0.07 10*3/mm3      nRBC 0.0 /100 WBC           Data Review:  Results from last 7 days   Lab Units 12/14/23  0523 12/13/23  0724 12/12/23  0607   SODIUM mmol/L 139 138 141   POTASSIUM mmol/L 3.9 3.9 3.9   CHLORIDE mmol/L 104 104 106   CO2 mmol/L 25.0 24.7 26.0   BUN mg/dL 22 19 22   CREATININE mg/dL 0.98 0.79 0.93   GLUCOSE mg/dL 133* 132* 122*   CALCIUM mg/dL 8.5* 8.6 8.6     Results from last 7 days   Lab Units 12/14/23  0523 12/13/23  0724 12/12/23  0607   WBC 10*3/mm3 10.69 10.71 7.83   HEMOGLOBIN g/dL 11.9* 12.3* 11.5*   HEMATOCRIT % 35.6* 36.5* 35.6*   PLATELETS 10*3/mm3 205 190 199             Lab Results   Lab Value Date/Time    TROPONINT 18 12/05/2023 1756    TROPONINT 21 12/05/2023 1506    TROPONINT 26 (H) 12/04/2023 0642    TROPONINT 22 (H) 12/04/2023 0116    TROPONINT 23 (H) 12/03/2023 2214    TROPONINT <0.010 01/30/2022 0606    TROPONINT <0.010 01/29/2022 2320         Results from last 7 days   Lab Units 12/09/23  0542 12/08/23  0645   ALK PHOS U/L 58 58   BILIRUBIN mg/dL 0.4 0.8   ALT (SGPT) U/L 41 27   AST (SGOT) U/L 38 31             No results found for: \"POCGLU\"        Past Medical History:   Diagnosis Date    Anxiety     COVID-19 01/29/2022    Diverticulitis     Gastritis     GERD (gastroesophageal reflux disease)     Gout     H/O complete eye exam 06/2017    Hyperlipidemia     Hypertension     Impaired fasting blood sugar     Kidney " stones     Memory loss     Sleep apnea        Assessment:  Active Hospital Problems    Diagnosis  POA    **Acute encephalopathy [G93.40]  Yes    AV block [I44.30]  No    Bradycardia [R00.1]  No    Dementia without behavioral disturbance [F03.90]  Yes    COVID-19 virus detected [U07.1]  Yes    BPH (benign prostatic hyperplasia) [N40.0]  Yes    Hypertension [I10]  Yes    GERD (gastroesophageal reflux disease) [K21.9]  Yes      Resolved Hospital Problems    Diagnosis Date Resolved POA    Hypernatremia [E87.0] 12/06/2023 No       Plan:  Continue with current medications and supportive care.  DC planning.  Son wants to take him home with caregivers at home.  Will DC feeding tube and see how he is tomorrow.  [place] is poor.  Follow-up labs.  May be home with home health tomorrow.  Family has 24-hour caregivers.    Zeyad Mcfarland MD  12/14/2023  12:54 EST

## 2023-12-14 NOTE — PLAN OF CARE
Goal Outcome Evaluation:    Problem: Restraint, Nonviolent  Goal: Absence of Harm or Injury  Outcome: Ongoing, Progressing  Intervention: Implement Least Restrictive Safety Strategies  Recent Flowsheet Documentation  Taken 12/14/2023 1600 by Bairon Guallpa RN  Medical Device Protection: IV pole/bag removed from visual field  Less Restrictive Alternative:   bed alarm in use   calming techniques promoted   environment adjusted   positive reinforcement provided   sensory stimulation limited  De-Escalation Techniques:   appropriate behavior reinforced   reoriented   quiet time facilitated  Diversional Activities: television  Taken 12/14/2023 1400 by Bairon Guallpa RN  Medical Device Protection: tubing secured  Less Restrictive Alternative:   bed alarm in use   calming techniques promoted   environment adjusted   positive reinforcement provided   sensory stimulation limited  De-Escalation Techniques:   appropriate behavior reinforced   quiet time facilitated   reoriented  Diversional Activities: television  Taken 12/14/2023 1240 by Bairon Guallpa RN  Medical Device Protection: tubing secured  Diversional Activities: television  Taken 12/14/2023 1233 by Bairon Guallpa RN  Medical Device Protection: tubing secured  Less Restrictive Alternative:   bed alarm in use   calming techniques promoted   environment adjusted   positive reinforcement provided   sensory stimulation limited  De-Escalation Techniques:   quiet time facilitated   reoriented   appropriate behavior reinforced  Diversional Activities:   music   television  Taken 12/14/2023 1200 by Bairon Guallpa RN  Medical Device Protection:   tubing secured   IV pole/bag removed from visual field  Less Restrictive Alternative:   bed alarm in use   calming techniques promoted   environment adjusted   positive reinforcement provided   sensory stimulation limited  De-Escalation Techniques:   reoriented   appropriate behavior reinforced   quiet time  facilitated  Diversional Activities: television  Taken 12/14/2023 1000 by Bairon Guallpa RN  Medical Device Protection:   tubing secured   IV pole/bag removed from visual field  Less Restrictive Alternative:   bed alarm in use   calming techniques promoted   environment adjusted   positive reinforcement provided   sensory stimulation limited  De-Escalation Techniques:   appropriate behavior reinforced   reoriented   increased round frequency  Diversional Activities: television  Taken 12/14/2023 0820 by Bairon Guallpa RN  Medical Device Protection:   tubing secured   IV pole/bag removed from visual field  Diversional Activities: television  Taken 12/14/2023 0800 by Bairon Guallpa RN  Medical Device Protection:   tubing secured   IV pole/bag removed from visual field  Less Restrictive Alternative:   bed alarm in use   calming techniques promoted   environment adjusted   positive reinforcement provided   sensory stimulation limited  De-Escalation Techniques:   appropriate behavior reinforced   reoriented   quiet time facilitated  Diversional Activities: television  Intervention: Protect Dignity, Rights, and Personal Wellbeing  Recent Flowsheet Documentation  Taken 12/14/2023 1240 by Bairon Guallpa RN  Trust Relationship/Rapport:   care explained   questions encouraged   questions answered  Taken 12/14/2023 0820 by Bairon Guallpa RN  Trust Relationship/Rapport: care explained  Intervention: Protect Skin and Joint Integrity  Recent Flowsheet Documentation  Taken 12/14/2023 1605 by Bairon Guallpa RN  Body Position:   turned   right  Taken 12/14/2023 1400 by Bairon Guallpa RN  Body Position:   turned   right  Taken 12/14/2023 1240 by Bairon Guallpa RN  Body Position: weight shifting  Range of Motion: active ROM (range of motion) encouraged  Taken 12/14/2023 1000 by Bairon Guallpa RN  Body Position:   turned   right  Taken 12/14/2023 0820 by Bairon Gaullpa RN  Body Position: supine, legs  elevated  Range of Motion: active ROM (range of motion) encouraged   Plan of Care Reviewed With: patient confused, room air, coretrak d/c, continues bilateral wrist restraints, no injuries noted, repositioned, fall precautions maintained, going home with home health tomorrow.

## 2023-12-14 NOTE — PLAN OF CARE
Goal Outcome Evaluation:              Outcome Evaluation: Attempted re-evaluation of swallow function, however, patient unable to increase alertness despite MAX auditory/visual/tactile stimulation. RN reports patient is more alert in the afternoons and evenings, continuing to receive feeds through [facility] due to poor PO intake.  Continue puree/nectar thick liquid diet; total assist feed only when alert. If concerned for aspiration, make NPO. Speech to follow as appropriate.

## 2023-12-14 NOTE — CASE MANAGEMENT/SOCIAL WORK
"Physicians Statement of Medical Necessity for  Ambulance Transportation    GENERAL INFORMATION     Name: Tyron Hunt  YOB: 1945    Medicare #:   [id number]   Transport Date:    (Valid for round trips this date, or for scheduled repetitive trips for 60 days from the date signed below.)  Origin: [facility]      Destination: [address] [place], [place] [address] [address]     Is the Patient's stay covered under Medicare Part A (PPS/DRG?)Yes  Closest appropriate facility? Yes  If this a hosp-hosp transfer? No  Is this a hospice patient? No    MEDICAL NECESSITY QUESTIONAIRE    Ambulance Transportation is medically necessary only if other means of transportation are contraindicated or would be potentially harmful to the patient.  To meet this requirement, the patient must be either \"bed confined\" or suffer from a condition such that transport by means other than an ambulance is contraindicated by the patient's condition.  The following questions must be answered by the healthcare professional signing below for this form to be valid:     1) Describe the MEDICAL CONDITION (physical and/or mental) of this patient AT THE TIME OF AMBULANCE TRANSPORT that requires the patient to be transported in an ambulance, and why transport by other means is contraindicated by the patient's condition:     Acute encephalopathy   AV block  Bradycardia  Dementia  Covid - 19 virus  Hypernatremia    Past Medical History:   Diagnosis Date    Anxiety     COVID-19 01/29/2022    Diverticulitis     Gastritis     GERD (gastroesophageal reflux disease)     Gout     H/O complete eye exam 06/2017    Hyperlipidemia     Hypertension     Impaired fasting blood sugar     Kidney stones     Memory loss     Sleep apnea       Past Surgical History:   Procedure Laterality Date    CERVICAL EPIDURAL N/A 08/27/2021    Procedure: CERVICAL EPIDURAL;  Surgeon: Nataliia Avilez MD;  Location: [facility] MAIN OR;  Service: Pain Management;  " "Laterality: N/A;    CERVICAL EPIDURAL N/A 10/18/2021    Procedure: CERVICAL EPIDURAL 7-1;  Surgeon: Nataliia Avilez MD;  [place] [facility] [place];  Service: Pain Management;  Laterality: N/A;    COLONOSCOPY      date unknow-Gregorio GRIFFIN    CYST REMOVAL      CYSTOSCOPY W/ LITHOLAPAXY / [facility]      TONSILLECTOMY  1949    UPPER GASTROINTESTINAL ENDOSCOPY  10/05/2021    Klaus Nice MD      2) Is this patient \"bed confined\" as defined below?No    To be \"bed confined\" the patient must satisfy all three of the following criteria:  (1) unable to get up from bed without assistance; AND (2) unable to ambulate;  AND (3) unable to sit in a chair or wheelchair.  3) Can this patient safely be transported by car or wheelchair van (I.e., may safely sit during transport, without an attendant or monitoring?)No   4. In addition to completing questions 1-3 above, please check any of the following conditions that apply*:          *Note: supporting documentation for any boxes checked must be maintained in the patient's medical records Patient is confused and Unable to tolerate seated position for time needed to transport      SIGNATURE OF PHYSICIAN OR OTHER AUTHORIZED HEALTHCARE PROFESSIONAL    I certify that the above information is true and correct based on my evaluation of this patient, and represent that the patient requires transport by ambulance and that other forms of transport are contraindicated.  I understand that this information will be used by the Centers for Medicare and Medicaid Services (CMS) to support the determiniation of medical necessity for ambulance services, and I represent that I have personal knowledge of the patient's condition at the time of transport.       If this box is checked, I also certify that the patient is physically or mentally incapable of signing the ambulance service's claim form and that the institution with which I am affiliated has furnished care, services or assistance to the patient.  My " signature below is made on behalf of the patient pursuant to 42 .36(b)(4). In accordance with 42 .37, the specific reason(s) that the patient is physically or mentally incapable of signing the claim for is as follows:     Signature of Physician or Healthcare Professional  Date/Time:        (For Scheduled repetitive transport, this form is not valid for transports performed more than 60 days after this date).                                                                                                                                            --------------------------------------------------------------------------------------------  Printed Name and Credentials of Physician or Authorized Healthcare Professional     *Form must be signed by patient's attending physician for scheduled, repetitive transports,.  For non-repetitive ambulance transports, if unable to obtain the signature of the attending physician, any of the following may sign (please select below):     Physician  Clinical Nurse Specialist  Registered Nurse     Physician Assistant  Discharge Planner  Licensed Practical Nurse     Nurse Practitioner

## 2023-12-14 NOTE — THERAPY TREATMENT NOTE
Patient Name: Tyron Hunt  : 1945    MRN: [id number]                              Today's Date: 2023       Admit Date: 12/3/2023    Visit Dx:     ICD-10-CM ICD-9-CM   1. Acute encephalopathy  G93.40 348.30   2. Alzheimer's dementia, unspecified dementia severity, unspecified timing of dementia onset, unspecified whether behavioral, psychotic, or mood disturbance or anxiety  G30.9 331.0    F02.80 294.10   3. COVID-19  U07.1 079.89     Patient Active Problem List   Diagnosis    Hypertension    GERD (gastroesophageal reflux disease)    Hyperlipidemia    Anxiety    BPH (benign prostatic hyperplasia)    Impaired fasting glucose    Recurrent major depressive disorder, in full remission    Cervical disc disease    Cervical spinal stenosis    Foraminal stenosis of cervical region    Cervical radiculitis    Altered mental status    COVID-19 virus detected    Age-related physical debility    Cytokine release syndrome, grade 1    Dyspepsia    Acute encephalopathy    Dementia without behavioral disturbance    AV block    Bradycardia     Past Medical History:   Diagnosis Date    Anxiety     COVID-19 2022    Diverticulitis     Gastritis     GERD (gastroesophageal reflux disease)     Gout     H/O complete eye exam 2017    Hyperlipidemia     Hypertension     Impaired fasting blood sugar     Kidney stones     Memory loss     Sleep apnea      Past Surgical History:   Procedure Laterality Date    CERVICAL EPIDURAL N/A 2021    Procedure: CERVICAL EPIDURAL;  Surgeon: Naatliia Avilez MD;  Location: [facility];  Service: Pain Management;  Laterality: N/A;    CERVICAL EPIDURAL N/A 10/18/2021    Procedure: CERVICAL EPIDURAL 7-1;  Surgeon: Nataliia Avilez MD;  Location: [facility];  Service: Pain Management;  Laterality: N/A;    COLONOSCOPY      date [place] GRFIFIN    CYST REMOVAL      CYSTOSCOPY W/ LITHOLAPAXY / EHL      TONSILLECTOMY      UPPER GASTROINTESTINAL ENDOSCOPY  10/05/2021    Gastritis    [facility] GRIFFIN      General Information       [facility] Name 12/14/23 1619          Physical Therapy Time and Intention    Document Type therapy note (daily note)  -     Mode of Treatment physical therapy  -[facility] Name 12/14/23 1619          General Information    Existing Precautions/Restrictions fall  -     Barriers to Rehab cognitive status  -[facility] Name 12/14/23 1619          Cognition    Orientation Status (Cognition) oriented to;person  -               User Key  (r) = Recorded By, (t) = Taken By, (c) = Cosigned By      Initials Name Provider Type     Brianna Resendiz PTA Physical Therapist Assistant                   Mobility       [facility] Name 12/14/23 1620          Bed Mobility    Bed Mobility supine-sit;sit-supine  -     Supine-Sit [place] (Bed Mobility) moderate assist (50% patient effort);maximum assist (25% patient effort)  -     Sit-Supine [place] (Bed Mobility) maximum assist (25% patient effort)  -     Assistive Device (Bed Mobility) bed rails;head of bed elevated;draw sheet  -[facility] Name 12/14/23 1620          Sit-Stand Transfer    Sit-Stand [place] (Transfers) maximum assist (25% patient effort)  -     Comment, (Sit-Stand Transfer) stood 3x, though unable to fully stand; used bar on back of recliner on last stand w/ slightly improved success, though still very flexed at knees and trunk  -               User Key  (r) = Recorded By, (t) = Taken By, (c) = Cosigned By      Initials Name Provider Type     Brianna Resendiz PTA Physical Therapist Assistant                   Obj/Interventions       Row Name 12/14/23 1630          Motor Skills    Therapeutic Exercise --  seated LAQ x10 reps  -[facility] Name 12/14/23 1630          Balance    Comment, Balance sitting balance on EOB for several minutes w/ mostly CGA for safety, at times min A to correct  -               User Key  (r) = Recorded By, (t) = Taken By, (c) = Cosigned By      Initials Name Provider  Type    Brianna Bryson PTA Physical Therapist Assistant                   Goals/Plan    No documentation.                  Clinical Impression       Row Name 12/14/23 1631          Pain    Pretreatment Pain Rating 0/10 - no pain  -SM     Posttreatment Pain Rating 0/10 - no pain  -SM       Row Name 12/14/23 1631          Positioning and Restraints    Pre-Treatment Position in bed  -SM     Post Treatment Position bed  -SM     In Bed supine;call light within reach;encouraged to call for assist;exit alarm on;with family/caregiver;notified nsg  -SM     Restraints reapplied:;soft limb  -               User Key  (r) = Recorded By, (t) = Taken By, (c) = Cosigned By      Initials Name Provider Type    Brianna Bryson PTA Physical Therapist Assistant                   Outcome Measures       Row Name 12/14/23 1631          How much help from another person do you currently need...    Turning from your back to your side while in flat bed without using bedrails? 2  -SM     Moving from lying on back to sitting on the side of a flat bed without bedrails? 2  -SM     Moving to and from a bed to a chair (including a wheelchair)? 1  -SM     Standing up from a chair using your arms (e.g., wheelchair, bedside chair)? 2  -SM     Climbing 3-5 steps with a railing? 1  -SM     To walk in hospital room? 1  -SM     AM-PAC 6 Clicks Score (PT) 9  -SM     Highest Level of Mobility Goal 3 --> Sit at edge of bed  -SM       Row Name 12/14/23 1631          Functional Assessment    Outcome Measure Options AM-PAC 6 Clicks Basic Mobility (PT)  -SM               User Key  (r) = Recorded By, (t) = Taken By, (c) = Cosigned By      Initials Name Provider Type    Brianna Bryson PTA Physical Therapist Assistant                                 Physical Therapy Education       Title: PT OT SLP Therapies (In Progress)       Topic: Physical Therapy (In Progress)       Point: Mobility training (In Progress)       Learning Progress  Summary             Patient Acceptance, E,D, NR,NL by SM at 12/14/2023 1631    Acceptance, E,D, NR by PC at 12/11/2023 1014    Acceptance, E,TB, VU by LB at 12/10/2023 1852    Acceptance, E,TB, VU by LB at 12/9/2023 1751    Acceptance, E, VU by JJ at 12/8/2023 1537    Nonacceptance, E,D, NL by PC at 12/6/2023 1515    Nonacceptance, E, NL by  at 12/5/2023 1826    Comment: pt confused    Acceptance, E,TB, VU by PT at 12/4/2023 1559    Acceptance, E, NR by SM1 at 12/4/2023 1419   Family Acceptance, E,TB, VU by LB at 12/10/2023 1852    Acceptance, E,TB, VU by LB at 12/9/2023 1751                         Point: Home exercise program (In Progress)       Learning Progress Summary             Patient Acceptance, E,D, NR,NL by SM at 12/14/2023 1631    Acceptance, E,D, NR by PC at 12/11/2023 1014    Acceptance, E,TB, VU by LB at 12/10/2023 1852    Acceptance, E,TB, VU by LB at 12/9/2023 1751    Acceptance, E, VU by JJ at 12/8/2023 1537    Nonacceptance, E,D, NL by PC at 12/6/2023 1515    Nonacceptance, E, NL by  at 12/5/2023 1826    Comment: pt confused    Acceptance, E,TB, VU by PT at 12/4/2023 1559    Acceptance, E, NR by SM1 at 12/4/2023 1419   Family Acceptance, E,TB, VU by LB at 12/10/2023 1852    Acceptance, E,TB, VU by LB at 12/9/2023 1751                         Point: Body mechanics (In Progress)       Learning Progress Summary             Patient Acceptance, E,D, NR,NL by SM at 12/14/2023 1631    Acceptance, E,D, NR by PC at 12/11/2023 1014    Acceptance, E,TB, VU by LB at 12/10/2023 1852    Acceptance, E,TB, VU by LB at 12/9/2023 1751    Acceptance, E, VU by JJ at 12/8/2023 1537    Nonacceptance, E,D, NL by PC at 12/6/2023 1515    Nonacceptance, E, NL by  at 12/5/2023 1826    Comment: pt confused    Acceptance, E,TB, VU by PT at 12/4/2023 1559    Acceptance, E, NR by SM1 at 12/4/2023 1419   Family Acceptance, E,TB, VU by LB at 12/10/2023 1852    Acceptance, E,TB, VU by LB at 12/9/2023 1751                          Point: Precautions (In Progress)       Learning Progress Summary             Patient Acceptance, E,D, NR,NL by  at 12/14/2023 1631    Acceptance, E,D, NR by  at 12/11/2023 1014    Acceptance, E,TB, VU by  at 12/10/2023 1852    Acceptance, E,TB, VU by LB at 12/9/2023 1751    Acceptance, E, VU by  at 12/8/2023 1537    Nonacceptance, E,D, NL by  at 12/6/2023 1515    Nonacceptance, E, NL by  at 12/5/2023 1826    Comment: pt confused    Acceptance, E,TB, VU by PT at 12/4/2023 1559    Acceptance, E, NR by [facility] at 12/4/2023 1419   Family Acceptance, E,TB, VU by LB at 12/10/2023 1852    Acceptance, E,TB, VU by  at 12/9/2023 1751                                         User Key       Initials Effective Dates Name Provider Type Discipline     06/16/21 -  Catie Bansal, PT Physical Therapist PT     03/07/18 -  Brianna Resendiz PTA Physical Therapist Assistant PT    PT 06/16/21 -  Nargis Juarez RN Registered Nurse Nurse     12/20/21 -  Thang Gutierrez, RN Registered Nurse Nurse    [facility] 05/02/22 -  Aliyah Cummins, LENNY Physical Therapist PT     11/07/23 -  Rosibel Barrera, RN Registered Nurse Nurse     09/15/23 -  Renay Mclain, RN Registered Nurse Nurse                  PT Recommendation and Plan     Plan of Care Reviewed With: patient  Progress: improving  Outcome Evaluation: Pt tolerated treatment fair this date. Required mod-max A for bed mobility, then maintained sitting balance on EOB for several minutes w/ mostly CGA. Min A needed occasionally to correct balance. Pt stood 3x w/ mod-max A, though unable to fully stand erect and demonstrated flexed knees and trunk. On last stand, pt used bar on back of recliner to pull to stand w/ slightly improved success, still unable to fully stand. Limited d/t weakness and fatigue, though pt also limited d/t cog deficits. Pt often would stare off and needed to be redirected to task.     Time Calculation:         PT Charges       Row Name 12/14/23  1636             Time Calculation    Start Time 1415  -      Stop Time 1455  -      Time Calculation (min) 40 min  -      PT Received On 12/14/23  -      PT - Next Appointment 12/15/23  -                User Key  (r) = Recorded By, (t) = Taken By, (c) = Cosigned By      Initials Name Provider Type    Brianna Bryson PTA Physical Therapist Assistant                  Therapy Charges for Today       Code Description Service Date Service Provider Modifiers Qty    02610550533 HC PT THERAPEUTIC ACT EA 15 MIN 12/14/2023 Brianna Resendiz PTA GP 2    66424546813 HC PT THER PROC EA 15 MIN 12/14/2023 Brianna Resendiz PTA GP 1            PT G-Codes  Outcome Measure Options: AM-PAC 6 Clicks Basic Mobility (PT)  AM-PAC 6 Clicks Score (PT): 9  PT Discharge Summary  Anticipated Discharge Disposition (PT): skilled nursing facility, home with 24/7 care    Brianna Resendiz PTA  12/14/2023

## 2023-12-14 NOTE — PLAN OF CARE
Goal Outcome Evaluation:  Plan of Care Reviewed With: patient        Progress: improving  Outcome Evaluation: Pt tolerated treatment fair this date. Required mod-max A for bed mobility, then maintained sitting balance on EOB for several minutes w/ mostly CGA. Min A needed occasionally to correct balance. Pt stood 3x w/ mod-max A, though unable to fully stand erect and demonstrated flexed knees and trunk. On last stand, pt used bar on back of recliner to pull to stand w/ slightly improved success, still unable to fully stand. Limited d/t weakness and fatigue, though pt also limited d/t cog deficits. Pt often would stare off and needed to be redirected to task.      Anticipated Discharge Disposition (PT): skilled nursing facility, home with 24/7 care

## 2023-12-15 ENCOUNTER — READMISSION MANAGEMENT (OUTPATIENT)
Dept: CALL CENTER | Facility: HOSPITAL | Age: 78
End: 2023-12-15
Payer: MEDICARE

## 2023-12-15 ENCOUNTER — DOCUMENTATION (OUTPATIENT)
Dept: HOME HEALTH SERVICES | Facility: HOME HEALTHCARE | Age: 78
End: 2023-12-15
Payer: MEDICARE

## 2023-12-15 ENCOUNTER — HOME HEALTH ADMISSION (OUTPATIENT)
Dept: HOME HEALTH SERVICES | Facility: HOME HEALTHCARE | Age: 78
End: 2023-12-15
Payer: COMMERCIAL

## 2023-12-15 VITALS
HEART RATE: 86 BPM | OXYGEN SATURATION: 95 % | SYSTOLIC BLOOD PRESSURE: 125 MMHG | TEMPERATURE: 100.4 F | RESPIRATION RATE: 18 BRPM | BODY MASS INDEX: 23.38 KG/M2 | HEIGHT: 66 IN | WEIGHT: 145.5 LBS | DIASTOLIC BLOOD PRESSURE: 69 MMHG

## 2023-12-15 LAB
ANION GAP SERPL CALCULATED.3IONS-SCNC: 9 MMOL/L (ref 5–15)
BASOPHILS # BLD AUTO: 0.04 10*3/MM3 (ref 0–0.2)
BASOPHILS NFR BLD AUTO: 0.4 % (ref 0–1.5)
BUN SERPL-MCNC: 24 MG/DL (ref 8–23)
BUN/CREAT SERPL: 24 (ref 7–25)
CALCIUM SPEC-SCNC: 8.6 MG/DL (ref 8.6–10.5)
CHLORIDE SERPL-SCNC: 104 MMOL/L (ref 98–107)
CO2 SERPL-SCNC: 24 MMOL/L (ref 22–29)
CREAT SERPL-MCNC: 1 MG/DL (ref 0.76–1.27)
DEPRECATED RDW RBC AUTO: 40 FL (ref 37–54)
EGFRCR SERPLBLD CKD-EPI 2021: 76.6 ML/MIN/1.73
EOSINOPHIL # BLD AUTO: 0.08 10*3/MM3 (ref 0–0.4)
EOSINOPHIL NFR BLD AUTO: 0.9 % (ref 0.3–6.2)
ERYTHROCYTE [DISTWIDTH] IN BLOOD BY AUTOMATED COUNT: 11.7 % (ref 12.3–15.4)
GLUCOSE SERPL-MCNC: 141 MG/DL (ref 65–99)
HCT VFR BLD AUTO: 34.1 % (ref 37.5–51)
HGB BLD-MCNC: 11.1 G/DL (ref 13–17.7)
IMM GRANULOCYTES # BLD AUTO: 0.05 10*3/MM3 (ref 0–0.05)
IMM GRANULOCYTES NFR BLD AUTO: 0.5 % (ref 0–0.5)
LYMPHOCYTES # BLD AUTO: 0.83 10*3/MM3 (ref 0.7–3.1)
LYMPHOCYTES NFR BLD AUTO: 8.9 % (ref 19.6–45.3)
MAGNESIUM SERPL-MCNC: 2.4 MG/DL (ref 1.6–2.4)
MCH RBC QN AUTO: 30.6 PG (ref 26.6–33)
MCHC RBC AUTO-ENTMCNC: 32.6 G/DL (ref 31.5–35.7)
MCV RBC AUTO: 93.9 FL (ref 79–97)
MONOCYTES # BLD AUTO: 0.9 10*3/MM3 (ref 0.1–0.9)
MONOCYTES NFR BLD AUTO: 9.7 % (ref 5–12)
NEUTROPHILS NFR BLD AUTO: 7.4 10*3/MM3 (ref 1.7–7)
NEUTROPHILS NFR BLD AUTO: 79.6 % (ref 42.7–76)
NRBC BLD AUTO-RTO: 0 /100 WBC (ref 0–0.2)
PHOSPHATE SERPL-MCNC: 3.2 MG/DL (ref 2.5–4.5)
PLATELET # BLD AUTO: 221 10*3/MM3 (ref 140–450)
PMV BLD AUTO: 11.8 FL (ref 6–12)
POTASSIUM SERPL-SCNC: 4.1 MMOL/L (ref 3.5–5.2)
RBC # BLD AUTO: 3.63 10*6/MM3 (ref 4.14–5.8)
SODIUM SERPL-SCNC: 137 MMOL/L (ref 136–145)
WBC NRBC COR # BLD AUTO: 9.3 10*3/MM3 (ref 3.4–10.8)

## 2023-12-15 PROCEDURE — 80048 BASIC METABOLIC PNL TOTAL CA: CPT | Performed by: STUDENT IN AN ORGANIZED HEALTH CARE EDUCATION/TRAINING PROGRAM

## 2023-12-15 PROCEDURE — 85025 COMPLETE CBC W/AUTO DIFF WBC: CPT | Performed by: STUDENT IN AN ORGANIZED HEALTH CARE EDUCATION/TRAINING PROGRAM

## 2023-12-15 PROCEDURE — 25010000002 ENOXAPARIN PER 10 MG: Performed by: INTERNAL MEDICINE

## 2023-12-15 PROCEDURE — 84100 ASSAY OF PHOSPHORUS: CPT | Performed by: STUDENT IN AN ORGANIZED HEALTH CARE EDUCATION/TRAINING PROGRAM

## 2023-12-15 PROCEDURE — 83735 ASSAY OF MAGNESIUM: CPT | Performed by: STUDENT IN AN ORGANIZED HEALTH CARE EDUCATION/TRAINING PROGRAM

## 2023-12-15 RX ORDER — AMLODIPINE BESYLATE 5 MG/1
5 TABLET ORAL
Qty: 30 TABLET | Refills: 0 | Status: SHIPPED | OUTPATIENT
Start: 2023-12-16 | End: 2024-01-15

## 2023-12-15 RX ADMIN — GUAIFENESIN 400 MG: 200 SOLUTION ORAL at 11:15

## 2023-12-15 RX ADMIN — ENOXAPARIN SODIUM 40 MG: 100 INJECTION SUBCUTANEOUS at 08:27

## 2023-12-15 RX ADMIN — GUAIFENESIN 400 MG: 200 SOLUTION ORAL at 04:14

## 2023-12-15 RX ADMIN — LOSARTAN POTASSIUM 25 MG: 25 TABLET, FILM COATED ORAL at 08:26

## 2023-12-15 RX ADMIN — FAMOTIDINE 20 MG: 20 TABLET ORAL at 08:28

## 2023-12-15 RX ADMIN — TAMSULOSIN HYDROCHLORIDE 0.4 MG: 0.4 CAPSULE ORAL at 08:26

## 2023-12-15 RX ADMIN — Medication 10 ML: at 08:27

## 2023-12-15 RX ADMIN — DOCUSATE SODIUM 50MG AND SENNOSIDES 8.6MG 2 TABLET: 8.6; 5 TABLET, FILM COATED ORAL at 08:26

## 2023-12-15 RX ADMIN — MEMANTINE HYDROCHLORIDE 10 MG: 10 TABLET, FILM COATED ORAL at 08:26

## 2023-12-15 RX ADMIN — AMLODIPINE BESYLATE 5 MG: 5 TABLET ORAL at 08:26

## 2023-12-15 NOTE — DISCHARGE SUMMARY
PHYSICIAN DISCHARGE SUMMARY                                                                        [facility]    Patient Identification:  Name: Tyron Hunt  Age: 78 y.o.  Sex: male  :  1945  MRN: [id number]  Primary Care Physician: Tyron Hunt MD    Admit date: 12/3/2023  Discharge date and time:12/15/2023  Discharged Condition: good    Discharge Diagnoses:  Active Hospital Problems    Diagnosis  POA    **Acute encephalopathy [G93.40]  Yes    AV block [I44.30]  No    Bradycardia [R00.1]  No    Dementia without behavioral disturbance [F03.90]  Yes    COVID-19 virus detected [U07.1]  Yes    BPH (benign prostatic hyperplasia) [N40.0]  Yes    Hypertension [I10]  Yes    GERD (gastroesophageal reflux disease) [K21.9]  Yes      Resolved Hospital Problems    Diagnosis Date Resolved POA    Hypernatremia [E87.0] 2023 No          PMHX:   Past Medical History:   Diagnosis Date    Anxiety     COVID-19 2022    Diverticulitis     Gastritis     GERD (gastroesophageal reflux disease)     Gout     H/O complete eye exam 2017    Hyperlipidemia     Hypertension     Impaired fasting blood sugar     Kidney stones     Memory loss     Sleep apnea      PSHX:   Past Surgical History:   Procedure Laterality Date    CERVICAL EPIDURAL N/A 2021    Procedure: CERVICAL EPIDURAL;  Surgeon: Nataliia Avilez MD;  Location: [facility] [place] MAIN OR;  Service: Pain Management;  Laterality: N/A;    CERVICAL EPIDURAL N/A 10/18/2021    Procedure: CERVICAL EPIDURAL 7-1;  Surgeon: Nataliia Avilez MD;  Location: [facility] MAIN OR;  Service: Pain Management;  Laterality: N/A;    COLONOSCOPY      date Triston GRIFFIN    CYST REMOVAL      CYSTOSCOPY W/ LITHOLAPAXY / EHL      TONSILLECTOMY      UPPER GASTROINTESTINAL ENDOSCOPY  10/05/2021    Gastritis   Arlet GRIFFIN       Hospital Course: Tyron Hunt   is a 78 y.o. male non-smoker with a history of  alzheimer's dementia, GERD, HTN, HLD that presents to [facility] complaining of fever, AMS. Wife tested positive for Covid recently. Both wife and patient were given azithromycin and ivermectin. He began having ill symptoms 1-2 days ago. He sustained fall yesterday requiring EMS to assist him up. Son is a physician. It is reported that pt is prone to becoming encephalopathic w/febrile illnesses. He was given po zyprexa at home for agitation. He has been mostly lethargic since admitted, not alert enough to take anything po. He is requiring low flow O2 1L w/sats 95%, tends to be mouth breather.  Wife and son do not consent for pt to receive remdesivir. Tmax since admitted 101.1. BC collected. Procal and lactate are wnl. No leukocytosis. RVP + Covid 19. Electrolytes are unremarkable. UA w/3-5 WBC, no bacteria or nitrites. HS trop 23-->22-->26. CTH without acute findings. CXR reported as lungs and pleural spaces clear.  The patient was admitted to the hospital and seen by pulmonary and cardiology.  The patient was treated supportively and also was not eating very well.  The patient did have a feeding tube and was on tube feedings for a few days.  He remained very confused and encephalopathic.  The family wanted to take him home and see if he might get better at home with 24-hour caregivers.  His vital signs were stable and his labs appeared stable.  He was not requiring any oxygen.  He will go home with home health and 24-hour caregivers and we will see how things go.  I still think outlook is poor and if he does not really getting better the family might consider going with hospice at home and comfort care.    Consults:     Consults       Date and Time Order Name Status Description    12/5/2023  2:42 PM Inpatient Pulmonology Consult Completed     12/5/2023  2:41 PM Inpatient Cardiology Consult      12/3/2023 11:09 PM LHDWAIN (on-call MD unless specified) Details            Results from last 7 days   Lab  "Units 12/15/23  0515   WBC 10*3/mm3 9.30   HEMOGLOBIN g/dL 11.1*   HEMATOCRIT % 34.1*   PLATELETS 10*3/mm3 221     Results from last 7 days   Lab Units 12/15/23  0515   SODIUM mmol/L 137   POTASSIUM mmol/L 4.1   CHLORIDE mmol/L 104   CO2 mmol/L 24.0   BUN mg/dL 24*   CREATININE mg/dL 1.00   GLUCOSE mg/dL 141*   CALCIUM mg/dL 8.6     Significant Diagnostic Studies:   WBC   Date Value Ref Range Status   12/15/2023 9.30 3.40 - 10.80 10*3/mm3 Final     Hemoglobin   Date Value Ref Range Status   12/15/2023 11.1 (L) 13.0 - 17.7 g/dL Final     Hematocrit   Date Value Ref Range Status   12/15/2023 34.1 (L) 37.5 - 51.0 % Final     Platelets   Date Value Ref Range Status   12/15/2023 221 140 - 450 10*3/mm3 Final     Sodium   Date Value Ref Range Status   12/15/2023 137 136 - 145 mmol/L Final     Potassium   Date Value Ref Range Status   12/15/2023 4.1 3.5 - 5.2 mmol/L Final     Chloride   Date Value Ref Range Status   12/15/2023 104 98 - 107 mmol/L Final     CO2   Date Value Ref Range Status   12/15/2023 24.0 22.0 - 29.0 mmol/L Final     BUN   Date Value Ref Range Status   12/15/2023 24 (H) 8 - 23 mg/dL Final     Creatinine   Date Value Ref Range Status   12/15/2023 1.00 0.76 - 1.27 mg/dL Final     Glucose   Date Value Ref Range Status   12/15/2023 141 (H) 65 - 99 mg/dL Final     Calcium   Date Value Ref Range Status   12/15/2023 8.6 8.6 - 10.5 mg/dL Final     Magnesium   Date Value Ref Range Status   12/15/2023 2.4 1.6 - 2.4 mg/dL Final     Phosphorus   Date Value Ref Range Status   12/15/2023 3.2 2.5 - 4.5 mg/dL Final     No results found for: \"AST\", \"ALT\", \"ALKPHOS\"  No results found for: \"APTT\", \"INR\"  No results found for: \"COLORU\", \"CLARITYU\", \"SPECGRAV\", \"PHUR\", \"PROTEINUR\", \"GLUCOSEU\", \"KETONESU\", \"BLOODU\", \"NITRITE\", \"LEUKOCYTESUR\", \"BILIRUBINUR\", \"UROBILINOGEN\", \"RBCUA\", \"WBCUA\", \"BACTERIA\", \"UACOMMENT\"  No results found for: \"TROPONINT\", \"TROPONINI\", \"BNP\"  No components found for: \"HGBA1C;2\"  No components found " "for: \"TSH;2\"  Imaging Results (All)       Procedure Component Value Units Date/Time    CT Angiogram Chest [[id number]] Collected: 12/07/23 1711     Updated: 12/07/23 1739    Narrative:      CT ANGIOGRAM OF THE CHEST. MULTIPLE CORONAL, SAGITTAL, AND 3-D  RECONSTRUCTIONS.     HISTORY: COVID-19 positive. Elevated D-dimer.     TECHNIQUE: Radiation dose reduction techniques were utilized, including  automated exposure control and exposure modulation based on body size.   CT angiogram of the chest was performed following the administration of  IV contrast. Coronal, sagittal, and 3-D reconstruction images were  obtained.     COMPARISON: AP chest 12/03/2023, CT chest 02/08/2022     FINDINGS: There is no intrapulmonary arterial filling defect to diagnose  a pulmonary embolus. There are several small mediastinal lymph nodes  which are most likely reactive. There is no axillary charisma enlargement.  The ascending thoracic aorta measures 3.6 cm diameter. Atherosclerotic  calcifications are present and there are coronary arterial  calcifications. Feeding tube is present.     No endotracheal or central endobronchial lesion is demonstrated. There  is bilateral lower lobe bronchial wall thickening and there are  bilateral lower lobe patchy ground-glass opacities. There is more dense  consolidation within the posteromedial aspects of both lower lobes,  greater on the left and there is material filling peripheral lower lobe  bronchi consistent with mucous plugging or infection. There is no  pneumothorax. No pleural effusion or pericardial effusion.     There is multilevel bridging endplate spur formation in the thoracic  spine compatible with DISH. Arthritic changes are present at both  shoulders and there are chronic osteochondral bodies within the right  subcoracoid recess.     Imaging through the upper abdomen demonstrates 2 mm and smaller upper  pole renal stones. There is an anterior right upper pole renal  low-density lesion that " is most likely a cyst and measures 2.5 cm. Left  upper pole renal low-density lesion is also most likely a cyst and  measures 1.2 cm.       Impression:      1. No evidence for pulmonary thromboembolic disease.  2.. Mild bilateral lower lobe ground-glass infiltrates as well as more  dense areas of consolidation and suspected infiltrate within the  posteromedial lower lobes, greater on the left associated with material  filling peripheral lower lobe bronchi and bronchial wall thickening.  3. Multiple small mediastinal lymph nodes are most likely reactive.  4. Atherosclerotic disease with mild coronary arterial calcifications.  5. Osteoarthritis at both shoulders, greater on the right where there  are chronic osteochondral bodies within the right subcoracoid recess.  6. Tiny bilateral upper pole renal stones. Bilateral upper pole renal  low-density lesions are most likely cysts.        This report was finalized on 12/7/2023 5:36 PM by Dr. Douglas Negro M.D on Workstation: [id number]       CT Head Without Contrast [[id number]] Collected: 12/03/23 2333     Updated: 12/03/23 2333    Narrative:        Patient: BART BENSON  Time Out: 23:32  Exam(s): CT HEAD Without Contrast     EXAM:    CT Head Without Intravenous Contrast    CLINICAL HISTORY:      ams.    TECHNIQUE:    Axial computed tomography images of the head brain without intravenous   contrast.  CTDI is 55.7 mGy and DLP is 959 mGy-cm.  This CT exam was   performed according to the principle of ALARA (As Low As Reasonably   Achievable) by using one or more of the following dose reduction   techniques: automated exposure control, adjustment of the mA and or kV   according to patient size, and or use of iterative reconstruction   technique.    COMPARISON:    No relevant prior studies available.    FINDINGS:    Brain:  No intracranial hemorrhage.  No significant mass effect.  No   evidence for cortical infarct.  Prominent parenchymal involutional   changes noted  diffusely.  Periventricular and subcortical deep white   matter hypodense changes noted.    Ventricles:  Unremarkable.  No ventriculomegaly.    Bones joints:  Prominent arthroscopic calcification of the cavernous   and supraclinoid internal carotid arteries and distal vertebral arteries.    No acute fracture.    Soft tissues:  Unremarkable.    Sinuses:  Unremarkable as visualized.  No acute sinusitis.    Mastoid air cells:  Unremarkable as visualized.  No mastoid effusion.    IMPRESSION:         No acute intracranial process identified.  Incidental chronic   parenchymal involutional changes and microvascular changes, as noted   above.      Impression:          Electronically signed by Dwight Avalos MD on 12-03-23 at 2332    XR Chest 1 View [[id number]] Collected: 12/03/23 2150     Updated: 12/03/23 2155    Narrative:       XR CHEST 1 VW-     HISTORY: Fever, cough.     COMPARISON: CT chest 2/8/2022. Chest radiograph 1/29/2022.     FINDINGS:    3 views of the chest were obtained. The cardiac silhouette is normal in  size. The aorta is tortuous, similar to prior. There is calcific aortic  atherosclerosis. Lungs and pleural spaces are clear. There is  degenerative disc disease.     This report was finalized on 12/3/2023 9:52 PM by Dr. Priscilla Black M.D  on Workstation: [id number]             Lab Results (last 7 days)       Procedure Component Value Units Date/Time    Phosphorus [[id number]]  (Normal) Collected: 12/15/23 0515    Specimen: Blood Updated: 12/15/23 0555     Phosphorus 3.2 mg/dL     Magnesium [[id number]]  (Normal) Collected: 12/15/23 0515    Specimen: Blood Updated: 12/15/23 0555     Magnesium 2.4 mg/dL     Basic Metabolic Panel [[id number]]  (Abnormal) Collected: 12/15/23 0515    Specimen: Blood Updated: 12/15/23 0555     Glucose 141 mg/dL      BUN 24 mg/dL      Creatinine 1.00 mg/dL      Sodium 137 mmol/L      Potassium 4.1 mmol/L      Chloride 104 mmol/L      CO2 24.0 mmol/L      Calcium 8.6 mg/dL       BUN/Creatinine Ratio 24.0     Anion Gap 9.0 mmol/L      eGFR 76.6 mL/min/1.73     Narrative:      GFR Normal >60  Chronic Kidney Disease <60  Kidney Failure <15    The GFR formula is only valid for adults with stable renal function between ages 18 and 70.    CBC & Differential [[id number]]  (Abnormal) Collected: 12/15/23 0515    Specimen: Blood Updated: 12/15/23 0533    Narrative:      The following orders were created for panel order CBC & Differential.  Procedure                               Abnormality         Status                     ---------                               -----------         ------                     CBC Auto Differential[[id number]]        Abnormal            Final result                 Please view results for these tests on the individual orders.    CBC Auto Differential [[id number]]  (Abnormal) Collected: 12/15/23 0515    Specimen: Blood Updated: 12/15/23 0533     WBC 9.30 10*3/mm3      RBC 3.63 10*6/mm3      Hemoglobin 11.1 g/dL      Hematocrit 34.1 %      MCV 93.9 fL      MCH 30.6 pg      MCHC 32.6 g/dL      RDW 11.7 %      RDW-SD 40.0 fl      MPV 11.8 fL      Platelets 221 10*3/mm3      Neutrophil % 79.6 %      Lymphocyte % 8.9 %      Monocyte % 9.7 %      Eosinophil % 0.9 %      Basophil % 0.4 %      Immature Grans % 0.5 %      Neutrophils, Absolute 7.40 10*3/mm3      Lymphocytes, Absolute 0.83 10*3/mm3      Monocytes, Absolute 0.90 10*3/mm3      Eosinophils, Absolute 0.08 10*3/mm3      Basophils, Absolute 0.04 10*3/mm3      Immature Grans, Absolute 0.05 10*3/mm3      nRBC 0.0 /100 WBC     Phosphorus [[id number]]  (Normal) Collected: 12/14/23 0523    Specimen: Blood Updated: 12/14/23 0552     Phosphorus 3.7 mg/dL     Magnesium [[id number]]  (Normal) Collected: 12/14/23 0523    Specimen: Blood Updated: 12/14/23 0552     Magnesium 2.2 mg/dL     Basic Metabolic Panel [[id number]]  (Abnormal) Collected: 12/14/23 0523    Specimen: Blood Updated: 12/14/23 0552     Glucose 133 mg/dL      BUN 22  mg/dL      Creatinine 0.98 mg/dL      Sodium 139 mmol/L      Potassium 3.9 mmol/L      Chloride 104 mmol/L      CO2 25.0 mmol/L      Calcium 8.5 mg/dL      BUN/Creatinine Ratio 22.4     Anion Gap 10.0 mmol/L      eGFR 78.4 mL/min/1.73     Narrative:      GFR Normal >60  Chronic Kidney Disease <60  Kidney Failure <15    The GFR formula is only valid for adults with stable renal function between ages 18 and 70.    CBC & Differential [[id number]]  (Abnormal) Collected: 12/14/23 0523    Specimen: Blood Updated: 12/14/23 0537    Narrative:      The following orders were created for panel order CBC & Differential.  Procedure                               Abnormality         Status                     ---------                               -----------         ------                     CBC Auto Differential[[id number]]        Abnormal            Final result                 Please view results for these tests on the individual orders.    CBC Auto Differential [[id number]]  (Abnormal) Collected: 12/14/23 0523    Specimen: Blood Updated: 12/14/23 0537     WBC 10.69 10*3/mm3      RBC 3.67 10*6/mm3      Hemoglobin 11.9 g/dL      Hematocrit 35.6 %      MCV 97.0 fL      MCH 32.4 pg      MCHC 33.4 g/dL      RDW 12.3 %      RDW-SD 44.2 fl      MPV 11.8 fL      Platelets 205 10*3/mm3      Neutrophil % 77.8 %      Lymphocyte % 11.1 %      Monocyte % 8.8 %      Eosinophil % 1.4 %      Basophil % 0.3 %      Immature Grans % 0.6 %      Neutrophils, Absolute 8.32 10*3/mm3      Lymphocytes, Absolute 1.19 10*3/mm3      Monocytes, Absolute 0.94 10*3/mm3      Eosinophils, Absolute 0.15 10*3/mm3      Basophils, Absolute 0.03 10*3/mm3      Immature Grans, Absolute 0.06 10*3/mm3      nRBC 0.0 /100 WBC     Phosphorus [[id number]]  (Normal) Collected: 12/13/23 0724    Specimen: Blood Updated: 12/13/23 0759     Phosphorus 3.6 mg/dL     Magnesium [[id number]]  (Normal) Collected: 12/13/23 0724    Specimen: Blood Updated: 12/13/23 0759     Magnesium  2.3 mg/dL     Basic Metabolic Panel [[id number]]  (Abnormal) Collected: 12/13/23 0724    Specimen: Blood Updated: 12/13/23 0759     Glucose 132 mg/dL      BUN 19 mg/dL      Creatinine 0.79 mg/dL      Sodium 138 mmol/L      Potassium 3.9 mmol/L      Chloride 104 mmol/L      CO2 24.7 mmol/L      Calcium 8.6 mg/dL      BUN/Creatinine Ratio 24.1     Anion Gap 9.3 mmol/L      eGFR 90.4 mL/min/1.73     Narrative:      GFR Normal >60  Chronic Kidney Disease <60  Kidney Failure <15    The GFR formula is only valid for adults with stable renal function between ages 18 and 70.    CBC & Differential [[id number]]  (Abnormal) Collected: 12/13/23 0724    Specimen: Blood Updated: 12/13/23 0742    Narrative:      The following orders were created for panel order CBC & Differential.  Procedure                               Abnormality         Status                     ---------                               -----------         ------                     CBC Auto Differential[[id number]]        Abnormal            Final result                 Please view results for these tests on the individual orders.    CBC Auto Differential [[id number]]  (Abnormal) Collected: 12/13/23 0724    Specimen: Blood Updated: 12/13/23 0742     WBC 10.71 10*3/mm3      RBC 3.77 10*6/mm3      Hemoglobin 12.3 g/dL      Hematocrit 36.5 %      MCV 96.8 fL      MCH 32.6 pg      MCHC 33.7 g/dL      RDW 12.1 %      RDW-SD 42.6 fl      MPV 12.1 fL      Platelets 190 10*3/mm3      Neutrophil % 78.0 %      Lymphocyte % 11.2 %      Monocyte % 7.9 %      Eosinophil % 1.8 %      Basophil % 0.4 %      Immature Grans % 0.7 %      Neutrophils, Absolute 8.35 10*3/mm3      Lymphocytes, Absolute 1.20 10*3/mm3      Monocytes, Absolute 0.85 10*3/mm3      Eosinophils, Absolute 0.19 10*3/mm3      Basophils, Absolute 0.04 10*3/mm3      Immature Grans, Absolute 0.08 10*3/mm3      nRBC 0.0 /100 WBC     Phosphorus [[id number]]  (Normal) Collected: 12/12/23 0607    Specimen: Blood  Updated: 12/12/23 0647     Phosphorus 3.3 mg/dL     Magnesium [[id number]]  (Abnormal) Collected: 12/12/23 0607    Specimen: Blood Updated: 12/12/23 0647     Magnesium 2.5 mg/dL     Basic Metabolic Panel [[id number]]  (Abnormal) Collected: 12/12/23 0607    Specimen: Blood Updated: 12/12/23 0647     Glucose 122 mg/dL      BUN 22 mg/dL      Creatinine 0.93 mg/dL      Sodium 141 mmol/L      Potassium 3.9 mmol/L      Chloride 106 mmol/L      CO2 26.0 mmol/L      Calcium 8.6 mg/dL      BUN/Creatinine Ratio 23.7     Anion Gap 9.0 mmol/L      eGFR 83.5 mL/min/1.73     Narrative:      GFR Normal >60  Chronic Kidney Disease <60  Kidney Failure <15    The GFR formula is only valid for adults with stable renal function between ages 18 and 70.    CBC & Differential [[id number]]  (Abnormal) Collected: 12/12/23 0607    Specimen: Blood Updated: 12/12/23 0631    Narrative:      The following orders were created for panel order CBC & Differential.  Procedure                               Abnormality         Status                     ---------                               -----------         ------                     CBC Auto Differential[[id number]]        Abnormal            Final result                 Please view results for these tests on the individual orders.    CBC Auto Differential [[id number]]  (Abnormal) Collected: 12/12/23 0607    Specimen: Blood Updated: 12/12/23 0631     WBC 7.83 10*3/mm3      RBC 3.80 10*6/mm3      Hemoglobin 11.5 g/dL      Hematocrit 35.6 %      MCV 93.7 fL      MCH 30.3 pg      MCHC 32.3 g/dL      RDW 11.9 %      RDW-SD 40.6 fl      MPV 11.8 fL      Platelets 199 10*3/mm3      Neutrophil % 69.5 %      Lymphocyte % 15.5 %      Monocyte % 10.5 %      Eosinophil % 3.2 %      Basophil % 0.4 %      Immature Grans % 0.9 %      Neutrophils, Absolute 5.45 10*3/mm3      Lymphocytes, Absolute 1.21 10*3/mm3      Monocytes, Absolute 0.82 10*3/mm3      Eosinophils, Absolute 0.25 10*3/mm3      Basophils, Absolute  0.03 10*3/mm3      Immature Grans, Absolute 0.07 10*3/mm3      nRBC 0.0 /100 WBC     Magnesium [[id number]]  (Normal) Collected: 12/11/23 0529    Specimen: Blood Updated: 12/11/23 0700     Magnesium 2.3 mg/dL     Basic Metabolic Panel [[id number]]  (Abnormal) Collected: 12/11/23 0529    Specimen: Blood Updated: 12/11/23 0700     Glucose 127 mg/dL      BUN 25 mg/dL      Creatinine 0.87 mg/dL      Sodium 143 mmol/L      Potassium 3.9 mmol/L      Chloride 110 mmol/L      CO2 21.5 mmol/L      Calcium 8.5 mg/dL      BUN/Creatinine Ratio 28.7     Anion Gap 11.5 mmol/L      eGFR 87.8 mL/min/1.73     Narrative:      GFR Normal >60  Chronic Kidney Disease <60  Kidney Failure <15    The GFR formula is only valid for adults with stable renal function between ages 18 and 70.    Phosphorus [[id number]]  (Normal) Collected: 12/11/23 0529    Specimen: Blood Updated: 12/11/23 0700     Phosphorus 3.7 mg/dL     CBC & Differential [[id number]]  (Abnormal) Collected: 12/11/23 0529    Specimen: Blood Updated: 12/11/23 0643    Narrative:      The following orders were created for panel order CBC & Differential.  Procedure                               Abnormality         Status                     ---------                               -----------         ------                     CBC Auto Differential[[id number]]        Abnormal            Final result                 Please view results for these tests on the individual orders.    CBC Auto Differential [[id number]]  (Abnormal) Collected: 12/11/23 0529    Specimen: Blood Updated: 12/11/23 0643     WBC 6.16 10*3/mm3      RBC 3.81 10*6/mm3      Hemoglobin 12.4 g/dL      Hematocrit 36.8 %      MCV 96.6 fL      MCH 32.5 pg      MCHC 33.7 g/dL      RDW 12.0 %      RDW-SD 42.7 fl      MPV 12.2 fL      Platelets 169 10*3/mm3      Neutrophil % 63.0 %      Lymphocyte % 19.0 %      Monocyte % 12.8 %      Eosinophil % 3.4 %      Basophil % 0.5 %      Immature Grans % 1.3 %      Neutrophils, Absolute  3.88 10*3/mm3      Lymphocytes, Absolute 1.17 10*3/mm3      Monocytes, Absolute 0.79 10*3/mm3      Eosinophils, Absolute 0.21 10*3/mm3      Basophils, Absolute 0.03 10*3/mm3      Immature Grans, Absolute 0.08 10*3/mm3      nRBC 0.0 /100 WBC     Phosphorus [[id number]]  (Normal) Collected: 12/10/23 0729    Specimen: Blood Updated: 12/10/23 0819     Phosphorus 4.0 mg/dL     Magnesium [[id number]]  (Normal) Collected: 12/10/23 0729    Specimen: Blood Updated: 12/10/23 0813     Magnesium 2.3 mg/dL     Basic Metabolic Panel [[id number]]  (Abnormal) Collected: 12/10/23 0729    Specimen: Blood Updated: 12/10/23 0813     Glucose 128 mg/dL      BUN 22 mg/dL      Creatinine 0.92 mg/dL      Sodium 144 mmol/L      Potassium 4.2 mmol/L      Chloride 111 mmol/L      CO2 24.0 mmol/L      Calcium 8.6 mg/dL      BUN/Creatinine Ratio 23.9     Anion Gap 9.0 mmol/L      eGFR 84.6 mL/min/1.73     Narrative:      GFR Normal >60  Chronic Kidney Disease <60  Kidney Failure <15    The GFR formula is only valid for adults with stable renal function between ages 18 and 70.    CBC & Differential [[id number]]  (Abnormal) Collected: 12/10/23 0729    Specimen: Blood Updated: 12/10/23 0757    Narrative:      The following orders were created for panel order CBC & Differential.  Procedure                               Abnormality         Status                     ---------                               -----------         ------                     CBC Auto Differential[[id number]]        Abnormal            Final result                 Please view results for these tests on the individual orders.    CBC Auto Differential [[id number]]  (Abnormal) Collected: 12/10/23 0729    Specimen: Blood Updated: 12/10/23 0757     WBC 5.26 10*3/mm3      RBC 3.80 10*6/mm3      Hemoglobin 12.3 g/dL      Hematocrit 36.1 %      MCV 95.0 fL      MCH 32.4 pg      MCHC 34.1 g/dL      RDW 12.2 %      RDW-SD 42.0 fl      MPV 11.8 fL      Platelets 160 10*3/mm3      Neutrophil  % 60.4 %      Lymphocyte % 21.9 %      Monocyte % 11.4 %      Eosinophil % 4.6 %      Basophil % 0.4 %      Immature Grans % 1.3 %      Neutrophils, Absolute 3.18 10*3/mm3      Lymphocytes, Absolute 1.15 10*3/mm3      Monocytes, Absolute 0.60 10*3/mm3      Eosinophils, Absolute 0.24 10*3/mm3      Basophils, Absolute 0.02 10*3/mm3      Immature Grans, Absolute 0.07 10*3/mm3      nRBC 0.0 /100 WBC     Magnesium [[id number]]  (Normal) Collected: 12/09/23 0542    Specimen: Blood Updated: 12/09/23 0723     Magnesium 2.3 mg/dL     Comprehensive Metabolic Panel [[id number]]  (Abnormal) Collected: 12/09/23 0542    Specimen: Blood Updated: 12/09/23 0723     Glucose 127 mg/dL      BUN 21 mg/dL      Creatinine 0.81 mg/dL      Sodium 145 mmol/L      Potassium 4.0 mmol/L      Chloride 111 mmol/L      CO2 22.6 mmol/L      Calcium 8.5 mg/dL      Total Protein 5.7 g/dL      Albumin 3.2 g/dL      ALT (SGPT) 41 U/L      AST (SGOT) 38 U/L      Alkaline Phosphatase 58 U/L      Total Bilirubin 0.4 mg/dL      Globulin 2.5 gm/dL      A/G Ratio 1.3 g/dL      BUN/Creatinine Ratio 25.9     Anion Gap 11.4 mmol/L      eGFR 89.7 mL/min/1.73     Narrative:      GFR Normal >60  Chronic Kidney Disease <60  Kidney Failure <15    The GFR formula is only valid for adults with stable renal function between ages 18 and 70.    Phosphorus [[id number]]  (Normal) Collected: 12/09/23 0542    Specimen: Blood Updated: 12/09/23 0723     Phosphorus 3.2 mg/dL     CBC & Differential [[id number]]  (Abnormal) Collected: 12/09/23 0542    Specimen: Blood Updated: 12/09/23 0712    Narrative:      The following orders were created for panel order CBC & Differential.  Procedure                               Abnormality         Status                     ---------                               -----------         ------                     CBC Auto Differential[[id number]]        Abnormal            Final result                 Please view results for these tests on the  "individual orders.    CBC Auto Differential [[id number]]  (Abnormal) Collected: 12/09/23 0542    Specimen: Blood Updated: 12/09/23 0712     WBC 4.82 10*3/mm3      RBC 4.01 10*6/mm3      Hemoglobin 12.2 g/dL      Hematocrit 36.7 %      MCV 91.5 fL      MCH 30.4 pg      MCHC 33.2 g/dL      RDW 11.9 %      RDW-SD 39.3 fl      MPV 12.2 fL      Platelets 145 10*3/mm3      Neutrophil % 60.6 %      Lymphocyte % 22.0 %      Monocyte % 11.4 %      Eosinophil % 4.4 %      Basophil % 0.4 %      Immature Grans % 1.2 %      Neutrophils, Absolute 2.92 10*3/mm3      Lymphocytes, Absolute 1.06 10*3/mm3      Monocytes, Absolute 0.55 10*3/mm3      Eosinophils, Absolute 0.21 10*3/mm3      Basophils, Absolute 0.02 10*3/mm3      Immature Grans, Absolute 0.06 10*3/mm3      nRBC 0.0 /100 WBC     Blood Culture - Blood, Arm, Left [[id number]]  (Normal) Collected: 12/03/23 2308    Specimen: Blood from Arm, Left Updated: 12/08/23 2330     Blood Culture No growth at 5 days    Narrative:      Less than seven (7) mL's of blood was collected.  Insufficient quantity may yield false negative results.    Blood Culture - Blood, Arm, Right [[id number]]  (Normal) Collected: 12/03/23 2214    Specimen: Blood from Arm, Right Updated: 12/08/23 2231     Blood Culture No growth at 5 days    Potassium [[id number]]  (Normal) Collected: 12/08/23 2039    Specimen: Blood Updated: 12/08/23 2102     Potassium 4.1 mmol/L           /69 (BP Location: Left arm, Patient Position: Lying)   Pulse 86   Temp 100.4 °F (38 °C) (Oral)   Resp 18   Ht 167 cm (65.75\")   Wt 66 kg (145 lb 8.1 oz)   SpO2 95%   BMI 23.67 kg/m²     Discharge Exam:  General Appearance:  Confused with very poor memory, no distress                          Head:    Normocephalic, without obvious abnormality, atraumatic                          Eyes:                            Throat:   Lips, tongue, gums normal                          Neck:   Supple, symmetrical, trachea midline, no JVD         "                Lungs:     Clear to auscultation bilaterally, respirations unlabored                Chest Wall:    No tenderness or deformity                        Heart:    Regular rate and rhythm, S1 and S2 normal, no murmur,no  Rub  or gallop                  Abdomen:     Soft, non-tender, bowel sounds active, no masses, no  organomegaly                  Extremities:   Extremities normal, atraumatic, no cyanosis or edema                             Skin:   Skin is warm and dry,  no rashes or palpable lesions                  Neurologic:   no focal deficits noted, confused and poor memory consistent with dementia.     Disposition:  Home with home health    Activity as tolerated    Diet as tolerated  Diet Order   Procedures    Diet: Regular/House Diet; Texture: Pureed (NDD 1); Fluid Consistency: Nectar Thick       Patient Instructions:      Discharge Medications        New Medications        Instructions Start Date   amLODIPine 5 MG tablet  Commonly known as: NORVASC   5 mg, Oral, Every 24 Hours Scheduled   Start Date: December 16, 2023            Continue These Medications        Instructions Start Date   aluminum hydroxide-mag carbonate 160-105 MG chewable tablet chewable tablet  Commonly known as: GAVISCON EXTRA RELIEF   Oral, As Needed      coenzyme Q10 100 MG capsule   200 mg, Oral, Daily      lansoprazole 30 MG capsule  Commonly known as: PREVACID   30 mg, Oral, 2 Times Daily      losartan 25 MG tablet  Commonly known as: Cozaar   25 mg, Oral, Daily      memantine 10 MG tablet  Commonly known as: NAMENDA   10 mg, Oral, 2 Times Daily      montelukast 10 MG tablet  Commonly known as: Singulair   10 mg, Oral, Nightly      sucralfate 1 g tablet  Commonly known as: CARAFATE   1 g, Oral, 4 Times Daily      tamsulosin 0.4 MG capsule 24 hr capsule  Commonly known as: FLOMAX   0.4 mg, Oral, 2 Times Daily      VITAMIN D3 PO   125 mcg, Oral, Daily, 5000 IU             Stop These Medications      azithromycin 250 MG  tablet  Commonly known as: ZITHROMAX            Future Appointments   Date Time Provider Department Center   3/15/2024  1:30 PM Hector Gan MD [facility]     Additional Instructions for the Follow-ups that You Need to Schedule       Ambulatory Referral to Home Health (Hospital)   As directed      Face to Face Visit Date: 12/15/2023   Follow-up provider for Plan of Care?: I treated the patient in an acute care facility and will not continue treatment after discharge.   Follow-up provider: BART HUNT [[id number]]   Reason/Clinical Findings: weak   Describe mobility limitations that make leaving home difficult: weakness   Nursing/Therapeutic Services Requested: Skilled Nursing Physical Therapy   Skilled nursing orders: Other   PT orders: Therapeutic exercise   Frequency: 1 Week 1               Contact information for follow-up providers       Bart Hunt MD Follow up in 1 week(s).    Specialty: Family Medicine  Contact information:  [address] [facility] [address] [facility] [address]  [place] [id number] [phone]               Bart Hunt MD .    Specialty: Family Medicine  Contact information:  [address] [place] [address]  [phone]                       Contact information for after-discharge care       Home Medical Care       [facility] .    Service: Home Health Services  Contact information:  [address] [facility] [address]  [facility] [address] [phone]                                 Discharge Order (From admission, onward)     Discharge Order (From admission, onward)       Start     Ordered    12/15/23 1142  Discharge patient  Once        Expected Discharge Date: 12/15/23   Discharge Disposition: Home-Health Care [facility]   Physician of Record for Attribution - Please select from Treatment Team: KARISSA MELO [[id number]]   Review needed by CMO to determine Physician of Record: No      Question Answer Comment   Physician of Record for Attribution - Please  select from Treatment Team ZEYAD MCFARLAND    Review needed by CMO to determine Physician of Record No        12/15/23 1145                     None            Total time spent discharging patient including evaluation,post hospitalization follow up,  medication and post hospitalization instructions and education total time exceeds 30 minutes.    Signed:  Zeyad Mcfarland MD  12/15/2023  13:23 EST

## 2023-12-15 NOTE — PLAN OF CARE
Pt. only oriented to self. Enhanced contact precautions maintained for Covid. Meds crushed in apple sauce. Restraints non-violent to patient. Tolerated well. Pt. is planning to DC home with health today. Call light within reach.    Goal Outcome Evaluation:  Plan of Care Reviewed With: patient        Progress: improving    Problem: Adult Inpatient Plan of Care  Goal: Absence of Hospital-Acquired Illness or Injury  Intervention: Identify and Manage Fall Risk  Recent Flowsheet Documentation  Taken 12/15/2023 0605 by Zeke Saha RN  Safety Promotion/Fall Prevention:   safety round/check completed   room organization consistent   lighting adjusted   fall prevention program maintained   clutter free environment maintained   activity supervised  Taken 12/15/2023 0414 by Zeke Saha RN  Safety Promotion/Fall Prevention:   safety round/check completed   room organization consistent   lighting adjusted   fall prevention program maintained   clutter free environment maintained   activity supervised  Taken 12/15/2023 0200 by Zeke Saha RN  Safety Promotion/Fall Prevention:   safety round/check completed   room organization consistent   lighting adjusted   fall prevention program maintained   clutter free environment maintained   activity supervised  Taken 12/15/2023 0028 by Zeke Saha RN  Safety Promotion/Fall Prevention:   safety round/check completed   room organization consistent   lighting adjusted   fall prevention program maintained   clutter free environment maintained   activity supervised  Taken 12/14/2023 2216 by Zeke Saha RN  Safety Promotion/Fall Prevention:   safety round/check completed   room organization consistent   lighting adjusted   fall prevention program maintained   clutter free environment maintained   activity supervised  Taken 12/14/2023 2102 by Zeke Saha RN  Safety Promotion/Fall Prevention:   safety round/check completed   room organization consistent   lighting adjusted   fall prevention  program maintained   clutter free environment maintained   activity supervised     Problem: Adult Inpatient Plan of Care  Goal: Absence of Hospital-Acquired Illness or Injury  Intervention: Prevent Infection  Recent Flowsheet Documentation  Taken 12/15/2023 0605 by Zeke Saha RN  Infection Prevention:   rest/sleep promoted   single patient room provided   hand hygiene promoted  Taken 12/15/2023 0414 by Zeke Saha RN  Infection Prevention:   rest/sleep promoted   single patient room provided  Taken 12/15/2023 0200 by Zeke Saha RN  Infection Prevention:   rest/sleep promoted   single patient room provided   hand hygiene promoted  Taken 12/15/2023 0028 by Zeke Saha RN  Infection Prevention:   rest/sleep promoted   single patient room provided   hand hygiene promoted  Taken 12/14/2023 2216 by Zeke Saha RN  Infection Prevention: rest/sleep promoted  Taken 12/14/2023 2102 by Zeke Saha RN  Infection Prevention:   rest/sleep promoted   single patient room provided   hand hygiene promoted     Problem: Infection Progression (Sepsis/Septic Shock)  Goal: Absence of Infection Signs and Symptoms  Outcome: Ongoing, Progressing  Intervention: Initiate Sepsis Management  Recent Flowsheet Documentation  Taken 12/15/2023 0605 by Zeke Saha RN  Infection Prevention:   rest/sleep promoted   single patient room provided   hand hygiene promoted  Isolation Precautions:   enhanced contact   precautions maintained  Taken 12/15/2023 0414 by Zeke Saha RN  Infection Prevention:   rest/sleep promoted   single patient room provided  Isolation Precautions:   enhanced contact   precautions maintained  Taken 12/15/2023 0200 by Zeke Saha RN  Infection Prevention:   rest/sleep promoted   single patient room provided   hand hygiene promoted  Isolation Precautions:   enhanced contact   precautions maintained  Taken 12/15/2023 0028 by Zeke Saha RN  Infection Prevention:   rest/sleep promoted   single patient room provided   hand hygiene  promoted  Isolation Precautions:   enhanced contact   precautions maintained  Taken 12/14/2023 2216 by Zeke Saha RN  Infection Prevention: rest/sleep promoted  Isolation Precautions: enhanced contact  Taken 12/14/2023 2102 by Zeke Saha RN  Infection Prevention:   rest/sleep promoted   single patient room provided   hand hygiene promoted  Isolation Precautions:   enhanced contact   precautions maintained  Intervention: Promote Recovery  Recent Flowsheet Documentation  Taken 12/15/2023 0605 by Zeke Saha, RN  Activity Management: bedrest  Taken 12/15/2023 0414 by Zeke Saha RN  Activity Management: bedrest  Taken 12/15/2023 0200 by Zeke Saha RN  Activity Management: bedrest  Taken 12/15/2023 0028 by Zeke Saha, RN  Activity Management: bedrest  Sleep/Rest Enhancement:   noise level reduced   relaxation techniques promoted  Taken 12/14/2023 2216 by Zeke Saha RN  Activity Management: bedrest  Taken 12/14/2023 2102 by Zeke Saha, RN  Activity Management: bedrest  Sleep/Rest Enhancement:   noise level reduced   relaxation techniques promoted

## 2023-12-15 NOTE — PROGRESS NOTES
[facility] to provide Home Care services. Patient and family agreeable. PCP is the patient's son, Dr Tyron Hunt. Contact information confirmed. Please call home phone to schedule HH visits, [phone].

## 2023-12-16 NOTE — OUTREACH NOTE
Prep Survey      Flowsheet Row Responses   [place] facility patient discharged from? [place]   Is LACE score < 7 ? No   Eligibility [facility]   Date of Admission 12/03/23   Date of Discharge 12/15/23   Discharge Disposition Home-Health Care Sv   Discharge diagnosis Acute encephalopathy   Does the patient have one of the following disease processes/diagnoses(primary or secondary)? Other   Does the patient have Home health ordered? Yes   What is the Home health agency?  [facility]   Is there a DME ordered? No   Prep survey completed? Yes            ELLIOTT A - Registered Nurse

## 2023-12-17 NOTE — CASE MANAGEMENT/SOCIAL WORK
Case Management Discharge Note      Final Note: home with spouse, [facility] and caregivers via [facility]    Provided Post Acute Provider List?: Yes  Post Acute Provider List: Nursing Home  Provided Post Acute Provider Quality & Resource List?: Yes  Post Acute Provider Quality and Resource List: Nursing Home  Delivered To: Support Person  Method of Delivery: In person    Selected Continued Care - Discharged on 12/15/2023 Admission date: 12/3/2023 - Discharge disposition: Home-Health Care Svc      Destination    No services have been selected for the patient.                Durable Medical Equipment    No services have been selected for the patient.                Dialysis/Infusion    No services have been selected for the patient.                Home Medical Care Coordination complete.      Service Provider Selected Services Address Phone Fax Patient Preferred    [facility] [address] [place] [address] [phone] --              Therapy    No services have been selected for the patient.                Community Resources    No services have been selected for the patient.                Community & DME    No services have been selected for the patient.                    Transportation Services  Ambulance: [facility]    Final Discharge Disposition Code: 06 - home with home health care

## 2023-12-18 ENCOUNTER — TRANSITIONAL CARE MANAGEMENT TELEPHONE ENCOUNTER (OUTPATIENT)
Dept: CALL CENTER | Facility: HOSPITAL | Age: 78
End: 2023-12-18
Payer: MEDICARE

## 2023-12-18 ENCOUNTER — HOME CARE VISIT (OUTPATIENT)
Dept: HOME HEALTH SERVICES | Facility: HOME HEALTHCARE | Age: 78
End: 2023-12-18
Payer: MEDICARE

## 2023-12-18 DIAGNOSIS — N40.0 BENIGN PROSTATIC HYPERPLASIA WITHOUT LOWER URINARY TRACT SYMPTOMS: Chronic | ICD-10-CM

## 2023-12-18 DIAGNOSIS — I10 ESSENTIAL HYPERTENSION: Chronic | ICD-10-CM

## 2023-12-18 PROCEDURE — G0299 HHS/HOSPICE OF RN EA 15 MIN: HCPCS

## 2023-12-18 RX ORDER — LOSARTAN POTASSIUM 25 MG/1
25 TABLET ORAL DAILY
Qty: 90 TABLET | Refills: 3 | Status: SHIPPED | OUTPATIENT
Start: 2023-12-18

## 2023-12-18 RX ORDER — TAMSULOSIN HYDROCHLORIDE 0.4 MG/1
1 CAPSULE ORAL DAILY
Qty: 90 CAPSULE | Refills: 3 | Status: SHIPPED | OUTPATIENT
Start: 2023-12-18

## 2023-12-18 RX ORDER — MEMANTINE HYDROCHLORIDE 10 MG/1
10 TABLET ORAL 2 TIMES DAILY
Qty: 180 TABLET | Refills: 3 | Status: SHIPPED | OUTPATIENT
Start: 2023-12-18

## 2023-12-18 RX ORDER — LANSOPRAZOLE 30 MG/1
30 CAPSULE, DELAYED RELEASE ORAL 2 TIMES DAILY
Qty: 180 CAPSULE | Refills: 3 | Status: SHIPPED | OUTPATIENT
Start: 2023-12-18

## 2023-12-18 NOTE — Clinical Note
SOC Note: Mr. Hunt is a 79 yo Male who was admitted to [facility] for COVID 19 causing acute encephalopathy. Upon arrival, pt found in bed with wife and caregiver present. When approached, pt alert to gentle stimuli. Pt was able to acknowledge RN presence but unable to comprehend what patient is saying, jumbled words and sentences, able to follow commands at a minimum. Per family, prior to going into the hospital, pt had mild dementia but was physically able to do most ADLs independently. Pt mouth/tongue appears very dry, caregiver at bedside states she attempts to give sips of water every hours but has not had much success. Attempted to stand patient, assisst x2 with myself and caregiver to get patient on the side of the bed. Pt was able to stand for few seconds before needing to sit back down.     Home Health ordered for: disciplines SN, PT, OT, SLP    Reason for Hosp/Primary Dx/Co-morbidities: Pt admitted to [facility] from 12/3/2023 - 12/15/2023 for Covid19 and acute encephalopathy. Pt has hx of history of alzheimer's dementia, GERD, HTN, HLD.     Focus of Care: CP and neuro assessments s/p COVID19 and encephalopathy.     Patient's goal(s):Pt unable to answer.     Current Functional status/mobility/DME: Pt is currently mostly bedfast at this time, max assist x2 to sit pt on side of the bed and stand. During assessment pt was unable to stand for more than a few seconds before legs giving out. Pt is unable to follow commands and roll independently. Pts wife states they do have walker, canes, bedside commode, and shower bench within the home.     HB status/Living Arrangements: Pt currently lives in multilevel single family home with wife as PCG. Family has recently hired home instead for aid in ADLs.     Skin Integrity/wound status: No issues noted    Code Status: full    Fall Risk/Safety concerns: Pt has falls risk of 8    Educated on Emergency Plan, steps to take prior to going to the ER and when to Call Home Health  First: Yes     Medication issues/Concerns: None, son prepared medplanner for patient.     Additional Problems/Concerns: none    SDOH Barriers (i.e. caregiver concerns, social isolation, transportation, food insecurity, environment, income etc.)/Need for MSW: not at this time     Plan for next visit: CP and general assessment, vital signs, goal based teaching per careplan.

## 2023-12-18 NOTE — OUTREACH NOTE
Call Center TCM Note      Flowsheet Row Responses   [facility] patient discharged from? [facility]   Does the patient have one of the following disease processes/diagnoses(primary or secondary)? Other   TCM attempt successful? Yes   Call start time 1437   Call end time 1442   Discharge diagnosis Acute encephalopathy   Is patient permission given to speak with other caregiver? Yes   List who call center can speak with Spouse   Person spoke with today (if not patient) and relationship Spouse   Meds reviewed with patient/caregiver? Yes   Is the patient having any side effects they believe may be caused by any medication additions or changes? No   Does the patient have all medications ordered at discharge? Yes   Is the patient taking all medications as directed (includes completed medication regime)? Yes   Comments Dr. Hunt is patient's Son   Does the patient have an appointment with their PCP within 7-14 days of discharge? Yes   What is the Home health agency?  [facility]   Has home health visited the patient within 72 hours of discharge? Yes   Has all DME been delivered? No   Psychosocial issues? No   Did the patient receive a copy of their discharge instructions? Yes   Nursing interventions Reviewed instructions with patient   What is the patient's perception of their health status since discharge? Improving   Is the patient/caregiver able to teach back signs and symptoms related to disease process for when to call PCP? Yes   Is the patient/caregiver able to teach back signs and symptoms related to disease process for when to call 911? Yes   Is the patient/caregiver able to teach back the hierarchy of who to call/visit for symptoms/problems? PCP, Specialist, Home health nurse, Urgent Care, ED, 911 Yes   If the patient is a current smoker, are they able to teach back resources for cessation? Not a smoker   TCM call completed? Yes   Wrap up additional comments Spouse reports patient has improved, he is eating and  drinking more and can answer some questions.   Call end time 1442   Would this patient benefit from a Referral to [facility] Social Work? No   Is the patient interested in additional calls from an ambulatory ? No            Clarice Rosario RN    12/18/2023, 14:43 EST

## 2023-12-19 ENCOUNTER — HOME CARE VISIT (OUTPATIENT)
Dept: HOME HEALTH SERVICES | Facility: HOME HEALTHCARE | Age: 78
End: 2023-12-19
Payer: MEDICARE

## 2023-12-19 VITALS
OXYGEN SATURATION: 94 % | TEMPERATURE: 98.5 F | DIASTOLIC BLOOD PRESSURE: 78 MMHG | HEART RATE: 75 BPM | RESPIRATION RATE: 18 BRPM | SYSTOLIC BLOOD PRESSURE: 122 MMHG

## 2023-12-19 VITALS
DIASTOLIC BLOOD PRESSURE: 72 MMHG | HEART RATE: 70 BPM | SYSTOLIC BLOOD PRESSURE: 134 MMHG | OXYGEN SATURATION: 96 % | TEMPERATURE: 96.2 F

## 2023-12-19 PROCEDURE — G0151 HHCP-SERV OF PT,EA 15 MIN: HCPCS

## 2023-12-19 NOTE — HOME HEALTH
Eval Note  Patient was admitted to [facility] 12/3 to 12/15/23 with Covid and acute encephalopathy  PMHx AV block, bradycardia, dementia, HTN   SHx Patient resides with spouse, has 2 steps to enter home, has 24 hour care.   Prior level of function Patient was ambulating independently, independent with ADLs     Patient's goal return to prior level of function     Services required to achieve goals: SN, PT, OT, ST     Potential Issues for goal attainment:  dementia, patient is taking in limited fluids and food     Problems identified: Patient is weak, limited ROM bilateral knees     Describe the Functional status and safety: Patient is staying in bed, requires max assist x 1-2 for bed mobility, max assist for supine to sit, sit to supine with max assist x 2, SPS to chair with max assist x 1, SPS from chair to bed with max assist x 2.     Any equipment needs Patient may benefit from walker to assist with standing and perfroming transfers, hospital bed, wheelchair     Plan for next visit  Progress with therapeutic exercise, transfers, progress to ambulation, see 2x/wk x 6 weeks

## 2023-12-19 NOTE — CASE COMMUNICATION
Physical therapy evaluation completed with plan to see patient for therapeutic exercise, transfers, progress to ambulation. See patient 2x/wk x 6 wk. Patient may benefit from walker, hospital bed, wheelchair if needing to go out for appointments.

## 2023-12-19 NOTE — Clinical Note
You are very welcome!   ----- Message -----  From: Tyron Hunt MD  Sent: 12/19/2023   7:04 PM EST  To: Ivonne Hernandez PT      He has a walker, but certainly let me know about other things you all deem needed and will order. Thanks for seeing dad.  ----- Message -----  From: Ivonne Hernandez PT  Sent: 12/19/2023   5:02 PM EST  To: Tyron Hunt MD    Physical therapy evaluation completed with plan to see patient for therapeutic exercise, transfers, progress to ambulation. See patient 2x/wk x 6 wk. Patient may benefit from walker, hospital bed, wheelchair if needing to go out for appointments.

## 2023-12-19 NOTE — HOME HEALTH
SOC Note: Mr. Hunt is a 77 yo Male who was admitted to [facility] for COVID 19 causing acute encephalopathy. Upon arrival, pt found in bed with wife and caregiver present. When approached, pt alert to gentle stimuli. Pt was able to acknowledge RN presence but unable to comprehend what patient is saying, jumbled words and sentences, able to follow commands at a minimum. Per family, prior to going into the hospital, pt had mild dementia but was physically able to do most ADLs independently. Pt mouth/tongue appears very dry, caregiver at bedside states she attempts to give sips of water every hours but has not had much success. Attempted to stand patient, assisst x2 with myself and caregiver to get patient on the side of the bed. Pt was able to stand for few seconds before needing to sit back down.     Home Health ordered for: disciplines SN, PT, OT, SLP    Reason for Hosp/Primary Dx/Co-morbidities: Pt admitted to [facility] from 12/3/2023 - 12/15/2023 for Covid19 and acute encephalopathy. Pt has hx of history of alzheimer's dementia, GERD, HTN, HLD.     Focus of Care: CP and neuro assessments s/p COVID19 and encephalopathy.     Patient's goal(s):Pt unable to answer.     Current Functional status/mobility/DME: Pt is currently mostly bedfast at this time, max assist x2 to sit pt on side of the bed and stand. During assessment pt was unable to stand for more than a few seconds before legs giving out. Pt is unable to follow commands and roll independently. Pts wife states they do have walker, canes, bedside commode, and shower bench within the home.     HB status/Living Arrangements: Pt currently lives in multilevel single family home with wife as PCG. Family has recently hired home instead for aid in ADLs.     Skin Integrity/wound status: No issues noted    Code Status: full    Fall Risk/Safety concerns: Pt has falls risk of 8    Educated on Emergency Plan, steps to take prior to going to the ER and when to Call Home Health  First: Yes     Medication issues/Concerns: None, son prepared medplanner for patient.     Additional Problems/Concerns: none    SDOH Barriers (i.e. caregiver concerns, social isolation, transportation, food insecurity, environment, income etc.)/Need for MSW: not at this time     Plan for next visit: CP and general assessment, vital signs, goal based teaching per careplan.

## 2023-12-21 ENCOUNTER — HOME CARE VISIT (OUTPATIENT)
Dept: HOME HEALTH SERVICES | Facility: HOME HEALTHCARE | Age: 78
End: 2023-12-21
Payer: MEDICARE

## 2023-12-21 VITALS
RESPIRATION RATE: 18 BRPM | TEMPERATURE: 97.7 F | SYSTOLIC BLOOD PRESSURE: 130 MMHG | HEART RATE: 72 BPM | OXYGEN SATURATION: 97 % | DIASTOLIC BLOOD PRESSURE: 72 MMHG

## 2023-12-21 VITALS
OXYGEN SATURATION: 99 % | HEART RATE: 77 BPM | DIASTOLIC BLOOD PRESSURE: 73 MMHG | TEMPERATURE: 97.7 F | SYSTOLIC BLOOD PRESSURE: 130 MMHG

## 2023-12-21 PROCEDURE — G0151 HHCP-SERV OF PT,EA 15 MIN: HCPCS

## 2023-12-21 PROCEDURE — G0300 HHS/HOSPICE OF LPN EA 15 MIN: HCPCS

## 2023-12-21 PROCEDURE — G0153 HHCP-SVS OF S/L PATH,EA 15MN: HCPCS

## 2023-12-21 PROCEDURE — G0152 HHCP-SERV OF OT,EA 15 MIN: HCPCS

## 2023-12-21 NOTE — Clinical Note
Dear Dr. Hunt,   OT rajesh performed this date, 12/21/23, with OT requesting 2 visits x 4 weeks addressing toileting I, bathing I, UBD, LBD, and caregiver edu for increased home safety secondary to COVID-19 with decline in function.   Thank you,  Emilee Warren OTR/L

## 2023-12-21 NOTE — HOME HEALTH
Patient is a 79 y/o male who presented to the ER on 12/3 with AMS and was diagnosed with Acute encephalopathy and positive for COVID-19. PMHx includes AV block, Bradycardia, Dementia without behavioral disturbance, BPH, Hypertension, and GERD. Upon OT eval this date, pt was able to answer yes/no questions with 40-50% consistency and was easy to arouse, but difficult to understand. Pt completed UBD with max A, LBD TD, and is TD for self feeding, G&H, and bed bound at this time. Pt was able to sit EOB for approx 7 minutes with SBA before requesting to return supine. OT educated spouse that pt would benefit from wc with OT assist with obtaining.

## 2023-12-22 NOTE — HOME HEALTH
REASON FOR REFERRAL: Speech Therapy referral to post hospitalization for COVID-19 and  altered mental status.  Speech Therapy to evaluate and establish a plan of care.   PRIMARY DIAGNOSIS: Dysphagia;  Cognitive linguistic Impairment  SECONDARY DIAGNOSIS: Alzheimer's Dementia  VFSS OR FEES: Oral dysphagia and suspected pharyngeal dysphagia.     PERTINENT HISTORY: Age-related physical debility, MILLY (acute kidney injury), Cytokine release syndrome, grade 1, Dyspepsia, Acute encephalopathy, Dementia without behavioral disturbance, Hypernatremia, AV block, Bradycardia   PRIOR LEVEL OF FUNCTION: Patient living in his home with his wife.    FOCUS OF CARE: Speech Therapy will focus on providing education to family/caregivers on dysphagia and swallowing precautions and education with caregivers on stimulating functional communication with patient.    Patient was asleep during the evaluation (did not wake when shaken/moved)  and his wife provided a history of patient's current status.     PLAN FOR NEXT VISIT   MEDICAL NECESSITY FOR ONGOING SKILLED THERAPY: Dysphagia therapy and education for safe intake of nutrition and hydration. Education with family on cueing techniques for cognitive linguistic stimulation.  SPECIFIC INTERVENTIONS AND GOALS TO ADDRESS ON NEXT VISIT:   FREQUENCY AND DURATION: 1w1  2w3  ANY OTHER FOLLOW UP NEEDED: None  REASSESSMENT DUE DATE: 1/20/24

## 2023-12-22 NOTE — HOME HEALTH
SNV for cp assessment and monjohanat Covid, dementia  Wife and CG present  Son prepeares patient medication weekly  Patient trying to get out of bed. Nurse discussed purchasing bed alarm

## 2023-12-22 NOTE — HOME HEALTH
Caregiver reports patient is trying to get up by himself. She reports they are going to get hospital bed.     Plan for next visit  Progress with therapeutic exercise, transfers, home safety

## 2023-12-27 ENCOUNTER — HOME CARE VISIT (OUTPATIENT)
Dept: HOME HEALTH SERVICES | Facility: HOME HEALTHCARE | Age: 78
End: 2023-12-27
Payer: COMMERCIAL

## 2023-12-27 VITALS
OXYGEN SATURATION: 94 % | HEART RATE: 81 BPM | TEMPERATURE: 97.8 F | SYSTOLIC BLOOD PRESSURE: 116 MMHG | DIASTOLIC BLOOD PRESSURE: 66 MMHG

## 2023-12-27 VITALS
DIASTOLIC BLOOD PRESSURE: 66 MMHG | SYSTOLIC BLOOD PRESSURE: 100 MMHG | HEART RATE: 87 BPM | TEMPERATURE: 97.2 F | OXYGEN SATURATION: 97 %

## 2023-12-27 PROCEDURE — G0153 HHCP-SVS OF S/L PATH,EA 15MN: HCPCS

## 2023-12-27 PROCEDURE — G0157 HHC PT ASSISTANT EA 15: HCPCS

## 2023-12-27 PROCEDURE — G0152 HHCP-SERV OF OT,EA 15 MIN: HCPCS

## 2023-12-27 NOTE — HOME HEALTH
Subjective: Caregiver states he has not slept much at night    Falls- none  Medication Changes- none  Cognition deficits and lacks safety awareness. He answers some questions accurately with confirmation from CG.    Assessment: Patient seen for being admitted to [facility] 12/3 to 12/15/23 with Covid and acute encephalopathy .PMHx AV block, bradycardia, dementia, HTN . Patient has 24/7 care.  Patient is very impulsive and will try to sit in open areas with no chair behind him and has to be guided for safety and provided CGA until he can sit in chair being provided by CG.. Therapist trialed using the gait belt however he kept trying to take it off and I finally took it off to assist him. CG has transport chair ready to assist him to seated positions.  He was able to use his hands and get self out of the bed and from chairs and toilet with CGA/SBA however does not center self and has to be guided for safety. Patient and CG re-education on medication regimen, hydration and proper nutrition and used teach back for carryover and accuracy. Patient will benefit with continued PT for progression and reduce risk of decline. spouse not home today and CG present for education    Plan for next visit:  Gait training as able  Review and progress HEP  Balance/transfers/safety  CG education

## 2023-12-27 NOTE — HOME HEALTH
Routine Visit Note:    Patient's wife and caregiver reported that patient did not sleep, at all, last night and was asleep this morning. They requested that patient not be awakened.    Skill/education provided: Skilled education provided to patient's wife and paid caregiver on swallowing precautions, diet recommendations and education in strategies to increase communication with patient.  SLP provided education in strategies to assist when patient gets up to walk unassisted (ie a chair/bed alarm).     Patient/caregiver response: Patient is being presented soft, chopped food now (pureed foods recommended) with thin liquids (nectar thick liquids recommended). They reported that patient was eating his meals with no overt signs or symptoms of aspiration. Patient's meals are supervised and caregiver reported that she feeds him in order to manage bite size. Family reported that they tried to present meals at the table, but patient will get up and walk around. Caregiver was able to teachback swallowing strategies at 100 percent accuracy.    Plan for next visit: Education     Other pertinent info: NA

## 2023-12-28 ENCOUNTER — HOME CARE VISIT (OUTPATIENT)
Dept: HOME HEALTH SERVICES | Facility: HOME HEALTHCARE | Age: 78
End: 2023-12-28
Payer: COMMERCIAL

## 2023-12-28 VITALS
TEMPERATURE: 98.2 F | DIASTOLIC BLOOD PRESSURE: 66 MMHG | HEART RATE: 65 BPM | SYSTOLIC BLOOD PRESSURE: 109 MMHG | OXYGEN SATURATION: 95 %

## 2023-12-28 PROCEDURE — G0493 RN CARE EA 15 MIN HH/HOSPICE: HCPCS

## 2023-12-28 PROCEDURE — G0152 HHCP-SERV OF OT,EA 15 MIN: HCPCS

## 2023-12-28 NOTE — HOME HEALTH
Routine Visit Note:    Skill/education provided: OT educated on importance of offloading pressure off joints including bottom, hips, knees, and shoulders to reduce risk of skin breakdown. OT demonstrated how to position pt in bed with pillows to reduce fall risk.    Patient/caregiver response: Caregivers/spouse present for edu and agreeable to continue to encourage pt to routinely agree as pt is very restless.    Plan for next visit: ADLs, home safety.    Other pertinent info: Caregivers report remarkable progression since evalutation including intermittent use of 3IN1 over commode and routine mobility within the home with RW and mod A with navigation.

## 2023-12-28 NOTE — HOME HEALTH
Routine Visit Note:    Skill/education provided: Caregiver and spouse educated on importance of establishing routine and increasing time OOB through methods such as eating meals at the kitchen table, spending time with visitors in the living room, and having pt sit UIC or EOB during morning ADL routine to increase pt arousal and participation with task.    Patient/caregiver response: Caregiver and spouse responsive to edu and agreeable to implement routine.    Plan for next visit: ADLs, home safety    Other pertinent info: Spouse reports she is considering LTC placement.

## 2023-12-28 NOTE — HOME HEALTH
Routine Visit Note: PT SITTING IN RECLINER , INTERACTS W/ SN, ANSWERS QUESTIONS, SPEACH CLEAR ENOUGH TO UNDERSTAND, PT IS IMPULSIVE, PAID CAREGIVERS 24/7,  VS WNL BUTTOCkS INTEG INTACT- RED BLANCHING COCCYX- INSTRUCT SKIN CARE, BARRIER CREAM INSTRUCT PRESSURE RELIEF CUSHION    Skill/education provided: SN INSTURCT SKIN BREAKDOWN PREVENTION, PHYSICAL ASSESSMENT    Patient/caregiver response: pt TOLERATED W/O C/O DISCOMFORT    Other pertinent info: DEMENTIA    Home Health ordered for: disciplines SN, PT, OT, SLP    Reason for Hosp/Primary Dx/Co-morbidities: Pt admitted to [facility] from 12/3/2023 - 12/15/2023 for Covid19 and acute encephalopathy. Pt has hx of history of alzheimer's dementia, GERD, HTN, HLD.     Focus of Care: CP and neuro assessments s/p COVID19 and encephalopathy.     HB status/Living Arrangements: Pt currently lives in multilevel single family home with wife as PCG. Family has recently hired home instead for aid in ADLs.       Plan for next visit: CP and general assessment, vital signs, goal based teaching per careplan.

## 2023-12-29 ENCOUNTER — HOME CARE VISIT (OUTPATIENT)
Dept: HOME HEALTH SERVICES | Facility: HOME HEALTHCARE | Age: 78
End: 2023-12-29
Payer: COMMERCIAL

## 2023-12-29 VITALS
TEMPERATURE: 97.3 F | HEART RATE: 72 BPM | OXYGEN SATURATION: 97 % | SYSTOLIC BLOOD PRESSURE: 128 MMHG | DIASTOLIC BLOOD PRESSURE: 76 MMHG

## 2023-12-29 VITALS
HEART RATE: 88 BPM | TEMPERATURE: 98.7 F | DIASTOLIC BLOOD PRESSURE: 70 MMHG | OXYGEN SATURATION: 98 % | RESPIRATION RATE: 18 BRPM | SYSTOLIC BLOOD PRESSURE: 120 MMHG

## 2023-12-29 PROCEDURE — G0157 HHC PT ASSISTANT EA 15: HCPCS

## 2023-12-29 NOTE — HOME HEALTH
Subjective: Patient in bed upon arrival- CG present in the room    Falls- none   Medication Changes- none   Cognition deficits and lacks safety awareness. He answers some questions accurately with confirmation from CG or family  Assessment: Patient seen for being admitted to [facility] 12/3 to 12/15/23 with Covid and acute encephalopathy .PMHx AV block, bradycardia, dementia, HTN . Patient has 24/7 care. Patient appears to have fixed gaze and does not appear to see well and has to be guided with his onto the walker and seated surfaces. He was able to state what color of shirt therapist was wearing today when asked so not sure what he is actually seeing.    Patient is very impulsive and will try to sit in open areas with no chair behind him and has to be guided for safety and provided CGA until he can sit in chair- CG has transport chair ready to assist him to seated positions. He was able to use his hands and get self out of the bed and from chairs with CGA/SBA however does not center self and has to be guided for safety. Patient and CG re-education on medication regimen, hydration and proper nutrition and used teach back for carryover and accuracy. Patient will benefit with continued PT for progression and reduce risk of decline. spouse , son and CG present for education on how to assist. Son states they are not planning on getting a hospital bed at this time.    Plan for next visit:   Gait training as able   Review and progress HEP   Balance/transfers/safety   CG education

## 2023-12-30 NOTE — CASE COMMUNICATION
Patient missed a SN visit from [facility] on 12/29/23    Reason: PATIENT REQUESTR      For your records only.   Per CMS Guidance, MD must be notified of missed/cancelled visits; therefore the prescribed frequency was not met.

## 2024-01-02 ENCOUNTER — HOME CARE VISIT (OUTPATIENT)
Dept: HOME HEALTH SERVICES | Facility: HOME HEALTHCARE | Age: 79
End: 2024-01-02
Payer: COMMERCIAL

## 2024-01-02 VITALS — OXYGEN SATURATION: 96 % | HEART RATE: 72 BPM

## 2024-01-02 PROCEDURE — G0157 HHC PT ASSISTANT EA 15: HCPCS

## 2024-01-02 PROCEDURE — G0299 HHS/HOSPICE OF RN EA 15 MIN: HCPCS

## 2024-01-02 NOTE — HOME HEALTH
Subjective: per family- he is doing better today    Falls- none   Medication Changes- none   Cognition deficits and lacks safety awareness. He answers some questions accurately with confirmation from CG or family .     Assessment: Patient seen for being admitted to [facility] 12/3 to 12/15/23 with Covid and acute encephalopathy .PMHx AV block, bradycardia, dementia, HTN . Patient has 24/7 care. Patient appears to have fixed gaze and does not appear to see well and has to be guided with his hands onto the walker and seated surfaces.     Patient is very impulsive and will try to sit in open areas with no chair behind him and has to be guided for safety and provided CGA until he can sit safely. He was better today than last few visits. He was able to use his hands to get up from chairs with CGA/SBA however does not center self and has to be guided for safety and appears to be also related to his visual defecits.. Patient and CG re-education on medication regimen, hydration and proper nutrition and used teach back for carryover and accuracy. Patient will benefit with continued PT for progression and reduce risk of decline.  CG present for education on how to assist. Son stated last visit they are not planning on getting a hospital bed at this time.     Plan for next visit:   Gait training as able   Review and progress HEP   Balance/transfers/safety   CG education

## 2024-01-03 ENCOUNTER — HOME CARE VISIT (OUTPATIENT)
Dept: HOME HEALTH SERVICES | Facility: HOME HEALTHCARE | Age: 79
End: 2024-01-03
Payer: COMMERCIAL

## 2024-01-03 VITALS
OXYGEN SATURATION: 98 % | SYSTOLIC BLOOD PRESSURE: 142 MMHG | DIASTOLIC BLOOD PRESSURE: 88 MMHG | RESPIRATION RATE: 18 BRPM | TEMPERATURE: 98.2 F | HEART RATE: 76 BPM

## 2024-01-03 VITALS
TEMPERATURE: 98.2 F | SYSTOLIC BLOOD PRESSURE: 106 MMHG | DIASTOLIC BLOOD PRESSURE: 64 MMHG | OXYGEN SATURATION: 98 % | HEART RATE: 70 BPM

## 2024-01-03 VITALS
OXYGEN SATURATION: 98 % | SYSTOLIC BLOOD PRESSURE: 133 MMHG | TEMPERATURE: 97.6 F | DIASTOLIC BLOOD PRESSURE: 89 MMHG | HEART RATE: 88 BPM

## 2024-01-03 PROCEDURE — G0153 HHCP-SVS OF S/L PATH,EA 15MN: HCPCS

## 2024-01-03 PROCEDURE — G0152 HHCP-SERV OF OT,EA 15 MIN: HCPCS

## 2024-01-03 NOTE — HOME HEALTH
Routine Visit Note:     Patient was sitting up in his recliner this am. Wife and caregiver were present for the session.     Skill/education provided:  Feeding assessment:   Caregiver and wife prepared breakfast of a waffle and syrup. Patient  was presented a cup of orange juice. Caregiver cut the waffle into small pieces and presented the food.  Patient was seated upright in his recliner. Patient sipped the juice without assistance.   Patient/caregiver response: Patient chewed/swallowed his food, with a mild wet vocal quality when he talked. Patient drank the orange juice and water to wash mouth of residual food from the mouth.  Patient refused the food after only a few bites, but the caregiver slowly continued to present the food until he ate all but 3 pieces.   Intervention: SLP presented patient a book that he had written about his father and the war.   Response: Patient was able to name people in the pictures at 90 percent accuracy and spontaneously produced responses to talk about his father/family. Listener understood approximately 70 percent of his responses.   Plan for next visit: Education   Strategies to implement to stimulate cognitive communication skills.  Other pertinent info: NA

## 2024-01-03 NOTE — HOME HEALTH
Routine Visit Note:    Skill/education provided: Caregiver/spouse educated on beneficial verbage to use with patient when giving directions including statements instread of questions to increase pt compliance with self care participation.    Patient/caregiver response: Caregiver receptive to edu and thrilled with demonstration of patient participation with shower this date. Pt reports she has only been able to get him to agree to sponge bathing prior bc he would not cooperate with showering.    Plan for next visit: home safety, ADLs    Other pertinent info: Patient continue to progress in general awaerness and functional mobility/participation in ADLs, however spouse is still considering LTC placement per her report this date. OT agrees with recommendation.

## 2024-01-03 NOTE — HOME HEALTH
Routine Visit Note:    Skill/education provided: CP and general assessment performed, vital signs stable. Pt sleeping upon arrival, difficult to arouse. Pt speech is difficult to understand but was able to voice basic needs such as needing to go to the bathroom. Pt assisted to the bathroom with assistance x1 with aid present. Pt having difficulty with coordination with ambulation and with following commands. Skin assessed while pt was standing in the bethroom. Coccyx area is mildly pink but is blanchable. No breakedown noted. Wife and caregiver instructed on continuing to attempt to turn patient while he is laying in the bed to prevent furhter breakdown. Wife states pt has remained rather confused and has continued to have difficulty with eating and drinking.     Patient/caregiver response: Wife and caregiver able to verbalize understaning and agree to continue with attempts at skin breakdown prevention.     Plan for next visit: CP and general assessment, vital signs, goal based teaching per careplan.

## 2024-01-04 ENCOUNTER — HOME CARE VISIT (OUTPATIENT)
Dept: HOME HEALTH SERVICES | Facility: HOME HEALTHCARE | Age: 79
End: 2024-01-04
Payer: COMMERCIAL

## 2024-01-04 VITALS
TEMPERATURE: 97.1 F | SYSTOLIC BLOOD PRESSURE: 118 MMHG | HEART RATE: 68 BPM | OXYGEN SATURATION: 100 % | DIASTOLIC BLOOD PRESSURE: 67 MMHG

## 2024-01-04 PROCEDURE — G0152 HHCP-SERV OF OT,EA 15 MIN: HCPCS

## 2024-01-04 PROCEDURE — G0299 HHS/HOSPICE OF RN EA 15 MIN: HCPCS

## 2024-01-04 NOTE — HOME HEALTH
Routine Visit Note:    Skill/education provided: CP and general assessment, vital signs stable. Pt presented more alert and able to ambulate around the home today but was still pleasently confused. Pt was able to minimaly follow commands but was very scattered with thought processes. Pt showing no ss of respiratory distress, lung sounds clear, O2% WDL. Wife and caregiver educated on falls risks as patient is still very unsteady with gait. Wife states patient is noncompliant with using assistive devices due to confusion but has herself or caregiver around him at all times when ambulating. No falls risks identified within the home.     Patient/caregiver response: Wife and caregiver able to verbalize understanding of falls prevention methods and agree to compliance. Pt tolerated assessment well.     Plan for next visit: CP and general assessment, vital signs, goal based teaching per careplan.

## 2024-01-04 NOTE — HOME HEALTH
Routine Visit Note:    Skill/education provided: OT educated pt/caregiver/spouse about shower routine recommendations and installed 3in1 over commode. OT educated caregiver/spouse to don back bar when using as a BSC, but to remove when over the commode for a better line up.    Patient/caregiver response: Spouse and caregiver agree that patient demos much better safety and I with toileting transfers with the 3in1 over the commode.    Plan for next visit: N/A    Other pertinent info: N/A

## 2024-01-04 NOTE — Clinical Note
Dear Dr. Hutn,   OT DC completed this date. Pt is at [facility] at this time. Pt has met all goals and his MBI improved by 81 points.   Thanks,   Emilee Warren OTR/L

## 2024-01-05 ENCOUNTER — HOME CARE VISIT (OUTPATIENT)
Dept: HOME HEALTH SERVICES | Facility: HOME HEALTHCARE | Age: 79
End: 2024-01-05
Payer: MEDICARE

## 2024-01-05 VITALS
OXYGEN SATURATION: 98 % | TEMPERATURE: 96.9 F | SYSTOLIC BLOOD PRESSURE: 124 MMHG | HEART RATE: 78 BPM | DIASTOLIC BLOOD PRESSURE: 70 MMHG

## 2024-01-05 PROCEDURE — G0157 HHC PT ASSISTANT EA 15: HCPCS

## 2024-01-05 NOTE — HOME HEALTH
Subjective: per CG- he has been up all night    Falls- none   Medication Changes- none   Cognition deficits and lacks safety awareness. He answers some questions accurately with confirmation from CG or family .     Assessment: Patient seen for being admitted to [facility] 12/3 to 12/15/23 with Covid and acute encephalopathy .PMHx AV block, bradycardia, dementia, HTN . Patient has 24/7 care. Patient appears to have fixed gaze and does not appear to see well and has to be guided with his hands onto the walker and seated surfaces.   Patient is impulsive at times with mobility and transfers has to be guided for safety and provided CGA until he can sit safely. He was able to use his hands to get up from chairs with CGA/SBA however does not center self at all times and has to be guided for safety and appears to be also related to his visual defecits.. Patient and CG re-education on medication regimen, hydration and proper nutrition and used teach back for carryover and accuracy. Patient will benefit with continued PT for progression and reduce risk of decline. CG present for education on how to assist. Update to PT as needed    Plan for next visit:   Gait training  Review and progress HEP   Balance/transfers/safety      CG education

## 2024-01-08 ENCOUNTER — HOME CARE VISIT (OUTPATIENT)
Dept: HOME HEALTH SERVICES | Facility: HOME HEALTHCARE | Age: 79
End: 2024-01-08
Payer: COMMERCIAL

## 2024-01-08 VITALS
HEART RATE: 85 BPM | TEMPERATURE: 96.7 F | SYSTOLIC BLOOD PRESSURE: 124 MMHG | OXYGEN SATURATION: 99 % | DIASTOLIC BLOOD PRESSURE: 76 MMHG

## 2024-01-08 VITALS
TEMPERATURE: 98.7 F | HEART RATE: 69 BPM | OXYGEN SATURATION: 100 % | SYSTOLIC BLOOD PRESSURE: 108 MMHG | RESPIRATION RATE: 16 BRPM | DIASTOLIC BLOOD PRESSURE: 70 MMHG

## 2024-01-08 PROCEDURE — G0157 HHC PT ASSISTANT EA 15: HCPCS

## 2024-01-08 PROCEDURE — G0299 HHS/HOSPICE OF RN EA 15 MIN: HCPCS

## 2024-01-08 NOTE — HOME HEALTH
pt home w/ wife and fracisco has paid cg in attendance 24/7 due to pt's dementia. pt w/ increasing endurance per wife report,    Routine Visit Note:    Skill/education provided: skilled assessment, meds reviewed    Patient/caregiver response: pt tolerated w/o c/o discomfort    Plan for next visit: SNV FOR CP/ MED ASSESS/INSTURCT    Other pertinent info: DX DEMENTIA

## 2024-01-08 NOTE — HOME HEALTH
Subjective: per CG- he is up now    Falls- none   Medication Changes- none   Cognition deficits and lacks safety awareness. He answers some questions accurately with confirmation from CG or family .     Assessment: Patient seen for being admitted to [facility] 12/3 to 12/15/23 with Covid and acute encephalopathy .PMHx AV block, bradycardia, dementia, HTN . Patient has 24/7 care. Patient appears to have fixed gaze most of the time and does not appear to see well and has to be guided with his hands onto the walker and seated surfaces as needed.     Patient is impulsive at times with mobility and transfers was able to use his hands to get up from chairs with S/SBA and improvements noted today. He did not use walker today and appeared safe with S/SBA. He was able to make multiple turns and hand no LOB. He forgets where he is going and has to be cued on where to turn to make laps around the home. CG assisted with toileting during session in standing position in front of toilet.  Patient and CG re-education on medication regimen, hydration and proper nutrition and used teach back for carryover and accuracy. Patient will benefit with continued PT for progression and reduce risk of decline. CG present for education on how to assist. Update to PT as needed     Plan for next visit:   Gait training   Review and progress HEP   Balance/transfers/safety   CG education

## 2024-01-09 ENCOUNTER — HOME CARE VISIT (OUTPATIENT)
Dept: HOME HEALTH SERVICES | Facility: HOME HEALTHCARE | Age: 79
End: 2024-01-09
Payer: COMMERCIAL

## 2024-01-09 VITALS
HEART RATE: 76 BPM | TEMPERATURE: 98.9 F | RESPIRATION RATE: 18 BRPM | SYSTOLIC BLOOD PRESSURE: 110 MMHG | OXYGEN SATURATION: 98 % | DIASTOLIC BLOOD PRESSURE: 60 MMHG

## 2024-01-09 VITALS
SYSTOLIC BLOOD PRESSURE: 108 MMHG | HEART RATE: 80 BPM | OXYGEN SATURATION: 99 % | DIASTOLIC BLOOD PRESSURE: 64 MMHG | TEMPERATURE: 98.7 F

## 2024-01-09 PROCEDURE — G0153 HHCP-SVS OF S/L PATH,EA 15MN: HCPCS

## 2024-01-09 NOTE — HOME HEALTH
Routine Visit Note:   Patient was up walking when SLP arrived. He was assisted to come to the livingroom for the session.   Skill/education provided: SLP presented pictures and instructed patient to identify hazards and provide solutions to increase safety. Patient was presented 2 words and instructed to give similarities and differences. Patient was instructed to write names of establishments in his neighborhood (ie. name a pharmacy, grocery or a fast food resturant).  Patient was asked to write his personal information (ie. name, , address, family names, etc). Patient was presented 5 words and instructed to write from dication.   Patient/caregiver response: Identifying hazards/solving problems at 20 percent accuracy. Similarities and differences at 30 percent accuracy. Patient's speech is characterized by empty speech, word substitutions and occasional appropriate responses. Listener was required to guess his messages. Patient wrote his name, date of birth and his wife's name with fair to good legiblity. He wrote words from dictation at 50 percent accuracy.  Patient was able to write names of establishments at 30 percent accuracy. Moderate cueing provided during the session.   Plan for next visit: Education   Strategies to implement to stimulate cognitive communication skills.   Other pertinent info: NA

## 2024-01-10 ENCOUNTER — HOME CARE VISIT (OUTPATIENT)
Dept: HOME HEALTH SERVICES | Facility: HOME HEALTHCARE | Age: 79
End: 2024-01-10
Payer: COMMERCIAL

## 2024-01-10 VITALS
TEMPERATURE: 97.9 F | SYSTOLIC BLOOD PRESSURE: 114 MMHG | DIASTOLIC BLOOD PRESSURE: 64 MMHG | HEART RATE: 78 BPM | OXYGEN SATURATION: 98 %

## 2024-01-10 PROCEDURE — G0157 HHC PT ASSISTANT EA 15: HCPCS

## 2024-01-10 NOTE — HOME HEALTH
Subjective: per CG- nothing new going on    Falls- none   Medication Changes- none - CG manages medication and verbalized understanding    Cognition deficits and lacks safety awareness. He answers some questions accurately with confirmation from CG or family . spouse appears to have some noted deficits as well    Assessment: Patient seen for being admitted to [facility] 12/3 to 12/15/23 with Covid and acute encephalopathy .PMHx AV block, bradycardia, dementia, HTN . Patient has 24/7 care. Patient appears to have fixed gaze most of the time and does not appear to see well and has to be guided with his hands onto seated surfaces as needed.     Patient is impulsive at times with mobility and transfers was able to use his hands to get up from chairs with S/SBA and improvements noted today. He did not use walker today and appeared safe with S and trialed 5 steps with handrail and no LOB. He was able to make multiple turns and had no LOB. He forgets where he is going and has to be cued on where to turn to make laps around the home. He was able to start his standing HEP with visual and verbal cues while using handout and CG education for carryover. .Patient and CG re-education on medication regimen, hydration and proper nutrition and used teach back for carryover and accuracy. Patient will benefit with continued PT for progression and reduce risk of decline. CG present for education on how to assist. Update to PT as needed     Plan for next visit:   Gait training   Review and progress HEP   Balance/transfers/safety   CG education

## 2024-01-11 ENCOUNTER — HOME CARE VISIT (OUTPATIENT)
Dept: HOME HEALTH SERVICES | Facility: HOME HEALTHCARE | Age: 79
End: 2024-01-11
Payer: COMMERCIAL

## 2024-01-11 VITALS
HEART RATE: 79 BPM | OXYGEN SATURATION: 99 % | TEMPERATURE: 98.7 F | DIASTOLIC BLOOD PRESSURE: 64 MMHG | SYSTOLIC BLOOD PRESSURE: 110 MMHG

## 2024-01-11 PROCEDURE — G0153 HHCP-SVS OF S/L PATH,EA 15MN: HCPCS

## 2024-01-11 PROCEDURE — G0299 HHS/HOSPICE OF RN EA 15 MIN: HCPCS

## 2024-01-11 NOTE — HOME HEALTH
Routine Visit Note: VS wnl, pt ambulates in home w/ sba, fair appetite, bowels /bladder function w/o s/s complciations. pt and wife home now w/o daytime paid caregivers, wife reports they still have paid cg thru the noc, and are thinking of attempting to go w/o cg at bedtime as pt as been sleeping thru the noc due to intentionally not letting him nap thru the day    Skill/education provided: skilled physical assessment, med review    Patient/caregiver response: pt tolerated w/o c/o discomfort    Plan for next visit: snv for genral assesmsnet, med review    Other pertinent info: hx dementia

## 2024-01-11 NOTE — HOME HEALTH
Routine Visit Note:     Patient was up walking around the home when SLP arrived. He greeted therapist, shaking her hand. He was assisted to come to the livingroom for the session.   Skill/education provided: Patient was engaged in social conversation (family, employment). SLP presented 20 descriptions and instructed patient to name the object being described.   Patient/caregiver response: Patient produced names from descriptions at 50 percent accuracy with moderate cueing. Patient answered questions with increased  spontaneous, appropriate responses today.   Patient was engaged in social conversation with his wife and guest, who came to the home. Patient had several appropriate, spontaneous response during the exchange.   Plan for next visit: Education   Strategies to implement to stimulate cognitive communication skills.   Other pertinent info: NA

## 2024-01-15 ENCOUNTER — HOME CARE VISIT (OUTPATIENT)
Dept: HOME HEALTH SERVICES | Facility: HOME HEALTHCARE | Age: 79
End: 2024-01-15
Payer: COMMERCIAL

## 2024-01-15 ENCOUNTER — HOME CARE VISIT (OUTPATIENT)
Dept: HOME HEALTH SERVICES | Facility: HOME HEALTHCARE | Age: 79
End: 2024-01-15
Payer: MEDICARE

## 2024-01-15 VITALS
HEART RATE: 81 BPM | SYSTOLIC BLOOD PRESSURE: 112 MMHG | DIASTOLIC BLOOD PRESSURE: 64 MMHG | TEMPERATURE: 97.9 F | OXYGEN SATURATION: 99 %

## 2024-01-15 PROCEDURE — G0157 HHC PT ASSISTANT EA 15: HCPCS

## 2024-01-15 PROCEDURE — G0299 HHS/HOSPICE OF RN EA 15 MIN: HCPCS

## 2024-01-15 PROCEDURE — G0493 RN CARE EA 15 MIN HH/HOSPICE: HCPCS

## 2024-01-15 NOTE — HOME HEALTH
Subjective: per Spouse- We are trying to go without the CG- I am going to see how it goes ( he does get up at night so not sure how that will work)    Falls- none   Medication Changes- none - CG manages medication and verbalized understanding     Cognition deficits and lacks safety awareness. He answers some questions accurately with confirmation from Spouse.  spouse appears to have some noted deficits as well but much better than him.  Assessment: Patient seen for being admitted to [facility] 12/3 to 12/15/23 with Covid and acute encephalopathy .PMHx AV block, bradycardia, dementia, HTN . Patient has 24/7 care from spouse and is trying to get rid of the [facility] staff starting today. Patient appears to be improving on his vision thru teach back with hand placement and control    Patient  was able to use his hands to get up from chairs with S and improvements noted today and not as impulsive or unsteady the last few visits. He was able to make multiple turns during mobility and had no LOB. He forgets where he is going and has to be cued on where to turn to make laps around the home. He was able to review and progress his standing HEP with visual and verbal cues while using handout and spouse education for carryover. .Patient and spouse re-education on medication regimen, hydration and proper nutrition and used teach back for carryover and accuracy.  Update to PT for discharge next visit if appopriate. He appears to be at his baseline    Plan for next visit:   Gait training   Review  HEP   Balance/transfers/safety   CG education

## 2024-01-15 NOTE — HOME HEALTH
Routine Visit Note:    Skill/education provided: SKILLED CP ASSESSMENT, MED REVIEW    Patient/caregiver response: PT TOLERATED W/O C/O DISCOMFORT    Plan for next visit: SNV FOR CP / MED ASSESS/INSTURCT    Other pertinent info: HX DEMENTIA    Reason for Hosp/Primary Dx/Co-morbidities: [facility] - 12/15/2023 for Covid19 and acute encephalopathy. Pt has hx of history of alzheimer's dementia, GERD, HTN, HLD.

## 2024-01-16 VITALS
OXYGEN SATURATION: 97 % | HEART RATE: 90 BPM | SYSTOLIC BLOOD PRESSURE: 110 MMHG | RESPIRATION RATE: 18 BRPM | TEMPERATURE: 98.7 F | DIASTOLIC BLOOD PRESSURE: 70 MMHG

## 2024-01-16 RX ORDER — TRAZODONE HYDROCHLORIDE 50 MG/1
50-100 TABLET ORAL NIGHTLY
Qty: 60 TABLET | Refills: 11 | Status: SHIPPED | OUTPATIENT
Start: 2024-01-16

## 2024-01-17 ENCOUNTER — HOME CARE VISIT (OUTPATIENT)
Dept: HOME HEALTH SERVICES | Facility: HOME HEALTHCARE | Age: 79
End: 2024-01-17
Payer: COMMERCIAL

## 2024-01-17 VITALS
OXYGEN SATURATION: 99 % | DIASTOLIC BLOOD PRESSURE: 60 MMHG | SYSTOLIC BLOOD PRESSURE: 126 MMHG | HEART RATE: 76 BPM | TEMPERATURE: 98.2 F

## 2024-01-17 VITALS
OXYGEN SATURATION: 99 % | TEMPERATURE: 99.3 F | SYSTOLIC BLOOD PRESSURE: 112 MMHG | DIASTOLIC BLOOD PRESSURE: 76 MMHG | HEART RATE: 77 BPM

## 2024-01-17 PROCEDURE — G0153 HHCP-SVS OF S/L PATH,EA 15MN: HCPCS

## 2024-01-17 PROCEDURE — G0151 HHCP-SERV OF PT,EA 15 MIN: HCPCS

## 2024-01-18 ENCOUNTER — HOME CARE VISIT (OUTPATIENT)
Dept: HOME HEALTH SERVICES | Facility: HOME HEALTHCARE | Age: 79
End: 2024-01-18
Payer: COMMERCIAL

## 2024-01-18 NOTE — HOME HEALTH
Routine Visit Note:   Patient was walking around the home when SLP arrived. He greeted therapist, shaking her hand. He was assisted to the livingroom for the session.   Skill/education provided: Patient was engaged in conversations, with SLP asking questions about his schooling, dental practice, hobbies, family.  Patient/caregiver response: Patient answered questions with increased spontaneous, appropriate responses today. He produced sentences to answer questions at approximately 80 percent accuracy. Patient's speech was more cohesive today. On occasion, patient would acknowledge that he could not recall a fact.   SLP discussed patient swallowing and intake of foods and liquids. Wife reported that she is presenting patient regular food textures with thin liquids.   Plan for next visit: Discharge instructions/ Education   Other pertinent info: NA

## 2024-01-22 ENCOUNTER — HOME CARE VISIT (OUTPATIENT)
Dept: HOME HEALTH SERVICES | Facility: HOME HEALTHCARE | Age: 79
End: 2024-01-22
Payer: COMMERCIAL

## 2024-01-22 PROCEDURE — G0493 RN CARE EA 15 MIN HH/HOSPICE: HCPCS

## 2024-01-22 NOTE — HOME HEALTH
Routine Visit Note: PT/WIFE AGREEABLE TO SN DC NEXT SNV, VS WNL, NO WORSENING OF DEMENTIA    Skill/education provided: SKILLED CP ASSESSMENT, MED RECONCILLIATION    Patient/caregiver response: PT TOLERATED W/O C/O DISCOMFORT    Plan for next visit: D/C SN NEXT SNV    Other pertinent info: DX DEMENTIA

## 2024-01-23 VITALS
HEART RATE: 80 BPM | RESPIRATION RATE: 18 BRPM | TEMPERATURE: 97.6 F | DIASTOLIC BLOOD PRESSURE: 70 MMHG | SYSTOLIC BLOOD PRESSURE: 110 MMHG | OXYGEN SATURATION: 98 %

## 2024-01-24 DIAGNOSIS — G89.29 CHRONIC RIGHT HIP PAIN: Primary | ICD-10-CM

## 2024-01-24 DIAGNOSIS — M25.551 CHRONIC RIGHT HIP PAIN: Primary | ICD-10-CM

## 2024-01-29 ENCOUNTER — OFFICE VISIT (OUTPATIENT)
Dept: SPORTS MEDICINE | Facility: CLINIC | Age: 79
End: 2024-01-29
Payer: MEDICARE

## 2024-01-29 VITALS
WEIGHT: 145 LBS | HEIGHT: 66 IN | DIASTOLIC BLOOD PRESSURE: 74 MMHG | RESPIRATION RATE: 12 BRPM | TEMPERATURE: 98.2 F | SYSTOLIC BLOOD PRESSURE: 120 MMHG | HEART RATE: 67 BPM | OXYGEN SATURATION: 95 % | BODY MASS INDEX: 23.3 KG/M2

## 2024-01-29 DIAGNOSIS — M25.551 GREATER TROCHANTERIC PAIN SYNDROME OF RIGHT LOWER EXTREMITY: Primary | ICD-10-CM

## 2024-01-29 PROCEDURE — 3078F DIAST BP <80 MM HG: CPT | Performed by: FAMILY MEDICINE

## 2024-01-29 PROCEDURE — 99213 OFFICE O/P EST LOW 20 MIN: CPT | Performed by: FAMILY MEDICINE

## 2024-01-29 PROCEDURE — 20610 DRAIN/INJ JOINT/BURSA W/O US: CPT | Performed by: FAMILY MEDICINE

## 2024-01-29 PROCEDURE — 3074F SYST BP LT 130 MM HG: CPT | Performed by: FAMILY MEDICINE

## 2024-01-29 RX ORDER — KETOCONAZOLE 20 MG/ML
SHAMPOO TOPICAL
COMMUNITY
Start: 2023-11-17

## 2024-01-29 RX ORDER — FLUOROURACIL 50 MG/G
CREAM TOPICAL
COMMUNITY
Start: 2023-11-17

## 2024-01-29 RX ORDER — METHYLPREDNISOLONE ACETATE 80 MG/ML
80 INJECTION, SUSPENSION INTRA-ARTICULAR; INTRALESIONAL; INTRAMUSCULAR; SOFT TISSUE
Status: DISCONTINUED | OUTPATIENT
Start: 2024-01-29 | End: 2024-01-29 | Stop reason: HOSPADM

## 2024-01-29 RX ADMIN — METHYLPREDNISOLONE ACETATE 80 MG: 80 INJECTION, SUSPENSION INTRA-ARTICULAR; INTRALESIONAL; INTRAMUSCULAR; SOFT TISSUE at 11:43

## 2024-01-29 NOTE — PROGRESS NOTES
"Tyron is a 79 y.o. year old male     Chief Complaint   Patient presents with    Right Hip - Pain     Possible injection       History of Present Illness  Pain     Here to f/u R hip pain. Continues to have intermittent pain as before. Last injection in July helped but has worn off. Wife notes he is active moving around the home, also had home health rehab after hospitalization in December.       Review of Systems    /74 (BP Location: Left arm, Patient Position: Sitting, Cuff Size: Adult)   Pulse 67   Temp 98.2 °F (36.8 °C) (Infrared)   Resp 12   Ht 167.6 cm (66\")   Wt 65.8 kg (145 lb)   SpO2 95%   BMI 23.40 kg/m²      Physical Exam    Vital signs reviewed.   General: No acute distress.      MSK Exam:  Ortho Exam  R hip: Normal ROM. TTP on greater trochanter. Mild pain with adduction stretch and resisted abduction.     - Large Joint Arthrocentesis: R greater trochanteric bursa on 1/29/2024 11:43 AM  Details: 25 G needle  Medications: 80 mg methylPREDNISolone acetate 80 MG/ML  Outcome: tolerated well, no immediate complications  Procedure, treatment alternatives, risks and benefits explained, specific risks discussed. Immediately prior to procedure a time out was called to verify the correct patient, procedure, equipment, support staff and site/side marked as required. Patient was prepped and draped in the usual sterile fashion.          Diagnoses and all orders for this visit:    Greater trochanteric pain syndrome of right lower extremity  -     - Large Joint Arthrocentesis    Other orders  -     fluorouracil (EFUDEX) 5 % cream  -     ketoconazole (NIZORAL) 2 % shampoo    Discussed options; agreed on repeat injection as above and also recommend try OTC voltaren gel. If burden of pain increases may need to consider alternate strategy for longer term relief such as prp.       EMR Dragon/Transcription disclaimer:    Much of this encounter note is an electronic transcription/translation of spoken language to " printed text.  The electronic translation of spoken language may permit erroneous, or at times, nonsensical words or phrases to be inadvertently transcribed.  Although I have reviewed the note for such errors some may still exist.

## 2024-02-02 DIAGNOSIS — N40.0 BENIGN PROSTATIC HYPERPLASIA WITHOUT LOWER URINARY TRACT SYMPTOMS: Chronic | ICD-10-CM

## 2024-02-02 DIAGNOSIS — I10 ESSENTIAL HYPERTENSION: Chronic | ICD-10-CM

## 2024-02-02 RX ORDER — MEMANTINE HYDROCHLORIDE 10 MG/1
10 TABLET ORAL 2 TIMES DAILY
Qty: 180 TABLET | Refills: 3 | Status: SHIPPED | OUTPATIENT
Start: 2024-02-02

## 2024-02-02 RX ORDER — TAMSULOSIN HYDROCHLORIDE 0.4 MG/1
1 CAPSULE ORAL DAILY
Qty: 90 CAPSULE | Refills: 3 | Status: SHIPPED | OUTPATIENT
Start: 2024-02-02

## 2024-02-02 RX ORDER — LANSOPRAZOLE 30 MG/1
30 CAPSULE, DELAYED RELEASE ORAL DAILY
Qty: 90 CAPSULE | Refills: 3 | Status: SHIPPED | OUTPATIENT
Start: 2024-02-02

## 2024-02-02 RX ORDER — LOSARTAN POTASSIUM 25 MG/1
25 TABLET ORAL DAILY
Qty: 90 TABLET | Refills: 3 | Status: SHIPPED | OUTPATIENT
Start: 2024-02-02

## 2024-02-22 DIAGNOSIS — M25.551 RIGHT HIP PAIN: Primary | ICD-10-CM

## 2024-02-22 DIAGNOSIS — M79.604 RIGHT LEG PAIN: ICD-10-CM

## 2024-03-06 ENCOUNTER — OFFICE VISIT (OUTPATIENT)
Dept: SPORTS MEDICINE | Facility: CLINIC | Age: 79
End: 2024-03-06
Payer: MEDICARE

## 2024-03-06 VITALS
OXYGEN SATURATION: 99 % | HEIGHT: 66 IN | SYSTOLIC BLOOD PRESSURE: 132 MMHG | DIASTOLIC BLOOD PRESSURE: 68 MMHG | HEART RATE: 66 BPM | TEMPERATURE: 97.3 F | WEIGHT: 145 LBS | BODY MASS INDEX: 23.3 KG/M2

## 2024-03-06 DIAGNOSIS — M25.551 GREATER TROCHANTERIC PAIN SYNDROME OF RIGHT LOWER EXTREMITY: Primary | ICD-10-CM

## 2024-03-06 NOTE — PROGRESS NOTES
"Tyron is a 79 y.o. year old male     Chief Complaint   Patient presents with    Hip Pain     RIGHT HIP- Patient is here to follow up on right hip pain, patient states last steroid injection did not last long.        History of Present Illness  HPI     The patient is here today to follow up on right hip pain.    Unfortunately, since his last visit, he has not had much improvement. He still has pain, pinpointed on the lateral aspect of the right hip, worse with walking or laying on it, but does seem to do better with direct massage.      Review of Systems    /68 (BP Location: Left arm, Patient Position: Sitting, Cuff Size: Adult)   Pulse 66   Temp 97.3 °F (36.3 °C) (Temporal)   Ht 167.6 cm (66\")   Wt 65.8 kg (145 lb)   SpO2 99%   BMI 23.40 kg/m²      Physical Exam    Vital signs reviewed.   General: No acute distress.      MSK Exam:  Right Hip Exam     Comments:  Right hip, normal appearance, normal range of motion. There is tenderness to palpation directly on the greater trochanter of the hip. There is minimal tenderness down the lateral hip through the IT band. Negative impingement maneuvers, negative DARIA, FADIR, negative Stinchfield, negative straight leg raise. There is good strength with abduction, adduction, internal and external rotation.            - Large Joint Arthrocentesis: R greater trochanteric bursa on 3/6/2024 3:26 PM  Details: 25 G needle  Medications: 2 mL lidocaine 1 %, 2 mL triamcinolone acetonide 40 MG/ML  Outcome: tolerated well, no immediate complications  Procedure, treatment alternatives, risks and benefits explained, specific risks discussed. Immediately prior to procedure a time out was called to verify the correct patient, procedure, equipment, support staff and site/side marked as required. Patient was prepped and draped in the usual sterile fashion.            Diagnoses and all orders for this visit:    Greater trochanteric pain syndrome of right lower extremity  -     - " Large Joint Arthrocentesis    1. Chronic greater trochanteric pain syndrome, right hip.  I reviewed his MRI report again from last year and this still seems very consistent with chronic greater trochanteric pain syndrome. We are going to try one more steroid injection today with Kenalog. It is interesting when I look back at his timeline, he seems to respond better to Kenalog compared to Depo-Medrol. Hopefully, that will get him on track. If he has a short-term response to this or minimal response, I think we need to consider changing trajectory to use a more regenerative approach with PRP. If he does not respond, we will need to discuss some other options there. I do not see any indicators today of this being referred pain from his hip joint or his lumbar spine. We will follow up based on his response to today's injection.    Total time: 15 minutes. This includes time spent with the patient, but also time spent before the visit reviewing the chart and time after the visit documenting the visit, reviewing labs, imaging studies, etc. This is in addition to/separate from any documented procedures performed.      Transcribed from ambient dictation for Daniel Medrano MD by Amber Moe.  03/06/24   16:19 EST    I have personally performed the services described in this document as transcribed by the above individual, and it is both accurate and complete.  Daniel Medrano MD  3/14/2024  18:08 EDT

## 2024-03-15 ENCOUNTER — OFFICE VISIT (OUTPATIENT)
Dept: CARDIOLOGY | Facility: CLINIC | Age: 79
End: 2024-03-15
Payer: MEDICARE

## 2024-03-15 VITALS
SYSTOLIC BLOOD PRESSURE: 128 MMHG | OXYGEN SATURATION: 99 % | HEART RATE: 72 BPM | WEIGHT: 139 LBS | DIASTOLIC BLOOD PRESSURE: 70 MMHG | HEIGHT: 66 IN | BODY MASS INDEX: 22.34 KG/M2

## 2024-03-15 DIAGNOSIS — E78.00 HYPERCHOLESTEROLEMIA: ICD-10-CM

## 2024-03-15 DIAGNOSIS — I10 ESSENTIAL HYPERTENSION: ICD-10-CM

## 2024-03-15 DIAGNOSIS — I44.7 LBBB (LEFT BUNDLE BRANCH BLOCK): Primary | ICD-10-CM

## 2024-03-15 DIAGNOSIS — I44.30 AV BLOCK: ICD-10-CM

## 2024-03-15 PROBLEM — K22.4 HYPERTENSIVE LOWER ESOPHAGEAL SPHINCTER: Status: ACTIVE | Noted: 2024-03-15

## 2024-03-15 NOTE — PROGRESS NOTES
PATIENTINFORMATION    Date of Office Visit: 03/15/2024  Encounter Provider: Hector Gan MD  Place of Service: [facility] CARDIOLOGY  Patient Name: Tyron Hunt  : 1945    Subjective:     Encounter Date:03/15/2024      Patient ID: Tyron Hunt is a 79 y.o. male.    Chief Complaint   Patient presents with    Follow-up     HPI  Mr. Hunt is a pleasant 80 yo gentleman who came to cardiac clinic for posthospital discharge follow-up.  He was admitted back in 2023 with COVID infection and altered mental status.  He was noted to have intermittent bradycardia and high degree AV block on telemetry but remained asymptomatic with normal blood pressure.  He has prior history of left bundle branch and hypertension but no significant heart disease.  He is pretty active and walking regularly prior to hospital admission.  Currently he has significant issues with right hip pain during walking but his mental status is improving by the day.  His wife was by his side during interview helping with history.  He denies any chest pain, presyncope syncope, orthopnea, PND, palpitations or significant extremity swelling.    ROS  All systems reviewed and negative except as noted in HPI.    Past Medical History:   Diagnosis Date    Anxiety     COVID-19 2022    Diverticulitis     Gastritis     GERD (gastroesophageal reflux disease)     Gout     H/O complete eye exam 2017    Hyperlipidemia     Hypertension     Impaired fasting blood sugar     Kidney stones     Memory loss     Sleep apnea        Past Surgical History:   Procedure Laterality Date    CERVICAL EPIDURAL N/A 2021    Procedure: CERVICAL EPIDURAL;  Surgeon: Nataliia Avilez MD;  [place] [facility] [place];  Service: Pain Management;  Laterality: N/A;    CERVICAL EPIDURAL N/A 10/18/2021    Procedure: CERVICAL EPIDURAL 7-1;  Surgeon: Nataliia Avilez MD;  [place] [facility] [place];  Service: Pain Management;  Laterality: N/A;     "COLONOSCOPY      date unknow-Gregorio GRIFFIN    CYST REMOVAL      CYSTOSCOPY W/ LITHOLAPAXY / [facility]      TONSILLECTOMY  1949    UPPER GASTROINTESTINAL ENDOSCOPY  10/05/2021    Gastritis   Arlet GRIFFIN       Social History     Socioeconomic History    Marital status:    Tobacco Use    Smoking status: Never    Smokeless tobacco: Never   Vaping Use    Vaping status: Never Used   Substance and Sexual Activity    Alcohol use: Never    Drug use: Never    Sexual activity: Defer     Partners: Female       Family History   Problem Relation Age of Onset    Arthritis Mother     Heart disease Mother     Thyroid disease Mother     Hypertension Brother     Obesity Brother     Pancreatic cancer Maternal Aunt     Dementia Maternal Grandmother          Procedures       Objective:     /70   Pulse 72   Ht 167.6 cm (66\")   Wt 63 kg (139 lb)   SpO2 99%   BMI 22.44 kg/m²  Body mass index is 22.44 kg/m².     Constitutional:       General: Not in acute distress.     Appearance: Well-developed. Not diaphoretic.   Eyes:      Pupils: Pupils are equal, round, and reactive to light.   HENT:      Head: Normocephalic and atraumatic.   Neck:      Thyroid: No thyromegaly.   Pulmonary:      Effort: Pulmonary effort is normal. No respiratory distress.      Breath sounds: Normal breath sounds. No wheezing. No rales.   Chest:      Chest wall: Not tender to palpatation.   Cardiovascular:      Normal rate. Regular rhythm.      No gallop.    Pulses:     Intact distal pulses.   Edema:     Peripheral edema absent.   Abdominal:      General: Bowel sounds are normal. There is no distension.      Palpations: Abdomen is soft.      Tenderness: There is no guarding.   Musculoskeletal: Normal range of motion.         General: No deformity.      Cervical back: Normal range of motion and neck supple. Skin:     General: Skin is warm and dry.      Findings: No rash.   Neurological:      Mental Status: Alert and oriented to person, place, and time.      Cranial " Nerves: No cranial nerve deficit.      Deep Tendon Reflexes: Reflexes are normal and symmetric.   Psychiatric:         Judgment: Judgment normal.       Review Of Data: I have reviewed pertinent recent labs, images and documents and pertinent findings included in HPI or assessment below.    Lipid Panel          11/17/2023    09:44   Lipid Panel   Total Cholesterol 200    Triglycerides 108    HDL Cholesterol 44    VLDL Cholesterol 20    LDL Cholesterol  136          Assessment/Plan:         Essential hypertension-controlled on current regimen  Intermittent high degree AV block during hospital admission in December 2023 on telemetry without any significant new symptoms.  Blood pressure remained normal.  He is fairly active considering comorbidities and tries to walk around that she is limited by right hip pain.  No presyncope syncope, change in breathing or chest pain.  Heart rate today is in the 70s at rest.  Blood pressure normal.  Hypercholesterolemia  Alzheimer's dementia on treatment  Right hip osteoarthritis surfing with activities    Mr. Hunt is stable from cardiac standpoint.  Vital signs within range.  No further cardiac testing or intervention warranted at this point.  Continue current care.  Follow-up in cardiac clinic in 1 year or sooner with any concerning symptoms.    Diagnosis and plan of care discussed with patient and verbalized understanding.            Your medication list            Accurate as of March 15, 2024  2:59 PM. If you have any questions, ask your nurse or doctor.                CONTINUE taking these medications        Instructions Last Dose Given Next Dose Due   coenzyme Q10 100 MG capsule      Take 2 capsules by mouth Daily.       fluorouracil 5 % cream  Commonly known as: EFUDEX           ketoconazole 2 % shampoo  Commonly known as: NIZORAL           lansoprazole 30 MG capsule  Commonly known as: PREVACID      Take 1 capsule by mouth Daily. Indications: Heartburn       losartan 25 MG  tablet  Commonly known as: Cozaar      Take 1 tablet by mouth Daily. Indications: High Blood Pressure Disorder       memantine 10 MG tablet  Commonly known as: NAMENDA      Take 1 tablet by mouth 2 (Two) Times a Day. Indications: Alzheimer's Disease       tamsulosin 0.4 MG capsule 24 hr capsule  Commonly known as: FLOMAX      Take 1 capsule by mouth Daily. Indications: Benign Enlargement of Prostate       Tylenol 325 MG tablet  Generic drug: acetaminophen      Take 2 tablets by mouth Every 6 (Six) Hours As Needed for Mild Pain. Indications: Pain       VITAMIN D3 PO      Take 125 mcg by mouth Daily. 5000 IU                    Hector Gna MD  03/15/24  14:59 EDT

## 2024-03-30 RX ORDER — ESCITALOPRAM OXALATE 5 MG/1
5 TABLET ORAL DAILY
Qty: 90 TABLET | Refills: 1 | Status: SHIPPED | OUTPATIENT
Start: 2024-03-30

## 2024-05-05 NOTE — PROGRESS NOTES
Physical Therapy Daily Treatment Note    Patient: Tyron Hunt  : 1945  Referring practitioner: Abraham Ledbetter MD  Today's Date: 2022    VISIT#: 5      Subjective   Tyron Hunt reports: I have a pinpoint spot on my hip that is still painful, but it is getting better.      Objective   See Exercise, Manual, and Modality Logs for complete treatment.     Patient Education: reviewed current HEP    Assessment/Plan   Tolerated continued progression of therapeutic exercise/activity well today, no increased pain reported during or after exercises.  Demos improving endurance/tolerance to exercise in general.     Progress per Plan of Care            Timed:         Manual Therapy:         mins  36972;     Therapeutic Exercise:    24     mins  89464;     Neuromuscular Fe:        mins  02897;    Therapeutic Activity:     15     mins  84065;     Gait Training:           mins  52109;     Ultrasound:          mins  68580;    Ionto:                                   mins   31688  Self Care:                            mins   75706    Un-Timed:  Electrical Stimulation:         mins  06052 ( );  Dry Needling          mins self-pay  Traction          mins 30392  Re-Eval                               mins  01368    Timed Treatment:   39   mins   Total Treatment:     39   mins    Sarkis Jimenez PTA   [place] License #[id number]  Physical Therapist Assistant     Negative

## 2024-07-26 DIAGNOSIS — M70.70 BURSITIS OF HIP, UNSPECIFIED BURSA, UNSPECIFIED LATERALITY: Primary | ICD-10-CM

## 2024-09-20 DIAGNOSIS — B35.1 ONYCHOMYCOSIS: Primary | ICD-10-CM

## 2024-09-30 DIAGNOSIS — N40.0 BENIGN PROSTATIC HYPERPLASIA WITHOUT LOWER URINARY TRACT SYMPTOMS: Chronic | ICD-10-CM

## 2024-09-30 RX ORDER — TAMSULOSIN HYDROCHLORIDE 0.4 MG/1
2 CAPSULE ORAL DAILY
Qty: 180 CAPSULE | Refills: 3 | Status: SHIPPED | OUTPATIENT
Start: 2024-09-30

## 2024-09-30 RX ORDER — TAMSULOSIN HYDROCHLORIDE 0.4 MG/1
2 CAPSULE ORAL DAILY
Qty: 28 CAPSULE | Refills: 0 | Status: SHIPPED | OUTPATIENT
Start: 2024-09-30 | End: 2024-09-30 | Stop reason: SDUPTHER

## 2024-11-23 ENCOUNTER — DOCUMENTATION (OUTPATIENT)
Dept: FAMILY MEDICINE CLINIC | Facility: CLINIC | Age: 79
End: 2024-11-23
Payer: MEDICARE

## 2024-11-23 RX ORDER — ESCITALOPRAM OXALATE 5 MG/1
5 TABLET ORAL DAILY
Qty: 90 TABLET | Refills: 3 | Status: SHIPPED | OUTPATIENT
Start: 2024-11-23

## 2025-01-19 DIAGNOSIS — I10 ESSENTIAL HYPERTENSION: Primary | ICD-10-CM

## 2025-01-19 DIAGNOSIS — R73.01 IFG (IMPAIRED FASTING GLUCOSE): ICD-10-CM

## 2025-01-28 DIAGNOSIS — F03.B0 MODERATE DEMENTIA, UNSPECIFIED DEMENTIA TYPE, UNSPECIFIED WHETHER BEHAVIORAL, PSYCHOTIC, OR MOOD DISTURBANCE OR ANXIETY: Primary | ICD-10-CM

## 2025-01-28 DIAGNOSIS — I10 ESSENTIAL HYPERTENSION: Chronic | ICD-10-CM

## 2025-01-28 RX ORDER — LOSARTAN POTASSIUM 25 MG/1
25 TABLET ORAL DAILY
Qty: 14 TABLET | Refills: 3 | Status: SHIPPED | OUTPATIENT
Start: 2025-01-28

## 2025-01-28 RX ORDER — LANSOPRAZOLE 30 MG/1
30 CAPSULE, DELAYED RELEASE ORAL DAILY
Qty: 90 CAPSULE | Refills: 3 | Status: SHIPPED | OUTPATIENT
Start: 2025-01-28 | End: 2025-01-28 | Stop reason: SDUPTHER

## 2025-01-28 RX ORDER — LOSARTAN POTASSIUM 25 MG/1
25 TABLET ORAL DAILY
Qty: 90 TABLET | Refills: 3 | Status: SHIPPED | OUTPATIENT
Start: 2025-01-28 | End: 2025-01-28 | Stop reason: SDUPTHER

## 2025-01-28 RX ORDER — MEMANTINE HYDROCHLORIDE 10 MG/1
10 TABLET ORAL 2 TIMES DAILY
Qty: 28 TABLET | Refills: 3 | Status: SHIPPED | OUTPATIENT
Start: 2025-01-28

## 2025-01-28 RX ORDER — MEMANTINE HYDROCHLORIDE 10 MG/1
10 TABLET ORAL 2 TIMES DAILY
Qty: 180 TABLET | Refills: 3 | Status: SHIPPED | OUTPATIENT
Start: 2025-01-28 | End: 2025-01-28 | Stop reason: SDUPTHER

## 2025-01-28 RX ORDER — LANSOPRAZOLE 30 MG/1
30 CAPSULE, DELAYED RELEASE ORAL DAILY
Qty: 14 CAPSULE | Refills: 3 | Status: SHIPPED | OUTPATIENT
Start: 2025-01-28

## 2025-01-29 LAB
ALBUMIN SERPL-MCNC: 4.3 G/DL (ref 3.5–5.2)
ALBUMIN/GLOB SERPL: 2 G/DL
ALP SERPL-CCNC: 68 U/L (ref 39–117)
ALT SERPL-CCNC: 7 U/L (ref 1–41)
AST SERPL-CCNC: 13 U/L (ref 1–40)
BASOPHILS # BLD AUTO: 0.02 10*3/MM3 (ref 0–0.2)
BASOPHILS NFR BLD AUTO: 0.5 % (ref 0–1.5)
BILIRUB SERPL-MCNC: 0.6 MG/DL (ref 0–1.2)
BUN SERPL-MCNC: 15 MG/DL (ref 8–23)
BUN/CREAT SERPL: 13.8 (ref 7–25)
CALCIUM SERPL-MCNC: 9.6 MG/DL (ref 8.6–10.5)
CHLORIDE SERPL-SCNC: 105 MMOL/L (ref 98–107)
CO2 SERPL-SCNC: 28.7 MMOL/L (ref 22–29)
CREAT SERPL-MCNC: 1.09 MG/DL (ref 0.76–1.27)
EGFRCR SERPLBLD CKD-EPI 2021: 68.2 ML/MIN/1.73
EOSINOPHIL # BLD AUTO: 0.17 10*3/MM3 (ref 0–0.4)
EOSINOPHIL NFR BLD AUTO: 4.6 % (ref 0.3–6.2)
ERYTHROCYTE [DISTWIDTH] IN BLOOD BY AUTOMATED COUNT: 12.5 % (ref 12.3–15.4)
GLOBULIN SER CALC-MCNC: 2.2 GM/DL
GLUCOSE SERPL-MCNC: 98 MG/DL (ref 65–99)
HBA1C MFR BLD: 5.4 % (ref 4.8–5.6)
HCT VFR BLD AUTO: 40.7 % (ref 37.5–51)
HGB BLD-MCNC: 13.9 G/DL (ref 13–17.7)
IMM GRANULOCYTES # BLD AUTO: 0 10*3/MM3 (ref 0–0.05)
IMM GRANULOCYTES NFR BLD AUTO: 0 % (ref 0–0.5)
LYMPHOCYTES # BLD AUTO: 1.4 10*3/MM3 (ref 0.7–3.1)
LYMPHOCYTES NFR BLD AUTO: 37.9 % (ref 19.6–45.3)
MCH RBC QN AUTO: 32.5 PG (ref 26.6–33)
MCHC RBC AUTO-ENTMCNC: 34.2 G/DL (ref 31.5–35.7)
MCV RBC AUTO: 95.1 FL (ref 79–97)
MONOCYTES # BLD AUTO: 0.3 10*3/MM3 (ref 0.1–0.9)
MONOCYTES NFR BLD AUTO: 8.1 % (ref 5–12)
NEUTROPHILS # BLD AUTO: 1.8 10*3/MM3 (ref 1.7–7)
NEUTROPHILS NFR BLD AUTO: 48.9 % (ref 42.7–76)
NRBC BLD AUTO-RTO: 0 /100 WBC (ref 0–0.2)
PLATELET # BLD AUTO: 137 10*3/MM3 (ref 140–450)
POTASSIUM SERPL-SCNC: 4.2 MMOL/L (ref 3.5–5.2)
PROT SERPL-MCNC: 6.5 G/DL (ref 6–8.5)
RBC # BLD AUTO: 4.28 10*6/MM3 (ref 4.14–5.8)
SODIUM SERPL-SCNC: 146 MMOL/L (ref 136–145)
TSH SERPL DL<=0.005 MIU/L-ACNC: 1.63 UIU/ML (ref 0.27–4.2)
WBC # BLD AUTO: 3.69 10*3/MM3 (ref 3.4–10.8)

## 2025-02-02 ENCOUNTER — DOCUMENTATION (OUTPATIENT)
Dept: FAMILY MEDICINE CLINIC | Facility: CLINIC | Age: 80
End: 2025-02-02
Payer: MEDICARE

## 2025-02-02 DIAGNOSIS — N40.0 BENIGN PROSTATIC HYPERPLASIA WITHOUT LOWER URINARY TRACT SYMPTOMS: Primary | Chronic | ICD-10-CM

## 2025-02-02 RX ORDER — TAMSULOSIN HYDROCHLORIDE 0.4 MG/1
2 CAPSULE ORAL DAILY
Qty: 28 CAPSULE | Refills: 3 | Status: SHIPPED | OUTPATIENT
Start: 2025-02-02

## 2025-02-17 DIAGNOSIS — R26.9 GAIT ABNORMALITY: Primary | ICD-10-CM

## 2025-02-19 ENCOUNTER — TELEPHONE (OUTPATIENT)
Dept: FAMILY MEDICINE CLINIC | Facility: CLINIC | Age: 80
End: 2025-02-19
Payer: MEDICARE

## 2025-02-19 NOTE — TELEPHONE ENCOUNTER
Patient has late stage Alzheimer's disease with gait abnormality, status post numerous falls previous, who was admitted last Friday to a permanent memory care facility.  Per the physical therapist at the facility, patient would benefit greatly from a rollator walker to decrease his fall risk and to increase his ADLs.  This prescription has been written.

## 2025-03-18 ENCOUNTER — TELEPHONE (OUTPATIENT)
Dept: FAMILY MEDICINE CLINIC | Facility: CLINIC | Age: 80
End: 2025-03-18
Payer: MEDICARE

## 2025-03-18 NOTE — TELEPHONE ENCOUNTER
Spoke with Caleb with [email or web address]. Advised that all meds with company will need to be stopped on mail order to home. Caleb verbalized all meds have now been taken off of their refill system.

## 2025-03-30 DIAGNOSIS — F03.B0 MODERATE DEMENTIA, UNSPECIFIED DEMENTIA TYPE, UNSPECIFIED WHETHER BEHAVIORAL, PSYCHOTIC, OR MOOD DISTURBANCE OR ANXIETY: ICD-10-CM

## 2025-03-30 DIAGNOSIS — I10 ESSENTIAL HYPERTENSION: Chronic | ICD-10-CM

## 2025-03-30 RX ORDER — LOSARTAN POTASSIUM 25 MG/1
25 TABLET ORAL DAILY
Qty: 90 TABLET | Refills: 3 | Status: CANCELLED | OUTPATIENT
Start: 2025-03-30

## 2025-03-30 RX ORDER — LANSOPRAZOLE 30 MG/1
30 CAPSULE, DELAYED RELEASE ORAL DAILY
Qty: 90 CAPSULE | Refills: 3 | Status: CANCELLED | OUTPATIENT
Start: 2025-03-30

## 2025-03-30 RX ORDER — MEMANTINE HYDROCHLORIDE 10 MG/1
10 TABLET ORAL 2 TIMES DAILY
Qty: 180 TABLET | Refills: 3 | Status: CANCELLED | OUTPATIENT
Start: 2025-03-30

## 2025-04-25 ENCOUNTER — EXTERNAL PBMM DATA (OUTPATIENT)
Dept: PHARMACY | Facility: OTHER | Age: 80
End: 2025-04-25

## (undated) DEVICE — GLV SURG TRIUMPH PF LTX 7 STRL

## (undated) DEVICE — Device: Brand: PORTEX

## (undated) DEVICE — NDL EPID TUOHY 18G 31/2IN

## (undated) DEVICE — EPIDURAL TRAY: Brand: MEDLINE INDUSTRIES, INC.